# Patient Record
Sex: FEMALE | Race: OTHER | NOT HISPANIC OR LATINO | URBAN - METROPOLITAN AREA
[De-identification: names, ages, dates, MRNs, and addresses within clinical notes are randomized per-mention and may not be internally consistent; named-entity substitution may affect disease eponyms.]

---

## 2018-11-22 ENCOUNTER — EMERGENCY (EMERGENCY)
Facility: HOSPITAL | Age: 73
LOS: 1 days | Discharge: HOME | End: 2018-11-22
Attending: EMERGENCY MEDICINE | Admitting: EMERGENCY MEDICINE
Payer: MEDICARE

## 2018-11-22 VITALS
OXYGEN SATURATION: 100 % | TEMPERATURE: 98 F | DIASTOLIC BLOOD PRESSURE: 72 MMHG | RESPIRATION RATE: 17 BRPM | SYSTOLIC BLOOD PRESSURE: 182 MMHG | HEART RATE: 83 BPM

## 2018-11-22 VITALS
TEMPERATURE: 98 F | SYSTOLIC BLOOD PRESSURE: 194 MMHG | HEART RATE: 80 BPM | OXYGEN SATURATION: 100 % | DIASTOLIC BLOOD PRESSURE: 75 MMHG | RESPIRATION RATE: 18 BRPM

## 2018-11-22 LAB
ALBUMIN SERPL ELPH-MCNC: 4.2 G/DL — SIGNIFICANT CHANGE UP (ref 3.3–5)
ALP SERPL-CCNC: 65 U/L — SIGNIFICANT CHANGE UP (ref 40–120)
ALT FLD-CCNC: 20 U/L — SIGNIFICANT CHANGE UP (ref 4–33)
APPEARANCE UR: CLEAR — SIGNIFICANT CHANGE UP
AST SERPL-CCNC: 30 U/L — SIGNIFICANT CHANGE UP (ref 4–32)
BASE EXCESS BLDV CALC-SCNC: -2.6 MMOL/L — SIGNIFICANT CHANGE UP
BASOPHILS # BLD AUTO: 0.09 K/UL — SIGNIFICANT CHANGE UP (ref 0–0.2)
BASOPHILS NFR BLD AUTO: 0.6 % — SIGNIFICANT CHANGE UP (ref 0–2)
BILIRUB SERPL-MCNC: 0.3 MG/DL — SIGNIFICANT CHANGE UP (ref 0.2–1.2)
BILIRUB UR-MCNC: NEGATIVE — SIGNIFICANT CHANGE UP
BLOOD GAS VENOUS - CREATININE: 1.84 MG/DL — HIGH (ref 0.5–1.3)
BLOOD UR QL VISUAL: NEGATIVE — SIGNIFICANT CHANGE UP
BUN SERPL-MCNC: 35 MG/DL — HIGH (ref 7–23)
CALCIUM SERPL-MCNC: 10.1 MG/DL — SIGNIFICANT CHANGE UP (ref 8.4–10.5)
CHLORIDE BLDV-SCNC: 108 MMOL/L — SIGNIFICANT CHANGE UP (ref 96–108)
CHLORIDE SERPL-SCNC: 105 MMOL/L — SIGNIFICANT CHANGE UP (ref 98–107)
CO2 SERPL-SCNC: 20 MMOL/L — LOW (ref 22–31)
COLOR SPEC: SIGNIFICANT CHANGE UP
CREAT SERPL-MCNC: 1.82 MG/DL — HIGH (ref 0.5–1.3)
EOSINOPHIL # BLD AUTO: 0.29 K/UL — SIGNIFICANT CHANGE UP (ref 0–0.5)
EOSINOPHIL NFR BLD AUTO: 2 % — SIGNIFICANT CHANGE UP (ref 0–6)
GAS PNL BLDV: 141 MMOL/L — SIGNIFICANT CHANGE UP (ref 136–146)
GLUCOSE BLDV-MCNC: 219 — HIGH (ref 70–99)
GLUCOSE SERPL-MCNC: 209 MG/DL — HIGH (ref 70–99)
GLUCOSE UR-MCNC: NEGATIVE — SIGNIFICANT CHANGE UP
HCO3 BLDV-SCNC: 22 MMOL/L — SIGNIFICANT CHANGE UP (ref 20–27)
HCT VFR BLD CALC: 36.3 % — SIGNIFICANT CHANGE UP (ref 34.5–45)
HCT VFR BLDV CALC: 37.3 % — SIGNIFICANT CHANGE UP (ref 34.5–45)
HGB BLD-MCNC: 11.9 G/DL — SIGNIFICANT CHANGE UP (ref 11.5–15.5)
HGB BLDV-MCNC: 12.1 G/DL — SIGNIFICANT CHANGE UP (ref 11.5–15.5)
IMM GRANULOCYTES # BLD AUTO: 0.04 # — SIGNIFICANT CHANGE UP
IMM GRANULOCYTES NFR BLD AUTO: 0.3 % — SIGNIFICANT CHANGE UP (ref 0–1.5)
KETONES UR-MCNC: NEGATIVE — SIGNIFICANT CHANGE UP
LACTATE BLDV-MCNC: 1.5 MMOL/L — SIGNIFICANT CHANGE UP (ref 0.5–2)
LEUKOCYTE ESTERASE UR-ACNC: NEGATIVE — SIGNIFICANT CHANGE UP
LIDOCAIN IGE QN: 113.9 U/L — HIGH (ref 7–60)
LYMPHOCYTES # BLD AUTO: 17.7 % — SIGNIFICANT CHANGE UP (ref 13–44)
LYMPHOCYTES # BLD AUTO: 2.55 K/UL — SIGNIFICANT CHANGE UP (ref 1–3.3)
MCHC RBC-ENTMCNC: 26.9 PG — LOW (ref 27–34)
MCHC RBC-ENTMCNC: 32.8 % — SIGNIFICANT CHANGE UP (ref 32–36)
MCV RBC AUTO: 81.9 FL — SIGNIFICANT CHANGE UP (ref 80–100)
MONOCYTES # BLD AUTO: 0.84 K/UL — SIGNIFICANT CHANGE UP (ref 0–0.9)
MONOCYTES NFR BLD AUTO: 5.8 % — SIGNIFICANT CHANGE UP (ref 2–14)
NEUTROPHILS # BLD AUTO: 10.57 K/UL — HIGH (ref 1.8–7.4)
NEUTROPHILS NFR BLD AUTO: 73.6 % — SIGNIFICANT CHANGE UP (ref 43–77)
NITRITE UR-MCNC: NEGATIVE — SIGNIFICANT CHANGE UP
NRBC # FLD: 0 — SIGNIFICANT CHANGE UP
OB PNL STL: POSITIVE — SIGNIFICANT CHANGE UP
PCO2 BLDV: 43 MMHG — SIGNIFICANT CHANGE UP (ref 41–51)
PH BLDV: 7.34 PH — SIGNIFICANT CHANGE UP (ref 7.32–7.43)
PH UR: 6 — SIGNIFICANT CHANGE UP (ref 5–8)
PLATELET # BLD AUTO: 230 K/UL — SIGNIFICANT CHANGE UP (ref 150–400)
PMV BLD: 11.9 FL — SIGNIFICANT CHANGE UP (ref 7–13)
PO2 BLDV: 42 MMHG — HIGH (ref 35–40)
POTASSIUM BLDV-SCNC: 4.7 MMOL/L — HIGH (ref 3.4–4.5)
POTASSIUM SERPL-MCNC: 4.8 MMOL/L — SIGNIFICANT CHANGE UP (ref 3.5–5.3)
POTASSIUM SERPL-SCNC: 4.8 MMOL/L — SIGNIFICANT CHANGE UP (ref 3.5–5.3)
PROT SERPL-MCNC: 8.6 G/DL — HIGH (ref 6–8.3)
PROT UR-MCNC: NEGATIVE — SIGNIFICANT CHANGE UP
RBC # BLD: 4.43 M/UL — SIGNIFICANT CHANGE UP (ref 3.8–5.2)
RBC # FLD: 12.9 % — SIGNIFICANT CHANGE UP (ref 10.3–14.5)
SAO2 % BLDV: 73.3 % — SIGNIFICANT CHANGE UP (ref 60–85)
SODIUM SERPL-SCNC: 139 MMOL/L — SIGNIFICANT CHANGE UP (ref 135–145)
SP GR SPEC: 1.01 — SIGNIFICANT CHANGE UP (ref 1–1.04)
UROBILINOGEN FLD QL: NORMAL — SIGNIFICANT CHANGE UP
WBC # BLD: 14.38 K/UL — HIGH (ref 3.8–10.5)
WBC # FLD AUTO: 14.38 K/UL — HIGH (ref 3.8–10.5)

## 2018-11-22 PROCEDURE — 99284 EMERGENCY DEPT VISIT MOD MDM: CPT | Mod: GC

## 2018-11-22 PROCEDURE — 74176 CT ABD & PELVIS W/O CONTRAST: CPT | Mod: 26

## 2018-11-22 RX ORDER — CIPROFLOXACIN LACTATE 400MG/40ML
500 VIAL (ML) INTRAVENOUS ONCE
Qty: 0 | Refills: 0 | Status: DISCONTINUED | OUTPATIENT
Start: 2018-11-22 | End: 2018-11-26

## 2018-11-22 RX ORDER — METRONIDAZOLE 500 MG
1 TABLET ORAL
Qty: 21 | Refills: 0
Start: 2018-11-22 | End: 2018-11-28

## 2018-11-22 RX ORDER — SODIUM CHLORIDE 9 MG/ML
1000 INJECTION INTRAMUSCULAR; INTRAVENOUS; SUBCUTANEOUS ONCE
Qty: 0 | Refills: 0 | Status: COMPLETED | OUTPATIENT
Start: 2018-11-22 | End: 2018-11-22

## 2018-11-22 RX ORDER — CIPROFLOXACIN LACTATE 400MG/40ML
1 VIAL (ML) INTRAVENOUS
Qty: 14 | Refills: 0
Start: 2018-11-22 | End: 2018-11-28

## 2018-11-22 RX ORDER — METRONIDAZOLE 500 MG
500 TABLET ORAL ONCE
Qty: 0 | Refills: 0 | Status: DISCONTINUED | OUTPATIENT
Start: 2018-11-22 | End: 2018-11-26

## 2018-11-22 RX ADMIN — SODIUM CHLORIDE 1000 MILLILITER(S): 9 INJECTION INTRAMUSCULAR; INTRAVENOUS; SUBCUTANEOUS at 12:04

## 2018-11-22 NOTE — ED PROVIDER NOTE - MEDICAL DECISION MAKING DETAILS
Blaze: sent in for abdominal pain and GI bleeding. Will evaluate. as pt has some llq pain. pt may need CT.  will check labs for anemia.

## 2018-11-22 NOTE — ED PROVIDER NOTE - OBJECTIVE STATEMENT
73F h/o HTN, DM p/w 2 days of intermittent diffuse abdominal pain, no aggravating/alleviating factors. Last BM was 2 days ago, was red (had eaten preserved mangoes prior to BM). Saw PMD 2 days ago who advised pt to go to the ER. Denies fever, abdominal surgeries, nausea, vomiting, diarrhea, dysuria, hematuria.

## 2018-11-22 NOTE — ED ADULT NURSE NOTE - OBJECTIVE STATEMENT
73F h/o HTN, DM p/w 2 days of intermittent diffuse abdominal pain, no aggravating/alleviating factors. Last BM was 2 days ago, was red (had eaten preserved mangoes prior to BM). Saw PMD 2 days ago who advised pt to go to the ER. Denies fever, abdominal surgeries, nausea, vomiting, diarrhea, dysuria, hematuria.  Abdomen soft and non tender, denies pain at current. Resp even and unlabored. NAD.

## 2018-11-22 NOTE — ED PROVIDER NOTE - NSFOLLOWUPINSTRUCTIONS_ED_ALL_ED_FT
- Follow up with your primary care doctor in 1-2 days.    - Consider seeing a Gastroenterologist, see attached list.   - Bring results with you to the appointment.   - Take Ciprofloxacin 500mg twice a day and Flagyl 500mg three times a day for 7 days.   - Take tylenol 650mg every 6 hours for pain or fever.   - Return to the ED for new or worsening symptoms.

## 2018-11-22 NOTE — ED PROVIDER NOTE - ATTENDING CONTRIBUTION TO CARE
I performed a history and physical exam of the patient and discussed their management with the resident and /or advanced care provider. I reviewed the resident and /or ACP's note and agree with the documented findings and plan of care. My medical decision making and observations are found above.  Abd llq pain. lungs clear

## 2018-11-22 NOTE — ED ADULT NURSE NOTE - NSIMPLEMENTINTERV_GEN_ALL_ED
Implemented All Universal Safety Interventions:  Max to call system. Call bell, personal items and telephone within reach. Instruct patient to call for assistance. Room bathroom lighting operational. Non-slip footwear when patient is off stretcher. Physically safe environment: no spills, clutter or unnecessary equipment. Stretcher in lowest position, wheels locked, appropriate side rails in place.

## 2018-11-22 NOTE — ED ADULT TRIAGE NOTE - CHIEF COMPLAINT QUOTE
Pt states she ate a sam with red dye in it on Tuesday. Yesterday had a bowel movement and saw redness. Pt unsure if its blood or the dye. Co diffuse abdominal pain. On low dose aspirin. PMH: diabetes, htn.

## 2019-01-05 NOTE — ED ADULT NURSE NOTE - CHIEF COMPLAINT
Called by rn to eval right bloody nt. pt denies any tugging/ trauma. color light red. nt flushed easily with ns. keep tubes secure. flush every 8 carlo bloody. Replacement of SPT The patient is a 73y Female complaining of

## 2019-12-21 NOTE — ED ADULT NURSE NOTE - NS ED NOTE ABUSE RESPONSE YN
Please note patient is a poor historian    This is a Please note patient is a poor historian    This is a 63F with history of Schizophrenia, DM2, and COPD who presents to the hospital with complaints of 2 syncopal episodes today. Said that the first episode occurred while she was in her kitchen, fell onto the floor but denied any prodromal symptoms, no cardiac symptoms, and no pulmonary symptoms. A little while later she was outside smoking and then fell again, again denied any prodromal symptoms but did say that she was unable to get up after the second fall and therefore called EMS. Said that she had a similar fall about 2 months ago but otherwise denied any frequent falls. Has long standing dyskinesia 2/2 psychiatric medications but denied that impairing her ability to ambulate. Denied any infectious complaints. Said that she feels well currently.     On arrival to the ED, her vitals were T 97.7, P 114, /67, RR 16, O2 sat 91% RA -> 97% 4L. Her lab work was significant for leukocytosis (seems to have chronic low grade leukocytosis to 14k-15k based on prior labs) without neutrophilia. Her trop was 18 (patient without cardiac/pulmonary complaints. Her CTH and CXR were negative. Her RVP was negative. She was given ceftriaxone 1g IVPB, azithromycin 500mg IVPB, and NS 2L. She was admitted to medicine on telemetry for further evaluation. Please note patient is a poor historian    This is a 63F with history of Schizophrenia, DM2, and COPD who presents to the hospital with complaints of 2 syncopal episodes today. Said that the first episode occurred while she was in her kitchen, fell onto the floor but denied any prodromal symptoms, no cardiac symptoms, and no pulmonary symptoms. A little while later she was outside smoking and then fell again, again denied any prodromal symptoms but did say that she was unable to get up after the second fall and therefore called EMS. Said that she had a similar fall about 2 months ago but otherwise denied any frequent falls. Has long standing dyskinesia 2/2 psychiatric medications but denied that impairing her ability to ambulate. Denied any infectious complaints. Said that she feels well currently.     On arrival to the ED, her vitals were T 97.7, P 114, /67, RR 16, O2 sat 91% RA -> 97% 4L. Her lab work was significant for leukocytosis (seems to have chronic low grade leukocytosis to 14k-15k based on prior labs) without neutrophilia. Her trop was 18 (patient without cardiac/pulmonary complaints. Her CTH and CXR were negative. Her RVP was negative. She was given ceftriaxone 1g IVPB, azithromycin 500mg IVPB, and NS 2L. She was admitted to medicine on telemetry for further evaluation.     Of note, patient said that she gets her medications delivered from her pharmacy but has not received any deliveries in the past 2 weeks. As a result said that she has been without her medications for the past 2 weeks. Yes

## 2020-11-30 NOTE — ED PROVIDER NOTE - CROS ED GI ALL NEG
----- Message from Sherice Granados [facility] on [address] [place] [address] in [place] [phone]  Local or Mail Order:local   Ordering Provider: John  Would the patient rather a call back or a response via [facility]? Call back   Best Call Back Number:[phone]  Additional Information:         - - -

## 2022-04-26 PROBLEM — I10 ESSENTIAL (PRIMARY) HYPERTENSION: Chronic | Status: ACTIVE | Noted: 2018-11-22

## 2022-04-29 ENCOUNTER — APPOINTMENT (OUTPATIENT)
Dept: PAIN MANAGEMENT | Facility: CLINIC | Age: 77
End: 2022-04-29
Payer: OTHER MISCELLANEOUS

## 2022-04-29 VITALS — HEIGHT: 64 IN | WEIGHT: 190 LBS | BODY MASS INDEX: 32.44 KG/M2

## 2022-04-29 DIAGNOSIS — Z86.39 PERSONAL HISTORY OF OTHER ENDOCRINE, NUTRITIONAL AND METABOLIC DISEASE: ICD-10-CM

## 2022-04-29 DIAGNOSIS — Z86.79 PERSONAL HISTORY OF OTHER DISEASES OF THE CIRCULATORY SYSTEM: ICD-10-CM

## 2022-04-29 DIAGNOSIS — M54.50 LOW BACK PAIN, UNSPECIFIED: ICD-10-CM

## 2022-04-29 DIAGNOSIS — Z87.09 PERSONAL HISTORY OF OTHER DISEASES OF THE RESPIRATORY SYSTEM: ICD-10-CM

## 2022-04-29 PROBLEM — Z00.00 ENCOUNTER FOR PREVENTIVE HEALTH EXAMINATION: Status: ACTIVE | Noted: 2022-04-29

## 2022-04-29 PROCEDURE — 99214 OFFICE O/P EST MOD 30 MIN: CPT

## 2022-04-29 PROCEDURE — 99072 ADDL SUPL MATRL&STAF TM PHE: CPT

## 2022-04-29 RX ORDER — GLIPIZIDE 10 MG/1
10 TABLET, EXTENDED RELEASE ORAL
Refills: 0 | Status: ACTIVE | COMMUNITY

## 2022-04-29 RX ORDER — LISINOPRIL AND HYDROCHLOROTHIAZIDE TABLETS 20; 12.5 MG/1; MG/1
20-12.5 TABLET ORAL
Refills: 0 | Status: ACTIVE | COMMUNITY

## 2022-04-29 RX ORDER — INSULIN GLARGINE 100 [IU]/ML
INJECTION, SOLUTION SUBCUTANEOUS
Refills: 0 | Status: ACTIVE | COMMUNITY

## 2022-04-29 RX ORDER — NIFEDIPINE 20 MG/1
CAPSULE ORAL
Refills: 0 | Status: ACTIVE | COMMUNITY

## 2022-04-29 RX ORDER — IBUPROFEN 800 MG/1
TABLET, FILM COATED ORAL
Refills: 0 | Status: ACTIVE | COMMUNITY

## 2022-04-29 NOTE — HISTORY OF PRESENT ILLNESS
[Lower back] : lower back [10] : 10 [8] : 8 [Radiating] : radiating [Sharp] : sharp [Tingling] : tingling [] : yes [Constant] : constant [Household chores] : household chores [Leisure] : leisure [Work] : work [Sleep] : sleep [Sitting] : sitting [Standing] : standing [Walking] : walking [Lying in bed] : lying in bed [FreeTextEntry1] : center [FreeTextEntry6] : numbing  [FreeTextEntry7] : right hip leg to the foot

## 2022-04-29 NOTE — PHYSICAL EXAM
[de-identified] : Constitutional; Appears well, no apparent distress\par Ability to communicate: Normal \par Respiratory: non-labored breathing\par Skin: No rash noted\par Head: Normocephalic, atraumatic\par Neck: no visible thyroid enlargement\par Eyes: Extraocular movements intact\par Neurologic: Alert and oriented x3\par Psychiatric: normal mood, affect and behavior \par \par  [] : positive sitting straight leg raise

## 2022-04-29 NOTE — DISCUSSION/SUMMARY
[de-identified] : After discussing various treatment options with the patient including but not limited to oral medications, physical therapy, exercise modalities as well as interventional spinal injections, we have decided with the following plan:\par \par - Continue Home exercises, stretching, activity modification, physical therapy, and conservative care.\par - Recommend Right L4-5, L5-S1 Transforaminal Epidural Steroid Injection under fluoroscopic guidance with image.\par - The risks, benefits and alternatives of the proposed procedure were explained in detail with the patient. The risks outlined include but are not limited to infection, bleeding, post-dural puncture headache, nerve injury, a temporary increase in pain, failure to resolve symptoms, allergic reaction, symptom recurrence, and possible elevation of blood sugar in diabetics. All questions were answered to patient's apparent satisfaction and he/she verbalized an understanding.\par - Patient is presenting with acute/sub-acute radicular pain with impairment in ADLs and functionality.  The pain has not responded to conservative care including NSAID therapy and/or physical therapy.  There is no bleeding tendency, unstable medical condition, or systemic infection.\par - Follow up in 1-2 weeks post injection for re-evaluation.\par

## 2022-05-20 ENCOUNTER — APPOINTMENT (OUTPATIENT)
Dept: PAIN MANAGEMENT | Facility: CLINIC | Age: 77
End: 2022-05-20
Payer: OTHER MISCELLANEOUS

## 2022-05-20 VITALS — BODY MASS INDEX: 30.73 KG/M2 | WEIGHT: 180 LBS | HEIGHT: 64 IN

## 2022-05-20 DIAGNOSIS — M54.50 LOW BACK PAIN, UNSPECIFIED: ICD-10-CM

## 2022-05-20 PROCEDURE — 99072 ADDL SUPL MATRL&STAF TM PHE: CPT

## 2022-05-20 PROCEDURE — 99213 OFFICE O/P EST LOW 20 MIN: CPT

## 2022-05-20 RX ORDER — GABAPENTIN 300 MG/1
300 CAPSULE ORAL 3 TIMES DAILY
Qty: 90 | Refills: 1 | Status: ACTIVE | COMMUNITY
Start: 2022-05-20 | End: 1900-01-01

## 2022-05-20 NOTE — DISCUSSION/SUMMARY
[de-identified] : After discussing various treatment options with the patient including but not limited to oral medications, physical therapy, exercise modalities as well as interventional spinal injections, we have decided with the following plan:\par \par - Continue home exercises, stretching, activity modification, physical therapy, and conservative care.\par - Follow-up as needed.\par - Recommend Gabapentin 300mg TID. Recommend to titrate up gabapentin slowly - first taking one pill at night for 3 days, then increasing to twice a day for another 3 days, and then increasing to 3 times a day. Pt instructed not to drive or operate heavy machinery while on medications.\par \par

## 2022-05-20 NOTE — PHYSICAL EXAM
[de-identified] : Constitutional; Appears well, no apparent distress\par Ability to communicate: Normal \par Respiratory: non-labored breathing\par Skin: No rash noted\par Head: Normocephalic, atraumatic\par Neck: no visible thyroid enlargement\par Eyes: Extraocular movements intact\par Neurologic: Alert and oriented x3\par Psychiatric: normal mood, affect and behavior \par \par  [] : negative sitting straight leg raise

## 2022-05-20 NOTE — HISTORY OF PRESENT ILLNESS
[Lower back] : lower back [Radiating] : radiating [Sharp] : sharp [Tingling] : tingling [Constant] : constant [Household chores] : household chores [Leisure] : leisure [Work] : work [Sleep] : sleep [Sitting] : sitting [Standing] : standing [Walking] : walking [Lying in bed] : lying in bed [10] : 10 [Not working due to injury] : Work status: not working due to injury [] : yes [FreeTextEntry1] : center [FreeTextEntry6] : numbing  [FreeTextEntry7] : right hip leg to the foot  [FreeTextEntry9] : aleve

## 2022-06-13 ENCOUNTER — APPOINTMENT (OUTPATIENT)
Dept: ORTHOPEDIC SURGERY | Facility: CLINIC | Age: 77
End: 2022-06-13

## 2022-08-19 ENCOUNTER — APPOINTMENT (OUTPATIENT)
Dept: ORTHOPEDIC SURGERY | Facility: CLINIC | Age: 77
End: 2022-08-19

## 2022-08-19 VITALS — HEIGHT: 64 IN | BODY MASS INDEX: 30.73 KG/M2 | WEIGHT: 180 LBS

## 2022-08-19 PROCEDURE — 99214 OFFICE O/P EST MOD 30 MIN: CPT

## 2022-08-19 PROCEDURE — 99072 ADDL SUPL MATRL&STAF TM PHE: CPT

## 2022-08-19 RX ORDER — LIDOCAINE 5% 700 MG/1
5 PATCH TOPICAL
Qty: 1 | Refills: 1 | Status: ACTIVE | COMMUNITY
Start: 2022-08-19 | End: 1900-01-01

## 2022-08-19 NOTE — HISTORY OF PRESENT ILLNESS
[Lower back] : lower back [Work related] : work related [10] : 10 [5] : 5 [Constant] : constant [Household chores] : household chores [Sleep] : sleep [Nothing helps with pain getting better] : Nothing helps with pain getting better [Not working due to injury] : Work status: not working due to injury [de-identified] : Patient Complaint -  7/19/94\par 70 yo female with neck and low back pain treated by Dr. Bailey for years. She had a work injury in 1994. She has tried\par PT but states pain returns when she is finished. She takes advil/aleve occasionally. There is pain from her lower back\par down posterior and anterior thighs. She has difficulty walking long distances.\par 12/2/16:  Case DOI 07.19.94\par *MRI Lumbar Spine 11.03.16*\par 1. Compared to prior examination. there is progression of posterior disc herniation, moderate central stenosis and\par bilateral\par exiting nerve root impingement at L3-L4, moderate central stenosis with impingement upon both the exiting nerve roots\par at\par L4-L5 and similar appearing protrusions and disc bulging in the lower thoracic and upper lumbar spine.\par 2. Exaggerated lower lumbar lordosis and grade I anterolisthesis at L4-L5 without acute fracture.\par She will be consulting with pain management. \par She reports continuing lower back pain with radicular pain through lateral thigh > anterior thigh. \par 4/7/17:  Case DOI: 07.19.94\par Pt presents for f/u. She reports that last night she started to experience a great exacerbation of her lower back pain\par and right leg radicular symptoms. She reports that she also experienced muscle spasms. She describes the right\par paralumbar pain and radiating to the right buttock, right lateral thigh and she also experiences pain and paresthesia\par over the top of her right foot.\par She reports that her walking endurance has been limited and she feels she often has to stop to rest to relieve back pain\par and radiating pain over b/l legs.\par 5/1/17:  Case DOI 07.19.94. \par Pt presents for f/u. She reports that overall his pain is less intense. She reports that she was able to obtain some relief\par of her pain after the MDP prescribed at the previous visit. She reports that her pain is variable in intensity and she feels\par that it can be aggravated with changes in the weather. She reports right LE pain has almost resolved, but there are still\par residual left LE radicular symptoms radiating to the 5th toe.\par ____\par 8/30/18: WC Case DOI: 07.19.94\par Pt presents for back. She reports that she has been experiencing and exacerbation of lower back pain. She reports that\par she has been experiencing radiating pain and paresthesias over the right LE.\par 5/30/19: WC Case DOI: 07.19.94\par Pt presents for f/u back. She reports that she has continuing lower back pain which is variable in intensity. She reports\par that she has a new onset of numbness over the toes of both feet which is most present at night when laying down.\par 1/24/20: WC Case DOI: 07.19.94\par Pt presents for routine f/u back. She reports that her condition has remained essentially unchanged in regards to\par location and inciting factors of her pain.\par 10/30/20: WC Case DOI: 07.19.94\par She reports that recently she has been experiencing worsening back pain which can be intense at times. She reports\par that her standing endurance has been greatly limited which is interfering with her ADLs. She also reports an increase in\par radiating symptoms into the right LE. She also reports that when walking in a supermarket, she leans on a shopping cart\par which enables her to continue walking.\par 11/23/20: WC Case DOI: 07.19.94\par *MRI Lumbar Spine 11.06.20 [compared to 2016]*\par Significant interval change at the L2-L3 level where there is broad-based posterior disc herniation contributing to\par impingement upon the right L3, right L2, and left L2 nerve roots with moderate central stenosis and right greater than\par left foraminal narrowing. Otherwise, there is similar appearing multilevel degenerative disc disease with multilevel central\par stenosis and nerve root impingement without evidence of acute fracture. Specifically, there is moderate central stenosis\par and bilateral exiting nerve root impingement at L3-L4, moderate central stenosis with impingement upon the left L5\par nerve root with encroachment upon the right exiting L4 nerve root at L4-L5 and protrusions with disc bulging in lower\par thoracic spine.\par 4/13/21: WC Case DOI: 07.19.94\par Pt presents for f/up back. She reports continuing and worsening lower back pain with pain that intensifies with walking\par and pain that is aggravated with extension. The main complaint is low back pain with lesser pain radiating into both\par posterior thighs. The thigh pain follows a neuroclaudication pattern. The most intense pain is in the lower back and is\par relieved by bending forward, there is a positive grocery cart sign. She was unable to tolerate even a small amount of\par extension due to LBP.\par 11/23/2021: Case DOI: 07.19.94\par Pt presents for f/up back. She reports continuing and worsening lower back pain . Pt reports she did not have injection\par with DR. Choi.\par \par 08/19/22:  Case DOI: 07.19.94\par Pt presents for f/up back. She has followed with Dr. Choi and could not undergo TFESI due to high blood sugar. She reports that recently her lower back pain with radicular symptoms into b/l LEs have been worsening. She reports that her ADLs and walking distance has become more limited. She reports that her radicular symptoms are present while walking, continue to worsen until she is forced to stop to relieve the pain. She presents in the office in a wheelchair today.  She also reports n/t throughout b/l LE.  [] : no [FreeTextEntry1] : Back  [FreeTextEntry3] : 07/19/1994 [FreeTextEntry7] : downward to left hip and leg  [de-identified] : Movement  [de-identified] : 3 months ago  [de-identified] : Exercise at home

## 2022-08-19 NOTE — DISCUSSION/SUMMARY
[de-identified] : 77f with worsening radicular / stenosis and neuroclaudication symptoms. Last MRi was done in 2020, will eval with new MRI for HNP / stenosis\par Advised for pt to consult with spine specialist after MRI\par Rx for lidoderm patch today. \par

## 2022-08-21 ENCOUNTER — FORM ENCOUNTER (OUTPATIENT)
Age: 77
End: 2022-08-21

## 2022-08-22 ENCOUNTER — APPOINTMENT (OUTPATIENT)
Dept: MRI IMAGING | Facility: CLINIC | Age: 77
End: 2022-08-22

## 2022-08-22 PROCEDURE — 72148 MRI LUMBAR SPINE W/O DYE: CPT

## 2022-08-22 PROCEDURE — 99072 ADDL SUPL MATRL&STAF TM PHE: CPT

## 2022-09-06 ENCOUNTER — APPOINTMENT (OUTPATIENT)
Dept: ORTHOPEDIC SURGERY | Facility: CLINIC | Age: 77
End: 2022-09-06

## 2022-09-13 ENCOUNTER — APPOINTMENT (OUTPATIENT)
Dept: ORTHOPEDIC SURGERY | Facility: CLINIC | Age: 77
End: 2022-09-13

## 2023-03-01 ENCOUNTER — FORM ENCOUNTER (OUTPATIENT)
Age: 78
End: 2023-03-01

## 2023-04-10 ENCOUNTER — APPOINTMENT (OUTPATIENT)
Dept: ORTHOPEDIC SURGERY | Facility: CLINIC | Age: 78
End: 2023-04-10
Payer: OTHER MISCELLANEOUS

## 2023-04-10 PROCEDURE — 99214 OFFICE O/P EST MOD 30 MIN: CPT

## 2023-04-10 RX ORDER — IBUPROFEN 600 MG/1
600 TABLET, FILM COATED ORAL 3 TIMES DAILY
Qty: 90 | Refills: 1 | Status: ACTIVE | COMMUNITY
Start: 2023-04-10 | End: 1900-01-01

## 2023-04-10 NOTE — IMAGING
[de-identified] : Thoracic/Lumbar Spine exam: Inspection of the thoracic/lumbar spine is as follows: Able to heel stand. Ankles are zero\par and symmetrical / Knees 1+ symmetrical EHL strength is normal b/l. No 1st web sensory deficit. SLR negative b/l.\par Forward flexion is approximately 80 degrees and reproduces lower back pain and pain into the posterior right LE.

## 2023-04-10 NOTE — DISCUSSION/SUMMARY
[Medication Risks Reviewed] : Medication risks reviewed [de-identified] : 1) I reviewed and discussed Lumbar Spine MRI results with the patient.\par 2) I discussed the risks, benefits and alternatives of treatment options for the Lumbar spine, including activity modification, rest, home exercise, oral antiinflammatory medication, physical therapy, surgery. \par 3) I recommend that the patient consult with spine sub-specialist Dr. Richards regarding possible spinal decompression surgery. \par 4) **  Rx Ibuprofen 600 mg 1TID PRN MDD 3. The patient may combine each dose of this medication with 1 tablet of OTC Tylenol. \par \par \par The patient will continue with conservative treatment as described above, and will F/U after consultation with spine specialist. \par \par \par The patient was advised of the diagnosis.  The natural history of the pathology was explained in full to the patient in layman's terms, including but not limited to the risks, symptoms and available options for treatment.  We discussed the risks, benefits and alternatives of the treatment options and the advice I provided to the patient as listed above.  Pt was given the opportunity to ask questions, and all questions were answered.  The discussion was not limited to the above.\par \par Entered by Mihaela Zarco acting as scribe. \par \par  \par

## 2023-04-10 NOTE — HISTORY OF PRESENT ILLNESS
[Lower back] : lower back [Work related] : work related [10] : 10 [5] : 5 [Constant] : constant [Household chores] : household chores [Nothing helps with pain getting better] : Nothing helps with pain getting better [Sleep] : sleep [Not working due to injury] : Work status: not working due to injury [de-identified] : Patient Complaint -  7/19/94\par 70 yo female with neck and low back pain treated by Dr. Bailey for years. She had a work injury in 1994. She has tried\par PT but states pain returns when she is finished. She takes advil/aleve occasionally. There is pain from her lower back\par down posterior and anterior thighs. She has difficulty walking long distances.\par 12/2/16:  Case DOI 07.19.94\par *MRI Lumbar Spine 11.03.16*\par 1. Compared to prior examination. there is progression of posterior disc herniation, moderate central stenosis and\par bilateral\par exiting nerve root impingement at L3-L4, moderate central stenosis with impingement upon both the exiting nerve roots\par at\par L4-L5 and similar appearing protrusions and disc bulging in the lower thoracic and upper lumbar spine.\par 2. Exaggerated lower lumbar lordosis and grade I anterolisthesis at L4-L5 without acute fracture.\par She will be consulting with pain management. \par She reports continuing lower back pain with radicular pain through lateral thigh > anterior thigh. \par 4/7/17:  Case DOI: 07.19.94\par Pt presents for f/u. She reports that last night she started to experience a great exacerbation of her lower back pain\par and right leg radicular symptoms. She reports that she also experienced muscle spasms. She describes the right\par paralumbar pain and radiating to the right buttock, right lateral thigh and she also experiences pain and paresthesia\par over the top of her right foot.\par She reports that her walking endurance has been limited and she feels she often has to stop to rest to relieve back pain\par and radiating pain over b/l legs.\par 5/1/17:  Case DOI 07.19.94. \par Pt presents for f/u. She reports that overall his pain is less intense. She reports that she was able to obtain some relief\par of her pain after the MDP prescribed at the previous visit. She reports that her pain is variable in intensity and she feels\par that it can be aggravated with changes in the weather. She reports right LE pain has almost resolved, but there are still\par residual left LE radicular symptoms radiating to the 5th toe.\par ____\par 8/30/18: WC Case DOI: 07.19.94\par Pt presents for back. She reports that she has been experiencing and exacerbation of lower back pain. She reports that\par she has been experiencing radiating pain and paresthesias over the right LE.\par 5/30/19: WC Case DOI: 07.19.94\par Pt presents for f/u back. She reports that she has continuing lower back pain which is variable in intensity. She reports\par that she has a new onset of numbness over the toes of both feet which is most present at night when laying down.\par 1/24/20: WC Case DOI: 07.19.94\par Pt presents for routine f/u back. She reports that her condition has remained essentially unchanged in regards to\par location and inciting factors of her pain.\par 10/30/20: WC Case DOI: 07.19.94\par She reports that recently she has been experiencing worsening back pain which can be intense at times. She reports\par that her standing endurance has been greatly limited which is interfering with her ADLs. She also reports an increase in\par radiating symptoms into the right LE. She also reports that when walking in a supermarket, she leans on a shopping cart\par which enables her to continue walking.\par 11/23/20: WC Case DOI: 07.19.94\par *MRI Lumbar Spine 11.06.20 [compared to 2016]*\par Significant interval change at the L2-L3 level where there is broad-based posterior disc herniation contributing to\par impingement upon the right L3, right L2, and left L2 nerve roots with moderate central stenosis and right greater than\par left foraminal narrowing. Otherwise, there is similar appearing multilevel degenerative disc disease with multilevel central\par stenosis and nerve root impingement without evidence of acute fracture. Specifically, there is moderate central stenosis\par and bilateral exiting nerve root impingement at L3-L4, moderate central stenosis with impingement upon the left L5\par nerve root with encroachment upon the right exiting L4 nerve root at L4-L5 and protrusions with disc bulging in lower\par thoracic spine.\par 4/13/21: WC Case DOI: 07.19.94\par Pt presents for f/up back. She reports continuing and worsening lower back pain with pain that intensifies with walking\par and pain that is aggravated with extension. The main complaint is low back pain with lesser pain radiating into both\par posterior thighs. The thigh pain follows a neuroclaudication pattern. The most intense pain is in the lower back and is\par relieved by bending forward, there is a positive grocery cart sign. She was unable to tolerate even a small amount of\par extension due to LBP.\par 11/23/2021: Case DOI: 07.19.94\par Pt presents for f/up back. She reports continuing and worsening lower back pain . Pt reports she did not have injection\par with DR. Choi.\par \par 08/19/22:  Case DOI: 07.19.94\par Pt presents for f/up back. She has followed with Dr. Choi and could not undergo TFESI due to high blood sugar. She reports that recently her lower back pain with radicular symptoms into b/l LEs have been worsening. She reports that her ADLs and walking distance has become more limited. She reports that her radicular symptoms are present while walking, continue to worsen until she is forced to stop to relieve the pain. She presents in the office in a wheelchair today.  She also reports n/t throughout b/l LE. \par \par 04/10/2023:   Case DOI: 07.19.94\par Pt presents for a follow up for the back. She reports that she is doing worse since the last visit, and she is doing physical therapy at home. The pain follows a neural claudication pattern, and limits her standing time so that she cannot cook without sitting down. Her walking distance is limited to approximately 30 feet at which she has to pause to allow the pain in both legs to subside.\par \par **MRI of the Lumbar Spine taken on 08/22/2022 at [facility]**\par IMPRESSION:\par 1. Multilevel lumbar degenerative disc disease most pronounced at L2-3 as well as multilevel facet osteoarthrosis \par with a mild degenerative levoscoliosis and grade I degenerative spondylolisthesis at L4-5.\par 2. Disc herniation T11-12 without compression of the conus medullaris.\par 3. Disc herniation at L1-2 without stenosis or nerve root compression.\par 4. Significant central stenosis at L2-3 as well as compression of the right L2 nerve root in the neural foramen.\par 5. Significant central stenosis at L3-4 as well as compression of the left L4 nerve root in the lateral recess.\par 6. Grade I degenerative spondylolisthesis, left foraminal disc herniation and posterior element degenerative changes at L4-5 with moderate central stenosis as well as compression of the left L4 nerve root in the neural foramen.\par 7. Modic type II endplate changes adjacent to the L2-3 disc.\par 8. No significant interval change when comparing this study with the most recent MR examination dated November 6, 2020. [] : no [FreeTextEntry1] : Back  [FreeTextEntry3] : 07/19/1994 [FreeTextEntry5] : PT states she is doing worse since her last visit.  Pt states she does physical therapy at home.   [FreeTextEntry7] : downward to left hip and leg  [de-identified] : Movement  [de-identified] : 3 months ago  [de-identified] : Exercise at home

## 2023-05-01 ENCOUNTER — APPOINTMENT (OUTPATIENT)
Dept: ORTHOPEDIC SURGERY | Facility: CLINIC | Age: 78
End: 2023-05-01
Payer: OTHER MISCELLANEOUS

## 2023-05-01 VITALS — WEIGHT: 180 LBS | BODY MASS INDEX: 30.73 KG/M2 | HEIGHT: 64 IN

## 2023-05-01 DIAGNOSIS — Z78.9 OTHER SPECIFIED HEALTH STATUS: ICD-10-CM

## 2023-05-01 PROCEDURE — 99215 OFFICE O/P EST HI 40 MIN: CPT

## 2023-05-01 PROCEDURE — 72110 X-RAY EXAM L-2 SPINE 4/>VWS: CPT

## 2023-05-01 NOTE — REVIEW OF SYSTEMS
Patient is currently at [facility]. [Joint Pain] : joint pain [Joint Stiffness] : joint stiffness [Negative] : Heme/Lymph

## 2023-05-01 NOTE — PHYSICAL EXAM
[Flexion] : flexion [Extension] : extension [4___] : left plantor flexors 4[unfilled]/5 [5___] : right extensor hallicus longus 5[unfilled]/5 [] : no lumbar paraspinal tenderness [FreeTextEntry9] : bilateral radicular complaints are reproduced with extension [de-identified] : unable to heel walk bilaterally; able to toe walk [de-identified] : top of foot numbness with decreased sensation to touch

## 2023-05-01 NOTE — ASSESSMENT
[FreeTextEntry1] : mri reviewed, lumbar slip and stenosis with neurogenic claudication - has done almost 30 years of conservative mgmt including meds and PT; most recent PT has not been helpful in giving sustained relief; RONEY attempted last year but was aborted d/t blood pressure and dm concernes; if surgery were to be pursued it would require a 2-3 level laminectomy and fusion - Laminectomy from the bottom half of L2 through L5 with posterior instrumentation ; The L4-5 facet has subluxed and the laminectomy needed would require stabilization with instrumentation at that area; there is degen scoliosis and there needs to be a low threshold to instrument;  other option would be decompress where needed and stabilize the slipped , 45 level, could always stabilize the 234 levels with interbody support;  \par \par Progress note completed by Dara Romano PA-C under the supervision of Wellington Richards MD

## 2023-05-01 NOTE — HISTORY OF PRESENT ILLNESS
[Lower back] : lower back [Sudden] : sudden [10] : 10 [Shooting] : shooting [Constant] : constant [Sleep] : sleep [Nothing helps with pain getting better] : Nothing helps with pain getting better [Walking] : walking [de-identified] :  7/19/1994\par \par 79 yo F presenting with low back and rodo leg pain x years after lifting patient in the bathroom and felt instant back pain. pain radiates down both leg into the back of the legs with numbness in the top of the feet. Pain with prolonged walking. Using cane. Ok with sitting. Has doing extensive PT over the years that has only provided temporary relief. taking ibuprofen and Tylenol with temporary relief. no bb incontinence. Saw Dr. Choi early last year, was scheduled for RONEY but canceled d/t HTN and elevated blood sugar. no prior spine surgeries. \par \par DM, last A1C around 7. oow since injury [] : no [FreeTextEntry5] : MARYAM OCHOA is a 78 year old female presenting with low back pain that started years ago.  Shooting down b/l legs, Difficulty walking. Ambulates w/cane. Saw Baryr/ had MRI L spine completed 2022. Tried PT; did not help.  [FreeTextEntry7] : b/l legs [de-identified] : MRI L Spine 2022

## 2023-05-08 ENCOUNTER — APPOINTMENT (OUTPATIENT)
Dept: ORTHOPEDIC SURGERY | Facility: CLINIC | Age: 78
End: 2023-05-08
Payer: OTHER MISCELLANEOUS

## 2023-05-08 ENCOUNTER — NON-APPOINTMENT (OUTPATIENT)
Age: 78
End: 2023-05-08

## 2023-05-08 VITALS — WEIGHT: 180 LBS | HEIGHT: 64 IN | BODY MASS INDEX: 30.73 KG/M2

## 2023-05-08 PROCEDURE — 99214 OFFICE O/P EST MOD 30 MIN: CPT

## 2023-05-08 NOTE — DISCUSSION/SUMMARY
[Medication Risks Reviewed] : Medication risks reviewed [de-identified] : 1) I discussed the risks, benefits and alternatives of treatment options for the Lumbar spine, including activity modification, rest, home exercise, oral antiinflammatory medication, physical therapy, surgery. \par 2) ** Continue Rx Ibuprofen 600 mg 1TID PRN MDD 3. The patient may combine each dose of this medication with 1 tablet of OTC Tylenol. \par 3) Pt is describing symptom that are strongly consistent with neuro claudication from lumbar spinal stenosis. She saw Dr. Richards and it was his opinion he will benefit from decompression surgery. I recommend that the patient undergo lumbar decompression for neuro claudication, which greatly limits her standing and walking endurance, with Dr. Richards. \par 4) Ronle Maharaj has been under my care for several years for a disabling problem with a diagnosis of lumbar spinal stenosis. Her walking endurance is no greater than 100 feet. She has received from a handicapped parking permit and when I last examined her in the office on 04/10/23 she remained disabled from the same problem. The lapse in time between the expiration of the last permit and the application for the new one has nothing to do with her degree of disability. This has remained constant over time. \par \par \par The patient will continue with conservative treatment as described above, and will F/U in 2 months. \par \par \par The patient was advised of the diagnosis.  The natural history of the pathology was explained in full to the patient in layman's terms, including but not limited to the risks, symptoms and available options for treatment.  We discussed the risks, benefits and alternatives of the treatment options and the advice I provided to the patient as listed above.  Pt was given the opportunity to ask questions, and all questions were answered.  The discussion was not limited to the above.\par \par Entered by Mihaela Zarco acting as scribe. \par \par  \par

## 2023-05-08 NOTE — IMAGING
[de-identified] : Thoracic/Lumbar Spine exam: Inspection of the thoracic/lumbar spine is as follows: Able to heel stand. Ankles are zero\par and symmetrical / Knees 1+ symmetrical EHL strength is normal b/l. No 1st web sensory deficit. SLR negative b/l.\par Forward flexion is approximately 80 degrees and reproduces lower back pain and pain into the posterior right LE.

## 2023-05-08 NOTE — HISTORY OF PRESENT ILLNESS
[Lower back] : lower back [Work related] : work related [10] : 10 [5] : 5 [Constant] : constant [Household chores] : household chores [Sleep] : sleep [Nothing helps with pain getting better] : Nothing helps with pain getting better [Not working due to injury] : Work status: not working due to injury [de-identified] : Patient Complaint -  7/19/94\par 72 yo female with neck and low back pain treated by Dr. Bailey for years. She had a work injury in 1994. She has tried\par PT but states pain returns when she is finished. She takes advil/aleve occasionally. There is pain from her lower back\par down posterior and anterior thighs. She has difficulty walking long distances.\par 12/2/16:  Case DOI 07.19.94\par *MRI Lumbar Spine 11.03.16*\par 1. Compared to prior examination. there is progression of posterior disc herniation, moderate central stenosis and\par bilateral\par exiting nerve root impingement at L3-L4, moderate central stenosis with impingement upon both the exiting nerve roots\par at\par L4-L5 and similar appearing protrusions and disc bulging in the lower thoracic and upper lumbar spine.\par 2. Exaggerated lower lumbar lordosis and grade I anterolisthesis at L4-L5 without acute fracture.\par She will be consulting with pain management. \par She reports continuing lower back pain with radicular pain through lateral thigh > anterior thigh. \par 4/7/17:  Case DOI: 07.19.94\par Pt presents for f/u. She reports that last night she started to experience a great exacerbation of her lower back pain\par and right leg radicular symptoms. She reports that she also experienced muscle spasms. She describes the right\par paralumbar pain and radiating to the right buttock, right lateral thigh and she also experiences pain and paresthesia\par over the top of her right foot.\par She reports that her walking endurance has been limited and she feels she often has to stop to rest to relieve back pain\par and radiating pain over b/l legs.\par 5/1/17:  Case DOI 07.19.94. \par Pt presents for f/u. She reports that overall his pain is less intense. She reports that she was able to obtain some relief\par of her pain after the MDP prescribed at the previous visit. She reports that her pain is variable in intensity and she feels\par that it can be aggravated with changes in the weather. She reports right LE pain has almost resolved, but there are still\par residual left LE radicular symptoms radiating to the 5th toe.\par ____\par 8/30/18: WC Case DOI: 07.19.94\par Pt presents for back. She reports that she has been experiencing and exacerbation of lower back pain. She reports that\par she has been experiencing radiating pain and paresthesias over the right LE.\par 5/30/19: WC Case DOI: 07.19.94\par Pt presents for f/u back. She reports that she has continuing lower back pain which is variable in intensity. She reports\par that she has a new onset of numbness over the toes of both feet which is most present at night when laying down.\par 1/24/20: WC Case DOI: 07.19.94\par Pt presents for routine f/u back. She reports that her condition has remained essentially unchanged in regards to\par location and inciting factors of her pain.\par 10/30/20: WC Case DOI: 07.19.94\par She reports that recently she has been experiencing worsening back pain which can be intense at times. She reports\par that her standing endurance has been greatly limited which is interfering with her ADLs. She also reports an increase in\par radiating symptoms into the right LE. She also reports that when walking in a supermarket, she leans on a shopping cart\par which enables her to continue walking.\par 11/23/20: WC Case DOI: 07.19.94\par *MRI Lumbar Spine 11.06.20 [compared to 2016]*\par Significant interval change at the L2-L3 level where there is broad-based posterior disc herniation contributing to\par impingement upon the right L3, right L2, and left L2 nerve roots with moderate central stenosis and right greater than\par left foraminal narrowing. Otherwise, there is similar appearing multilevel degenerative disc disease with multilevel central\par stenosis and nerve root impingement without evidence of acute fracture. Specifically, there is moderate central stenosis\par and bilateral exiting nerve root impingement at L3-L4, moderate central stenosis with impingement upon the left L5\par nerve root with encroachment upon the right exiting L4 nerve root at L4-L5 and protrusions with disc bulging in lower\par thoracic spine.\par 4/13/21: WC Case DOI: 07.19.94\par Pt presents for f/up back. She reports continuing and worsening lower back pain with pain that intensifies with walking\par and pain that is aggravated with extension. The main complaint is low back pain with lesser pain radiating into both\par posterior thighs. The thigh pain follows a neuroclaudication pattern. The most intense pain is in the lower back and is\par relieved by bending forward, there is a positive grocery cart sign. She was unable to tolerate even a small amount of\par extension due to LBP.\par 11/23/2021: Case DOI: 07.19.94\par Pt presents for f/up back. She reports continuing and worsening lower back pain . Pt reports she did not have injection\par with DR. Choi.\par \par 08/19/22:  Case DOI: 07.19.94\par Pt presents for f/up back. She has followed with Dr. Choi and could not undergo TFESI due to high blood sugar. She reports that recently her lower back pain with radicular symptoms into b/l LEs have been worsening. She reports that her ADLs and walking distance has become more limited. She reports that her radicular symptoms are present while walking, continue to worsen until she is forced to stop to relieve the pain. She presents in the office in a wheelchair today.  She also reports n/t throughout b/l LE. \par \par 04/10/2023:   Case DOI: 07.19.94\par Pt presents for a follow up for the back. She reports that she is doing worse since the last visit, and she is doing physical therapy at home. The pain follows a neural claudication pattern, and limits her standing time so that she cannot cook without sitting down. Her walking distance is limited to approximately 30 feet at which she has to pause to allow the pain in both legs to subside.\par \par **MRI of the Lumbar Spine taken on 08/22/2022 at [facility]**\par IMPRESSION:\par 1. Multilevel lumbar degenerative disc disease most pronounced at L2-3 as well as multilevel facet osteoarthrosis \par with a mild degenerative levoscoliosis and grade I degenerative spondylolisthesis at L4-5.\par 2. Disc herniation T11-12 without compression of the conus medullaris.\par 3. Disc herniation at L1-2 without stenosis or nerve root compression.\par 4. Significant central stenosis at L2-3 as well as compression of the right L2 nerve root in the neural foramen.\par 5. Significant central stenosis at L3-4 as well as compression of the left L4 nerve root in the lateral recess.\par 6. Grade I degenerative spondylolisthesis, left foraminal disc herniation and posterior element degenerative changes at L4-5 with moderate central stenosis as well as compression of the left L4 nerve root in the neural foramen.\par 7. Modic type II endplate changes adjacent to the L2-3 disc.\par 8. No significant interval change when comparing this study with the most recent MR examination dated November 6, 2020.\par \par 05/08/2023: WC Case DOI: 07.19.94\par Pt reports that she is doing worse since the last visit. She states that she is no longer attending physical therapy and is instead doing home exercises. \par   [] : no [FreeTextEntry1] : Back  [FreeTextEntry3] : 07/19/1994 [FreeTextEntry5] : Pt states she is doing a little worse then the last office visit.  PT states she no longer attends physical therapy at a location.  Pt states she does physical therapy at home.  PT states she tries to do exercises every morning.   [FreeTextEntry7] : downward to left hip and leg  [de-identified] : Movement  [de-identified] : 3 months ago  [de-identified] : Exercise at home

## 2023-05-09 ENCOUNTER — FORM ENCOUNTER (OUTPATIENT)
Age: 78
End: 2023-05-09

## 2023-05-09 ENCOUNTER — APPOINTMENT (OUTPATIENT)
Dept: ORTHOPEDIC SURGERY | Facility: CLINIC | Age: 78
End: 2023-05-09
Payer: OTHER MISCELLANEOUS

## 2023-05-09 VITALS — HEIGHT: 64 IN | WEIGHT: 180 LBS | BODY MASS INDEX: 30.73 KG/M2

## 2023-05-09 PROCEDURE — 99213 OFFICE O/P EST LOW 20 MIN: CPT

## 2023-05-09 NOTE — ASSESSMENT
[FreeTextEntry1] : mri reviewed, lumbar slip and stenosis with neurogenic claudication - has done almost 30 years of conservative mgmt including meds and PT; most recent PT has not been helpful in giving sustained relief; RONEY attempted last year but was aborted d/t blood pressure and dm concernes; if surgery were to be pursued it would require a 2-3 level laminectomy and fusion - Laminectomy from the bottom half of L2 through L5 with posterior instrumentation ; The L4-5 facet has subluxed and the laminectomy needed would require stabilization with instrumentation at that area; there is degen scoliosis and there needs to be a low threshold to instrument;  other option would be decompress where needed and stabilize the slipped , 45 level, could always stabilize the 234 levels with interbody support;  we reviewed the r/b/e and patient opts to move forward with surgery\par \par Progress note completed by Dara Romano PA-C under the supervision of Wellington Richards MD

## 2023-05-09 NOTE — PHYSICAL EXAM
[Flexion] : flexion [Extension] : extension [4___] : left plantor flexors 4[unfilled]/5 [5___] : right extensor hallicus longus 5[unfilled]/5 [] : no lumbar paraspinal tenderness [FreeTextEntry9] : bilateral radicular complaints are reproduced with extension [de-identified] : unable to heel walk bilaterally; able to toe walk [de-identified] : top of foot numbness with decreased sensation to touch

## 2023-05-09 NOTE — HISTORY OF PRESENT ILLNESS
[Lower back] : lower back [Sudden] : sudden [10] : 10 [Shooting] : shooting [Constant] : constant [Sleep] : sleep [Nothing helps with pain getting better] : Nothing helps with pain getting better [Walking] : walking [de-identified] :  7/19/1994\par \par 77 yo F presenting with low back and rodo leg pain x years after lifting patient in the bathroom and felt instant back pain. pain radiates down both leg into the back of the legs with numbness in the top of the feet. Pain with prolonged walking. Using cane. Ok with sitting. Has doing extensive PT over the years that has only provided temporary relief. taking ibuprofen and Tylenol with temporary relief. no bb incontinence. Saw Dr. Choi early last year, was scheduled for RONEY but canceled d/t HTN and elevated blood sugar. no prior spine surgeries. \par \par DM, last A1C around 7. oow since injury [] : no [FreeTextEntry5] : MARYAM OCHOA is a 78 year old female presenting with low back pain that started years ago.  Shooting down b/l legs, Difficulty walking. Ambulates w/cane. Saw Barry/ had MRI L spine completed 2022. Tried PT; did not help.  [FreeTextEntry7] : b/l legs [de-identified] : MRI L Spine 2022

## 2023-06-06 ENCOUNTER — APPOINTMENT (OUTPATIENT)
Dept: ORTHOPEDIC SURGERY | Facility: CLINIC | Age: 78
End: 2023-06-06
Payer: OTHER MISCELLANEOUS

## 2023-06-06 VITALS — WEIGHT: 180 LBS | HEIGHT: 64 IN | BODY MASS INDEX: 30.73 KG/M2

## 2023-06-06 PROCEDURE — 99214 OFFICE O/P EST MOD 30 MIN: CPT

## 2023-06-06 NOTE — ASSESSMENT
[FreeTextEntry1] : mri reviewed, lumbar slip and stenosis with neurogenic claudication - has done almost 30 years of conservative mgmt including meds and PT; most recent PT has not been helpful in giving sustained relief; RONEY attempted last year but was aborted d/t blood pressure and dm concernes; if surgery were to be pursued it would require a 2-3 level laminectomy and fusion - Laminectomy from the bottom half of L2 through L5 with posterior instrumentation ; The L4-5 facet has subluxed and the laminectomy needed would require stabilization with instrumentation at that area; there is degen scoliosis and there needs to be a low threshold to instrument;  other option would be decompress where needed and stabilize the slipped , 45 level, could always stabilize the 234 levels with interbody support;  we reviewed the r/b/e and patient opts to move forward with surgery\par \par

## 2023-06-06 NOTE — HISTORY OF PRESENT ILLNESS
[Lower back] : lower back [Sudden] : sudden [10] : 10 [Shooting] : shooting [Constant] : constant [Sleep] : sleep [Nothing helps with pain getting better] : Nothing helps with pain getting better [Walking] : walking [de-identified] : 6/6/23: Symptoms unchanged since last visit. Reports surgery has been approved. Recently having numbness in LT calf and foot. 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 pain radiates down both leg into the back of the legs with numbness in the top of the feet. Pain with prolonged walking. Using cane. Ok with sitting. Has doing extensive PT over the years that has only provided temporary relief. taking ibuprofen and Tylenol with temporary relief. no bb incontinence. Saw Dr. Choi early last year, was scheduled for RONEY but canceled d/t HTN and elevated blood sugar. no prior spine surgeries. \par \par DM, last A1C around 7. oow since injury [] : no [FreeTextEntry5] : MARYAM OCHOA is a 78 year old female presenting with low back pain that started years ago.  Shooting down b/l legs, Difficulty walking. Ambulates w/cane. Saw Barry/ had MRI L spine completed 2022. Tried PT; did not help.  [FreeTextEntry7] : b/l legs [de-identified] : MRI L Spine 2022

## 2023-06-06 NOTE — PHYSICAL EXAM
[Flexion] : flexion [Extension] : extension [4___] : left plantor flexors 4[unfilled]/5 [5___] : right extensor hallicus longus 5[unfilled]/5 [] : no lumbar paraspinal tenderness [FreeTextEntry9] : bilateral radicular complaints are reproduced with extension [de-identified] : unable to heel walk bilaterally; able to toe walk [de-identified] : top of foot numbness with decreased sensation to touch

## 2023-06-15 ENCOUNTER — OUTPATIENT (OUTPATIENT)
Dept: OUTPATIENT SERVICES | Facility: HOSPITAL | Age: 78
LOS: 1 days | Discharge: HOME | End: 2023-06-15
Payer: OTHER MISCELLANEOUS

## 2023-06-15 VITALS
HEIGHT: 63 IN | TEMPERATURE: 98 F | HEART RATE: 70 BPM | OXYGEN SATURATION: 99 % | RESPIRATION RATE: 17 BRPM | DIASTOLIC BLOOD PRESSURE: 77 MMHG | WEIGHT: 172.18 LBS | SYSTOLIC BLOOD PRESSURE: 134 MMHG

## 2023-06-15 DIAGNOSIS — M41.80 OTHER FORMS OF SCOLIOSIS, SITE UNSPECIFIED: ICD-10-CM

## 2023-06-15 DIAGNOSIS — M48.062 SPINAL STENOSIS, LUMBAR REGION WITH NEUROGENIC CLAUDICATION: ICD-10-CM

## 2023-06-15 DIAGNOSIS — M54.16 RADICULOPATHY, LUMBAR REGION: ICD-10-CM

## 2023-06-15 DIAGNOSIS — Z01.818 ENCOUNTER FOR OTHER PREPROCEDURAL EXAMINATION: ICD-10-CM

## 2023-06-15 DIAGNOSIS — M43.17 SPONDYLOLISTHESIS, LUMBOSACRAL REGION: ICD-10-CM

## 2023-06-15 DIAGNOSIS — Z98.890 OTHER SPECIFIED POSTPROCEDURAL STATES: Chronic | ICD-10-CM

## 2023-06-15 LAB
A1C WITH ESTIMATED AVERAGE GLUCOSE RESULT: 8.2 % — HIGH (ref 4–5.6)
ALBUMIN SERPL ELPH-MCNC: 3.3 G/DL — SIGNIFICANT CHANGE UP (ref 3.3–5)
ALP SERPL-CCNC: 71 U/L — SIGNIFICANT CHANGE UP (ref 40–120)
ALT FLD-CCNC: 24 U/L — SIGNIFICANT CHANGE UP (ref 12–78)
ANION GAP SERPL CALC-SCNC: 8 MMOL/L — SIGNIFICANT CHANGE UP (ref 5–17)
APTT BLD: 28.8 SEC — SIGNIFICANT CHANGE UP (ref 27.5–35.5)
AST SERPL-CCNC: 16 U/L — SIGNIFICANT CHANGE UP (ref 15–37)
BASOPHILS # BLD AUTO: 0.08 K/UL — SIGNIFICANT CHANGE UP (ref 0–0.2)
BASOPHILS NFR BLD AUTO: 0.8 % — SIGNIFICANT CHANGE UP (ref 0–2)
BILIRUB SERPL-MCNC: 0.2 MG/DL — SIGNIFICANT CHANGE UP (ref 0.2–1.2)
BUN SERPL-MCNC: 51 MG/DL — HIGH (ref 7–23)
CALCIUM SERPL-MCNC: 9.9 MG/DL — SIGNIFICANT CHANGE UP (ref 8.5–10.1)
CHLORIDE SERPL-SCNC: 113 MMOL/L — HIGH (ref 96–108)
CO2 SERPL-SCNC: 20 MMOL/L — LOW (ref 22–31)
CREAT SERPL-MCNC: 2.76 MG/DL — HIGH (ref 0.5–1.3)
EGFR: 17 ML/MIN/1.73M2 — LOW
EOSINOPHIL # BLD AUTO: 0.51 K/UL — HIGH (ref 0–0.5)
EOSINOPHIL NFR BLD AUTO: 5.4 % — SIGNIFICANT CHANGE UP (ref 0–6)
ESTIMATED AVERAGE GLUCOSE: 189 MG/DL — HIGH (ref 68–114)
GLUCOSE SERPL-MCNC: 243 MG/DL — HIGH (ref 70–99)
HCT VFR BLD CALC: 34.8 % — SIGNIFICANT CHANGE UP (ref 34.5–45)
HGB BLD-MCNC: 11.3 G/DL — LOW (ref 11.5–15.5)
IMM GRANULOCYTES NFR BLD AUTO: 0.4 % — SIGNIFICANT CHANGE UP (ref 0–0.9)
INR BLD: 1 RATIO — SIGNIFICANT CHANGE UP (ref 0.88–1.16)
LYMPHOCYTES # BLD AUTO: 3.02 K/UL — SIGNIFICANT CHANGE UP (ref 1–3.3)
LYMPHOCYTES # BLD AUTO: 31.7 % — SIGNIFICANT CHANGE UP (ref 13–44)
MCHC RBC-ENTMCNC: 26.8 PG — LOW (ref 27–34)
MCHC RBC-ENTMCNC: 32.5 G/DL — SIGNIFICANT CHANGE UP (ref 32–36)
MCV RBC AUTO: 82.5 FL — SIGNIFICANT CHANGE UP (ref 80–100)
MONOCYTES # BLD AUTO: 0.55 K/UL — SIGNIFICANT CHANGE UP (ref 0–0.9)
MONOCYTES NFR BLD AUTO: 5.8 % — SIGNIFICANT CHANGE UP (ref 2–14)
MRSA PCR RESULT.: SIGNIFICANT CHANGE UP
NEUTROPHILS # BLD AUTO: 5.33 K/UL — SIGNIFICANT CHANGE UP (ref 1.8–7.4)
NEUTROPHILS NFR BLD AUTO: 55.9 % — SIGNIFICANT CHANGE UP (ref 43–77)
NRBC # BLD: 0 /100 WBCS — SIGNIFICANT CHANGE UP (ref 0–0)
PLATELET # BLD AUTO: 245 K/UL — SIGNIFICANT CHANGE UP (ref 150–400)
POTASSIUM SERPL-MCNC: 4.9 MMOL/L — SIGNIFICANT CHANGE UP (ref 3.5–5.3)
POTASSIUM SERPL-SCNC: 4.9 MMOL/L — SIGNIFICANT CHANGE UP (ref 3.5–5.3)
PROT SERPL-MCNC: 8.1 GM/DL — SIGNIFICANT CHANGE UP (ref 6–8.3)
PROTHROM AB SERPL-ACNC: 12 SEC — SIGNIFICANT CHANGE UP (ref 10.5–13.4)
RBC # BLD: 4.22 M/UL — SIGNIFICANT CHANGE UP (ref 3.8–5.2)
RBC # FLD: 13.8 % — SIGNIFICANT CHANGE UP (ref 10.3–14.5)
S AUREUS DNA NOSE QL NAA+PROBE: SIGNIFICANT CHANGE UP
SODIUM SERPL-SCNC: 141 MMOL/L — SIGNIFICANT CHANGE UP (ref 135–145)
VIT D25+D1,25 OH+D1,25 PNL SERPL-MCNC: 12.7 PG/ML — LOW (ref 19.9–79.3)
WBC # BLD: 9.53 K/UL — SIGNIFICANT CHANGE UP (ref 3.8–10.5)
WBC # FLD AUTO: 9.53 K/UL — SIGNIFICANT CHANGE UP (ref 3.8–10.5)

## 2023-06-15 PROCEDURE — 93010 ELECTROCARDIOGRAM REPORT: CPT

## 2023-06-15 NOTE — H&P PST ADULT - NSANTHOSAYNRD_GEN_A_CORE
No. ADITI screening performed.  STOP BANG Legend: 0-2 = LOW Risk; 3-4 = INTERMEDIATE Risk; 5-8 = HIGH Risk

## 2023-06-15 NOTE — H&P PST ADULT - PROBLEM SELECTOR PLAN 1
Laminectomy L2-4 and posterior spinal fusion L2-5 on 6/30/23 with Dr Richards.    Labs - CBC, BMP, PT/PTT, T&S, nose culture and EKG done   Medical eval advised  Preop 3 day antibacterial wash  instruction reviewed and given, MRSA and MSSA screening done today.  Avoid NSAID and OTC supplements for 7 days  Continue all prescribed meds as instructed  NPO after 11pm the day before surgery. Take medicines as advised the morning of surgery with sips of water.   Not Vaccinated for covid.   verbalized understanding of all instructions.

## 2023-06-15 NOTE — H&P PST ADULT - DIAGNOSTICS OTHER REVIEWED
Hospitalist Progress Note Power Rodas MD 
Please call  and page for questions. Call physician on-call through the  7pm-7am 
 
Daily Progress Note: 6/18/2019 Primary care Nancy Alfred MD 
 
Date of admission: 5/31/2019  7:03 PM 
 
Admission summery and hospital course: 
70-year-old man with past medical history significant for chronic systolic congestive heart failure, dyslipidemia, hypertension, atrial fibrillation status post cardioversion, was in his usual state of health until the day of presentation at the emergency room when the patient developed worsening of his shortness of breath.  The inpatient underwent cardioversion early this morning by his cardiologist. 
Everlina Backers started on 06/05. 
  
Subjective:  
 
Patient is seen and examined at bedside. Family present. He just underwent RHC and cardioversion. Tolerated well. No complaints. Assessment/Plan:  
Acute-on-chronic systolic congestive heart failure NYHA 2-3  
JACQUE on 05/31 with EF of 21%-25%.      
Entresto hold for low BP Continue coreg, CTA of the chest is negative for pulmonary embolism.  Appreciated Dr Nielson/Dr Olvera input. AHF team on board Patient off milrinone and bumex drip RHC done 6/18 - elevated pressures C/w bumex IV bid and po aldactone 
  
Atrial fibrillation, status post cardioversion 6/18   
Aflutter - reverted to afib - c/w amiodarone and Eliquis for anticoagulation.  
 
LBBB - need upgrade to BiV ICD/pacer perhaps later this week per cardiology 
  
Hypertension. stable . C/w coreg Acute kidney injury on CKD   
Creatinine worsening -  Most likely as a result of the diuretic therapy and or entresto.   
- Renal US on 06/05 was normal.  
- avoid nephrotoxins - pt cardiac cath today 
- will monitor Anemia-iron deficient - hb stable UTI:  
Afebrile. Completed Rocephin.  
Ucx c/w Enterobacter Cloacae Urinary retention- resolved, off neal Insomnia: melatonin. 
  
VTE prophylaxis: On Eliquis.  
Code status: Full Discussed plan of care with Patient/Family and Nurse.  
Pre-admission lived at home. Discharge planning: pending TBD Review of Systems:  
 
Review of Systems: 
Symptom  Y/N  Comments   Symptom  Y/N  Comments Fever/Chills   n   Chest Pain  n   
Poor Appetite  n    Edema  n    
Cough  n   Abdominal Pain  n    
Sputum  n   Joint Pain SOB/DOUGLAS  y Improved   Pruritis/Rash Nausea/vomit  n   Tolerating PT/OT Diarrhea     Tolerating Diet  y Constipation     Other Could not obtain due to:    
 
 
Objective:  
Physical Exam:  
 
Visit Vitals /66 (BP 1 Location: Right arm, BP Patient Position: At rest) Pulse 75 Temp 98 °F (36.7 °C) Resp 25 Wt 96.4 kg (212 lb 8.4 oz) SpO2 90% BMI 27.29 kg/m² O2 Flow Rate (L/min): 2 l/min O2 Device: Room air Temp (24hrs), Av °F (36.7 °C), Min:97.8 °F (36.6 °C), Max:98.3 °F (36.8 °C) 
  701 - 1900 In: -  
Out: 150 [Urine:150]   1901 -  0700 In: 1060 [P.O.:1060] Out: 2450 [Urine:2450] Stable exam.2019 General:  Alert, cooperative, no distress, appears stated age. Lungs:     clear eldon bases Heart:  Regular rate and rhythm, S1, S2 normal, no murmur.   
Abdomen:   Soft, non-tender. Bowel sounds normal.   
Extremities: Extremities normal, atraumatic, no cyanosis. Edema at LEs. Skin: Skin color, texture, turgor normal. No rashes or lesions Neurologic: Grossly normal.   
  
Data Review:  
   
Recent Days: 
Recent Labs  
  19 
0212 19 
0345 19 
0507 WBC 5.2 5.8 5.7 HGB 10.9* 10.8* 11.6* HCT 33.8* 33.2* 35.6*  296 267 Recent Labs  
  19 
0211 19 
0345 19 
0510 * 136 137  
K 3.9 3.8 4.2 CL 97 97 98 CO2 31 33* 32  
GLU 98 95 92 BUN 34* 32* 31* CREA 2.21* 2.13* 1.93* CA 9.1 9.1 9.0 MG 1.0* 2.1 2.1 ALB 2.8* 2.7* 2.7* SGOT 16 14* 15 ALT 23 21 22 No results for input(s): PH, PCO2, PO2, HCO3, FIO2 in the last 72 hours. 24 Hour Results: 
Recent Results (from the past 24 hour(s)) NT-PRO BNP Collection Time: 06/18/19  2:11 AM  
Result Value Ref Range NT pro-BNP 4,009 (H) <125 PG/ML  
MAGNESIUM Collection Time: 06/18/19  2:11 AM  
Result Value Ref Range Magnesium 1.0 (L) 1.6 - 2.4 mg/dL METABOLIC PANEL, COMPREHENSIVE Collection Time: 06/18/19  2:11 AM  
Result Value Ref Range Sodium 135 (L) 136 - 145 mmol/L Potassium 3.9 3.5 - 5.1 mmol/L Chloride 97 97 - 108 mmol/L  
 CO2 31 21 - 32 mmol/L Anion gap 7 5 - 15 mmol/L Glucose 98 65 - 100 mg/dL BUN 34 (H) 6 - 20 MG/DL Creatinine 2.21 (H) 0.70 - 1.30 MG/DL  
 BUN/Creatinine ratio 15 12 - 20 GFR est AA 36 (L) >60 ml/min/1.73m2 GFR est non-AA 30 (L) >60 ml/min/1.73m2 Calcium 9.1 8.5 - 10.1 MG/DL Bilirubin, total 0.4 0.2 - 1.0 MG/DL  
 ALT (SGPT) 23 12 - 78 U/L  
 AST (SGOT) 16 15 - 37 U/L Alk. phosphatase 88 45 - 117 U/L Protein, total 6.7 6.4 - 8.2 g/dL Albumin 2.8 (L) 3.5 - 5.0 g/dL Globulin 3.9 2.0 - 4.0 g/dL A-G Ratio 0.7 (L) 1.1 - 2.2 SAMPLES BEING HELD Collection Time: 06/18/19  2:11 AM  
Result Value Ref Range SAMPLES BEING HELD 1PST COMMENT Add-on orders for these samples will be processed based on acceptable specimen integrity and analyte stability, which may vary by analyte. CBC W/O DIFF Collection Time: 06/18/19  2:12 AM  
Result Value Ref Range WBC 5.2 4.1 - 11.1 K/uL  
 RBC 3.63 (L) 4.10 - 5.70 M/uL  
 HGB 10.9 (L) 12.1 - 17.0 g/dL HCT 33.8 (L) 36.6 - 50.3 % MCV 93.1 80.0 - 99.0 FL  
 MCH 30.0 26.0 - 34.0 PG  
 MCHC 32.2 30.0 - 36.5 g/dL  
 RDW 13.6 11.5 - 14.5 % PLATELET 189 941 - 694 K/uL MPV 9.0 8.9 - 12.9 FL  
 NRBC 0.0 0  WBC ABSOLUTE NRBC 0.00 0.00 - 0.01 K/uL EKG, 12 LEAD, INITIAL Collection Time: 06/18/19  5:05 AM  
Result Value Ref Range  Ventricular Rate 71 BPM  
 Atrial Rate 78 BPM  
 QRS Duration 172 ms Q-T Interval 458 ms QTC Calculation (Bezet) 497 ms Calculated R Axis 24 degrees Calculated T Axis 136 degrees Diagnosis Atrial fibrillation Left bundle branch block When compared with ECG of 17-JUN-2019 05:07, No significant change was found Confirmed by Catalina Chapa M.D., Rosa Merida ([id number]) on 6/18/2019 1:11:18 PM 
  
EKG, 12 LEAD, INITIAL Collection Time: 06/18/19 11:07 AM  
Result Value Ref Range Ventricular Rate 69 BPM  
 Atrial Rate 69 BPM  
 P-R Interval 260 ms QRS Duration 164 ms Q-T Interval 506 ms QTC Calculation (Bezet) 542 ms Calculated P Axis 13 degrees Calculated R Axis 36 degrees Calculated T Axis 106 degrees Diagnosis Sinus rhythm with 1st degree AV block with premature atrial complexes Left bundle branch block When compared with ECG of 18-JUN-2019 05:05, Sinus rhythm has replaced Atrial fibrillation Confirmed by Catalina Chapa M.D., Rosa Merida ([id number]) on 6/18/2019 4:31:54 PM 
  
 
 
Problem List: 
Problem List as of 6/18/2019 Date Reviewed: 6/10/2019 Codes Class Noted - Resolved * (Principal) Acute on chronic systolic CHF (congestive heart failure) (HCC) ICD-10-CM: B97.02 ICD-9-CM: 428.23, 428.0  5/31/2019 - Present Paroxysmal atrial fibrillation (HCC) ICD-10-CM: I48.0 ICD-9-CM: 427.31  4/2/2019 - Present Medications reviewed Current Facility-Administered Medications Medication Dose Route Frequency  bumetanide (BUMEX) injection 2 mg  2 mg IntraVENous BID  
 evolocumab (REPATHA SURECLICK) pen injection 576 mg (Patient Supplied)  140 mg SubCUTAneous Q 14 DAYS  spironolactone (ALDACTONE) tablet 25 mg  25 mg Oral DAILY  melatonin tablet 3 mg  3 mg Oral QHS PRN  
 amiodarone (CORDARONE) tablet 200 mg  200 mg Oral BID  
 apixaban (ELIQUIS) tablet 5 mg  5 mg Oral Q12H  aspirin delayed-release tablet 81 mg  81 mg Oral DAILY  carvedilol (COREG) tablet 12.5 mg  12.5 mg Oral BID WITH MEALS  sodium chloride (NS) flush 5-40 mL  5-40 mL IntraVENous Q8H  
 sodium chloride (NS) flush 5-40 mL  5-40 mL IntraVENous PRN  
 ondansetron (ZOFRAN) injection 4 mg  4 mg IntraVENous Q4H PRN  
 bisacodyl (DULCOLAX) tablet 5 mg  5 mg Oral DAILY PRN  
 acetaminophen (TYLENOL) tablet 650 mg  650 mg Oral Q4H PRN  
 morphine injection 2 mg  2 mg IntraVENous Q4H PRN Care Plan discussed with: Patient/Family and Nurse Total time spent with patient: 30 minutes.  
 
Kristene Goodpasture, MD 
 No

## 2023-06-15 NOTE — H&P PST ADULT - HISTORY OF PRESENT ILLNESS
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 Had difficulty getting to the bathroom, use a cane to walk. Takes Tylenol as needed for pain Tried Motrin but did not help. Seen by Dr Richards about a week ago, MRI and xray was done , scheduled for lumbar surgery on 6/30/23. Pre op testing today.  Goal: to walk without pain.  Denies recent travel outside of the country, no covid exposure  Not vaccinated.

## 2023-06-15 NOTE — H&P PST ADULT - NSICDXFAMILYHX_GEN_ALL_CORE_FT
FAMILY HISTORY:  Mother  Still living? No  FH: HTN (hypertension), Age at diagnosis: Age Unknown    Aunt  Still living? No  FH: type 2 diabetes, Age at diagnosis: Age Unknown

## 2023-06-15 NOTE — H&P PST ADULT - ASSESSMENT
lumbar radiculopathy lumbar radiculopathy  CAPRINI SCORE [CLOT]    AGE RELATED RISK FACTORS                                                       MOBILITY RELATED FACTORS  [ ] Age 41-60 years                                            (1 Point)                  [ ] Bed rest                                                        (1 Point)  [ ] Age: 61-74 years                                           (2 Points)                 [ ] Plaster cast                                                   (2 Points)  [ x] Age= 75 years                                              (3 Points)                 [ ] Bed bound for more than 72 hours                 (2 Points)    DISEASE RELATED RISK FACTORS                                               GENDER SPECIFIC FACTORS  [ ] Edema in the lower extremities                       (1 Point)                  [ ] Pregnancy                                                     (1 Point)  [ ] Varicose veins                                               (1 Point)                  [ ] Post-partum < 6 weeks                                   (1 Point)             [x ] BMI > 25 Kg/m2                                            (1 Point)                  [ ] Hormonal therapy  or oral contraception          (1 Point)                 [ ] Sepsis (in the previous month)                        (1 Point)                  [ ] History of pregnancy complications                 (1 point)  [ ] Pneumonia or serious lung disease                                               [ ] Unexplained or recurrent                     (1 Point)           (in the previous month)                               (1 Point)  [ ] Abnormal pulmonary function test                     (1 Point)                 SURGERY RELATED RISK FACTORS  [ ] Acute myocardial infarction                              (1 Point)                 [ ]  Section                                             (1 Point)  [ ] Congestive heart failure (in the previous month)  (1 Point)               [ ] Minor surgery                                                  (1 Point)   [ ] Inflammatory bowel disease                             (1 Point)                 [ ] Arthroscopic surgery                                        (2 Points)  [ ] Central venous access                                      (2 Points)                [ ] General surgery lasting more than 45 minutes   (2 Points)       [ ] Stroke (in the previous month)                          (5 Points)               [x ] Elective arthroplasty                                         (5 Points)                                                                                                                                               HEMATOLOGY RELATED FACTORS                                                 TRAUMA RELATED RISK FACTORS  [ ] Prior episodes of VTE                                     (3 Points)                [ ] Fracture of the hip, pelvis, or leg                       (5 Points)  [ ] Positive family history for VTE                         (3 Points)                 [ ] Acute spinal cord injury (in the previous month)  (5 Points)  [ ] Prothrombin 02758 A                                     (3 Points)                 [ ] Paralysis  (less than 1 month)                             (5 Points)  [ ] Factor V Leiden                                             (3 Points)                  [ ] Multiple Trauma within 1 month                        (5 Points)  [ ] Lupus anticoagulants                                     (3 Points)                                                           [ ] Anticardiolipin antibodies                               (3 Points)                                                       [ ] High homocysteine in the blood                      (3 Points)                                             [ ] Other congenital or acquired thrombophilia      (3 Points)                                                [ ] Heparin induced thrombocytopenia                  (3 Points)                                          Total Score [    9      ]    Caprini Score 0 - 2:  Low Risk, No VTE Prophylaxis required for most patients, encourage ambulation  Caprini Score 3 - 6:  At Risk, pharmacologic VTE prophylaxis is indicated for most patients (in the absence of a contraindication)  Caprini Score Greater than or = 7:  High Risk, pharmacologic VTE prophylaxis is indicated for most patients (in the absence of a contraindication)

## 2023-06-15 NOTE — H&P PST ADULT - NSICDXPASTMEDICALHX_GEN_ALL_CORE_FT
PAST MEDICAL HISTORY:  DM (diabetes mellitus)     HTN (hypertension)      PAST MEDICAL HISTORY:  HTN (hypertension)     T2DM (type 2 diabetes mellitus)

## 2023-07-05 ENCOUNTER — INPATIENT (INPATIENT)
Facility: HOSPITAL | Age: 78
LOS: 3 days | Discharge: HOME | End: 2023-07-09
Attending: INTERNAL MEDICINE | Admitting: INTERNAL MEDICINE
Payer: MEDICARE

## 2023-07-05 VITALS
RESPIRATION RATE: 19 BRPM | WEIGHT: 171.08 LBS | HEIGHT: 62 IN | TEMPERATURE: 98 F | SYSTOLIC BLOOD PRESSURE: 167 MMHG | OXYGEN SATURATION: 100 % | HEART RATE: 68 BPM | DIASTOLIC BLOOD PRESSURE: 72 MMHG

## 2023-07-05 DIAGNOSIS — Z29.9 ENCOUNTER FOR PROPHYLACTIC MEASURES, UNSPECIFIED: ICD-10-CM

## 2023-07-05 DIAGNOSIS — E11.9 TYPE 2 DIABETES MELLITUS WITHOUT COMPLICATIONS: ICD-10-CM

## 2023-07-05 DIAGNOSIS — N17.9 ACUTE KIDNEY FAILURE, UNSPECIFIED: ICD-10-CM

## 2023-07-05 DIAGNOSIS — R42 DIZZINESS AND GIDDINESS: ICD-10-CM

## 2023-07-05 DIAGNOSIS — I10 ESSENTIAL (PRIMARY) HYPERTENSION: ICD-10-CM

## 2023-07-05 DIAGNOSIS — Z98.890 OTHER SPECIFIED POSTPROCEDURAL STATES: Chronic | ICD-10-CM

## 2023-07-05 LAB
ALBUMIN SERPL ELPH-MCNC: 3.6 G/DL — SIGNIFICANT CHANGE UP (ref 3.3–5)
ALP SERPL-CCNC: 60 U/L — SIGNIFICANT CHANGE UP (ref 40–120)
ALT FLD-CCNC: 21 U/L — SIGNIFICANT CHANGE UP (ref 12–78)
ANION GAP SERPL CALC-SCNC: 5 MMOL/L — SIGNIFICANT CHANGE UP (ref 5–17)
APTT BLD: 27.8 SEC — SIGNIFICANT CHANGE UP (ref 27.5–35.5)
AST SERPL-CCNC: 20 U/L — SIGNIFICANT CHANGE UP (ref 15–37)
BASOPHILS # BLD AUTO: 0.09 K/UL — SIGNIFICANT CHANGE UP (ref 0–0.2)
BASOPHILS NFR BLD AUTO: 0.8 % — SIGNIFICANT CHANGE UP (ref 0–2)
BILIRUB SERPL-MCNC: 0.3 MG/DL — SIGNIFICANT CHANGE UP (ref 0.2–1.2)
BUN SERPL-MCNC: 53 MG/DL — HIGH (ref 7–23)
CALCIUM SERPL-MCNC: 10.3 MG/DL — HIGH (ref 8.5–10.1)
CHLORIDE SERPL-SCNC: 114 MMOL/L — HIGH (ref 96–108)
CO2 SERPL-SCNC: 20 MMOL/L — LOW (ref 22–31)
CREAT SERPL-MCNC: 2.82 MG/DL — HIGH (ref 0.5–1.3)
EGFR: 17 ML/MIN/1.73M2 — LOW
EOSINOPHIL # BLD AUTO: 0.04 K/UL — SIGNIFICANT CHANGE UP (ref 0–0.5)
EOSINOPHIL NFR BLD AUTO: 0.3 % — SIGNIFICANT CHANGE UP (ref 0–6)
GLUCOSE BLDC GLUCOMTR-MCNC: 174 MG/DL — HIGH (ref 70–99)
GLUCOSE SERPL-MCNC: 239 MG/DL — HIGH (ref 70–99)
HCT VFR BLD CALC: 34.1 % — LOW (ref 34.5–45)
HGB BLD-MCNC: 11.2 G/DL — LOW (ref 11.5–15.5)
IMM GRANULOCYTES NFR BLD AUTO: 0.3 % — SIGNIFICANT CHANGE UP (ref 0–0.9)
INR BLD: 1 RATIO — SIGNIFICANT CHANGE UP (ref 0.88–1.16)
LYMPHOCYTES # BLD AUTO: 1.56 K/UL — SIGNIFICANT CHANGE UP (ref 1–3.3)
LYMPHOCYTES # BLD AUTO: 13 % — SIGNIFICANT CHANGE UP (ref 13–44)
MCHC RBC-ENTMCNC: 27.3 PG — SIGNIFICANT CHANGE UP (ref 27–34)
MCHC RBC-ENTMCNC: 32.8 G/DL — SIGNIFICANT CHANGE UP (ref 32–36)
MCV RBC AUTO: 83 FL — SIGNIFICANT CHANGE UP (ref 80–100)
MONOCYTES # BLD AUTO: 0.34 K/UL — SIGNIFICANT CHANGE UP (ref 0–0.9)
MONOCYTES NFR BLD AUTO: 2.8 % — SIGNIFICANT CHANGE UP (ref 2–14)
NEUTROPHILS # BLD AUTO: 9.91 K/UL — HIGH (ref 1.8–7.4)
NEUTROPHILS NFR BLD AUTO: 82.8 % — HIGH (ref 43–77)
NRBC # BLD: 0 /100 WBCS — SIGNIFICANT CHANGE UP (ref 0–0)
PLATELET # BLD AUTO: 280 K/UL — SIGNIFICANT CHANGE UP (ref 150–400)
POTASSIUM SERPL-MCNC: 4.2 MMOL/L — SIGNIFICANT CHANGE UP (ref 3.5–5.3)
POTASSIUM SERPL-SCNC: 4.2 MMOL/L — SIGNIFICANT CHANGE UP (ref 3.5–5.3)
PROT SERPL-MCNC: 8.2 GM/DL — SIGNIFICANT CHANGE UP (ref 6–8.3)
PROTHROM AB SERPL-ACNC: 12 SEC — SIGNIFICANT CHANGE UP (ref 10.5–13.4)
RBC # BLD: 4.11 M/UL — SIGNIFICANT CHANGE UP (ref 3.8–5.2)
RBC # FLD: 14 % — SIGNIFICANT CHANGE UP (ref 10.3–14.5)
SODIUM SERPL-SCNC: 139 MMOL/L — SIGNIFICANT CHANGE UP (ref 135–145)
TROPONIN I, HIGH SENSITIVITY RESULT: 17.2 NG/L — SIGNIFICANT CHANGE UP
WBC # BLD: 11.97 K/UL — HIGH (ref 3.8–10.5)
WBC # FLD AUTO: 11.97 K/UL — HIGH (ref 3.8–10.5)

## 2023-07-05 PROCEDURE — 71045 X-RAY EXAM CHEST 1 VIEW: CPT | Mod: 26

## 2023-07-05 PROCEDURE — 93010 ELECTROCARDIOGRAM REPORT: CPT

## 2023-07-05 PROCEDURE — 99285 EMERGENCY DEPT VISIT HI MDM: CPT

## 2023-07-05 PROCEDURE — 70450 CT HEAD/BRAIN W/O DYE: CPT | Mod: 26,MA

## 2023-07-05 PROCEDURE — 70551 MRI BRAIN STEM W/O DYE: CPT | Mod: 26

## 2023-07-05 PROCEDURE — 99222 1ST HOSP IP/OBS MODERATE 55: CPT

## 2023-07-05 RX ORDER — HEPARIN SODIUM 5000 [USP'U]/ML
5000 INJECTION INTRAVENOUS; SUBCUTANEOUS EVERY 12 HOURS
Refills: 0 | Status: DISCONTINUED | OUTPATIENT
Start: 2023-07-05 | End: 2023-07-09

## 2023-07-05 RX ORDER — DEXTROSE 50 % IN WATER 50 %
25 SYRINGE (ML) INTRAVENOUS ONCE
Refills: 0 | Status: DISCONTINUED | OUTPATIENT
Start: 2023-07-05 | End: 2023-07-09

## 2023-07-05 RX ORDER — INSULIN LISPRO 100/ML
VIAL (ML) SUBCUTANEOUS
Refills: 0 | Status: DISCONTINUED | OUTPATIENT
Start: 2023-07-05 | End: 2023-07-09

## 2023-07-05 RX ORDER — SODIUM CHLORIDE 9 MG/ML
1000 INJECTION, SOLUTION INTRAVENOUS
Refills: 0 | Status: COMPLETED | OUTPATIENT
Start: 2023-07-05 | End: 2023-07-05

## 2023-07-05 RX ORDER — DEXTROSE 50 % IN WATER 50 %
12.5 SYRINGE (ML) INTRAVENOUS ONCE
Refills: 0 | Status: DISCONTINUED | OUTPATIENT
Start: 2023-07-05 | End: 2023-07-09

## 2023-07-05 RX ORDER — LISINOPRIL 2.5 MG/1
40 TABLET ORAL DAILY
Refills: 0 | Status: DISCONTINUED | OUTPATIENT
Start: 2023-07-05 | End: 2023-07-09

## 2023-07-05 RX ORDER — GLUCAGON INJECTION, SOLUTION 0.5 MG/.1ML
1 INJECTION, SOLUTION SUBCUTANEOUS ONCE
Refills: 0 | Status: DISCONTINUED | OUTPATIENT
Start: 2023-07-05 | End: 2023-07-09

## 2023-07-05 RX ORDER — NIFEDIPINE 30 MG
60 TABLET, EXTENDED RELEASE 24 HR ORAL DAILY
Refills: 0 | Status: DISCONTINUED | OUTPATIENT
Start: 2023-07-05 | End: 2023-07-09

## 2023-07-05 RX ORDER — SODIUM CHLORIDE 9 MG/ML
1000 INJECTION, SOLUTION INTRAVENOUS
Refills: 0 | Status: DISCONTINUED | OUTPATIENT
Start: 2023-07-05 | End: 2023-07-09

## 2023-07-05 RX ORDER — MECLIZINE HCL 12.5 MG
25 TABLET ORAL THREE TIMES A DAY
Refills: 0 | Status: DISCONTINUED | OUTPATIENT
Start: 2023-07-05 | End: 2023-07-06

## 2023-07-05 RX ORDER — LISINOPRIL 2.5 MG/1
20 TABLET ORAL DAILY
Refills: 0 | Status: DISCONTINUED | OUTPATIENT
Start: 2023-07-05 | End: 2023-07-05

## 2023-07-05 RX ORDER — DEXTROSE 50 % IN WATER 50 %
15 SYRINGE (ML) INTRAVENOUS ONCE
Refills: 0 | Status: DISCONTINUED | OUTPATIENT
Start: 2023-07-05 | End: 2023-07-09

## 2023-07-05 RX ADMIN — Medication 60 MILLIGRAM(S): at 19:21

## 2023-07-05 RX ADMIN — LISINOPRIL 40 MILLIGRAM(S): 2.5 TABLET ORAL at 21:18

## 2023-07-05 RX ADMIN — SODIUM CHLORIDE 100 MILLILITER(S): 9 INJECTION, SOLUTION INTRAVENOUS at 20:28

## 2023-07-05 RX ADMIN — Medication 25 MILLIGRAM(S): at 20:36

## 2023-07-05 NOTE — ED PROVIDER NOTE - OBJECTIVE STATEMENT
78 F pmh HTN, DM presenting to the ED w/ complaints of dizziness. Patient complaining of light-headedness sensation this morning. She states that her sugar level was low and she started sweating. Her symptoms began to alleviate after taking in crackers and coffee. She did not take any of her DM medications today. She has no complaints of room-spinning. occasional tinnitus (hx of polyp removal in R ear)

## 2023-07-05 NOTE — ED ADULT NURSE NOTE - NSFALLRISKINTERV_ED_ALL_ED

## 2023-07-05 NOTE — H&P ADULT - NSHPLABSRESULTS_GEN_ALL_CORE
Recent Vitals  T(C): 36.7 (07-05-23 @ 16:37), Max: 36.8 (07-05-23 @ 10:46)  HR: 71 (07-05-23 @ 16:37) (59 - 71)  BP: 190/74 (07-05-23 @ 16:37) (164/53 - 190/74)  RR: 20 (07-05-23 @ 16:37) (19 - 20)  SpO2: 99% (07-05-23 @ 16:37) (99% - 100%)                        11.2   11.97 )-----------( 280      ( 05 Jul 2023 11:58 )             34.1     07-05    139  |  114<H>  |  53<H>  ----------------------------<  239<H>  4.2   |  20<L>  |  2.82<H>    Ca    10.3<H>      05 Jul 2023 11:58    TPro  8.2  /  Alb  3.6  /  TBili  0.3  /  DBili  x   /  AST  20  /  ALT  21  /  AlkPhos  60  07-05    PT/INR - ( 05 Jul 2023 11:58 )   PT: 12.0 sec;   INR: 1.00 ratio         PTT - ( 05 Jul 2023 11:58 )  PTT:27.8 sec  LIVER FUNCTIONS - ( 05 Jul 2023 11:58 )  Alb: 3.6 g/dL / Pro: 8.2 gm/dL / ALK PHOS: 60 U/L / ALT: 21 U/L / AST: 20 U/L / GGT: x           Urinalysis Basic - ( 05 Jul 2023 11:58 )    Color: x / Appearance: x / SG: x / pH: x  Gluc: 239 mg/dL / Ketone: x  / Bili: x / Urobili: x   Blood: x / Protein: x / Nitrite: x   Leuk Esterase: x / RBC: x / WBC x   Sq Epi: x / Non Sq Epi: x / Bacteria: x        Home Medications:

## 2023-07-05 NOTE — ED PROVIDER NOTE - NEUROLOGICAL, MLM
Alert and oriented, no focal deficits, no motor or sensory deficits. ambulatory w/ assistance (pt uses cane at baseline)

## 2023-07-05 NOTE — ED PROVIDER NOTE - EKG #1 DATE/TIME
05-Jul-2023 10:59
Is This A New Presentation, Or A Follow-Up?: Skin Lesion
What Type Of Note Output Would You Prefer (Optional)?: Standard Output
Has Your Skin Lesion Been Treated?: not been treated

## 2023-07-05 NOTE — ED ADULT NURSE NOTE - NS ED NURSE DISCH DISPOSITION
Strep Throat, Group A Streptococcus  This is a test to determine if a sore throat (pharyngitis) or tonsil infection (tonsillitis) is caused by a Group A streptococcal bacteria (strep throat).   The test identifies Streptococcus pyogenes, known as Group A streptococcus, which are bacteria (a type of germ) that infect the back of the throat and cause the common infection called strep throat.  PREPARATION FOR TEST  A swab is brushed against your throat and tonsils. The swab is tested in your doctor's office or may be sent to a laboratory.  NORMAL FINDINGS  Normal values are negative for strep.  Ranges for normal findings may vary among different laboratories and hospitals. You should always check with your doctor after having lab work or other tests done to discuss the meaning of your test results and whether your values are considered within normal limits.  MEANING OF TEST   A positive rapid test indicates the presence of group A streptococci, the bacteria that cause strep throat. A negative rapid test indicates that you probably do not have strep throat. If negative, your caregiver may have the sample tested by doing a second test called a culture (a test that grows bacteria taking from the throat). This second test is done to be sure that there is no group A strep in your throat. Culture results may take one or two days. Recent antibiotic therapy or gargling with some mouthwashes before the rapid test may make the test wrong.  Your caregiver will go over the test results with you and discuss the importance and meaning of your results, as well as treatment options and the need for additional tests if necessary.  OBTAINING THE TEST RESULTS  It is your responsibility to obtain your test results. Ask the lab or department performing the test when and how you will get your results.  Document Released: 01/20/2006 Document Revised: 03/11/2013 Document Reviewed: 10/13/2009  [employer]® Patient Information ©2013 [facility]  [facility].   Admitted

## 2023-07-05 NOTE — ED ADULT NURSE NOTE - OBJECTIVE STATEMENT
pt stated she has been dizzy x2 day, with back surgery scheduled on june 13th. pt stated the dizziness increased and come to the ED to be evaluated. pt has a PMH of diabetes and HBP, compliant with medication. pt denies any international travel or falls ANNITA.

## 2023-07-05 NOTE — ED ADULT TRIAGE NOTE - CHIEF COMPLAINT QUOTE
patient c/o of dizziness, mild numbness and mild facial droop  started 2 weeks ago  denied  headache denied blurred vision ,

## 2023-07-05 NOTE — H&P ADULT - NSHPREVIEWOFSYSTEMS_GEN_ALL_CORE
Constitutional: no fever, chills, night sweats  Ears: no hearing changes or ear pain,   Nose: no nasal congestion, sinus pain, or rhinorrhea  Cardio: no chest pain, orthopnea, edema, or palpitations  Resp: no dyspnea, cough, wheezing  GI: no nausea, vomiting, diarrhea, constipation, hematochezia, or melena  : no dysuria, urinary frequency, hematuria  MSK: no back pain, neck pain  Skin: no rash, pruritis   Neuro: dizziness positive.  No lightheadedness, syncope   Heme/Lymph: no bruising or bleeding

## 2023-07-05 NOTE — PATIENT PROFILE ADULT - FALL HARM RISK - RISK INTERVENTIONS
Assistance OOB with selected safe patient handling equipment/Assistance with ambulation/Communicate Fall Risk and Risk Factors to all staff, patient, and family/Discuss with provider need for PT consult/Monitor gait and stability/Provide patient with walking aids - walker, cane, crutches/Reinforce activity limits and safety measures with patient and family/Sit up slowly, dangle for a short time, stand at bedside before walking/Visual Cue: Yellow wristband/Bed in lowest position, wheels locked, appropriate side rails in place/Call bell, personal items and telephone in reach/Instruct patient to call for assistance before getting out of bed or chair/Non-slip footwear when patient is out of bed/[place] to call system/Physically safe environment - no spills, clutter or unnecessary equipment/Purposeful Proactive Rounding/Room/bathroom lighting operational, light cord in reach

## 2023-07-05 NOTE — ED PROVIDER NOTE - NSFOLLOWUPINSTRUCTIONS_ED_ALL_ED_FT
Return to the ED for new or worsening symptoms such as dizziness, loss of consciousness, severe headache

## 2023-07-05 NOTE — ED PROVIDER NOTE - CLINICAL SUMMARY MEDICAL DECISION MAKING FREE TEXT BOX
78 F pmh HTN, DM presenting to the ED for dizziness  physical exam unremarkable    concern for hypoglycemia, vertigo, r/o ICH  labs consistent w/ prior values  CT head w/o acute pathology  patient ambulatory w/ steady gait    will discharge home w/ primary doctor follow-up 78 F pmh HTN, DM presenting to the ED for dizziness  physical exam unremarkable    concern for hypoglycemia, vertigo, r/o ICH  labs consistent w/ prior values  CT head w/o acute pathology  patient ambulatory w/ steady gait    Patient with persistent dizziness despite food  meclizine  will admit for MRI and r/o posterior CVA

## 2023-07-05 NOTE — ED PROVIDER NOTE - PATIENT PORTAL LINK FT
You can access the FollowMyHealth Patient Portal offered by [facility] by registering at the following website: [email or web address]. By joining [facility]’s FollowMyHealth portal, you will also be able to view your health information using other applications (apps) compatible with our system.

## 2023-07-05 NOTE — H&P ADULT - HISTORY OF PRESENT ILLNESS
Patient is a 78F with a PMH of HTN, DM, spinal stenosis who presents to the ED for dizziness.  Patient states that over the last 2-3 weeks she has developed dizziness that persists with change of position.  Earlier today, patient had hypoglycemia and her dizziness worsened.  Symptoms reportedly improved after eating.  Presented to the ED for evaluation and had continued dizziness.  Patient admits that she has had multiple near falls at home and that she lives alone.  Concerned for safety.  Ambulates with a cane at baseline.  Scheduled to have spinal surgery with Dr Richards in a few weeks.  No other complaints.  Hypertensive to 190/74 in ED, labs show mild leukocytosis and elevated creatinine.  Will admit to tele

## 2023-07-05 NOTE — H&P ADULT - ASSESSMENT
Patient is a 78F with a PMH of HTN, DM, spinal stenosis who presents to the ED for dizziness.  Patient states that over the last 2-3 weeks she has developed dizziness that persists with change of position.  Earlier today, patient had hypoglycemia and her dizziness worsened.  Symptoms reportedly improved after eating.  Presented to the ED for evaluation and had continued dizziness.  Patient admits that she has had multiple near falls at home and that she lives alone.  Concerned for safety.  Ambulates with a cane at baseline.  Scheduled to have spinal surgery with Dr Richards in a few weeks.  No other complaints.  Hypertensive to 190/74 in ED, labs show mild leukocytosis and elevated creatinine.  Will admit to tele    IMPROVE VTE Individual Risk Assessment          RISK                                                          Points  [  ] Previous VTE                                                3  [  ] Thrombophilia                                             2  [  ] Lower limb paralysis                                    2        (unable to hold up >15 seconds)    [  ] Current Cancer                                             2         (within 6 months)  [  ] Immobilization > 24 hrs                              1  [  ] ICU/CCU stay > 24 hours                            1  [  ] Age > 60                                                    1    IMPROVE VTE Score - 1

## 2023-07-05 NOTE — PATIENT PROFILE ADULT - DO YOU FEEL THREATENED BY OTHERS?
Patient Seen in: [address]    History   Patient presents with:  Ear Problem Pain (neurosensory)    Stated Complaint: ear pain in both ears    HPI  9year-old male is brought to IC by his mom over concern for ear pain f membrane is injected and erythematous. Tympanic membrane is not scarred, not perforated, not retracted and not bulging. A middle ear effusion is present. No hemotympanum. Nose: No rhinorrhea or congestion.    Mouth/Throat: Mucous membranes are moist. Orop 10 days. , Normal, Disp-10 mL, R-0    azithromycin 200 MG/5ML Oral Recon Susp  Take 6 mL (240 mg total) by mouth daily for 5 days.  Take 12ml on the first day, then 6ml on days 2-5., Normal, Disp-36 mL, R-0 no

## 2023-07-05 NOTE — H&P ADULT - PROBLEM SELECTOR PLAN 1
Will start on meclizine stat.  CVA unlikely has patient has had symptoms for 2-3 wks and CT negative.    MR brain in am  Consider neuro eval in am   PT Evaluation.

## 2023-07-06 LAB
A1C WITH ESTIMATED AVERAGE GLUCOSE RESULT: 7.3 % — HIGH (ref 4–5.6)
ANION GAP SERPL CALC-SCNC: 2 MMOL/L — LOW (ref 5–17)
BUN SERPL-MCNC: 53 MG/DL — HIGH (ref 7–23)
CALCIUM SERPL-MCNC: 9.7 MG/DL — SIGNIFICANT CHANGE UP (ref 8.5–10.1)
CHLORIDE SERPL-SCNC: 116 MMOL/L — HIGH (ref 96–108)
CO2 SERPL-SCNC: 24 MMOL/L — SIGNIFICANT CHANGE UP (ref 22–31)
CREAT SERPL-MCNC: 2.59 MG/DL — HIGH (ref 0.5–1.3)
EGFR: 19 ML/MIN/1.73M2 — LOW
ESTIMATED AVERAGE GLUCOSE: 163 MG/DL — HIGH (ref 68–114)
GLUCOSE BLDC GLUCOMTR-MCNC: 101 MG/DL — HIGH (ref 70–99)
GLUCOSE BLDC GLUCOMTR-MCNC: 173 MG/DL — HIGH (ref 70–99)
GLUCOSE BLDC GLUCOMTR-MCNC: 201 MG/DL — HIGH (ref 70–99)
GLUCOSE BLDC GLUCOMTR-MCNC: 210 MG/DL — HIGH (ref 70–99)
GLUCOSE SERPL-MCNC: 96 MG/DL — SIGNIFICANT CHANGE UP (ref 70–99)
HCT VFR BLD CALC: 31.2 % — LOW (ref 34.5–45)
HCV AB S/CO SERPL IA: 0.15 S/CO — SIGNIFICANT CHANGE UP (ref 0–0.99)
HCV AB SERPL-IMP: SIGNIFICANT CHANGE UP
HGB BLD-MCNC: 10 G/DL — LOW (ref 11.5–15.5)
MCHC RBC-ENTMCNC: 27 PG — SIGNIFICANT CHANGE UP (ref 27–34)
MCHC RBC-ENTMCNC: 32.1 G/DL — SIGNIFICANT CHANGE UP (ref 32–36)
MCV RBC AUTO: 84.1 FL — SIGNIFICANT CHANGE UP (ref 80–100)
NRBC # BLD: 0 /100 WBCS — SIGNIFICANT CHANGE UP (ref 0–0)
PLATELET # BLD AUTO: 252 K/UL — SIGNIFICANT CHANGE UP (ref 150–400)
POTASSIUM SERPL-MCNC: 4.7 MMOL/L — SIGNIFICANT CHANGE UP (ref 3.5–5.3)
POTASSIUM SERPL-SCNC: 4.7 MMOL/L — SIGNIFICANT CHANGE UP (ref 3.5–5.3)
RBC # BLD: 3.71 M/UL — LOW (ref 3.8–5.2)
RBC # FLD: 13.8 % — SIGNIFICANT CHANGE UP (ref 10.3–14.5)
SODIUM SERPL-SCNC: 142 MMOL/L — SIGNIFICANT CHANGE UP (ref 135–145)
WBC # BLD: 8.84 K/UL — SIGNIFICANT CHANGE UP (ref 3.8–10.5)
WBC # FLD AUTO: 8.84 K/UL — SIGNIFICANT CHANGE UP (ref 3.8–10.5)

## 2023-07-06 PROCEDURE — 99232 SBSQ HOSP IP/OBS MODERATE 35: CPT

## 2023-07-06 RX ORDER — MECLIZINE HCL 12.5 MG
25 TABLET ORAL THREE TIMES A DAY
Refills: 0 | Status: COMPLETED | OUTPATIENT
Start: 2023-07-06 | End: 2023-07-08

## 2023-07-06 RX ADMIN — Medication 25 MILLIGRAM(S): at 21:07

## 2023-07-06 RX ADMIN — Medication 60 MILLIGRAM(S): at 18:02

## 2023-07-06 RX ADMIN — Medication 1: at 17:45

## 2023-07-06 RX ADMIN — HEPARIN SODIUM 5000 UNIT(S): 5000 INJECTION INTRAVENOUS; SUBCUTANEOUS at 05:36

## 2023-07-06 RX ADMIN — LISINOPRIL 40 MILLIGRAM(S): 2.5 TABLET ORAL at 15:22

## 2023-07-06 RX ADMIN — Medication 2: at 12:34

## 2023-07-06 RX ADMIN — HEPARIN SODIUM 5000 UNIT(S): 5000 INJECTION INTRAVENOUS; SUBCUTANEOUS at 18:02

## 2023-07-06 NOTE — PHYSICAL THERAPY INITIAL EVALUATION ADULT - NSPTDISCHREC_GEN_A_CORE
(* pending improvement in doing ADLs safely  particularly ambulation, transfers, stair climbing/Sub-acute Rehab

## 2023-07-06 NOTE — PHYSICAL THERAPY INITIAL EVALUATION ADULT - PERTINENT HX OF CURRENT PROBLEM, REHAB EVAL
Pt states she is admitted with c/o dizziness and vertigo like symptoms that she almost fell a few times at home. states that her baseline is she lives alone in t house, independent in ADLS, drives car and uses a cane intermittently.

## 2023-07-06 NOTE — PHYSICAL THERAPY INITIAL EVALUATION ADULT - DIAGNOSIS, PT EVAL
Pt presented with ability to perform bed mobility , transfers and ambulation with use of rolling walker but balance is unsteady due to c/o weakness in the lower extremities, lightheaded, dizziness.

## 2023-07-06 NOTE — PHYSICAL THERAPY INITIAL EVALUATION ADULT - ADDITIONAL COMMENTS
Pt states her baseline at home- she is independent in ADLs at home, lives alone,ambulates with a cane, drives car.

## 2023-07-06 NOTE — PHYSICAL THERAPY INITIAL EVALUATION ADULT - GENERAL OBSERVATIONS, REHAB EVAL
Pt is alert,oriented x 4, follow instructions, on room air, c/o dizziness and vertigo like symptoms when standing and ambulating, and lumbar spine pain, states she supposed to have back surgery with Dr. Lawrence

## 2023-07-06 NOTE — PHYSICAL THERAPY INITIAL EVALUATION ADULT - STRENGTHENING, PT EVAL
Improve strength in the  UE and LE to 5/5, improve endurance to good and be able to perform functional tasks-bed mobility, sitting, standing, transfers and ambulate in a safe manner with or without  assistive device and prevent falls.

## 2023-07-06 NOTE — PROGRESS NOTE ADULT - ASSESSMENT
Patient is a 78F with a PMH of HTN, DM, spinal stenosis who presents to the ED for dizziness.  Patient states that over the last 2-3 weeks she has developed dizziness that persists with change of position.  Earlier today, patient had hypoglycemia and her dizziness worsened.  Symptoms reportedly improved after eating.  Presented to the ED for evaluation and had continued dizziness.  Patient admits that she has had multiple near falls at home and that she lives alone.  Concerned for safety.  Ambulates with a cane at baseline.  Scheduled to have spinal surgery with Dr Richards in a few weeks.  No other complaints.  Hypertensive to 190/74 in ED, labs show mild leukocytosis and elevated creatinine.  Will admit to tele.    # Dizziness/lightheadedness   CVA unlikely has patient has had symptoms for 2-3 wks and CT negative  MR brain without acute pathologies   Will trial meclizine ATC to see if symptoms improved  PT Evaluation  TTE    #ANASTACIO vs. CKD  Cr 2.8, unknown baseline, down to 2.59  Will hydrate and trend  check UA, urine cr and na    #Essential hypertension   BP elevated in ED  Nifedipine started  Continue lisinopril.    #Diabetes mellitus  insulin corrective scale      VTE prop: heparin sc q12 hrs  Dispo: pending TTE and improvement in dizziness    PT eval pending

## 2023-07-06 NOTE — PHYSICAL THERAPY INITIAL EVALUATION ADULT - GAIT TRAINING, PT EVAL
Pt will be able to ambulate using assistive device up to 200 ft or more and eventually be a safe community ambulator,  be able to negotiate steps safely observing proper gait, posture and prevent falls.

## 2023-07-07 LAB
ANION GAP SERPL CALC-SCNC: 3 MMOL/L — LOW (ref 5–17)
APPEARANCE UR: CLEAR — SIGNIFICANT CHANGE UP
BACTERIA # UR AUTO: ABNORMAL
BILIRUB UR-MCNC: NEGATIVE — SIGNIFICANT CHANGE UP
BUN SERPL-MCNC: 62 MG/DL — HIGH (ref 7–23)
CALCIUM SERPL-MCNC: 9.5 MG/DL — SIGNIFICANT CHANGE UP (ref 8.5–10.1)
CHLORIDE SERPL-SCNC: 111 MMOL/L — HIGH (ref 96–108)
CO2 SERPL-SCNC: 24 MMOL/L — SIGNIFICANT CHANGE UP (ref 22–31)
COLOR SPEC: YELLOW — SIGNIFICANT CHANGE UP
CREAT ?TM UR-MCNC: 71 MG/DL — SIGNIFICANT CHANGE UP
CREAT SERPL-MCNC: 2.65 MG/DL — HIGH (ref 0.5–1.3)
DIFF PNL FLD: NEGATIVE — SIGNIFICANT CHANGE UP
EGFR: 18 ML/MIN/1.73M2 — LOW
EPI CELLS # UR: SIGNIFICANT CHANGE UP
GLUCOSE BLDC GLUCOMTR-MCNC: 129 MG/DL — HIGH (ref 70–99)
GLUCOSE BLDC GLUCOMTR-MCNC: 146 MG/DL — HIGH (ref 70–99)
GLUCOSE BLDC GLUCOMTR-MCNC: 197 MG/DL — HIGH (ref 70–99)
GLUCOSE BLDC GLUCOMTR-MCNC: 222 MG/DL — HIGH (ref 70–99)
GLUCOSE BLDC GLUCOMTR-MCNC: 252 MG/DL — HIGH (ref 70–99)
GLUCOSE SERPL-MCNC: 144 MG/DL — HIGH (ref 70–99)
GLUCOSE UR QL: NEGATIVE MG/DL — SIGNIFICANT CHANGE UP
HCT VFR BLD CALC: 31.7 % — LOW (ref 34.5–45)
HGB BLD-MCNC: 10.1 G/DL — LOW (ref 11.5–15.5)
KETONES UR-MCNC: NEGATIVE — SIGNIFICANT CHANGE UP
LEUKOCYTE ESTERASE UR-ACNC: NEGATIVE — SIGNIFICANT CHANGE UP
MCHC RBC-ENTMCNC: 26.9 PG — LOW (ref 27–34)
MCHC RBC-ENTMCNC: 31.9 G/DL — LOW (ref 32–36)
MCV RBC AUTO: 84.5 FL — SIGNIFICANT CHANGE UP (ref 80–100)
NITRITE UR-MCNC: NEGATIVE — SIGNIFICANT CHANGE UP
NRBC # BLD: 0 /100 WBCS — SIGNIFICANT CHANGE UP (ref 0–0)
PH UR: 6 — SIGNIFICANT CHANGE UP (ref 5–8)
PLATELET # BLD AUTO: 239 K/UL — SIGNIFICANT CHANGE UP (ref 150–400)
POTASSIUM SERPL-MCNC: 4.7 MMOL/L — SIGNIFICANT CHANGE UP (ref 3.5–5.3)
POTASSIUM SERPL-SCNC: 4.7 MMOL/L — SIGNIFICANT CHANGE UP (ref 3.5–5.3)
PROT UR-MCNC: 100 MG/DL
RBC # BLD: 3.75 M/UL — LOW (ref 3.8–5.2)
RBC # FLD: 13.6 % — SIGNIFICANT CHANGE UP (ref 10.3–14.5)
RBC CASTS # UR COMP ASSIST: NEGATIVE /HPF — SIGNIFICANT CHANGE UP (ref 0–4)
SODIUM SERPL-SCNC: 138 MMOL/L — SIGNIFICANT CHANGE UP (ref 135–145)
SODIUM UR-SCNC: 76 MMOL/L — SIGNIFICANT CHANGE UP
SP GR SPEC: 1.01 — SIGNIFICANT CHANGE UP (ref 1.01–1.02)
TSH SERPL-MCNC: 5.11 UIU/ML — HIGH (ref 0.36–3.74)
UROBILINOGEN FLD QL: NEGATIVE MG/DL — SIGNIFICANT CHANGE UP
UUN UR-MCNC: 553 MG/DL — SIGNIFICANT CHANGE UP
WBC # BLD: 9.21 K/UL — SIGNIFICANT CHANGE UP (ref 3.8–10.5)
WBC # FLD AUTO: 9.21 K/UL — SIGNIFICANT CHANGE UP (ref 3.8–10.5)
WBC UR QL: SIGNIFICANT CHANGE UP

## 2023-07-07 PROCEDURE — 99232 SBSQ HOSP IP/OBS MODERATE 35: CPT

## 2023-07-07 RX ORDER — SODIUM CHLORIDE 0.65 %
1 AEROSOL, SPRAY (ML) NASAL
Refills: 0 | Status: DISCONTINUED | OUTPATIENT
Start: 2023-07-07 | End: 2023-07-09

## 2023-07-07 RX ORDER — FLUTICASONE PROPIONATE 50 MCG
1 SPRAY, SUSPENSION NASAL
Refills: 0 | Status: DISCONTINUED | OUTPATIENT
Start: 2023-07-07 | End: 2023-07-09

## 2023-07-07 RX ADMIN — Medication 3: at 14:16

## 2023-07-07 RX ADMIN — Medication 1 SPRAY(S): at 22:16

## 2023-07-07 RX ADMIN — Medication 1 SPRAY(S): at 22:18

## 2023-07-07 RX ADMIN — Medication 25 MILLIGRAM(S): at 22:16

## 2023-07-07 RX ADMIN — HEPARIN SODIUM 5000 UNIT(S): 5000 INJECTION INTRAVENOUS; SUBCUTANEOUS at 18:26

## 2023-07-07 RX ADMIN — LISINOPRIL 40 MILLIGRAM(S): 2.5 TABLET ORAL at 05:17

## 2023-07-07 RX ADMIN — Medication 25 MILLIGRAM(S): at 05:17

## 2023-07-07 RX ADMIN — HEPARIN SODIUM 5000 UNIT(S): 5000 INJECTION INTRAVENOUS; SUBCUTANEOUS at 05:17

## 2023-07-07 RX ADMIN — Medication 60 MILLIGRAM(S): at 05:17

## 2023-07-07 RX ADMIN — Medication 25 MILLIGRAM(S): at 14:16

## 2023-07-07 NOTE — PROGRESS NOTE ADULT - ASSESSMENT
Patient is a 78F with a PMH of HTN, DM, spinal stenosis who presents to the ED for dizziness.  Patient states that over the last 2-3 weeks she has developed dizziness that persists with change of position.  Earlier today, patient had hypoglycemia and her dizziness worsened.  Symptoms reportedly improved after eating.  Presented to the ED for evaluation and had continued dizziness.  Patient admits that she has had multiple near falls at home and that she lives alone.  Concerned for safety.  Ambulates with a cane at baseline.  Scheduled to have spinal surgery with Dr Richards in a few weeks.  No other complaints.  Hypertensive to 190/74 in ED, labs show mild leukocytosis and elevated creatinine.  Will admit to tele.    # Dizziness/lightheadedness   CVA unlikely has patient has had symptoms for 2-3 wks and CT negative  MR brain without acute pathologies   Will trial meclizine ATC to see if symptoms improved  PT Evaluation--> reccs for STr will d/w SW to d/w patient  TTE--> pending    #ANASTACIO on CKD vs oprogression   in 2018 had CKD stage 3b via review of HIE now stage 4   this maybe just progression of CKD    #Essential hypertension   BP has been elevated will adjust meds accordingly .elevated in ED  Nifedipine started--> increase  to 90  Continue lisinopril.    #Diabetes mellitus  insulin corrective scale      VTE prop: heparin sc q12 hrs Patient is a 78F with a PMH of HTN, DM, spinal stenosis who presents to the ED for dizziness.  Patient states that over the last 2-3 weeks she has developed dizziness that persists with change of position.  Earlier today, patient had hypoglycemia and her dizziness worsened.  Symptoms reportedly improved after eating.  Presented to the ED for evaluation and had continued dizziness.  Patient admits that she has had multiple near falls at home and that she lives alone.  Concerned for safety.  Ambulates with a cane at baseline.  Scheduled to have spinal surgery with Dr Richards in a few weeks.  No other complaints.  Hypertensive to 190/74 in ED, labs show mild leukocytosis and elevated creatinine.  Will admit to tele.    # Dizziness/lightheadedness   CVA unlikely has patient has had symptoms for 2-3 wks and CT negative  MR brain without acute pathologies   Will trial meclizine ATC to see if symptoms improved  PT Evaluation--> reccs for STr will d/w SW to d/w patient  TTE--> pending    #ANASTACIO on CKD vs oprogression   in 2018 had CKD stage 3b via review of HIE now stage 4   this maybe just progression of CKD    #Essential hypertension   BP has been elevated will adjust meds accordingly .elevated in ED  Nifedipine started--> consider  increase  to 90  Continue lisinopril.    #Diabetes mellitus  insulin corrective scale      VTE prop: heparin sc q12 hrs

## 2023-07-08 LAB
GLUCOSE BLDC GLUCOMTR-MCNC: 146 MG/DL — HIGH (ref 70–99)
GLUCOSE BLDC GLUCOMTR-MCNC: 223 MG/DL — HIGH (ref 70–99)
GLUCOSE BLDC GLUCOMTR-MCNC: 256 MG/DL — HIGH (ref 70–99)
T4 FREE SERPL-MCNC: 1.1 NG/DL — SIGNIFICANT CHANGE UP (ref 0.9–1.8)

## 2023-07-08 PROCEDURE — 99232 SBSQ HOSP IP/OBS MODERATE 35: CPT

## 2023-07-08 RX ORDER — ACETAMINOPHEN 500 MG
650 TABLET ORAL EVERY 6 HOURS
Refills: 0 | Status: DISCONTINUED | OUTPATIENT
Start: 2023-07-08 | End: 2023-07-09

## 2023-07-08 RX ORDER — MECLIZINE HCL 12.5 MG
25 TABLET ORAL THREE TIMES A DAY
Refills: 0 | Status: DISCONTINUED | OUTPATIENT
Start: 2023-07-08 | End: 2023-07-09

## 2023-07-08 RX ADMIN — Medication 25 MILLIGRAM(S): at 21:36

## 2023-07-08 RX ADMIN — HEPARIN SODIUM 5000 UNIT(S): 5000 INJECTION INTRAVENOUS; SUBCUTANEOUS at 17:45

## 2023-07-08 RX ADMIN — LISINOPRIL 40 MILLIGRAM(S): 2.5 TABLET ORAL at 06:02

## 2023-07-08 RX ADMIN — Medication 1 SPRAY(S): at 17:09

## 2023-07-08 RX ADMIN — Medication 2: at 12:11

## 2023-07-08 RX ADMIN — HEPARIN SODIUM 5000 UNIT(S): 5000 INJECTION INTRAVENOUS; SUBCUTANEOUS at 06:01

## 2023-07-08 RX ADMIN — Medication 650 MILLIGRAM(S): at 08:51

## 2023-07-08 RX ADMIN — Medication 3: at 17:07

## 2023-07-08 RX ADMIN — Medication 25 MILLIGRAM(S): at 13:46

## 2023-07-08 RX ADMIN — Medication 1 SPRAY(S): at 06:01

## 2023-07-08 RX ADMIN — Medication 60 MILLIGRAM(S): at 06:02

## 2023-07-08 RX ADMIN — Medication 25 MILLIGRAM(S): at 06:02

## 2023-07-08 RX ADMIN — Medication 650 MILLIGRAM(S): at 11:54

## 2023-07-08 NOTE — PROGRESS NOTE ADULT - ASSESSMENT
Patient is a 78F with a PMH of HTN, DM, spinal stenosis who presents to the ED for dizziness.  Patient states that over the last 2-3 weeks she has developed dizziness that persists with change of position.  Earlier today, patient had hypoglycemia and her dizziness worsened.  Symptoms reportedly improved after eating.  Presented to the ED for evaluation and had continued dizziness.  Patient admits that she has had multiple near falls at home and that she lives alone.  Concerned for safety.  Ambulates with a cane at baseline.  Scheduled to have spinal surgery with Dr Richards in a few weeks.  No other complaints.  Hypertensive to 190/74 in ED, labs show mild leukocytosis and elevated creatinine.  Will admit to tele.    # Dizziness/lightheadedness   CVA unlikely has patient has had symptoms for 2-3 wks and CT negative  MR brain without acute pathologies   Will trial meclizine ATC to see if symptoms improved  PT Evaluation--> reccs for STr will d/w SW to d/w patient  TTE--> pending    7/8/2023 echo as above   c/w mild diastolic chf    #ANASTACIO on CKD vs progression   in 2018 had CKD stage 3b via review of HIE now stage 4   this maybe just progression of CKD    #Essential hypertension   BP has been elevated will adjust meds accordingly .elevated in ED  Nifedipine started--> consider  increase  to 90  Continue lisinopril.    #Diabetes mellitus  insulin corrective scale   a1c 7.3       VTE prop: heparin sc q12 hrs

## 2023-07-09 ENCOUNTER — TRANSCRIPTION ENCOUNTER (OUTPATIENT)
Age: 78
End: 2023-07-09

## 2023-07-09 VITALS
HEART RATE: 49 BPM | DIASTOLIC BLOOD PRESSURE: 58 MMHG | RESPIRATION RATE: 18 BRPM | SYSTOLIC BLOOD PRESSURE: 149 MMHG | OXYGEN SATURATION: 93 % | TEMPERATURE: 99 F

## 2023-07-09 LAB
GLUCOSE BLDC GLUCOMTR-MCNC: 159 MG/DL — HIGH (ref 70–99)
GLUCOSE BLDC GLUCOMTR-MCNC: 163 MG/DL — HIGH (ref 70–99)
GLUCOSE BLDC GLUCOMTR-MCNC: 203 MG/DL — HIGH (ref 70–99)

## 2023-07-09 PROCEDURE — 99239 HOSP IP/OBS DSCHRG MGMT >30: CPT

## 2023-07-09 PROCEDURE — 99222 1ST HOSP IP/OBS MODERATE 55: CPT

## 2023-07-09 RX ORDER — NIFEDIPINE 30 MG
90 TABLET, EXTENDED RELEASE 24 HR ORAL DAILY
Refills: 0 | Status: DISCONTINUED | OUTPATIENT
Start: 2023-07-09 | End: 2023-07-09

## 2023-07-09 RX ORDER — FLUTICASONE PROPIONATE 50 MCG
1 SPRAY, SUSPENSION NASAL
Qty: 1 | Refills: 0
Start: 2023-07-09

## 2023-07-09 RX ADMIN — Medication 650 MILLIGRAM(S): at 08:27

## 2023-07-09 RX ADMIN — Medication 90 MILLIGRAM(S): at 06:36

## 2023-07-09 RX ADMIN — HEPARIN SODIUM 5000 UNIT(S): 5000 INJECTION INTRAVENOUS; SUBCUTANEOUS at 06:36

## 2023-07-09 RX ADMIN — Medication 1: at 08:28

## 2023-07-09 RX ADMIN — Medication 25 MILLIGRAM(S): at 06:36

## 2023-07-09 RX ADMIN — LISINOPRIL 40 MILLIGRAM(S): 2.5 TABLET ORAL at 06:36

## 2023-07-09 RX ADMIN — Medication 1 SPRAY(S): at 06:37

## 2023-07-09 RX ADMIN — Medication 2: at 11:49

## 2023-07-09 RX ADMIN — Medication 650 MILLIGRAM(S): at 09:30

## 2023-07-09 NOTE — DISCHARGE NOTE PROVIDER - NSDCQMERRANDS_GEN_ALL_CORE
I called the pharmacy and they are going to fax over the PA. I will call the pt once we receive them.
No

## 2023-07-09 NOTE — DISCHARGE NOTE PROVIDER - NSDCCPCAREPLAN_GEN_ALL_CORE_FT
PRINCIPAL DISCHARGE DIAGNOSIS  Diagnosis: Episode of dizziness  Assessment and Plan of Treatment:

## 2023-07-09 NOTE — PROGRESS NOTE ADULT - SUBJECTIVE AND OBJECTIVE BOX
Patient is a 78y old  Female who presents with a chief complaint of dizziness r/o stroke (05 Jul 2023 18:48)      SUBJECTIVE / OVERNIGHT EVENTS:     CONSTITUTIONAL: NAD, well-groomed  ENMT: Moist oral mucosa, no pharyngeal injection or exudates; normal dentition  RESPIRATORY: Normal respiratory effort; lungs are clear to auscultation bilaterally  CARDIOVASCULAR: Regular rate and rhythm; No lower extremity edema  ABDOMEN: Nontender to palpation, normoactive bowel sounds  PSYCH: A+O x3; affect appropriate  NEUROLOGY: CN 2-12 are intact and symmetric; no gross sensory deficits;   SKIN: No rashes; no palpable lesions    LABS:                               A1C with Estimated Average Glucose (07.06.23 @ 06:15)    A1C with Estimated Average Glucose Result: 7.3: Method: Immunoassay       Reference Range                4.0-5.6%       High risk (prediabetic)        5.7-6.4%       Diabetic, diagnostic             >=6.5%       ADA diabetic treatment goal       <7.0%  The Hemoglobin A1c testing is NGSP-certified.Reference ranges are based  upon the 2010 recommendations of  the American Diabetes Association.  Interpretation may vary for children  and adolescents. %   Estimated Average Glucose: 163: The Estimated Average Glucose (eAG) or Mean Plasma Glucose (MPG) value is  calculated from the hemoglobin A1c value and covers the same time period.   The American Diabetes Association (ADA) and other professional  organizations recommend reporting the eAG with the HgbA1c. mg/dL           CAPILLARY BLOOD GLUCOSE      POCT Blood Glucose.: 203 mg/dL (09 Jul 2023 11:21)  POCT Blood Glucose.: 159 mg/dL (09 Jul 2023 07:50)  POCT Blood Glucose.: 163 mg/dL (09 Jul 2023 00:18)  POCT Blood Glucose.: 256 mg/dL (08 Jul 2023 17:02)    RADIOLOGY & ADDITIONAL TESTS:  < from: TTE Echo Complete w/o Contrast w/ Doppler (07.07.23 @ 10:48) >  Summary:   1. Left ventricular ejection fraction, by visual estimation, is 60 to   65%.   2. Technically difficult study.   3. Normal global left ventricular systolic function.   4. Mildly increased LV wall thickness.   5. Normal left ventricular internal cavity size.   6. Spectral Doppler shows impaired relaxation pattern of left   ventricular myocardial filling (Grade I diastolic dysfunction).   7. Normal right ventricular size and function.   8. Normal left atrial size.   9. Normal right atrial size.  10. Thereis no evidence of pericardial effusion.  11. Mild mitral annular calcification.  12. Mild mitral valve regurgitation.  13. Mild thickening and calcification of the anterior and posterior   mitral valve leaflets.  14. Sclerotic aortic valve with normal opening.    < end of copied text >      Results Reviewed:   Imaging Personally Reviewed:  Electrocardiogram Personally Reviewed:
Shiloh Worthy M.D.    Patient is a 78y old  Female who presents with a chief complaint of dizziness r/o stroke (05 Jul 2023 18:48)      SUBJECTIVE / OVERNIGHT EVENTS: Continues to have lightheadedness with movement.     MEDICATIONS  (STANDING):  dextrose 5%. 1000 milliLiter(s) (50 mL/Hr) IV Continuous <Continuous>  dextrose 5%. 1000 milliLiter(s) (100 mL/Hr) IV Continuous <Continuous>  dextrose 50% Injectable 25 Gram(s) IV Push once  dextrose 50% Injectable 12.5 Gram(s) IV Push once  dextrose 50% Injectable 25 Gram(s) IV Push once  glucagon  Injectable 1 milliGRAM(s) IntraMuscular once  heparin   Injectable 5000 Unit(s) SubCutaneous every 12 hours  insulin lispro (ADMELOG) corrective regimen sliding scale   SubCutaneous three times a day before meals  lisinopril 40 milliGRAM(s) Oral daily  meclizine 25 milliGRAM(s) Oral three times a day  NIFEdipine XL 60 milliGRAM(s) Oral daily    MEDICATIONS  (PRN):  dextrose Oral Gel 15 Gram(s) Oral once PRN Blood Glucose LESS THAN 70 milliGRAM(s)/deciliter    Vital Signs Last 24 Hrs  T(C): 37.1 (07 Jul 2023 10:25), Max: 37.1 (07 Jul 2023 04:52)  T(F): 98.8 (07 Jul 2023 10:25), Max: 98.8 (07 Jul 2023 04:52)  HR: 58 (07 Jul 2023 10:25) (46 - 75)  BP: 135/62 (07 Jul 2023 10:25) (135/62 - 199/93)  BP(mean): --  RR: 19 (07 Jul 2023 10:25) (18 - 19)  SpO2: 99% (07 Jul 2023 10:25) (95% - 99%)    Parameters below as of 07 Jul 2023 10:25  Patient On (Oxygen Delivery Method): room air    CONSTITUTIONAL: NAD, well-groomed  ENMT: Moist oral mucosa, no pharyngeal injection or exudates; normal dentition  RESPIRATORY: Normal respiratory effort; lungs are clear to auscultation bilaterally  CARDIOVASCULAR: Regular rate and rhythm; No lower extremity edema  ABDOMEN: Nontender to palpation, normoactive bowel sounds  PSYCH: A+O x3; affect appropriate  NEUROLOGY: CN 2-12 are intact and symmetric; no gross sensory deficits;   SKIN: No rashes; no palpable lesions    LABS:                                   10.1   9.21  )-----------( 239      ( 07 Jul 2023 06:30 )             31.7   07-07    138  |  111<H>  |  62<H>  ----------------------------<  144<H>  4.7   |  24  |  2.65<H>    Ca    9.5      07 Jul 2023 06:30             CAPILLARY BLOOD GLUCOSE  CAPILLARY BLOOD GLUCOSE      POCT Blood Glucose.: 222 mg/dL (07 Jul 2023 12:09)  POCT Blood Glucose.: 146 mg/dL (07 Jul 2023 07:32)  POCT Blood Glucose.: 201 mg/dL (06 Jul 2023 21:18)  POCT Blood Glucose.: 173 mg/dL (06 Jul 2023 17:16)  )      RADIOLOGY & ADDITIONAL TESTS:      Results Reviewed:   Imaging Personally Reviewed:  Electrocardiogram Personally Reviewed:
Shiloh Worthy M.D.    Patient is a 78y old  Female who presents with a chief complaint of dizziness r/o stroke (05 Jul 2023 18:48)      SUBJECTIVE / OVERNIGHT EVENTS: Continues to have lightheadedness with movement.    heart rate did increases to max 79 with ambulation    Vital Signs Last 24 Hrs  T(C): 36.9 (08 Jul 2023 11:25), Max: 37.2 (08 Jul 2023 05:11)  T(F): 98.5 (08 Jul 2023 11:25), Max: 99 (08 Jul 2023 05:11)  HR: 56 (08 Jul 2023 11:25) (55 - 83)  BP: 132/67 (08 Jul 2023 11:25) (132/67 - 155/80)  BP(mean): --  RR: 18 (08 Jul 2023 11:25) (18 - 18)  SpO2: 98% (08 Jul 2023 11:25) (98% - 100%)    Parameters below as of 08 Jul 2023 11:25  Patient On (Oxygen Delivery Method): room air      CONSTITUTIONAL: NAD, well-groomed  ENMT: Moist oral mucosa, no pharyngeal injection or exudates; normal dentition  RESPIRATORY: Normal respiratory effort; lungs are clear to auscultation bilaterally  CARDIOVASCULAR: Regular rate and rhythm; No lower extremity edema  ABDOMEN: Nontender to palpation, normoactive bowel sounds  PSYCH: A+O x3; affect appropriate  NEUROLOGY: CN 2-12 are intact and symmetric; no gross sensory deficits;   SKIN: No rashes; no palpable lesions    LABS:                                            10.1   9.21  )-----------( 239      ( 07 Jul 2023 06:30 )             31.7   07-07    138  |  111<H>  |  62<H>  ----------------------------<  144<H>  4.7   |  24  |  2.65<H>    Ca    9.5      07 Jul 2023 06:30    A1C with Estimated Average Glucose (07.06.23 @ 06:15)    A1C with Estimated Average Glucose Result: 7.3: Method: Immunoassay       Reference Range                4.0-5.6%       High risk (prediabetic)        5.7-6.4%       Diabetic, diagnostic             >=6.5%       ADA diabetic treatment goal       <7.0%  The Hemoglobin A1c testing is NGSP-certified.Reference ranges are based  upon the 2010 recommendations of  the American Diabetes Association.  Interpretation may vary for children  and adolescents. %   Estimated Average Glucose: 163: The Estimated Average Glucose (eAG) or Mean Plasma Glucose (MPG) value is  calculated from the hemoglobin A1c value and covers the same time period.   The American Diabetes Association (ADA) and other professional  organizations recommend reporting the eAG with the HgbA1c. mg/dL           CAPILLARY BLOOD GLUCOSE      POCT Blood Glucose.: 223 mg/dL (08 Jul 2023 11:37)  POCT Blood Glucose.: 146 mg/dL (08 Jul 2023 07:43)  POCT Blood Glucose.: 197 mg/dL (07 Jul 2023 21:11)      RADIOLOGY & ADDITIONAL TESTS:  < from: TTE Echo Complete w/o Contrast w/ Doppler (07.07.23 @ 10:48) >  Summary:   1. Left ventricular ejection fraction, by visual estimation, is 60 to   65%.   2. Technically difficult study.   3. Normal global left ventricular systolic function.   4. Mildly increased LV wall thickness.   5. Normal left ventricular internal cavity size.   6. Spectral Doppler shows impaired relaxation pattern of left   ventricular myocardial filling (Grade I diastolic dysfunction).   7. Normal right ventricular size and function.   8. Normal left atrial size.   9. Normal right atrial size.  10. Thereis no evidence of pericardial effusion.  11. Mild mitral annular calcification.  12. Mild mitral valve regurgitation.  13. Mild thickening and calcification of the anterior and posterior   mitral valve leaflets.  14. Sclerotic aortic valve with normal opening.    < end of copied text >      Results Reviewed:   Imaging Personally Reviewed:  Electrocardiogram Personally Reviewed:
Shiloh Worthy M.D.    Patient is a 78y old  Female who presents with a chief complaint of dizziness r/o stroke (05 Jul 2023 18:48)      SUBJECTIVE / OVERNIGHT EVENTS: Continues to have lightheadedness with movement.     Patient denies chest pain, SOB, abd pain, N/V, fever, chills, dysuria or any other complaints. All remainder ROS negative.     MEDICATIONS  (STANDING):  dextrose 5%. 1000 milliLiter(s) (50 mL/Hr) IV Continuous <Continuous>  dextrose 5%. 1000 milliLiter(s) (100 mL/Hr) IV Continuous <Continuous>  dextrose 50% Injectable 25 Gram(s) IV Push once  dextrose 50% Injectable 12.5 Gram(s) IV Push once  dextrose 50% Injectable 25 Gram(s) IV Push once  glucagon  Injectable 1 milliGRAM(s) IntraMuscular once  heparin   Injectable 5000 Unit(s) SubCutaneous every 12 hours  insulin lispro (ADMELOG) corrective regimen sliding scale   SubCutaneous three times a day before meals  lisinopril 40 milliGRAM(s) Oral daily  NIFEdipine XL 60 milliGRAM(s) Oral daily    MEDICATIONS  (PRN):  dextrose Oral Gel 15 Gram(s) Oral once PRN Blood Glucose LESS THAN 70 milliGRAM(s)/deciliter  meclizine 25 milliGRAM(s) Oral three times a day PRN Dizziness      I&O's Summary      PHYSICAL EXAM:  Vital Signs Last 24 Hrs  T(C): 36.7 (06 Jul 2023 15:14), Max: 36.9 (05 Jul 2023 23:57)  T(F): 98.1 (06 Jul 2023 15:14), Max: 98.5 (05 Jul 2023 23:57)  HR: 67 (06 Jul 2023 15:14) (51 - 71)  BP: 199/93 (06 Jul 2023 15:14) (113/60 - 199/93)  BP(mean): --  RR: 18 (06 Jul 2023 15:14) (9 - 20)  SpO2: 98% (06 Jul 2023 15:14) (98% - 100%)    Parameters below as of 06 Jul 2023 15:14  Patient On (Oxygen Delivery Method): room air      CONSTITUTIONAL: NAD, well-groomed  ENMT: Moist oral mucosa, no pharyngeal injection or exudates; normal dentition  RESPIRATORY: Normal respiratory effort; lungs are clear to auscultation bilaterally  CARDIOVASCULAR: Regular rate and rhythm; No lower extremity edema  ABDOMEN: Nontender to palpation, normoactive bowel sounds  PSYCH: A+O x3; affect appropriate  NEUROLOGY: CN 2-12 are intact and symmetric; no gross sensory deficits;   SKIN: No rashes; no palpable lesions    LABS:                        10.0   8.84  )-----------( 252      ( 06 Jul 2023 06:15 )             31.2     07-06    142  |  116<H>  |  53<H>  ----------------------------<  96  4.7   |  24  |  2.59<H>    Ca    9.7      06 Jul 2023 06:15    TPro  8.2  /  Alb  3.6  /  TBili  0.3  /  DBili  x   /  AST  20  /  ALT  21  /  AlkPhos  60  07-05    PT/INR - ( 05 Jul 2023 11:58 )   PT: 12.0 sec;   INR: 1.00 ratio         PTT - ( 05 Jul 2023 11:58 )  PTT:27.8 sec      Urinalysis Basic - ( 06 Jul 2023 06:15 )    Color: x / Appearance: x / SG: x / pH: x  Gluc: 96 mg/dL / Ketone: x  / Bili: x / Urobili: x   Blood: x / Protein: x / Nitrite: x   Leuk Esterase: x / RBC: x / WBC x   Sq Epi: x / Non Sq Epi: x / Bacteria: x        CAPILLARY BLOOD GLUCOSE      POCT Blood Glucose.: 210 mg/dL (06 Jul 2023 12:32)  POCT Blood Glucose.: 101 mg/dL (06 Jul 2023 08:02)  POCT Blood Glucose.: 174 mg/dL (05 Jul 2023 20:32)      RADIOLOGY & ADDITIONAL TESTS:  Results Reviewed:   Imaging Personally Reviewed:  Electrocardiogram Personally Reviewed:

## 2023-07-09 NOTE — CONSULT NOTE ADULT - ASSESSMENT
78F HTN, DM, spinal stenosis who presented with dizziness and found to be bradycardic.    No evidence of high degree block.  There is evidence of increase in HR with light exertion.  ET > 4 METS    No evidence of active cardiac ischemia, uncontrolled arrhythmia, or decompensated heart failure. No cardiac contraindication to discharge or her planned spinal surgery.    Should she experience any further sx can be evaluated for chronotropic competence as outpt.

## 2023-07-09 NOTE — DISCHARGE NOTE PROVIDER - CARE PROVIDER_API CALL
Bret Bean  Internal Medicine  [id number] [place] [address]  Phone: [phone]  Fax: [phone]  Follow Up Time:

## 2023-07-09 NOTE — DISCHARGE NOTE PROVIDER - NSDCHHCONTACT_GEN_ALL_CORE_FT
62 y/o F PMH CAD, HLD, osteopenia, parathyroid tumor (s/p parathyroidectomy), pituitary tumor (s/p tx 30 years ago), episode of atrial fibrillation (resolved with anticoagulant, not on AC now), osteopenia presents with high sodium (154) seen on blood work done at PCP office. Pt endorses feeling weak for the past few months but has otherwise felt fine. Pt says she always drinks plenty of water at home. Not taking diuretics. Also reports that she lost 15 pounds in the past 2 months. Denies fevers/chills, HA, CP, SOB, nausea, vomiting, abdominal pain, dysuria, diarrhea, constipation, recent illness. On exam pt AOx3, CN II-XII intact, cardiac normal s1/s2 no M/R/G. Ordered basic labs, vbg, serum osm, phos. 62 y/o F PMH CAD, HLD, osteopenia, parathyroid tumor (s/p parathyroidectomy), pituitary tumor (s/p tx 30 years ago), episode of atrial fibrillation (resolved with anticoagulant, not on AC now), osteopenia presents with high sodium (154) seen on blood work done at PCP office. Pt endorses feeling weak for the past few months but has otherwise felt fine. Pt says she always drinks plenty of water at home. Not taking diuretics. Also reports that she lost 15 pounds in the past 2 months. Denies fevers/chills, HA, CP, SOB, nausea, vomiting, abdominal pain, dysuria, diarrhea, constipation, recent illness. On exam pt AOx3, CN II-XII intact, cardiac normal s1/s2 no M/R/G. Ordered basic labs, vbg, serum osm, mag, phos. As certified below, I, or a nurse practitioner or physician assistant working with me, had a face-to-face encounter that meets the physician face-to-face encounter requirements.

## 2023-07-09 NOTE — PROGRESS NOTE ADULT - ASSESSMENT
Patient is a 78F with a PMH of HTN, DM, spinal stenosis who presents to the ED for dizziness.  Patient states that over the last 2-3 weeks she has developed dizziness that persists with change of position.  Earlier today, patient had hypoglycemia and her dizziness worsened.  Symptoms reportedly improved after eating.  Presented to the ED for evaluation and had continued dizziness.  Patient admits that she has had multiple near falls at home and that she lives alone.  Concerned for safety.  Ambulates with a cane at baseline.  Scheduled to have spinal surgery with Dr Richards in a few weeks.  No other complaints.  Hypertensive to 190/74 in ED, labs show mild leukocytosis and elevated creatinine.  Will admit to tele.    # Dizziness/lightheadedness   CVA unlikely has patient has had symptoms for 2-3 wks and CT negative  MR brain without acute pathologies   Will trial meclizine ATC to see if symptoms improved  PT Evaluation--> reccs for STr will d/w SW to d/w patient  TTE--> pending    7/8/2023 echo as above   c/w mild diastolic chf    #ANASTACIO on CKD vs progression   in 2018 had CKD stage 3b via review of HIE now stage 4   this maybe just progression of CKD    #Essential hypertension   BP has been elevated will adjust meds accordingly .elevated in ED  Nifedipine started--> consider  increase  to 90  Continue lisinopril.  7/9/2023 --> bp better with the increase    #Diabetes mellitus  insulin corrective scale   a1c 7.3       VTE prop: heparin sc q12 hrs

## 2023-07-09 NOTE — DISCHARGE NOTE NURSING/CASE MANAGEMENT/SOCIAL WORK - NSDCPEFALRISK_GEN_ALL_CORE
For information on Fall & Injury Prevention, visit: [email or web address] OR  [email or web address] OR  https://www.cdc.gov/steadi/patient.html

## 2023-07-09 NOTE — DISCHARGE NOTE PROVIDER - NSDCMRMEDTOKEN_GEN_ALL_CORE_FT
glipiZIDE 5 mg oral tablet: 1 tab(s) orally 2 times a day  hydrALAZINE 50 mg oral tablet: 1 tab(s) orally 2 times a day  lisinopril-hydrochlorothiazide 20 mg-12.5 mg oral tablet: 1 orally  lisinopril-hydrochlorothiazide 20 mg-25 mg oral tablet: 1 tab(s) orally once a day  NIFEdipine 60 mg oral tablet, extended release: 1 tab(s) orally once a day   fluticasone 50 mcg/inh nasal spray: 1 puff(s) nasal 2 times a day  glipiZIDE 5 mg oral tablet: 1 tab(s) orally 2 times a day  hydrALAZINE 50 mg oral tablet: 1 tab(s) orally 2 times a day  lisinopril-hydrochlorothiazide 20 mg-12.5 mg oral tablet: 1 orally  NIFEdipine 60 mg oral tablet, extended release: 1 tab(s) orally once a day

## 2023-07-09 NOTE — DISCHARGE NOTE NURSING/CASE MANAGEMENT/SOCIAL WORK - PATIENT PORTAL LINK FT
You can access the FollowMyHealth Patient Portal offered by [facility] by registering at the following website: [email or web address]. By joining [facility]’s FollowMyHealth portal, you will also be able to view your health information using other applications (apps) compatible with our system.

## 2023-07-09 NOTE — DISCHARGE NOTE PROVIDER - HOSPITAL COURSE
Patient is a 78F with a PMH of HTN, DM, spinal stenosis who presents to the ED for dizziness.  Patient states that over the last 2-3 weeks she has developed dizziness that persists with change of position.  Earlier today, patient had hypoglycemia and her dizziness worsened.  Symptoms reportedly improved after eating.  Presented to the ED for evaluation and had continued dizziness.  Patient admits that she has had multiple near falls at home and that she lives alone.  Concerned for safety.  Ambulates with a cane at baseline.  Scheduled to have spinal surgery with Dr Richards in a few weeks.  No other complaints.  Hypertensive to 190/74 in ED, labs show mild leukocytosis and elevated creatinine.  Will admit to tele   (05 Jul 2023 18:48)    Subjective/Observations: Pt. seen and examined and evaluated. Pt. resting comfortably in bed in NAD, with no respiratory distress, no chest pain, dyspnea, palpitations, PND, or orthopnea.    REVIEW OF SYSTEMS: All other review of systems is negative unless indicated above    PAST MEDICAL & SURGICAL HISTORY:  Diabetes mellitus          MEDICATIONS  (STANDING):  dextrose 5%. 1000 milliLiter(s) (50 mL/Hr) IV Continuous <Continuous>  dextrose 5%. 1000 milliLiter(s) (100 mL/Hr) IV Continuous <Continuous>  dextrose 50% Injectable 25 Gram(s) IV Push once  dextrose 50% Injectable 12.5 Gram(s) IV Push once  dextrose 50% Injectable 25 Gram(s) IV Push once  fluticasone propionate 50 MICROgram(s)/spray Nasal Spray 1 Spray(s) Both Nostrils two times a day  glucagon  Injectable 1 milliGRAM(s) IntraMuscular once  heparin   Injectable 5000 Unit(s) SubCutaneous every 12 hours  insulin lispro (ADMELOG) corrective regimen sliding scale   SubCutaneous three times a day before meals  lisinopril 40 milliGRAM(s) Oral daily  meclizine 25 milliGRAM(s) Oral three times a day  NIFEdipine XL 90 milliGRAM(s) Oral daily    MEDICATIONS  (PRN):  acetaminophen     Tablet .. 650 milliGRAM(s) Oral every 6 hours PRN Temp greater or equal to 38C (100.4F), Mild Pain (1 - 3)  dextrose Oral Gel 15 Gram(s) Oral once PRN Blood Glucose LESS THAN 70 milliGRAM(s)/deciliter  sodium chloride 0.65% Nasal 1 Spray(s) Both Nostrils two times a day PRN Nasal Congestion      Allergies: No Known Allergies    Vital Signs Last 24 Hrs  T(C): 37 (09 Jul 2023 11:46), Max: 37.2 (09 Jul 2023 05:12)  T(F): 98.6 (09 Jul 2023 11:46), Max: 98.9 (09 Jul 2023 05:12)  HR: 49 (09 Jul 2023 11:46) (49 - 63)  BP: 149/58 (09 Jul 2023 11:46) (111/67 - 185/70)  BP(mean): --  RR: 18 (09 Jul 2023 11:46) (18 - 18)  SpO2: 93% (09 Jul 2023 11:46) (93% - 100%)    Parameters below as of 08 Jul 2023 16:23  Patient On (Oxygen Delivery Method): room air      LABS: All Labs Reviewed:             Echo:  < from: TTE Echo Complete w/o Contrast w/ Doppler (07.07.23 @ 10:48) >    Summary:   1. Left ventricular ejection fraction, by visual estimation, is 60 to   65%.   2. Technically difficult study.   3. Normal global left ventricular systolic function.   4. Mildly increased LV wall thickness.   5. Normal left ventricular internal cavity size.   6. Spectral Doppler shows impaired relaxation pattern of left   ventricular myocardial filling (Grade I diastolic dysfunction).   7. Normal right ventricular size and function.   8. Normal left atrial size.   9. Normal right atrial size.  10. Thereis no evidence of pericardial effusion.  11. Mild mitral annular calcification.  12. Mild mitral valve regurgitation.  13. Mild thickening and calcification of the anterior and posterior   mitral valve leaflets.          Physical Exam:  Appearance: [ ] Normal  [ ] abnormal [X ] NAD   Eyes: [ ] PERRL [ ] EOMI  HEENT: [ ] Normal [ ] Abnormal oral mucosa [ ]NC/AT  Cardiovascular: [X ] S1 [X ] S2 [ ] RRR [ ] m/r/g [ ]edema [ ] JVP  Procedural Access Site: [ ]  hematoma [ ] tender to palpation [ ] 2+ pulse [ ] bruit [ ] Ecchymosis  Respiratory: [ X] Clear to auscultation bilaterally  Gastrointestinal: [ ] Soft [ ] tenderness[ ] distension [ ] BS  Musculoskeletal: [ ] clubbing [ ] joint deformity   Neurologic: [ ] Non-focal  Psychiatry: [X] AAOx3  [ ] confused [ ] disoriented [ ] Mood & affect appropriate  Skin: [ ]  rashes [ ] ecchymoses [ ] cyanosis

## 2023-07-09 NOTE — CONSULT NOTE ADULT - SUBJECTIVE AND OBJECTIVE BOX
Cardiology Initial Consult    [facility] - Cardiology at [place]  [address] [place] [address], [place] [address]  Office: [phone]  Fax: [phone]    CHIEF COMPLAINT:  dizziness    HISTORY OF PRESENT ILLNESS:  78F HTN, DM, spinal stenosis who presented with dizziness.    The dizziness is usually positional, worse with getting up. Better since admitted    Noted to have baseline bradycardia without much variation throughout the day.    Ambulated and HR went up. Pt unable to ambulate further 2/2 her lumbago.  Sinusitis noted on NCHCT; dizziness also improved with nasal spray.    No hx of syncope.      Allergies  No Known Allergies    Intolerances      MEDICATIONS:  heparin   Injectable 5000 Unit(s) SubCutaneous every 12 hours  lisinopril 40 milliGRAM(s) Oral daily  NIFEdipine XL 90 milliGRAM(s) Oral daily  acetaminophen     Tablet .. 650 milliGRAM(s) Oral every 6 hours PRN  meclizine 25 milliGRAM(s) Oral three times a day  dextrose 50% Injectable 25 Gram(s) IV Push once  dextrose 50% Injectable 12.5 Gram(s) IV Push once  dextrose 50% Injectable 25 Gram(s) IV Push once  dextrose Oral Gel 15 Gram(s) Oral once PRN  glucagon  Injectable 1 milliGRAM(s) IntraMuscular once  insulin lispro (ADMELOG) corrective regimen sliding scale   SubCutaneous three times a day before meals  dextrose 5%. 1000 milliLiter(s) IV Continuous <Continuous>  dextrose 5%. 1000 milliLiter(s) IV Continuous <Continuous>  fluticasone propionate 50 MICROgram(s)/spray Nasal Spray 1 Spray(s) Both Nostrils two times a day  sodium chloride 0.65% Nasal 1 Spray(s) Both Nostrils two times a day PRN    PAST MEDICAL & SURGICAL HISTORY:  Diabetes mellitus      FAMILY HISTORY:  FH: HTN (hypertension)    SOCIAL HISTORY:    [x] Non-smoker  [ ] Smoker  [ ] Alcohol    Review of Systems:  Constitutional: [ -] Fever [- ] Chills [ ] Fatigue [ ] Weight change   HEENT: [ ] Blurred vision [ ] Eye pain [- ] Headache [ ] Runny nose [ ] Sore throat   Respiratory: [ ] Cough [ ] Wheezing [- ] Shortness of breath  Cardiovascular: [- ] Chest Pain [- ] Palpitations [- ] MARION [ -] PND [ -] Orthopnea  Gastrointestinal: [- ] Abdominal Pain [ ] Diarrhea [ ] Constipation [ ] Hemorrhoids [ ] Nausea [ ] Vomiting  Genitourinary: [ ] Nocturia [ -] Dysuria [ ] Incontinence  Extremities: [ -] Swelling [x ] Joint Pain  Neurologic: [ ] Focal deficit [x ] Paresthesias [ -] Syncope  Skin: [ -] Rash [ ] Ecchymoses [ ] Wounds [ ] Lesions  Psychiatry: [- ] Depression [ ] Suicidal/Homicidal ideation [ ] Anxiety [ ] Sleep disturbances  [x ] 10 point review of systems is otherwise negative except as mentioned above            [ ]Unable to obtain    PHYSICAL EXAM:  T(C): 37 (07-09-23 @ 11:46), Max: 37.2 (07-09-23 @ 05:12)  HR: 49 (07-09-23 @ 11:46) (49 - 63)  BP: 149/58 (07-09-23 @ 11:46) (111/67 - 185/70)  RR: 18 (07-09-23 @ 11:46) (18 - 18)  SpO2: 93% (07-09-23 @ 11:46) (93% - 100%)  Wt(kg): --  I&O's Summary      Appearance: No acute distress  HEENT:   Normal oral mucosa, PERRL  Cardiovascular: Normal S1 S2, no elevated JVP, 2/6 systolic murmur RUSB, LLSB, no edema  Respiratory: Lungs clear to auscultation, good air movement  Psychiatry: A & O x 3, Mood & affect appropriate  Gastrointestinal:  soft nt  Skin: No rashes, no ecchymoses, no cyanosis	  Neurologic: grossly non-focal  Extremities: Normal range of motion, no clubbing, cyanosis or edema  Vascular: Peripheral pulses palpable bilaterally    LABS:	 	    proBNP:   Lipid Profile:   HgA1c:   TSH:     CARDIAC MARKERS:    TELEMETRY: 	 SB   ECG:  	SR 73, LVH  RADIOLOGY: < from: CT Head No Cont (07.05.23 @ 13:10) >  IMPRESSION:   No acute abnormality. Chronic LEFT sphenoid sinusitis with   inspissated secretions    < end of copied text >    OTHER: 	    PREVIOUS DIAGNOSTIC TESTING:    [x] Echocardiogram: < from: TTE Echo Complete w/o Contrast w/ Doppler (07.07.23 @ 10:48) >  Summary:   1. Left ventricular ejection fraction, by visual estimation, is 60 to   65%.   2. Technically difficult study.   3. Normal global left ventricular systolic function.   4. Mildly increased LV wall thickness.   5. Normal left ventricular internal cavity size.   6. Spectral Doppler shows impaired relaxation pattern of left   ventricular myocardial filling (Grade I diastolic dysfunction).   7. Normal right ventricular size and function.   8. Normal left atrial size.   9. Normal right atrial size.  10. Thereis no evidence of pericardial effusion.  11. Mild mitral annular calcification.  12. Mild mitral valve regurgitation.  13. Mild thickening and calcification of the anterior and posterior   mitral valve leaflets.  14. Sclerotic aortic valve with normal opening.  15. Increased relative wall thickness with normal mass index consistent   with left ventricular concentric remodeling.  16. Intact intra-atrial septum without shunt.    < end of copied text >    [ ] Catheterization:  [ ] Stress Test:

## 2023-07-10 ENCOUNTER — APPOINTMENT (OUTPATIENT)
Dept: ORTHOPEDIC SURGERY | Facility: CLINIC | Age: 78
End: 2023-07-10
Payer: OTHER MISCELLANEOUS

## 2023-07-10 PROCEDURE — 99214 OFFICE O/P EST MOD 30 MIN: CPT

## 2023-07-10 RX ORDER — NIFEDIPINE 30 MG
1 TABLET, EXTENDED RELEASE 24 HR ORAL
Refills: 0 | DISCHARGE

## 2023-07-10 RX ORDER — LISINOPRIL/HYDROCHLOROTHIAZIDE 10-12.5 MG
1 TABLET ORAL
Refills: 0 | DISCHARGE

## 2023-07-10 NOTE — PHYSICAL EXAM
[Flexion] : flexion [Extension] : extension [4___] : left plantor flexors 4[unfilled]/5 [5___] : right extensor hallicus longus 5[unfilled]/5 [] : no lumbar paraspinal tenderness [FreeTextEntry9] : bilateral radicular complaints are reproduced with extension [de-identified] : unable to heel walk bilaterally; able to toe walk [de-identified] : top of foot numbness with decreased sensation to touch

## 2023-07-10 NOTE — IMAGING
[de-identified] : Thoracic/Lumbar Spine exam: Inspection of the thoracic/lumbar spine is as follows: Able to heel stand. Ankles are zero and symmetrical / Knees 1+ symmetrical EHL strength is normal b/l. No 1st web sensory deficit. SLR negative b/l. Forward flexion is approximately 80 degrees and reproduces lower back pain and pain into the posterior right LE. \par

## 2023-07-10 NOTE — DISCUSSION/SUMMARY
[de-identified] : 1) I discussed the risks, benefits and alternatives of treatment options for the Lumbar spine, including activity modification, rest, home exercise, oral antiinflammatory medication, physical therapy, surgery. \par 2) ** Continue Rx Ibuprofen 600 mg 1TID PRN MDD 3. The patient may combine each dose of this medication with 1 tablet of OTC Tylenol. \par 3) Pt is describing symptom that are strongly consistent with neuro claudication from lumbar spinal stenosis. She saw Dr. Richards and it was his opinion he will benefit from decompression surgery. I recommend that the patient undergo lumbar decompression for neuro claudication, which greatly limits her standing and walking endurance, with Dr. Richards. \par \par \par The patient will continue with conservative treatment as described above, and will F/U in 2 months. \par \par \par The patient was advised of the diagnosis. The natural history of the pathology was explained in full to the patient in layman's terms, including but not limited to the risks, symptoms and available options for treatment. We discussed the risks, benefits and alternatives of the treatment options and the advice I provided to the patient as listed above. Pt was given the opportunity to ask questions, and all questions were answered. The discussion was not limited to the above.\par \par Entered by Mihaela Zarco acting as scribe. \par

## 2023-07-10 NOTE — HISTORY OF PRESENT ILLNESS
[Lower back] : lower back [Sudden] : sudden [10] : 10 [Shooting] : shooting [Constant] : constant [Sleep] : sleep [Nothing helps with pain getting better] : Nothing helps with pain getting better [Walking] : walking [de-identified] : Patient Complaint -  7/19/94\par 72 yo female with neck and low back pain treated by Dr. Bailey for years. She had a work injury in 1994. She has tried\par PT but states pain returns when she is finished. She takes advil/aleve occasionally. There is pain from her lower back\par down posterior and anterior thighs. She has difficulty walking long distances.\par 12/2/16:  Case DOI 07.19.94\par *MRI Lumbar Spine 11.03.16*\par 1. Compared to prior examination. there is progression of posterior disc herniation, moderate central stenosis and\par bilateral\par exiting nerve root impingement at L3-L4, moderate central stenosis with impingement upon both the exiting nerve roots\par at\par L4-L5 and similar appearing protrusions and disc bulging in the lower thoracic and upper lumbar spine.\par 2. Exaggerated lower lumbar lordosis and grade I anterolisthesis at L4-L5 without acute fracture.\par She will be consulting with pain management. \par She reports continuing lower back pain with radicular pain through lateral thigh > anterior thigh. \par 4/7/17:  Case DOI: 07.19.94\par Pt presents for f/u. She reports that last night she started to experience a great exacerbation of her lower back pain\par and right leg radicular symptoms. She reports that she also experienced muscle spasms. She describes the right\par paralumbar pain and radiating to the right buttock, right lateral thigh and she also experiences pain and paresthesia\par over the top of her right foot.\par She reports that her walking endurance has been limited and she feels she often has to stop to rest to relieve back pain\par and radiating pain over b/l legs.\par 5/1/17:  Case DOI 07.19.94. \par Pt presents for f/u. She reports that overall his pain is less intense. She reports that she was able to obtain some relief\par of her pain after the MDP prescribed at the previous visit. She reports that her pain is variable in intensity and she feels\par that it can be aggravated with changes in the weather. She reports right LE pain has almost resolved, but there are still\par residual left LE radicular symptoms radiating to the 5th toe.\par ____\par 8/30/18: WC Case DOI: 07.19.94\par Pt presents for back. She reports that she has been experiencing and exacerbation of lower back pain. She reports that\par she has been experiencing radiating pain and paresthesias over the right LE.\par 5/30/19: WC Case DOI: 07.19.94\par Pt presents for f/u back. She reports that she has continuing lower back pain which is variable in intensity. She reports\par that she has a new onset of numbness over the toes of both feet which is most present at night when laying down.\par 1/24/20: WC Case DOI: 07.19.94\par Pt presents for routine f/u back. She reports that her condition has remained essentially unchanged in regards to\par location and inciting factors of her pain.\par 10/30/20: WC Case DOI: 07.19.94\par She reports that recently she has been experiencing worsening back pain which can be intense at times. She reports\par that her standing endurance has been greatly limited which is interfering with her ADLs. She also reports an increase in\par radiating symptoms into the right LE. She also reports that when walking in a supermarket, she leans on a shopping cart\par which enables her to continue walking.\par 11/23/20: WC Case DOI: 07.19.94\par *MRI Lumbar Spine 11.06.20 [compared to 2016]*\par Significant interval change at the L2-L3 level where there is broad-based posterior disc herniation contributing to\par impingement upon the right L3, right L2, and left L2 nerve roots with moderate central stenosis and right greater than\par left foraminal narrowing. Otherwise, there is similar appearing multilevel degenerative disc disease with multilevel central\par stenosis and nerve root impingement without evidence of acute fracture. Specifically, there is moderate central stenosis\par and bilateral exiting nerve root impingement at L3-L4, moderate central stenosis with impingement upon the left L5\par nerve root with encroachment upon the right exiting L4 nerve root at L4-L5 and protrusions with disc bulging in lower\par thoracic spine.\par 4/13/21: WC Case DOI: 07.19.94\par Pt presents for f/up back. She reports continuing and worsening lower back pain with pain that intensifies with walking\par and pain that is aggravated with extension. The main complaint is low back pain with lesser pain radiating into both\par posterior thighs. The thigh pain follows a neuroclaudication pattern. The most intense pain is in the lower back and is\par relieved by bending forward, there is a positive grocery cart sign. She was unable to tolerate even a small amount of\par extension due to LBP.\par 11/23/2021: Case DOI: 07.19.94\par Pt presents for f/up back. She reports continuing and worsening lower back pain. Pt reports she did not have injection\par with DR. Choi.\par \par 08/19/22:  Case DOI: 07.19.94\par Pt presents for f/up back. She has followed with Dr. Choi and could not undergo TFESI due to high blood sugar. She reports that recently her lower back pain with radicular symptoms into b/l LEs have been worsening. She reports that her ADLs and walking distance has become more limited. She reports that her radicular symptoms are present while walking, continue to worsen until she is forced to stop to relieve the pain. She presents in the office in a wheelchair today. She also reports n/t throughout b/l LE. \par \par 04/10/2023:  Case DOI: 07.19.94\par Pt presents for a follow up for the back. She reports that she is doing worse since the last visit, and she is doing physical therapy at home. The pain follows a neural claudication pattern, and limits her standing time so that she cannot cook without sitting down. Her walking distance is limited to approximately 30 feet at which she has to pause to allow the pain in both legs to subside.\par \par **MRI of the Lumbar Spine taken on 08/22/2022 at [facility]**\par IMPRESSION:\par 1. Multilevel lumbar degenerative disc disease most pronounced at L2-3 as well as multilevel facet osteoarthrosis \par with a mild degenerative levoscoliosis and grade I degenerative spondylolisthesis at L4-5.\par 2. Disc herniation T11-12 without compression of the conus medullaris.\par 3. Disc herniation at L1-2 without stenosis or nerve root compression.\par 4. Significant central stenosis at L2-3 as well as compression of the right L2 nerve root in the neural foramen.\par 5. Significant central stenosis at L3-4 as well as compression of the left L4 nerve root in the lateral recess.\par 6. Grade I degenerative spondylolisthesis, left foraminal disc herniation and posterior element degenerative changes at L4-5 with moderate central stenosis as well as compression of the left L4 nerve root in the neural foramen.\par 7. Modic type II endplate changes adjacent to the L2-3 disc.\par 8. No significant interval change when comparing this study with the most recent MR examination dated November 6, 2020.\par \par 05/08/2023:  Case DOI: 07.19.94\par Pt reports that she is doing worse since the last visit. She states that she is no longer attending physical therapy and is instead doing home exercises. \par \par 07/10/2023:  Case DOI: 07.19.94\par Pt had a recent complaint of vertigo and underwent workup including imaging. The workup was negative. She is scheduled to undergo lumbar laminectomy and fusion by Dr. Richards in 4 days. \par  [] : no [FreeTextEntry5] : MARYAM OCHOA is a 78 year old female presenting with low back pain that started years ago.  Shooting down b/l legs, Difficulty walking. Ambulates w/cane. Saw Barry/ had MRI L spine completed 2022. Tried PT; did not help. \par 7.10.2023 Patient states she was in the hospital from 7.5 to 7.9. Patient had x-rays and a CT scan completed from [facility]. [FreeTextEntry7] : b/l legs [de-identified] : MRI L Spine 2022

## 2023-07-12 DIAGNOSIS — R42 DIZZINESS AND GIDDINESS: ICD-10-CM

## 2023-07-12 DIAGNOSIS — R00.1 BRADYCARDIA, UNSPECIFIED: ICD-10-CM

## 2023-07-12 DIAGNOSIS — I11.0 HYPERTENSIVE HEART DISEASE WITH HEART FAILURE: ICD-10-CM

## 2023-07-12 DIAGNOSIS — J32.9 CHRONIC SINUSITIS, UNSPECIFIED: ICD-10-CM

## 2023-07-12 DIAGNOSIS — I50.32 CHRONIC DIASTOLIC (CONGESTIVE) HEART FAILURE: ICD-10-CM

## 2023-07-12 DIAGNOSIS — N18.4 CHRONIC KIDNEY DISEASE, STAGE 4 (SEVERE): ICD-10-CM

## 2023-07-12 DIAGNOSIS — N17.9 ACUTE KIDNEY FAILURE, UNSPECIFIED: ICD-10-CM

## 2023-07-12 DIAGNOSIS — E11.22 TYPE 2 DIABETES MELLITUS WITH DIABETIC CHRONIC KIDNEY DISEASE: ICD-10-CM

## 2023-07-12 DIAGNOSIS — E11.649 TYPE 2 DIABETES MELLITUS WITH HYPOGLYCEMIA WITHOUT COMA: ICD-10-CM

## 2023-07-12 DIAGNOSIS — M48.00 SPINAL STENOSIS, SITE UNSPECIFIED: ICD-10-CM

## 2023-07-13 ENCOUNTER — TRANSCRIPTION ENCOUNTER (OUTPATIENT)
Age: 78
End: 2023-07-13

## 2023-07-14 ENCOUNTER — INPATIENT (INPATIENT)
Facility: HOSPITAL | Age: 78
LOS: 3 days | Discharge: NURSING, REHAB OR LONG-TERM CARE | End: 2023-07-18
Attending: ORTHOPAEDIC SURGERY | Admitting: ORTHOPAEDIC SURGERY
Payer: MEDICARE

## 2023-07-14 ENCOUNTER — APPOINTMENT (OUTPATIENT)
Dept: ORTHOPEDIC SURGERY | Facility: HOSPITAL | Age: 78
End: 2023-07-14
Payer: OTHER MISCELLANEOUS

## 2023-07-14 ENCOUNTER — TRANSCRIPTION ENCOUNTER (OUTPATIENT)
Age: 78
End: 2023-07-14

## 2023-07-14 VITALS
TEMPERATURE: 98 F | OXYGEN SATURATION: 97 % | DIASTOLIC BLOOD PRESSURE: 72 MMHG | HEIGHT: 63 IN | HEART RATE: 80 BPM | WEIGHT: 169.98 LBS | RESPIRATION RATE: 17 BRPM | SYSTOLIC BLOOD PRESSURE: 181 MMHG

## 2023-07-14 DIAGNOSIS — M48.061 SPINAL STENOSIS, LUMBAR REGION WITHOUT NEUROGENIC CLAUDICATION: ICD-10-CM

## 2023-07-14 DIAGNOSIS — Z98.890 OTHER SPECIFIED POSTPROCEDURAL STATES: Chronic | ICD-10-CM

## 2023-07-14 LAB
ANION GAP SERPL CALC-SCNC: 8 MMOL/L — SIGNIFICANT CHANGE UP (ref 5–17)
BASOPHILS # BLD AUTO: 0.04 K/UL — SIGNIFICANT CHANGE UP (ref 0–0.2)
BASOPHILS NFR BLD AUTO: 0.3 % — SIGNIFICANT CHANGE UP (ref 0–2)
BUN SERPL-MCNC: 57 MG/DL — HIGH (ref 7–23)
CALCIUM SERPL-MCNC: 9.2 MG/DL — SIGNIFICANT CHANGE UP (ref 8.5–10.1)
CHLORIDE SERPL-SCNC: 114 MMOL/L — HIGH (ref 96–108)
CO2 SERPL-SCNC: 16 MMOL/L — LOW (ref 22–31)
CREAT SERPL-MCNC: 3.14 MG/DL — HIGH (ref 0.5–1.3)
EGFR: 15 ML/MIN/1.73M2 — LOW
EOSINOPHIL # BLD AUTO: 0.06 K/UL — SIGNIFICANT CHANGE UP (ref 0–0.5)
EOSINOPHIL NFR BLD AUTO: 0.5 % — SIGNIFICANT CHANGE UP (ref 0–6)
GLUCOSE SERPL-MCNC: 214 MG/DL — HIGH (ref 70–99)
HCT VFR BLD CALC: 27.6 % — LOW (ref 34.5–45)
HGB BLD-MCNC: 8.7 G/DL — LOW (ref 11.5–15.5)
IMM GRANULOCYTES NFR BLD AUTO: 0.8 % — SIGNIFICANT CHANGE UP (ref 0–0.9)
LYMPHOCYTES # BLD AUTO: 1.05 K/UL — SIGNIFICANT CHANGE UP (ref 1–3.3)
LYMPHOCYTES # BLD AUTO: 9.1 % — LOW (ref 13–44)
MCHC RBC-ENTMCNC: 27.2 PG — SIGNIFICANT CHANGE UP (ref 27–34)
MCHC RBC-ENTMCNC: 31.5 G/DL — LOW (ref 32–36)
MCV RBC AUTO: 86.3 FL — SIGNIFICANT CHANGE UP (ref 80–100)
MONOCYTES # BLD AUTO: 0.13 K/UL — SIGNIFICANT CHANGE UP (ref 0–0.9)
MONOCYTES NFR BLD AUTO: 1.1 % — LOW (ref 2–14)
NEUTROPHILS # BLD AUTO: 10.23 K/UL — HIGH (ref 1.8–7.4)
NEUTROPHILS NFR BLD AUTO: 88.2 % — HIGH (ref 43–77)
NRBC # BLD: 0 /100 WBCS — SIGNIFICANT CHANGE UP (ref 0–0)
PLATELET # BLD AUTO: 229 K/UL — SIGNIFICANT CHANGE UP (ref 150–400)
POTASSIUM SERPL-MCNC: 5.4 MMOL/L — HIGH (ref 3.5–5.3)
POTASSIUM SERPL-SCNC: 5.4 MMOL/L — HIGH (ref 3.5–5.3)
RBC # BLD: 3.2 M/UL — LOW (ref 3.8–5.2)
RBC # FLD: 13.9 % — SIGNIFICANT CHANGE UP (ref 10.3–14.5)
SODIUM SERPL-SCNC: 138 MMOL/L — SIGNIFICANT CHANGE UP (ref 135–145)
WBC # BLD: 11.6 K/UL — HIGH (ref 3.8–10.5)
WBC # FLD AUTO: 11.6 K/UL — HIGH (ref 3.8–10.5)

## 2023-07-14 PROCEDURE — 20936 SP BONE AGRFT LOCAL ADD-ON: CPT | Mod: AS

## 2023-07-14 PROCEDURE — 22842 INSERT SPINE FIXATION DEVICE: CPT

## 2023-07-14 PROCEDURE — 22612 ARTHRD PST TQ 1NTRSPC LUMBAR: CPT | Mod: AS

## 2023-07-14 PROCEDURE — 63048 LAM FACETEC &FORAMOT EA ADDL: CPT | Mod: AS

## 2023-07-14 PROCEDURE — 20936 SP BONE AGRFT LOCAL ADD-ON: CPT

## 2023-07-14 PROCEDURE — 22614 ARTHRD PST TQ 1NTRSPC EA ADD: CPT | Mod: AS

## 2023-07-14 PROCEDURE — 22614 ARTHRD PST TQ 1NTRSPC EA ADD: CPT

## 2023-07-14 PROCEDURE — 22612 ARTHRD PST TQ 1NTRSPC LUMBAR: CPT

## 2023-07-14 PROCEDURE — 22842 INSERT SPINE FIXATION DEVICE: CPT | Mod: AS

## 2023-07-14 PROCEDURE — 63047 LAM FACETEC & FORAMOT LUMBAR: CPT

## 2023-07-14 PROCEDURE — 63047 LAM FACETEC & FORAMOT LUMBAR: CPT | Mod: AS

## 2023-07-14 PROCEDURE — 61783 SCAN PROC SPINAL: CPT

## 2023-07-14 PROCEDURE — 63048 LAM FACETEC &FORAMOT EA ADDL: CPT

## 2023-07-14 DEVICE — BONE WAX 2.5GM: Type: IMPLANTABLE DEVICE | Status: FUNCTIONAL

## 2023-07-14 DEVICE — BONE WAX 2.5G NON ABSORBABLE: Type: IMPLANTABLE DEVICE | Status: FUNCTIONAL

## 2023-07-14 DEVICE — TULIP RELINE MOD: Type: IMPLANTABLE DEVICE | Status: FUNCTIONAL

## 2023-07-14 DEVICE — IMPLANTABLE DEVICE: Type: IMPLANTABLE DEVICE | Status: FUNCTIONAL

## 2023-07-14 DEVICE — SCREW LOCKG OPEN TULIP RELINE 5.5MM: Type: IMPLANTABLE DEVICE | Status: FUNCTIONAL

## 2023-07-14 DEVICE — SURGIFLO MATRIX WITH THROMBIN KIT: Type: IMPLANTABLE DEVICE | Status: FUNCTIONAL

## 2023-07-14 DEVICE — SCREW SHANK RELINE MAS MOD 2C 6.5X45MM: Type: IMPLANTABLE DEVICE | Status: FUNCTIONAL

## 2023-07-14 RX ORDER — LISINOPRIL 2.5 MG/1
20 TABLET ORAL DAILY
Refills: 0 | Status: DISCONTINUED | OUTPATIENT
Start: 2023-07-14 | End: 2023-07-15

## 2023-07-14 RX ORDER — DEXTROSE 50 % IN WATER 50 %
15 SYRINGE (ML) INTRAVENOUS ONCE
Refills: 0 | Status: DISCONTINUED | OUTPATIENT
Start: 2023-07-14 | End: 2023-07-18

## 2023-07-14 RX ORDER — DEXTROSE 50 % IN WATER 50 %
25 SYRINGE (ML) INTRAVENOUS ONCE
Refills: 0 | Status: DISCONTINUED | OUTPATIENT
Start: 2023-07-14 | End: 2023-07-18

## 2023-07-14 RX ORDER — OXYCODONE HYDROCHLORIDE 5 MG/1
15 TABLET ORAL EVERY 4 HOURS
Refills: 0 | Status: DISCONTINUED | OUTPATIENT
Start: 2023-07-14 | End: 2023-07-18

## 2023-07-14 RX ORDER — SODIUM CHLORIDE 9 MG/ML
1000 INJECTION, SOLUTION INTRAVENOUS
Refills: 0 | Status: DISCONTINUED | OUTPATIENT
Start: 2023-07-14 | End: 2023-07-14

## 2023-07-14 RX ORDER — NIFEDIPINE 30 MG
60 TABLET, EXTENDED RELEASE 24 HR ORAL
Refills: 0 | Status: DISCONTINUED | OUTPATIENT
Start: 2023-07-14 | End: 2023-07-14

## 2023-07-14 RX ORDER — SENNA PLUS 8.6 MG/1
2 TABLET ORAL AT BEDTIME
Refills: 0 | Status: DISCONTINUED | OUTPATIENT
Start: 2023-07-14 | End: 2023-07-18

## 2023-07-14 RX ORDER — LISINOPRIL 2.5 MG/1
20 TABLET ORAL DAILY
Refills: 0 | Status: DISCONTINUED | OUTPATIENT
Start: 2023-07-14 | End: 2023-07-14

## 2023-07-14 RX ORDER — FLUTICASONE PROPIONATE 50 MCG
1 SPRAY, SUSPENSION NASAL
Refills: 0 | Status: DISCONTINUED | OUTPATIENT
Start: 2023-07-14 | End: 2023-07-14

## 2023-07-14 RX ORDER — HYDRALAZINE HCL 50 MG
50 TABLET ORAL
Refills: 0 | Status: DISCONTINUED | OUTPATIENT
Start: 2023-07-14 | End: 2023-07-14

## 2023-07-14 RX ORDER — CEFAZOLIN SODIUM 1 G
2000 VIAL (EA) INJECTION EVERY 8 HOURS
Refills: 0 | Status: COMPLETED | OUTPATIENT
Start: 2023-07-14 | End: 2023-07-15

## 2023-07-14 RX ORDER — INSULIN LISPRO 100/ML
1 VIAL (ML) SUBCUTANEOUS ONCE
Refills: 0 | Status: DISCONTINUED | OUTPATIENT
Start: 2023-07-14 | End: 2023-07-14

## 2023-07-14 RX ORDER — ONDANSETRON 8 MG/1
4 TABLET, FILM COATED ORAL
Refills: 0 | Status: DISCONTINUED | OUTPATIENT
Start: 2023-07-14 | End: 2023-07-18

## 2023-07-14 RX ORDER — ONDANSETRON 8 MG/1
4 TABLET, FILM COATED ORAL EVERY 6 HOURS
Refills: 0 | Status: DISCONTINUED | OUTPATIENT
Start: 2023-07-14 | End: 2023-07-18

## 2023-07-14 RX ORDER — SODIUM CHLORIDE 9 MG/ML
1000 INJECTION, SOLUTION INTRAVENOUS
Refills: 0 | Status: DISCONTINUED | OUTPATIENT
Start: 2023-07-14 | End: 2023-07-15

## 2023-07-14 RX ORDER — INSULIN LISPRO 100/ML
1 VIAL (ML) SUBCUTANEOUS ONCE
Refills: 0 | Status: COMPLETED | OUTPATIENT
Start: 2023-07-14 | End: 2023-07-14

## 2023-07-14 RX ORDER — HYDROMORPHONE HYDROCHLORIDE 2 MG/ML
0.5 INJECTION INTRAMUSCULAR; INTRAVENOUS; SUBCUTANEOUS
Refills: 0 | Status: DISCONTINUED | OUTPATIENT
Start: 2023-07-14 | End: 2023-07-14

## 2023-07-14 RX ORDER — HYDRALAZINE HCL 50 MG
1 TABLET ORAL
Refills: 0 | DISCHARGE

## 2023-07-14 RX ORDER — ACETAMINOPHEN 500 MG
1000 TABLET ORAL ONCE
Refills: 0 | Status: DISCONTINUED | OUTPATIENT
Start: 2023-07-14 | End: 2023-07-18

## 2023-07-14 RX ORDER — SODIUM CHLORIDE 9 MG/ML
3 INJECTION INTRAMUSCULAR; INTRAVENOUS; SUBCUTANEOUS EVERY 8 HOURS
Refills: 0 | Status: DISCONTINUED | OUTPATIENT
Start: 2023-07-14 | End: 2023-07-14

## 2023-07-14 RX ORDER — BENZOCAINE AND MENTHOL 5; 1 G/100ML; G/100ML
1 LIQUID ORAL THREE TIMES A DAY
Refills: 0 | Status: DISCONTINUED | OUTPATIENT
Start: 2023-07-14 | End: 2023-07-18

## 2023-07-14 RX ORDER — INSULIN LISPRO 100/ML
VIAL (ML) SUBCUTANEOUS
Refills: 0 | Status: DISCONTINUED | OUTPATIENT
Start: 2023-07-14 | End: 2023-07-18

## 2023-07-14 RX ORDER — CYCLOBENZAPRINE HYDROCHLORIDE 10 MG/1
10 TABLET, FILM COATED ORAL EVERY 8 HOURS
Refills: 0 | Status: DISCONTINUED | OUTPATIENT
Start: 2023-07-14 | End: 2023-07-18

## 2023-07-14 RX ORDER — GLUCAGON INJECTION, SOLUTION 0.5 MG/.1ML
1 INJECTION, SOLUTION SUBCUTANEOUS ONCE
Refills: 0 | Status: DISCONTINUED | OUTPATIENT
Start: 2023-07-14 | End: 2023-07-18

## 2023-07-14 RX ORDER — DEXTROSE 50 % IN WATER 50 %
12.5 SYRINGE (ML) INTRAVENOUS ONCE
Refills: 0 | Status: DISCONTINUED | OUTPATIENT
Start: 2023-07-14 | End: 2023-07-18

## 2023-07-14 RX ORDER — FLUTICASONE PROPIONATE 50 MCG
1 SPRAY, SUSPENSION NASAL
Refills: 0 | Status: DISCONTINUED | OUTPATIENT
Start: 2023-07-14 | End: 2023-07-18

## 2023-07-14 RX ORDER — NIFEDIPINE 30 MG
60 TABLET, EXTENDED RELEASE 24 HR ORAL
Refills: 0 | Status: DISCONTINUED | OUTPATIENT
Start: 2023-07-14 | End: 2023-07-18

## 2023-07-14 RX ORDER — LANOLIN ALCOHOL/MO/W.PET/CERES
3 CREAM (GRAM) TOPICAL AT BEDTIME
Refills: 0 | Status: DISCONTINUED | OUTPATIENT
Start: 2023-07-14 | End: 2023-07-18

## 2023-07-14 RX ORDER — POLYETHYLENE GLYCOL 3350 17 G/17G
17 POWDER, FOR SOLUTION ORAL
Refills: 0 | Status: DISCONTINUED | OUTPATIENT
Start: 2023-07-14 | End: 2023-07-18

## 2023-07-14 RX ORDER — LISINOPRIL/HYDROCHLOROTHIAZIDE 10-12.5 MG
1 TABLET ORAL
Refills: 0 | DISCHARGE

## 2023-07-14 RX ORDER — OXYCODONE HYDROCHLORIDE 5 MG/1
10 TABLET ORAL EVERY 4 HOURS
Refills: 0 | Status: DISCONTINUED | OUTPATIENT
Start: 2023-07-14 | End: 2023-07-18

## 2023-07-14 RX ORDER — HYDRALAZINE HCL 50 MG
50 TABLET ORAL
Refills: 0 | Status: DISCONTINUED | OUTPATIENT
Start: 2023-07-14 | End: 2023-07-18

## 2023-07-14 RX ORDER — SODIUM CHLORIDE 9 MG/ML
1000 INJECTION, SOLUTION INTRAVENOUS
Refills: 0 | Status: DISCONTINUED | OUTPATIENT
Start: 2023-07-14 | End: 2023-07-18

## 2023-07-14 RX ORDER — ACETAMINOPHEN 500 MG
975 TABLET ORAL EVERY 8 HOURS
Refills: 0 | Status: DISCONTINUED | OUTPATIENT
Start: 2023-07-14 | End: 2023-07-18

## 2023-07-14 RX ORDER — HYDROMORPHONE HYDROCHLORIDE 2 MG/ML
0.5 INJECTION INTRAMUSCULAR; INTRAVENOUS; SUBCUTANEOUS EVERY 4 HOURS
Refills: 0 | Status: DISCONTINUED | OUTPATIENT
Start: 2023-07-14 | End: 2023-07-15

## 2023-07-14 RX ADMIN — HYDROMORPHONE HYDROCHLORIDE 0.5 MILLIGRAM(S): 2 INJECTION INTRAMUSCULAR; INTRAVENOUS; SUBCUTANEOUS at 18:31

## 2023-07-14 RX ADMIN — OXYCODONE HYDROCHLORIDE 10 MILLIGRAM(S): 5 TABLET ORAL at 22:30

## 2023-07-14 RX ADMIN — SODIUM CHLORIDE 75 MILLILITER(S): 9 INJECTION, SOLUTION INTRAVENOUS at 20:34

## 2023-07-14 RX ADMIN — HYDROMORPHONE HYDROCHLORIDE 0.5 MILLIGRAM(S): 2 INJECTION INTRAMUSCULAR; INTRAVENOUS; SUBCUTANEOUS at 18:18

## 2023-07-14 RX ADMIN — SENNA PLUS 2 TABLET(S): 8.6 TABLET ORAL at 21:29

## 2023-07-14 RX ADMIN — Medication 1 SPRAY(S): at 21:29

## 2023-07-14 RX ADMIN — SODIUM CHLORIDE 75 MILLILITER(S): 9 INJECTION, SOLUTION INTRAVENOUS at 18:05

## 2023-07-14 RX ADMIN — Medication 50 MILLIGRAM(S): at 21:30

## 2023-07-14 RX ADMIN — Medication 975 MILLIGRAM(S): at 21:29

## 2023-07-14 RX ADMIN — HYDROMORPHONE HYDROCHLORIDE 0.5 MILLIGRAM(S): 2 INJECTION INTRAMUSCULAR; INTRAVENOUS; SUBCUTANEOUS at 19:05

## 2023-07-14 RX ADMIN — OXYCODONE HYDROCHLORIDE 10 MILLIGRAM(S): 5 TABLET ORAL at 21:34

## 2023-07-14 RX ADMIN — HYDROMORPHONE HYDROCHLORIDE 0.5 MILLIGRAM(S): 2 INJECTION INTRAMUSCULAR; INTRAVENOUS; SUBCUTANEOUS at 18:50

## 2023-07-14 RX ADMIN — Medication 3 MILLIGRAM(S): at 21:29

## 2023-07-14 RX ADMIN — Medication 60 MILLIGRAM(S): at 21:30

## 2023-07-14 RX ADMIN — HYDROMORPHONE HYDROCHLORIDE 0.5 MILLIGRAM(S): 2 INJECTION INTRAMUSCULAR; INTRAVENOUS; SUBCUTANEOUS at 19:26

## 2023-07-14 RX ADMIN — Medication 975 MILLIGRAM(S): at 22:25

## 2023-07-14 NOTE — DISCHARGE NOTE PROVIDER - CARE PROVIDER_API CALL
Wellington Richards  Orthopaedic Surgery  [address] [facility], Floor 2  [place] [id number]  Phone: [phone]  Fax: [phone]  Follow Up Time:

## 2023-07-14 NOTE — DISCHARGE NOTE PROVIDER - HOSPITAL COURSE
78yFemale with history of lumbar radiculopathy presenting for Lumbar laminectomy and fusion by Dr. Richards on 7/14/2023. Risk and benefits of surgery were explained to the patient. The patient understood and agreed to proceed with surgery. Patient underwent the procedure with no intraoperative complications. Pt was brought in stable condition to the PACU. Once stable in PACU, pt was brought to the floor. During hospital stay pt was followed by Medicine, physical therapy, Home Care during this admission. Pt had an uneventful hospital course. Pt is stable for discharge to home 78yFemale with history of lumbar radiculopathy presenting for Lumbar laminectomy and fusion by Dr. Richards on 7/14/2023. Risk and benefits of surgery were explained to the patient. The patient understood and agreed to proceed with surgery. Patient underwent the procedure with no intraoperative complications. Pt was brought in stable condition to the PACU. Once stable in PACU, pt was brought to the floor. During hospital stay pt was followed by Medicine, physical therapy, Home Care during this admission. Pt had hyperkalemia which was treated with lokelma. Pt had elevated creatinine from baseline which improved during the hospital course. Pt is stable for discharge to rehab on POD#4 after drains were removed and electrolytes improved.

## 2023-07-14 NOTE — DISCHARGE NOTE PROVIDER - NSDCCPCAREPLAN_GEN_ALL_CORE_FT
PRINCIPAL DISCHARGE DIAGNOSIS  Diagnosis: Spinal stenosis of lumbar region with radiculopathy  Assessment and Plan of Treatment:      PRINCIPAL DISCHARGE DIAGNOSIS  Diagnosis: Spinal stenosis of lumbar region with radiculopathy  Assessment and Plan of Treatment:       SECONDARY DISCHARGE DIAGNOSES  Diagnosis: Hyperkalemia  Assessment and Plan of Treatment:     Diagnosis: Acute kidney injury superimposed on chronic kidney disease  Assessment and Plan of Treatment:

## 2023-07-14 NOTE — PROGRESS NOTE ADULT - SUBJECTIVE AND OBJECTIVE BOX
Patient seen and examined at bedside. Pain is controlled. Pt feeling well. No nausea or vomiting.    Vital Signs Last 24 Hrs  T(C): 36.7 (07-15-23 @ 05:30), Max: 36.9 (07-14-23 @ 12:16)  T(F): 98 (07-15-23 @ 05:30), Max: 98.5 (07-14-23 @ 12:16)  HR: 61 (07-15-23 @ 05:30) (57 - 80)  BP: 176/64 (07-15-23 @ 05:30) (141/44 - 181/72)  BP(mean): 114 (07-14-23 @ 19:20) (114 - 114)  RR: 18 (07-15-23 @ 05:30) (13 - 20)  SpO2: 98% (07-15-23 @ 05:30) (96% - 100%)      Exam:  Gen: NAD, resting comfortably  Spine  Dressing c/d/i  JPs with SS output    Motor:                   C5                C6              C7               C8           T1   R            5/5                5/5            5/5             5/5          5/5  L             5/5               5/5             5/5             5/5          5/5                L2             L3             L4               L5            S1  R         5/5           5/5          5/5             5/5           5/5  L          5/5          5/5           4/5             5/5           5/5    Sensory:            C5         C6         C7      C8       T1        (0=absent, 1=impaired, 2=normal, NT=not testable)  R         2            1          1        1        2  L          2            2           2        2         2               L2          L3         L4      L5       S1         (0=absent, 1=impaired, 2=normal, NT=not testable)  R         2            2           1       1       1  L          2            2          1      1         1  Neg Do  Neg Babinski  Neg clonus     A/P:  78yFemale Stable POD 0  s/p L2-4 Lami, L2-5 PSF    -please record drain output   -Bruce in place will plan to discontinue today   -FU labs  -WBAT  -Pain control  -PT/OT  -Ppx ABX x2 dose  -DVT PE ppx- hold in setting of spine surgery and risk of epidural hematoma, SCDs BL  -Incentive spirometry  -Dispo: Pending PT

## 2023-07-14 NOTE — DISCHARGE NOTE PROVIDER - NSDCMRMEDTOKEN_GEN_ALL_CORE_FT
fluticasone 50 mcg/inh nasal spray: 1 puff(s) nasal 2 times a day  glipiZIDE 10 mg oral tablet: 1 orally 2 times a day  glipiZIDE 5 mg oral tablet: 1 tab(s) orally 2 times a day  hydrALAZINE 50 mg oral tablet: 1 tab(s) orally 2 times a day  lisinopril-hydrochlorothiazide 20 mg-12.5 mg oral tablet: 1 orally once a day  Nifediac CC 60 mg oral tablet, extended release: 1 orally 2 times a day  NIFEdipine 60 mg oral tablet, extended release: 1 tab(s) orally once a day   acetaminophen 325 mg oral tablet: 3 tab(s) orally every 8 hours  aluminum hydroxide-magnesium hydroxide 200 mg-200 mg/5 mL oral suspension: 30 milliliter(s) orally every 4 hours As needed Dyspepsia  cyclobenzaprine 10 mg oral tablet: 1 tab(s) orally every 8 hours As needed Muscle Spasm  fluticasone 50 mcg/inh nasal spray: 1 puff(s) nasal 2 times a day  glipiZIDE 5 mg oral tablet: 1 tab(s) orally 2 times a day  hydrALAZINE 50 mg oral tablet: 1 tab(s) orally 2 times a day  lisinopril-hydrochlorothiazide 20 mg-12.5 mg oral tablet: 1 orally once a day  NIFEdipine 60 mg oral tablet, extended release: 1 tab(s) orally 2 times a day  oxyCODONE 10 mg oral tablet: 1 tab(s) orally every 4 hours As needed Moderate Pain (4 - 6)  oxyCODONE 15 mg oral tablet: 1 tab(s) orally every 4 hours As needed Severe Pain (7 - 10)  polyethylene glycol 3350 oral powder for reconstitution: 17 gram(s) orally 2 times a day  senna leaf extract oral tablet: 2 tab(s) orally once a day (at bedtime)

## 2023-07-14 NOTE — H&P ADULT - NSHPPHYSICALEXAM_GEN_ALL_CORE
· Constitutional: well-groomed; no distress  · Eyes: PERRL; EOMI; conjunctiva clear - no redness  · ENMT: no gross abnormalities  · Respiratory: clear to auscultation bilaterally; no wheezes; no rales; no rhonchi  · Cardiovascular: regular rate and rhythm; S1 S2 present; no gallops; no rub; no murmur  · Gastrointestinal: soft; nontender; nondistended; normal active bowel sounds  · Genitourinary Comments: deferred  · Neurological: cranial nerves II-XII intact; sensation intact  · Skin: warm and dry; color normal; no rashes; no ulcers  · Lymphatic: No lymphadedenopathy  · Musculoskeletal back exam  · Back Exam ROM limited  · ROM Limitation and pain with flexion; extension; lateral bend L; lateral bend R; rotation L; rotation R; sensory and motor grossly intact; limited movement, Walks with cane short distance otherwise wheelchair  · Psychiatric- normal affect; alert and oriented x3; normal behavior

## 2023-07-14 NOTE — H&P ADULT - NSICDXPASTMEDICALHX_GEN_ALL_CORE_FT
PAST MEDICAL HISTORY:  Diabetes mellitus     HTN (hypertension)     T2DM (type 2 diabetes mellitus)

## 2023-07-14 NOTE — H&P ADULT - ASSESSMENT
79 yo F with hx of HTN, T2DM, vertigo, lumbar stenosis and radiculopathy presents today for lumbar laminectomy and fusion with Dr. Richards.

## 2023-07-14 NOTE — DISCHARGE NOTE PROVIDER - NSDCCPTREATMENT_GEN_ALL_CORE_FT
PRINCIPAL PROCEDURE  Procedure: Posterior fusion of lumbar spine with laminectomy  Findings and Treatment:

## 2023-07-14 NOTE — DISCHARGE NOTE PROVIDER - NSDCFUSCHEDAPPT_GEN_ALL_CORE_FT
Wellington Richards  [facility]  [address]  Scheduled Appointment: 07/17/2023    Wellington Richards  [facility]  [address] [place], [place] [address]diana Mullen  Scheduled Appointment: 07/31/2023    Russell Reddy  [facility]  [address]  Scheduled Appointment: 09/11/2023     Wellington Richards  [facility]  [address] [place], [place] [address]diana [place]ullen  Scheduled Appointment: 07/31/2023    Russell Reddy  [facility]  [address] [place], [place] [address]diana Mullen  Scheduled Appointment: 09/11/2023

## 2023-07-14 NOTE — DISCHARGE NOTE PROVIDER - NSDCFUADDINST_GEN_ALL_CORE_FT
No bending/lifting/twisting/pulling/pushing/carrying or driving. No blood thinners (not limited to but including) aspirin, motrin, alleve, naproxen, etc.    May shower post op.  No direct water pressure over incision.  Change dressing daily as needed with a dry clean bandage.

## 2023-07-14 NOTE — DISCHARGE NOTE PROVIDER - NSDCFUADDAPPT_GEN_ALL_CORE_FT
Follow up with your surgeon in two weeks. Call for appointment.    If you need more pain medications, call your surgeon's office. For medication refills or authorizations call [phone]    Make sure to have a bowel movement by 2 days after surgery. Take stool softners and laxatives as needed.    Call and schedule a follow up appointment with your primary care physician for repeat blood work (CBC and BMP) for post hospital discharge follow-up care.    Call your surgeon if you have increased redness/pain/drainage or fever. Return to ER for shortness of breath/calf tenderness. Follow up with your surgeon in two weeks. Call for appointment.    If you need more pain medications, call your surgeon's office. For medication refills or authorizations call [phone]    Make sure to have a bowel movement every 2 days.     Repeat labs this week on 7/20: BMP for potassium and creatinine.   Hydralazine was discontinued because of elevated creatinine from baseline.   Please have medical follow up this week to review labs and medications.     Call your surgeon if you have increased redness/pain/drainage or fever. Return to ER for shortness of breath/calf tenderness.

## 2023-07-14 NOTE — H&P ADULT - NSICDXFAMILYHX_GEN_ALL_CORE_FT
FAMILY HISTORY:  FH: HTN (hypertension)    Mother  Still living? No  FH: HTN (hypertension), Age at diagnosis: Age Unknown    Aunt  Still living? No  FH: type 2 diabetes, Age at diagnosis: Age Unknown

## 2023-07-14 NOTE — PATIENT PROFILE ADULT - FALL HARM RISK - UNIVERSAL INTERVENTIONS
Bed in lowest position, wheels locked, appropriate side rails in place/Call bell, personal items and telephone in reach/Instruct patient to call for assistance before getting out of bed or chair/Non-slip footwear when patient is out of bed/[place] to call system/Physically safe environment - no spills, clutter or unnecessary equipment/Purposeful Proactive Rounding/Room/bathroom lighting operational, light cord in reach

## 2023-07-14 NOTE — H&P ADULT - HISTORY OF PRESENT ILLNESS
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 Had difficulty getting to the bathroom, use a cane to walk. Takes Tylenol as needed for pain Tried Motrin but did not help. Continue with back in the back and legs khushi with prolonged walking. After failing extensive conservative management over the years had decided to pursue lumbar laminectomy and fusion with Dr Richards 7/14/23. Was hospitalized last week for vertigo, was discharged with meclizine and nasal spray.   Goal: to walk without pain.  Denies recent travel outside of the country, no covid exposure  Not vaccinated. 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 Had difficulty getting to the bathroom, use a cane to walk. Takes Tylenol as needed for pain Tried Motrin but did not help. Continue with back in the back and legs khushi with prolonged walking. After failing extensive conservative management over the years had decided to pursue lumbar laminectomy and fusion with Dr Richards 7/14/23. Was hospitalized last week for vertigo, was discharged with meclizine and nasal spray.   Goal: to walk without pain.  Denies recent travel outside of the country, no covid exposure  Not vaccinated. 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 Had difficulty getting to the bathroom, use a cane to walk. Takes Tylenol as needed for pain Tried Motrin but did not help. Continue with pain in the back and legs with walking. After failing extensive conservative management over the years had decided to pursue lumbar laminectomy and fusion with Dr Richards 7/14/23. Was hospitalized last week for vertigo, was discharged with meclizine and nasal spray.   Goal: to walk without pain.  Denies recent travel outside of the country, no covid exposure  Not vaccinated.

## 2023-07-14 NOTE — H&P ADULT - NSHPREVIEWOFSYSTEMS_GEN_ALL_CORE
Skin - negative  Ophthalmologic Comments - BILATERAL  VISION PROBLEMS "swelling in the eyes" , GETTING INJECTION IN THE EYES every 2 -3 months  ENMT- negative  Negative Respiratory and Thorax Symptoms- no wheezing; no dyspnea; no cough; no hemoptysis  Negative Cardiovascular Symptoms - no chest pain; no palpitations; no dyspnea on exertion; no orthopnea  Gastrointestinal Symptoms- constipation sometimes, not taking meds  Genitourinary- negative  Musculoskeletal- lumbar paraspinal muscle spasms and tenderness, diminished ROM in all planes, motor and sensory grossly intact  Neurological- negative  Psychiatric- negative  Hematology/Lymphatics-negative  Endocrine Comments-T2DM, was on insulin , stopped since Dec 2022  Allergic/Immunologic- negative

## 2023-07-15 LAB
ANION GAP SERPL CALC-SCNC: 5 MMOL/L — SIGNIFICANT CHANGE UP (ref 5–17)
ANION GAP SERPL CALC-SCNC: 7 MMOL/L — SIGNIFICANT CHANGE UP (ref 5–17)
BASOPHILS # BLD AUTO: 0.02 K/UL — SIGNIFICANT CHANGE UP (ref 0–0.2)
BASOPHILS NFR BLD AUTO: 0.1 % — SIGNIFICANT CHANGE UP (ref 0–2)
BUN SERPL-MCNC: 58 MG/DL — HIGH (ref 7–23)
BUN SERPL-MCNC: 62 MG/DL — HIGH (ref 7–23)
CALCIUM SERPL-MCNC: 9.4 MG/DL — SIGNIFICANT CHANGE UP (ref 8.5–10.1)
CALCIUM SERPL-MCNC: 9.5 MG/DL — SIGNIFICANT CHANGE UP (ref 8.5–10.1)
CHLORIDE SERPL-SCNC: 109 MMOL/L — HIGH (ref 96–108)
CHLORIDE SERPL-SCNC: 113 MMOL/L — HIGH (ref 96–108)
CO2 SERPL-SCNC: 22 MMOL/L — SIGNIFICANT CHANGE UP (ref 22–31)
CO2 SERPL-SCNC: 22 MMOL/L — SIGNIFICANT CHANGE UP (ref 22–31)
CREAT SERPL-MCNC: 3.2 MG/DL — HIGH (ref 0.5–1.3)
CREAT SERPL-MCNC: 3.36 MG/DL — HIGH (ref 0.5–1.3)
EGFR: 14 ML/MIN/1.73M2 — LOW
EGFR: 14 ML/MIN/1.73M2 — LOW
EOSINOPHIL # BLD AUTO: 0 K/UL — SIGNIFICANT CHANGE UP (ref 0–0.5)
EOSINOPHIL NFR BLD AUTO: 0 % — SIGNIFICANT CHANGE UP (ref 0–6)
GLUCOSE SERPL-MCNC: 234 MG/DL — HIGH (ref 70–99)
GLUCOSE SERPL-MCNC: 324 MG/DL — HIGH (ref 70–99)
HCT VFR BLD CALC: 29.5 % — LOW (ref 34.5–45)
HGB BLD-MCNC: 9.2 G/DL — LOW (ref 11.5–15.5)
IMM GRANULOCYTES NFR BLD AUTO: 0.5 % — SIGNIFICANT CHANGE UP (ref 0–0.9)
LYMPHOCYTES # BLD AUTO: 0.84 K/UL — LOW (ref 1–3.3)
LYMPHOCYTES # BLD AUTO: 5 % — LOW (ref 13–44)
MCHC RBC-ENTMCNC: 26.8 PG — LOW (ref 27–34)
MCHC RBC-ENTMCNC: 31.2 G/DL — LOW (ref 32–36)
MCV RBC AUTO: 86 FL — SIGNIFICANT CHANGE UP (ref 80–100)
MONOCYTES # BLD AUTO: 0.67 K/UL — SIGNIFICANT CHANGE UP (ref 0–0.9)
MONOCYTES NFR BLD AUTO: 4 % — SIGNIFICANT CHANGE UP (ref 2–14)
NEUTROPHILS # BLD AUTO: 15.28 K/UL — HIGH (ref 1.8–7.4)
NEUTROPHILS NFR BLD AUTO: 90.4 % — HIGH (ref 43–77)
NRBC # BLD: 0 /100 WBCS — SIGNIFICANT CHANGE UP (ref 0–0)
PLATELET # BLD AUTO: 248 K/UL — SIGNIFICANT CHANGE UP (ref 150–400)
POTASSIUM SERPL-MCNC: 5.8 MMOL/L — HIGH (ref 3.5–5.3)
POTASSIUM SERPL-MCNC: 6.4 MMOL/L — CRITICAL HIGH (ref 3.5–5.3)
POTASSIUM SERPL-SCNC: 5.8 MMOL/L — HIGH (ref 3.5–5.3)
POTASSIUM SERPL-SCNC: 6.4 MMOL/L — CRITICAL HIGH (ref 3.5–5.3)
RBC # BLD: 3.43 M/UL — LOW (ref 3.8–5.2)
RBC # FLD: 13.8 % — SIGNIFICANT CHANGE UP (ref 10.3–14.5)
SODIUM SERPL-SCNC: 138 MMOL/L — SIGNIFICANT CHANGE UP (ref 135–145)
SODIUM SERPL-SCNC: 140 MMOL/L — SIGNIFICANT CHANGE UP (ref 135–145)
WBC # BLD: 16.9 K/UL — HIGH (ref 3.8–10.5)
WBC # FLD AUTO: 16.9 K/UL — HIGH (ref 3.8–10.5)

## 2023-07-15 PROCEDURE — 93010 ELECTROCARDIOGRAM REPORT: CPT

## 2023-07-15 RX ORDER — INSULIN HUMAN 100 [IU]/ML
10 INJECTION, SOLUTION SUBCUTANEOUS ONCE
Refills: 0 | Status: COMPLETED | OUTPATIENT
Start: 2023-07-15 | End: 2023-07-15

## 2023-07-15 RX ORDER — SODIUM CHLORIDE 9 MG/ML
1000 INJECTION INTRAMUSCULAR; INTRAVENOUS; SUBCUTANEOUS
Refills: 0 | Status: DISCONTINUED | OUTPATIENT
Start: 2023-07-15 | End: 2023-07-18

## 2023-07-15 RX ORDER — SODIUM ZIRCONIUM CYCLOSILICATE 10 G/10G
10 POWDER, FOR SUSPENSION ORAL ONCE
Refills: 0 | Status: COMPLETED | OUTPATIENT
Start: 2023-07-15 | End: 2023-07-15

## 2023-07-15 RX ORDER — DEXTROSE 50 % IN WATER 50 %
50 SYRINGE (ML) INTRAVENOUS ONCE
Refills: 0 | Status: COMPLETED | OUTPATIENT
Start: 2023-07-15 | End: 2023-07-15

## 2023-07-15 RX ADMIN — Medication 50 MILLILITER(S): at 08:10

## 2023-07-15 RX ADMIN — OXYCODONE HYDROCHLORIDE 15 MILLIGRAM(S): 5 TABLET ORAL at 02:49

## 2023-07-15 RX ADMIN — Medication 3 MILLIGRAM(S): at 21:17

## 2023-07-15 RX ADMIN — Medication 50 MILLIGRAM(S): at 18:16

## 2023-07-15 RX ADMIN — POLYETHYLENE GLYCOL 3350 17 GRAM(S): 17 POWDER, FOR SOLUTION ORAL at 05:38

## 2023-07-15 RX ADMIN — Medication 1 SPRAY(S): at 18:16

## 2023-07-15 RX ADMIN — Medication 975 MILLIGRAM(S): at 15:32

## 2023-07-15 RX ADMIN — SODIUM CHLORIDE 100 MILLILITER(S): 9 INJECTION INTRAMUSCULAR; INTRAVENOUS; SUBCUTANEOUS at 21:17

## 2023-07-15 RX ADMIN — POLYETHYLENE GLYCOL 3350 17 GRAM(S): 17 POWDER, FOR SOLUTION ORAL at 18:16

## 2023-07-15 RX ADMIN — HYDROMORPHONE HYDROCHLORIDE 0.5 MILLIGRAM(S): 2 INJECTION INTRAMUSCULAR; INTRAVENOUS; SUBCUTANEOUS at 05:34

## 2023-07-15 RX ADMIN — SENNA PLUS 2 TABLET(S): 8.6 TABLET ORAL at 21:17

## 2023-07-15 RX ADMIN — Medication 4: at 08:11

## 2023-07-15 RX ADMIN — HYDROMORPHONE HYDROCHLORIDE 0.5 MILLIGRAM(S): 2 INJECTION INTRAMUSCULAR; INTRAVENOUS; SUBCUTANEOUS at 05:49

## 2023-07-15 RX ADMIN — CYCLOBENZAPRINE HYDROCHLORIDE 10 MILLIGRAM(S): 10 TABLET, FILM COATED ORAL at 00:41

## 2023-07-15 RX ADMIN — LISINOPRIL 20 MILLIGRAM(S): 2.5 TABLET ORAL at 05:38

## 2023-07-15 RX ADMIN — SODIUM CHLORIDE 100 MILLILITER(S): 9 INJECTION INTRAMUSCULAR; INTRAVENOUS; SUBCUTANEOUS at 06:59

## 2023-07-15 RX ADMIN — OXYCODONE HYDROCHLORIDE 15 MILLIGRAM(S): 5 TABLET ORAL at 03:25

## 2023-07-15 RX ADMIN — Medication 60 MILLIGRAM(S): at 18:49

## 2023-07-15 RX ADMIN — OXYCODONE HYDROCHLORIDE 10 MILLIGRAM(S): 5 TABLET ORAL at 22:58

## 2023-07-15 RX ADMIN — Medication 3: at 11:52

## 2023-07-15 RX ADMIN — Medication 975 MILLIGRAM(S): at 06:30

## 2023-07-15 RX ADMIN — Medication 975 MILLIGRAM(S): at 14:32

## 2023-07-15 RX ADMIN — SODIUM CHLORIDE 75 MILLILITER(S): 9 INJECTION, SOLUTION INTRAVENOUS at 05:41

## 2023-07-15 RX ADMIN — Medication 100 MILLIGRAM(S): at 00:41

## 2023-07-15 RX ADMIN — SODIUM ZIRCONIUM CYCLOSILICATE 10 GRAM(S): 10 POWDER, FOR SUSPENSION ORAL at 08:10

## 2023-07-15 RX ADMIN — OXYCODONE HYDROCHLORIDE 15 MILLIGRAM(S): 5 TABLET ORAL at 14:31

## 2023-07-15 RX ADMIN — BENZOCAINE AND MENTHOL 1 LOZENGE: 5; 1 LIQUID ORAL at 00:53

## 2023-07-15 RX ADMIN — OXYCODONE HYDROCHLORIDE 15 MILLIGRAM(S): 5 TABLET ORAL at 08:41

## 2023-07-15 RX ADMIN — INSULIN HUMAN 10 UNIT(S): 100 INJECTION, SOLUTION SUBCUTANEOUS at 08:09

## 2023-07-15 RX ADMIN — OXYCODONE HYDROCHLORIDE 10 MILLIGRAM(S): 5 TABLET ORAL at 23:55

## 2023-07-15 RX ADMIN — Medication 975 MILLIGRAM(S): at 05:34

## 2023-07-15 RX ADMIN — Medication 975 MILLIGRAM(S): at 22:15

## 2023-07-15 RX ADMIN — OXYCODONE HYDROCHLORIDE 15 MILLIGRAM(S): 5 TABLET ORAL at 09:40

## 2023-07-15 RX ADMIN — Medication 60 MILLIGRAM(S): at 05:38

## 2023-07-15 RX ADMIN — Medication 975 MILLIGRAM(S): at 21:16

## 2023-07-15 RX ADMIN — OXYCODONE HYDROCHLORIDE 15 MILLIGRAM(S): 5 TABLET ORAL at 15:31

## 2023-07-15 RX ADMIN — Medication 50 MILLIGRAM(S): at 05:38

## 2023-07-15 NOTE — PROGRESS NOTE ADULT - SUBJECTIVE AND OBJECTIVE BOX
Patient seen and examined at bedside. Pain is controlled. Pt feeling well. No nausea or vomiting.    Vital Signs Last 24 Hrs  T(C): 36.7 (07-15-23 @ 05:30), Max: 36.9 (07-14-23 @ 12:16)  T(F): 98 (07-15-23 @ 05:30), Max: 98.5 (07-14-23 @ 12:16)  HR: 61 (07-15-23 @ 05:30) (57 - 80)  BP: 176/64 (07-15-23 @ 05:30) (141/44 - 181/72)  BP(mean): 114 (07-14-23 @ 19:20) (114 - 114)  RR: 18 (07-15-23 @ 05:30) (13 - 20)  SpO2: 98% (07-15-23 @ 05:30) (96% - 100%)      Exam:  Gen: NAD, resting comfortably  Spine  Dressing c/d/i  JPs with SS output    Motor:                   C5                C6              C7               C8           T1   R            5/5                5/5            5/5             5/5          5/5  L             5/5               5/5             5/5             5/5          5/5                L2             L3             L4               L5            S1  R         5/5           5/5          5/5             5/5           5/5  L          5/5          5/5           4/5             5/5           5/5    Sensory:            C5         C6         C7      C8       T1        (0=absent, 1=impaired, 2=normal, NT=not testable)  R         2            1          1        1        2  L          2            2           2        2         2               L2          L3         L4      L5       S1         (0=absent, 1=impaired, 2=normal, NT=not testable)  R         2            2           1       1       1  L          2            2          1      1         1  Neg Do  Neg Babinski  Neg clonus     A/P:  78yFemale Stable POD1  s/p L2-4 Lami, L2-5 PSF    -please record drain output   -Bruce in place will plan to discontinue today   -FU labs  -WBAT  -Pain control  -PT/OT  -Ppx ABX x2 dose  -DVT PE ppx- hold in setting of spine surgery and risk of epidural hematoma, SCDs BL  -Incentive spirometry  -Dispo: Pending PT   Patient seen and examined at bedside. Pain is controlled. Pt feeling well. No nausea or vomiting.    Vital Signs Last 24 Hrs  T(C): 36.7 (07-15-23 @ 05:30), Max: 36.9 (07-14-23 @ 12:16)  T(F): 98 (07-15-23 @ 05:30), Max: 98.5 (07-14-23 @ 12:16)  HR: 61 (07-15-23 @ 05:30) (57 - 80)  BP: 176/64 (07-15-23 @ 05:30) (141/44 - 181/72)  BP(mean): 114 (07-14-23 @ 19:20) (114 - 114)  RR: 18 (07-15-23 @ 05:30) (13 - 20)  SpO2: 98% (07-15-23 @ 05:30) (96% - 100%)      Exam:  Gen: NAD, resting comfortably  Spine  Dressing c/d/i  JPs with SS output    Motor:                   C5                C6              C7               C8           T1   R            5/5                5/5            5/5             5/5          5/5  L             5/5               5/5             5/5             5/5          5/5                L2             L3             L4               L5            S1  R         5/5           5/5          5/5             5/5           5/5  L          5/5          5/5           4/5             5/5           5/5    Sensory:            C5         C6         C7      C8       T1        (0=absent, 1=impaired, 2=normal, NT=not testable)  R         2            1          1        1        2  L          2            2           2        2         2               L2          L3         L4      L5       S1         (0=absent, 1=impaired, 2=normal, NT=not testable)  R         2            2           1       1       1  L          2            2          1      1         1  Neg Do  Neg Babinski  Neg clonus     A/P:  78yFemale Stable POD1  s/p L2-4 Lami, L2-5 PSF      -CKD now with ANASTACIO BUN/cr 58/3.2 from baseline which appears to be around 50s/2.6. Will continue gentle hydration and monitor  -please record drain output   -Bruce in place will plan to discontinue today   -FU labs  -WBAT  -Pain control  -PT/OT  -Ppx ABX x2 dose  -DVT PE ppx- hold in setting of spine surgery and risk of epidural hematoma, SCDs BL  -Incentive spirometry  -Dispo: Pending PT

## 2023-07-15 NOTE — CHART NOTE - NSCHARTNOTEFT_GEN_A_CORE
Contacted for high potassium 6.4. Will give a dose 10 Lokelma and obtain EKG. Will have medical team/Dr. Thorpe evaluate today and provide further recs. Contacted for high potassium 6.4. Will give a dose 10 Lokelma and obtain EKG.   Will have medical team/Dr. Thorpe evaluate this AM and provide further recs.    652 Spoke with general hospitalist and will give insulin as well as Lokelma. Will discontinue LR as this has high potassium and will start NS. Discontinued home lisinopril as well. Called and spoke with nurse to give insulin/lokelma/and have EKG completed Contacted for high potassium 6.4. Will give a dose 10 Lokelma and obtain EKG.   Will have medical team/Dr. Thorpe evaluate this AM and provide further recs.    652 Spoke with general hospitalist and will give insulin as well as Lokelma. Will discontinue LR as this has high potassium and will start NS. Discontinued home lisinopril as well. Called and spoke with nurse to give insulin/lokelma/and have EKG completed    725 EKG completed and no acute changes. will repeat BMP at 1900 and again in the AM

## 2023-07-15 NOTE — PHYSICAL THERAPY INITIAL EVALUATION ADULT - STAIR PATTERN, REHAB EVAL
During stair negotiation pt devloped dizziness  limiting her further participation in PT session/step to step

## 2023-07-15 NOTE — PHYSICAL THERAPY INITIAL EVALUATION ADULT - TRANSFER SAFETY CONCERNS NOTED: SIT/STAND, REHAB EVAL
Patient has no clinical changes    And remains cleared for tomorrows surgery. Dr. Jose Angel Ware aware, on behalf of dr. Al Preston. decreased step length

## 2023-07-15 NOTE — PHYSICAL THERAPY INITIAL EVALUATION ADULT - PERTINENT HX OF CURRENT PROBLEM, REHAB EVAL
Admitted for elective back sx  Pt reported that until 3 years ago she used to have a regular gym routine but then slowly her back pain go worse and her activity level decreased. Lately pt mostly home bound, with ambulation limited to approx 50 ft . Pt developed radicular pain & paresthesiae  BLE and  Pt has RW and SAC at home

## 2023-07-16 LAB
ANION GAP SERPL CALC-SCNC: 3 MMOL/L — LOW (ref 5–17)
ANION GAP SERPL CALC-SCNC: 6 MMOL/L — SIGNIFICANT CHANGE UP (ref 5–17)
BASOPHILS # BLD AUTO: 0.05 K/UL — SIGNIFICANT CHANGE UP (ref 0–0.2)
BASOPHILS NFR BLD AUTO: 0.3 % — SIGNIFICANT CHANGE UP (ref 0–2)
BUN SERPL-MCNC: 60 MG/DL — HIGH (ref 7–23)
BUN SERPL-MCNC: 62 MG/DL — HIGH (ref 7–23)
CALCIUM SERPL-MCNC: 9.2 MG/DL — SIGNIFICANT CHANGE UP (ref 8.5–10.1)
CALCIUM SERPL-MCNC: 9.3 MG/DL — SIGNIFICANT CHANGE UP (ref 8.5–10.1)
CHLORIDE SERPL-SCNC: 111 MMOL/L — HIGH (ref 96–108)
CHLORIDE SERPL-SCNC: 111 MMOL/L — HIGH (ref 96–108)
CO2 SERPL-SCNC: 21 MMOL/L — LOW (ref 22–31)
CO2 SERPL-SCNC: 22 MMOL/L — SIGNIFICANT CHANGE UP (ref 22–31)
CREAT SERPL-MCNC: 3.37 MG/DL — HIGH (ref 0.5–1.3)
CREAT SERPL-MCNC: 3.42 MG/DL — HIGH (ref 0.5–1.3)
EGFR: 13 ML/MIN/1.73M2 — LOW
EGFR: 14 ML/MIN/1.73M2 — LOW
EOSINOPHIL # BLD AUTO: 0.4 K/UL — SIGNIFICANT CHANGE UP (ref 0–0.5)
EOSINOPHIL NFR BLD AUTO: 2.2 % — SIGNIFICANT CHANGE UP (ref 0–6)
GLUCOSE SERPL-MCNC: 141 MG/DL — HIGH (ref 70–99)
GLUCOSE SERPL-MCNC: 177 MG/DL — HIGH (ref 70–99)
HCT VFR BLD CALC: 25.8 % — LOW (ref 34.5–45)
HGB BLD-MCNC: 8.2 G/DL — LOW (ref 11.5–15.5)
IMM GRANULOCYTES NFR BLD AUTO: 0.5 % — SIGNIFICANT CHANGE UP (ref 0–0.9)
LYMPHOCYTES # BLD AUTO: 13.7 % — SIGNIFICANT CHANGE UP (ref 13–44)
LYMPHOCYTES # BLD AUTO: 2.49 K/UL — SIGNIFICANT CHANGE UP (ref 1–3.3)
MCHC RBC-ENTMCNC: 27.2 PG — SIGNIFICANT CHANGE UP (ref 27–34)
MCHC RBC-ENTMCNC: 31.8 G/DL — LOW (ref 32–36)
MCV RBC AUTO: 85.4 FL — SIGNIFICANT CHANGE UP (ref 80–100)
MONOCYTES # BLD AUTO: 1.29 K/UL — HIGH (ref 0–0.9)
MONOCYTES NFR BLD AUTO: 7.1 % — SIGNIFICANT CHANGE UP (ref 2–14)
NEUTROPHILS # BLD AUTO: 13.88 K/UL — HIGH (ref 1.8–7.4)
NEUTROPHILS NFR BLD AUTO: 76.2 % — SIGNIFICANT CHANGE UP (ref 43–77)
NRBC # BLD: 0 /100 WBCS — SIGNIFICANT CHANGE UP (ref 0–0)
PLATELET # BLD AUTO: 270 K/UL — SIGNIFICANT CHANGE UP (ref 150–400)
POTASSIUM SERPL-MCNC: 5.2 MMOL/L — SIGNIFICANT CHANGE UP (ref 3.5–5.3)
POTASSIUM SERPL-MCNC: 6.1 MMOL/L — HIGH (ref 3.5–5.3)
POTASSIUM SERPL-SCNC: 5.2 MMOL/L — SIGNIFICANT CHANGE UP (ref 3.5–5.3)
POTASSIUM SERPL-SCNC: 6.1 MMOL/L — HIGH (ref 3.5–5.3)
RBC # BLD: 3.02 M/UL — LOW (ref 3.8–5.2)
RBC # FLD: 13.8 % — SIGNIFICANT CHANGE UP (ref 10.3–14.5)
SODIUM SERPL-SCNC: 136 MMOL/L — SIGNIFICANT CHANGE UP (ref 135–145)
SODIUM SERPL-SCNC: 138 MMOL/L — SIGNIFICANT CHANGE UP (ref 135–145)
WBC # BLD: 18.21 K/UL — HIGH (ref 3.8–10.5)
WBC # FLD AUTO: 18.21 K/UL — HIGH (ref 3.8–10.5)

## 2023-07-16 RX ORDER — SODIUM ZIRCONIUM CYCLOSILICATE 10 G/10G
10 POWDER, FOR SUSPENSION ORAL ONCE
Refills: 0 | Status: COMPLETED | OUTPATIENT
Start: 2023-07-16 | End: 2023-07-16

## 2023-07-16 RX ORDER — SODIUM CHLORIDE 9 MG/ML
500 INJECTION INTRAMUSCULAR; INTRAVENOUS; SUBCUTANEOUS ONCE
Refills: 0 | Status: COMPLETED | OUTPATIENT
Start: 2023-07-16 | End: 2023-07-16

## 2023-07-16 RX ORDER — SODIUM ZIRCONIUM CYCLOSILICATE 10 G/10G
10 POWDER, FOR SUSPENSION ORAL THREE TIMES A DAY
Refills: 0 | Status: COMPLETED | OUTPATIENT
Start: 2023-07-17 | End: 2023-07-17

## 2023-07-16 RX ADMIN — Medication 50 MILLIGRAM(S): at 18:35

## 2023-07-16 RX ADMIN — OXYCODONE HYDROCHLORIDE 15 MILLIGRAM(S): 5 TABLET ORAL at 14:52

## 2023-07-16 RX ADMIN — Medication 975 MILLIGRAM(S): at 23:00

## 2023-07-16 RX ADMIN — Medication 1 SPRAY(S): at 06:15

## 2023-07-16 RX ADMIN — BENZOCAINE AND MENTHOL 1 LOZENGE: 5; 1 LIQUID ORAL at 18:38

## 2023-07-16 RX ADMIN — Medication 975 MILLIGRAM(S): at 14:25

## 2023-07-16 RX ADMIN — Medication 3 MILLIGRAM(S): at 22:07

## 2023-07-16 RX ADMIN — Medication 50 MILLIGRAM(S): at 06:14

## 2023-07-16 RX ADMIN — POLYETHYLENE GLYCOL 3350 17 GRAM(S): 17 POWDER, FOR SOLUTION ORAL at 06:14

## 2023-07-16 RX ADMIN — Medication 30 MILLILITER(S): at 22:06

## 2023-07-16 RX ADMIN — Medication 1: at 16:11

## 2023-07-16 RX ADMIN — Medication 1: at 08:12

## 2023-07-16 RX ADMIN — Medication 975 MILLIGRAM(S): at 22:07

## 2023-07-16 RX ADMIN — SODIUM CHLORIDE 100 MILLILITER(S): 9 INJECTION INTRAMUSCULAR; INTRAVENOUS; SUBCUTANEOUS at 22:06

## 2023-07-16 RX ADMIN — Medication 2: at 11:31

## 2023-07-16 RX ADMIN — SODIUM ZIRCONIUM CYCLOSILICATE 10 GRAM(S): 10 POWDER, FOR SUSPENSION ORAL at 23:37

## 2023-07-16 RX ADMIN — Medication 1 SPRAY(S): at 18:08

## 2023-07-16 RX ADMIN — POLYETHYLENE GLYCOL 3350 17 GRAM(S): 17 POWDER, FOR SOLUTION ORAL at 22:07

## 2023-07-16 RX ADMIN — OXYCODONE HYDROCHLORIDE 15 MILLIGRAM(S): 5 TABLET ORAL at 10:48

## 2023-07-16 RX ADMIN — ONDANSETRON 4 MILLIGRAM(S): 8 TABLET, FILM COATED ORAL at 19:03

## 2023-07-16 RX ADMIN — Medication 975 MILLIGRAM(S): at 06:15

## 2023-07-16 RX ADMIN — SODIUM CHLORIDE 100 MILLILITER(S): 9 INJECTION INTRAMUSCULAR; INTRAVENOUS; SUBCUTANEOUS at 09:15

## 2023-07-16 RX ADMIN — Medication 975 MILLIGRAM(S): at 07:15

## 2023-07-16 RX ADMIN — OXYCODONE HYDROCHLORIDE 15 MILLIGRAM(S): 5 TABLET ORAL at 13:52

## 2023-07-16 RX ADMIN — Medication 60 MILLIGRAM(S): at 06:14

## 2023-07-16 RX ADMIN — OXYCODONE HYDROCHLORIDE 15 MILLIGRAM(S): 5 TABLET ORAL at 09:48

## 2023-07-16 RX ADMIN — SODIUM CHLORIDE 1000 MILLILITER(S): 9 INJECTION INTRAMUSCULAR; INTRAVENOUS; SUBCUTANEOUS at 08:25

## 2023-07-16 RX ADMIN — Medication 60 MILLIGRAM(S): at 19:30

## 2023-07-16 RX ADMIN — Medication 975 MILLIGRAM(S): at 13:25

## 2023-07-16 RX ADMIN — SENNA PLUS 2 TABLET(S): 8.6 TABLET ORAL at 22:07

## 2023-07-16 NOTE — CHART NOTE - NSCHARTNOTEFT_GEN_A_CORE
Discussed worsening renal function with Dr. Thorpe this AM and recommendation was to give NS bolus and continue NS maintenance fluids with repeat BMP this evening. On repeat BMP, potassium came back elevated at 6.1. Discussed findings with overnight swing hospitalist who recommended one dose of lokelma 10mg, continuation of IVF, lokelma TID starting tomorrow for 3 doses, low potassium diet, and a medical eval in the AM.

## 2023-07-16 NOTE — PROGRESS NOTE ADULT - SUBJECTIVE AND OBJECTIVE BOX
Patient seen and examined at bedside. Pain is controlled. Pt feeling well. No nausea or vomiting.    LABS:                        8.2    18.21 )-----------( 270      ( 16 Jul 2023 05:30 )             25.8     07-16    138  |  111<H>  |  60<H>  ----------------------------<  141<H>  5.2   |  21<L>  |  3.37<H>    Ca    9.2      16 Jul 2023 05:30        Urinalysis Basic - ( 16 Jul 2023 05:30 )    Color: x / Appearance: x / SG: x / pH: x  Gluc: 141 mg/dL / Ketone: x  / Bili: x / Urobili: x   Blood: x / Protein: x / Nitrite: x   Leuk Esterase: x / RBC: x / WBC x   Sq Epi: x / Non Sq Epi: x / Bacteria: x        VITAL SIGNS:  T(C): 36.9 (07-16-23 @ 12:13), Max: 37.1 (07-16-23 @ 06:00)  HR: 73 (07-16-23 @ 12:13) (56 - 76)  BP: 153/67 (07-16-23 @ 12:13) (123/64 - 169/74)  RR: 17 (07-16-23 @ 12:13) (16 - 18)  SpO2: 95% (07-16-23 @ 12:13) (94% - 100%)      Exam:  Gen: NAD, resting comfortably  Spine  Dressing c/d/i  JPs with SS output    Motor:                   C5                C6              C7               C8           T1   R            5/5                5/5            5/5             5/5          5/5  L             5/5               5/5             5/5             5/5          5/5                L2             L3             L4               L5            S1  R         5/5           5/5          5/5             5/5           5/5  L          5/5          5/5           5/5             5/5           5/5    Sensory:            C5         C6         C7      C8       T1        (0=absent, 1=impaired, 2=normal, NT=not testable)  R         2            1          1        1        2  L          2            2           2        2         2               L2          L3         L4      L5       S1         (0=absent, 1=impaired, 2=normal, NT=not testable)  R         2            2           1       1       1  L          2            2          1      1         1  Neg Do  Neg Babinski  Neg clonus     A/P:  78yFemale Stable POD2  s/p L2-4 Lami, L2-5 PSF      -CKD now with ANASTACIO from baseline which appears to be around 50s/2.6. Will continue gentle hydration and monitor  -Patient with Hyperkalemia sp lokalma, insulin, NS, patient's potassium improved overnight and normalized this AM  -please record drain output   -Bruce in place will plan to discontinue today   -FU labs  -WBAT  -Pain control  -PT/OT  -DVT PE ppx- hold in setting of spine surgery and risk of epidural hematoma, SCDs BL  -Incentive spirometry  -Please provide optimization recommendations for discharge from medical standpoint  -Dispo: Home

## 2023-07-17 ENCOUNTER — APPOINTMENT (OUTPATIENT)
Dept: ORTHOPEDIC SURGERY | Facility: CLINIC | Age: 78
End: 2023-07-17

## 2023-07-17 LAB
ANION GAP SERPL CALC-SCNC: 5 MMOL/L — SIGNIFICANT CHANGE UP (ref 5–17)
BASOPHILS # BLD AUTO: 0.06 K/UL — SIGNIFICANT CHANGE UP (ref 0–0.2)
BASOPHILS NFR BLD AUTO: 0.4 % — SIGNIFICANT CHANGE UP (ref 0–2)
BUN SERPL-MCNC: 59 MG/DL — HIGH (ref 7–23)
CALCIUM SERPL-MCNC: 9.1 MG/DL — SIGNIFICANT CHANGE UP (ref 8.5–10.1)
CHLORIDE SERPL-SCNC: 113 MMOL/L — HIGH (ref 96–108)
CO2 SERPL-SCNC: 19 MMOL/L — LOW (ref 22–31)
CREAT SERPL-MCNC: 3.35 MG/DL — HIGH (ref 0.5–1.3)
EGFR: 14 ML/MIN/1.73M2 — LOW
EOSINOPHIL # BLD AUTO: 0.48 K/UL — SIGNIFICANT CHANGE UP (ref 0–0.5)
EOSINOPHIL NFR BLD AUTO: 3.2 % — SIGNIFICANT CHANGE UP (ref 0–6)
FLUAV AG NPH QL: SIGNIFICANT CHANGE UP
FLUBV AG NPH QL: SIGNIFICANT CHANGE UP
GLUCOSE SERPL-MCNC: 184 MG/DL — HIGH (ref 70–99)
HCT VFR BLD CALC: 25.5 % — LOW (ref 34.5–45)
HGB BLD-MCNC: 8 G/DL — LOW (ref 11.5–15.5)
IMM GRANULOCYTES NFR BLD AUTO: 0.4 % — SIGNIFICANT CHANGE UP (ref 0–0.9)
LYMPHOCYTES # BLD AUTO: 1.71 K/UL — SIGNIFICANT CHANGE UP (ref 1–3.3)
LYMPHOCYTES # BLD AUTO: 11.4 % — LOW (ref 13–44)
MCHC RBC-ENTMCNC: 26.9 PG — LOW (ref 27–34)
MCHC RBC-ENTMCNC: 31.4 G/DL — LOW (ref 32–36)
MCV RBC AUTO: 85.9 FL — SIGNIFICANT CHANGE UP (ref 80–100)
MONOCYTES # BLD AUTO: 1.19 K/UL — HIGH (ref 0–0.9)
MONOCYTES NFR BLD AUTO: 7.9 % — SIGNIFICANT CHANGE UP (ref 2–14)
NEUTROPHILS # BLD AUTO: 11.54 K/UL — HIGH (ref 1.8–7.4)
NEUTROPHILS NFR BLD AUTO: 76.7 % — SIGNIFICANT CHANGE UP (ref 43–77)
NRBC # BLD: 0 /100 WBCS — SIGNIFICANT CHANGE UP (ref 0–0)
PLATELET # BLD AUTO: 265 K/UL — SIGNIFICANT CHANGE UP (ref 150–400)
POTASSIUM SERPL-MCNC: 5.2 MMOL/L — SIGNIFICANT CHANGE UP (ref 3.5–5.3)
POTASSIUM SERPL-SCNC: 5.2 MMOL/L — SIGNIFICANT CHANGE UP (ref 3.5–5.3)
RBC # BLD: 2.97 M/UL — LOW (ref 3.8–5.2)
RBC # FLD: 13.7 % — SIGNIFICANT CHANGE UP (ref 10.3–14.5)
SARS-COV-2 RNA SPEC QL NAA+PROBE: SIGNIFICANT CHANGE UP
SODIUM SERPL-SCNC: 137 MMOL/L — SIGNIFICANT CHANGE UP (ref 135–145)
WBC # BLD: 15.04 K/UL — HIGH (ref 3.8–10.5)
WBC # FLD AUTO: 15.04 K/UL — HIGH (ref 3.8–10.5)

## 2023-07-17 PROCEDURE — 99231 SBSQ HOSP IP/OBS SF/LOW 25: CPT | Mod: 24

## 2023-07-17 RX ORDER — SIMETHICONE 80 MG/1
80 TABLET, CHEWABLE ORAL EVERY 8 HOURS
Refills: 0 | Status: DISCONTINUED | OUTPATIENT
Start: 2023-07-17 | End: 2023-07-18

## 2023-07-17 RX ADMIN — BENZOCAINE AND MENTHOL 1 LOZENGE: 5; 1 LIQUID ORAL at 15:37

## 2023-07-17 RX ADMIN — Medication 975 MILLIGRAM(S): at 21:16

## 2023-07-17 RX ADMIN — SENNA PLUS 2 TABLET(S): 8.6 TABLET ORAL at 21:16

## 2023-07-17 RX ADMIN — SIMETHICONE 80 MILLIGRAM(S): 80 TABLET, CHEWABLE ORAL at 11:18

## 2023-07-17 RX ADMIN — SODIUM ZIRCONIUM CYCLOSILICATE 10 GRAM(S): 10 POWDER, FOR SUSPENSION ORAL at 22:39

## 2023-07-17 RX ADMIN — Medication 1 SPRAY(S): at 05:44

## 2023-07-17 RX ADMIN — Medication 975 MILLIGRAM(S): at 15:33

## 2023-07-17 RX ADMIN — Medication 3 MILLIGRAM(S): at 22:40

## 2023-07-17 RX ADMIN — Medication 975 MILLIGRAM(S): at 22:16

## 2023-07-17 RX ADMIN — SIMETHICONE 80 MILLIGRAM(S): 80 TABLET, CHEWABLE ORAL at 21:16

## 2023-07-17 RX ADMIN — SODIUM ZIRCONIUM CYCLOSILICATE 10 GRAM(S): 10 POWDER, FOR SUSPENSION ORAL at 07:31

## 2023-07-17 RX ADMIN — Medication 1: at 16:18

## 2023-07-17 RX ADMIN — Medication 975 MILLIGRAM(S): at 06:40

## 2023-07-17 RX ADMIN — Medication 975 MILLIGRAM(S): at 05:39

## 2023-07-17 RX ADMIN — POLYETHYLENE GLYCOL 3350 17 GRAM(S): 17 POWDER, FOR SOLUTION ORAL at 09:40

## 2023-07-17 RX ADMIN — POLYETHYLENE GLYCOL 3350 17 GRAM(S): 17 POWDER, FOR SOLUTION ORAL at 17:41

## 2023-07-17 RX ADMIN — Medication 60 MILLIGRAM(S): at 05:39

## 2023-07-17 RX ADMIN — Medication 975 MILLIGRAM(S): at 16:33

## 2023-07-17 RX ADMIN — Medication 5 MILLIGRAM(S): at 11:24

## 2023-07-17 RX ADMIN — Medication 1: at 07:34

## 2023-07-17 RX ADMIN — SODIUM ZIRCONIUM CYCLOSILICATE 10 GRAM(S): 10 POWDER, FOR SUSPENSION ORAL at 13:28

## 2023-07-17 RX ADMIN — Medication 1 SPRAY(S): at 17:42

## 2023-07-17 RX ADMIN — Medication 50 MILLIGRAM(S): at 18:20

## 2023-07-17 RX ADMIN — SIMETHICONE 80 MILLIGRAM(S): 80 TABLET, CHEWABLE ORAL at 15:33

## 2023-07-17 RX ADMIN — Medication 50 MILLIGRAM(S): at 05:39

## 2023-07-17 RX ADMIN — Medication 60 MILLIGRAM(S): at 17:41

## 2023-07-17 RX ADMIN — Medication 1: at 11:19

## 2023-07-17 NOTE — PHYSICAL THERAPY INITIAL EVALUATION ADULT - ADDITIONAL COMMENTS
As per PT initial evaluation: Lives in a  with 3 steps to enter c B/l closeby handrails. Once inside no additional steps to do. Pt's niece from [place] is visiting & would stay with the patient until she is fully recovered. Pt's daughter lives in [place] with her family but cannot help as she has stroke for 4 years now.
Lives in a  with 3 steps to enter c B/l closeby handrails . Once inside no additional steps to do. Pt's niece from [place] is visiting & would stay with the patient until she is fully recovered. Pt's daughter lives in [place] with her family but cannot help as she has stroke for 4 years now .

## 2023-07-17 NOTE — CONSULT NOTE ADULT - SUBJECTIVE AND OBJECTIVE BOX
MARYAM OCHOA is a 78y Female s/p LAMINECTOMY L2-L4 POSTERIOR SPINAL FUSION L2-L5 WITH IMAGE      w/ h/o HTN (hypertension)    DM (diabetes mellitus)    T2DM (type 2 diabetes mellitus)    Diabetes mellitus      denies any chest pain shortness of breath palpitation dizziness lightheadedness nausea vomiting fever or chills  Spinal stenosis of lumbar region with radiculopathy      Status post D&C      FH: type 2 diabetes (Aunt)    FH: HTN (hypertension) (Mother)    FH: HTN (hypertension)      SH: doesnot smoke or drink at this time    No Known Allergies    acetaminophen     Tablet .. 975 milliGRAM(s) Oral every 8 hours  acetaminophen   IVPB .. 1000 milliGRAM(s) IV Intermittent once  aluminum hydroxide/magnesium hydroxide/simethicone Suspension 30 milliLiter(s) Oral every 6 hours PRN  aluminum hydroxide/magnesium hydroxide/simethicone Suspension 30 milliLiter(s) Oral every 4 hours PRN  benzocaine/menthol Lozenge 1 Lozenge Oral three times a day PRN  bisacodyl 5 milliGRAM(s) Oral every 12 hours PRN  cyclobenzaprine 10 milliGRAM(s) Oral every 8 hours PRN  dextrose 5%. 1000 milliLiter(s) IV Continuous <Continuous>  dextrose 5%. 1000 milliLiter(s) IV Continuous <Continuous>  dextrose 50% Injectable 12.5 Gram(s) IV Push once  dextrose 50% Injectable 25 Gram(s) IV Push once  dextrose 50% Injectable 25 Gram(s) IV Push once  dextrose Oral Gel 15 Gram(s) Oral once PRN  fluticasone propionate 50 MICROgram(s)/spray Nasal Spray 1 Spray(s) Both Nostrils two times a day  glucagon  Injectable 1 milliGRAM(s) IntraMuscular once  hydrALAZINE 50 milliGRAM(s) Oral two times a day  hydrochlorothiazide 12.5 milliGRAM(s) Oral daily  insulin lispro (ADMELOG) corrective regimen sliding scale   SubCutaneous three times a day before meals  melatonin 3 milliGRAM(s) Oral at bedtime  NIFEdipine XL 60 milliGRAM(s) Oral two times a day  ondansetron Injectable 4 milliGRAM(s) IV Push every 6 hours PRN  ondansetron Injectable 4 milliGRAM(s) IV Push two times a day PRN  oxyCODONE    IR 15 milliGRAM(s) Oral every 4 hours PRN  oxyCODONE    IR 10 milliGRAM(s) Oral every 4 hours PRN  polyethylene glycol 3350 17 Gram(s) Oral two times a day  senna 2 Tablet(s) Oral at bedtime  simethicone 80 milliGRAM(s) Chew every 8 hours  sodium chloride 0.9%. 1000 milliLiter(s) IV Continuous <Continuous>  sodium zirconium cyclosilicate 10 Gram(s) Oral three times a day    T(C): 37.1 (07-17-23 @ 19:30), Max: 37.2 (07-16-23 @ 22:00)  HR: 72 (07-17-23 @ 19:30) (72 - 81)  BP: 154/76 (07-17-23 @ 19:30) (132/69 - 161/64)  RR: 18 (07-17-23 @ 19:30) (17 - 19)  SpO2: 98% (07-17-23 @ 19:30) (94% - 98%)  HEENT unremarkable  neck no JVD or bruit  heart normal S1 S2 RRR no gallops or rubs  chest clear to auscultation  abd sof nontender non distended +bs  ext no calf tenderness    A/P   DVT PX  pain control  bowel regimen   wound care as per ortho  GI PX  antiemetics prn  incentive spirometer

## 2023-07-17 NOTE — PHYSICAL THERAPY INITIAL EVALUATION ADULT - BED MOBILITY TRAINING, PT EVAL
Independent in bed mobility- supine<>sit, rolling side<>side observing proper body mechanics, proper positioning, body alignment and precautions.
Patient will be independent in bed mobility in 1 week.

## 2023-07-17 NOTE — PHYSICAL THERAPY INITIAL EVALUATION ADULT - TRANSFER TRAINING, PT EVAL
Patient will be independent with sit to stand transfers in 2-3 weeks with rolling walker
Independent in  transfer ability bed to chair and vice versa using appropriate assistive device  and prevent falls.

## 2023-07-17 NOTE — PHYSICAL THERAPY INITIAL EVALUATION ADULT - GAIT DISTANCE, PT EVAL
25 feet
Ambulated 75ft c RW x 2 trials c chair follow.  . Needs sitt rest period of approx 3-4 min to cont further walking ./75 feet
Skin normal color for race, warm, dry and intact. No evidence of rash.

## 2023-07-17 NOTE — PHYSICAL THERAPY INITIAL EVALUATION ADULT - IMPAIRMENTS FOUND, PT EVAL
gait, locomotion, and balance
aerobic capacity/endurance/gait, locomotion, and balance/muscle strength

## 2023-07-17 NOTE — PHYSICAL THERAPY INITIAL EVALUATION ADULT - GENERAL OBSERVATIONS, REHAB EVAL
Pt received supine NAD +hemovac + primafit. Pt with no c/o pain in back, reported stiffness. Denies any pain in BLE but c/o numbness on soles of B feet and over B shins.  During stair training session pt developed lightheadedness & dizziness & was unable to ambulate further  & participate in car transfer training Reviewed back precautions c visual demo of activities to be avoided - bendning, lifting & twisting
Patient encountered supine in bed, A&O x 4.

## 2023-07-17 NOTE — PHYSICAL THERAPY INITIAL EVALUATION ADULT - GAIT TRAINING, PT EVAL
Patient will ambulate 500 feet with rolling walker independently for community ambulation in 2-3 weeks.
Pt will be able to ambulate using assistive device up to 200 ft or more, be able to negotiate 12 steps safely observing proper gait, posture and prevent falls.

## 2023-07-17 NOTE — PHYSICAL THERAPY INITIAL EVALUATION ADULT - GAIT DEVIATIONS NOTED, PT EVAL
decreased velocity of limb motion/decreased step length
decreased mariela/decreased step length/decreased stride length/decreased weight-shifting ability

## 2023-07-17 NOTE — PROGRESS NOTE ADULT - SUBJECTIVE AND OBJECTIVE BOX
78yFemale s/p Laminectomy L2-4/PSF L2-L5. Pt seen and examined in NAD. Pain controlled. Pt denies any new complaints.C/O persisting preop symptoms. Also c/o dizziness.     PE:   Neuro: AAOX3  Abd: Soft. NTTP.   Spine: Dressing c/d/i +HV with serosanguinous.   B/L UE: Skin intact. +ROM shoulder/elbow/wrist/fingers. +ok/thumbsup/fingercross signs.  strength: 5/5.  RP2+ NVI.   B/L LE: Skin intact. +ROM hip/knee/ankle/toes. Ankle Dorsi/plantarflexion: 5/5. Calf: soft, compressible and nontender. DP/PT 2+ NVI.                             8.0    15.04 )-----------( 265      ( 17 Jul 2023 07:23 )             25.5       07-17    137  |  113<H>  |  59<H>  ----------------------------<  184<H>  5.2   |  19<L>  |  3.35<H>    Ca    9.1      17 Jul 2023 07:23          A/P: 78yFemale s/p Laminectomy L2-4/PSF L2-L5 POD#3  F/U BM: Dulcolax BID added  Monitor HV output- to gravity/OFF suction for now per Dr Luong  Electrolyte abnormalities: Hyperkalemia s/p lokelma: Now normakalemic   Acute on chronic kidney ds - stable   Pain controlled  PT: WBAT - spinal precautions   DVT ppx: SCDs   Wound care, Isometric exercises, incentive spirometry   Discharge: planning for home with home care after HV out  All the above discussed and understood by pt   D/W Dr luong

## 2023-07-18 ENCOUNTER — TRANSCRIPTION ENCOUNTER (OUTPATIENT)
Age: 78
End: 2023-07-18

## 2023-07-18 VITALS
RESPIRATION RATE: 18 BRPM | TEMPERATURE: 99 F | DIASTOLIC BLOOD PRESSURE: 76 MMHG | HEART RATE: 81 BPM | SYSTOLIC BLOOD PRESSURE: 180 MMHG | OXYGEN SATURATION: 93 %

## 2023-07-18 LAB
ANION GAP SERPL CALC-SCNC: 6 MMOL/L — SIGNIFICANT CHANGE UP (ref 5–17)
BUN SERPL-MCNC: 56 MG/DL — HIGH (ref 7–23)
CALCIUM SERPL-MCNC: 9 MG/DL — SIGNIFICANT CHANGE UP (ref 8.5–10.1)
CHLORIDE SERPL-SCNC: 114 MMOL/L — HIGH (ref 96–108)
CO2 SERPL-SCNC: 19 MMOL/L — LOW (ref 22–31)
CREAT SERPL-MCNC: 3.1 MG/DL — HIGH (ref 0.5–1.3)
EGFR: 15 ML/MIN/1.73M2 — LOW
GLUCOSE SERPL-MCNC: 131 MG/DL — HIGH (ref 70–99)
HCT VFR BLD CALC: 25.6 % — LOW (ref 34.5–45)
HGB BLD-MCNC: 8 G/DL — LOW (ref 11.5–15.5)
MCHC RBC-ENTMCNC: 26.8 PG — LOW (ref 27–34)
MCHC RBC-ENTMCNC: 31.3 G/DL — LOW (ref 32–36)
MCV RBC AUTO: 85.9 FL — SIGNIFICANT CHANGE UP (ref 80–100)
NRBC # BLD: 0 /100 WBCS — SIGNIFICANT CHANGE UP (ref 0–0)
PLATELET # BLD AUTO: 270 K/UL — SIGNIFICANT CHANGE UP (ref 150–400)
POTASSIUM SERPL-MCNC: 4.4 MMOL/L — SIGNIFICANT CHANGE UP (ref 3.5–5.3)
POTASSIUM SERPL-SCNC: 4.4 MMOL/L — SIGNIFICANT CHANGE UP (ref 3.5–5.3)
RBC # BLD: 2.98 M/UL — LOW (ref 3.8–5.2)
RBC # FLD: 13.7 % — SIGNIFICANT CHANGE UP (ref 10.3–14.5)
SODIUM SERPL-SCNC: 139 MMOL/L — SIGNIFICANT CHANGE UP (ref 135–145)
WBC # BLD: 13.83 K/UL — HIGH (ref 3.8–10.5)
WBC # FLD AUTO: 13.83 K/UL — HIGH (ref 3.8–10.5)

## 2023-07-18 PROCEDURE — 99231 SBSQ HOSP IP/OBS SF/LOW 25: CPT | Mod: 1L,24

## 2023-07-18 RX ORDER — CYCLOBENZAPRINE HYDROCHLORIDE 10 MG/1
1 TABLET, FILM COATED ORAL
Qty: 0 | Refills: 0 | DISCHARGE
Start: 2023-07-18

## 2023-07-18 RX ORDER — POLYETHYLENE GLYCOL 3350 17 G/17G
17 POWDER, FOR SOLUTION ORAL
Qty: 0 | Refills: 0 | DISCHARGE
Start: 2023-07-18

## 2023-07-18 RX ORDER — NIFEDIPINE 30 MG
1 TABLET, EXTENDED RELEASE 24 HR ORAL
Refills: 0 | DISCHARGE

## 2023-07-18 RX ORDER — MINERAL OIL
133 OIL (ML) MISCELLANEOUS ONCE
Refills: 0 | Status: COMPLETED | OUTPATIENT
Start: 2023-07-18 | End: 2023-07-18

## 2023-07-18 RX ORDER — OXYCODONE HYDROCHLORIDE 5 MG/1
1 TABLET ORAL
Qty: 0 | Refills: 0 | DISCHARGE
Start: 2023-07-18

## 2023-07-18 RX ORDER — ACETAMINOPHEN 500 MG
3 TABLET ORAL
Qty: 0 | Refills: 0 | DISCHARGE
Start: 2023-07-18

## 2023-07-18 RX ORDER — NIFEDIPINE 30 MG
1 TABLET, EXTENDED RELEASE 24 HR ORAL
Qty: 0 | Refills: 0 | DISCHARGE
Start: 2023-07-18

## 2023-07-18 RX ORDER — SENNA PLUS 8.6 MG/1
2 TABLET ORAL
Qty: 0 | Refills: 0 | DISCHARGE
Start: 2023-07-18

## 2023-07-18 RX ADMIN — Medication 1 ENEMA: at 11:45

## 2023-07-18 RX ADMIN — Medication 2: at 12:04

## 2023-07-18 RX ADMIN — Medication 975 MILLIGRAM(S): at 14:08

## 2023-07-18 RX ADMIN — Medication 1 SPRAY(S): at 05:53

## 2023-07-18 RX ADMIN — SIMETHICONE 80 MILLIGRAM(S): 80 TABLET, CHEWABLE ORAL at 14:09

## 2023-07-18 RX ADMIN — SIMETHICONE 80 MILLIGRAM(S): 80 TABLET, CHEWABLE ORAL at 05:47

## 2023-07-18 RX ADMIN — Medication 50 MILLIGRAM(S): at 05:47

## 2023-07-18 RX ADMIN — Medication 133 MILLILITER(S): at 14:03

## 2023-07-18 RX ADMIN — Medication 10 MILLIGRAM(S): at 09:47

## 2023-07-18 RX ADMIN — POLYETHYLENE GLYCOL 3350 17 GRAM(S): 17 POWDER, FOR SOLUTION ORAL at 05:53

## 2023-07-18 RX ADMIN — Medication 975 MILLIGRAM(S): at 07:31

## 2023-07-18 RX ADMIN — Medication 975 MILLIGRAM(S): at 05:46

## 2023-07-18 RX ADMIN — Medication 60 MILLIGRAM(S): at 05:47

## 2023-07-18 RX ADMIN — Medication 975 MILLIGRAM(S): at 15:00

## 2023-07-18 NOTE — PROGRESS NOTE ADULT - SUBJECTIVE AND OBJECTIVE BOX
MARYAM OCHOA is a 78y Female s/p LAMINECTOMY L2-L4 POSTERIOR SPINAL FUSION L2-L5 WITH IMAGE        denies any chest pain shortness of breath palpitation dizziness lightheadedness nausea vomiting fever or chills    T(C): 37 (07-18-23 @ 05:30), Max: 37.1 (07-17-23 @ 19:30)  HR: 73 (07-18-23 @ 10:50) (63 - 81)  BP: 155/59 (07-18-23 @ 10:50) (145/73 - 161/64)  RR: 17 (07-18-23 @ 10:50) (17 - 18)  SpO2: 98% (07-18-23 @ 10:50) (94% - 98%)  no jvd/bruit  s1 s2 rrr  cta  s/nt/nd  no calf tend                        8.0    13.83 )-----------( 270      ( 18 Jul 2023 06:18 )             25.6   07-18    139  |  114<H>  |  56<H>  ----------------------------<  131<H>  4.4   |  19<L>  |  3.10<H>    Ca    9.0      18 Jul 2023 06:18        cont dvt px  pain control  bowel regimen  antiemetics  incentive spirometer

## 2023-07-18 NOTE — PROGRESS NOTE ADULT - SUBJECTIVE AND OBJECTIVE BOX
78yFemale s/p  lamiL2-4/PSF L2-5 POD#4. Pt seen and examined in NAD. Pain controlled. Pt denies any new complaints. Pt denies CP/SOB/N/V/D/numbness/tingling/bowel or bladder dysfunction.   HV: 90/230    PE:   Neuro: AAOX3  Spine: Dressing c/d/i Incision: prineo dressing C/D/I.    B/L UE: Skin intact. +ROM shoulder/elbow/wrist/fingers. +ok/thumbsup/fingercross signs.  strength: 5/5.  RP2+ NVI.   B/L LE: Skin intact. +ROM hip/knee/ankle/toes. Ankle Dorsi/plantarflexion: 5/5. Calf: soft, compressible and nontender. DP/PT 2+ NVI.                             8.0    13.83 )-----------( 270      ( 18 Jul 2023 06:18 )             25.6       07-18    139  |  114<H>  |  56<H>  ----------------------------<  131<H>  4.4   |  19<L>  |  3.10<H>    Ca    9.0      18 Jul 2023 06:18          A/P: 78yFemale s/p  lamiL2-4/PSF L2-5 POD#4.  HV DC'd tip intact. Dressing changed    Pain controlled  F/U BM today   PT: WBAT - spinal precautions   DVT ppx: SCDs   Acute on chronic kidney injury: Creatinine trending downwared  Hyperkalemia: resolved   Wound care, Isometric exercises, incentive spirometry   Medical consult appreciated -pt medically stable for dc to rehab today should have follow up labs later this week and medical follow up this week as well  Discharge: planning for rehab pending BM and auth.   All the above discussed and understood by pt   D/W DR Richards

## 2023-07-18 NOTE — CHART NOTE - NSCHARTNOTEFT_GEN_A_CORE
Per PT recommendations discharge planning changed to subacute rehab.  Pt pending drain removal today - DC to rehab today after drain removal and BM.

## 2023-07-18 NOTE — DISCHARGE NOTE NURSING/CASE MANAGEMENT/SOCIAL WORK - NSDCFUADDAPPT_GEN_ALL_CORE_FT
Follow up with your surgeon in two weeks. Call for appointment.    If you need more pain medications, call your surgeon's office. For medication refills or authorizations call [phone]    Make sure to have a bowel movement every 2 days.     Repeat labs this week on 7/20: BMP for potassium and creatinine.   Hydralazine was discontinued because of elevated creatinine from baseline.   Please have medical follow up this week to review labs and medications.     Call your surgeon if you have increased redness/pain/drainage or fever. Return to ER for shortness of breath/calf tenderness.

## 2023-07-18 NOTE — DISCHARGE NOTE NURSING/CASE MANAGEMENT/SOCIAL WORK - PATIENT PORTAL LINK FT
You can access the FollowMyHealth Patient Portal offered by [facility] by registering at the following website: [email or web address]. By joining [employer]’s FollowMyHealth portal, you will also be able to view your health information using other applications (apps) compatible with our system.

## 2023-07-18 NOTE — PROGRESS NOTE ADULT - REASON FOR ADMISSION
lumbar laminectomy and fusion

## 2023-07-21 ENCOUNTER — OUTPATIENT (OUTPATIENT)
Dept: OUTPATIENT SERVICES | Facility: HOSPITAL | Age: 78
LOS: 1 days | Discharge: HOME | End: 2023-07-21
Payer: OTHER MISCELLANEOUS

## 2023-07-21 DIAGNOSIS — M54.16 RADICULOPATHY, LUMBAR REGION: ICD-10-CM

## 2023-07-21 DIAGNOSIS — M41.56 OTHER SECONDARY SCOLIOSIS, LUMBAR REGION: ICD-10-CM

## 2023-07-21 DIAGNOSIS — M43.16 SPONDYLOLISTHESIS, LUMBAR REGION: ICD-10-CM

## 2023-07-21 DIAGNOSIS — K59.00 CONSTIPATION, UNSPECIFIED: ICD-10-CM

## 2023-07-21 DIAGNOSIS — M48.061 SPINAL STENOSIS, LUMBAR REGION WITHOUT NEUROGENIC CLAUDICATION: ICD-10-CM

## 2023-07-21 DIAGNOSIS — Z98.890 OTHER SPECIFIED POSTPROCEDURAL STATES: Chronic | ICD-10-CM

## 2023-07-21 DIAGNOSIS — E87.5 HYPERKALEMIA: ICD-10-CM

## 2023-07-21 DIAGNOSIS — R91.8 OTHER NONSPECIFIC ABNORMAL FINDING OF LUNG FIELD: ICD-10-CM

## 2023-07-21 DIAGNOSIS — E11.22 TYPE 2 DIABETES MELLITUS WITH DIABETIC CHRONIC KIDNEY DISEASE: ICD-10-CM

## 2023-07-21 DIAGNOSIS — N18.9 CHRONIC KIDNEY DISEASE, UNSPECIFIED: ICD-10-CM

## 2023-07-21 DIAGNOSIS — N17.9 ACUTE KIDNEY FAILURE, UNSPECIFIED: ICD-10-CM

## 2023-07-21 DIAGNOSIS — I12.9 HYPERTENSIVE CHRONIC KIDNEY DISEASE WITH STAGE 1 THROUGH STAGE 4 CHRONIC KIDNEY DISEASE, OR UNSPECIFIED CHRONIC KIDNEY DISEASE: ICD-10-CM

## 2023-07-21 PROBLEM — E11.9 TYPE 2 DIABETES MELLITUS WITHOUT COMPLICATIONS: Chronic | Status: ACTIVE | Noted: 2023-06-15

## 2023-07-21 PROBLEM — E11.9 TYPE 2 DIABETES MELLITUS WITHOUT COMPLICATIONS: Chronic | Status: ACTIVE | Noted: 2023-07-05

## 2023-07-21 PROCEDURE — 71045 X-RAY EXAM CHEST 1 VIEW: CPT | Mod: 26

## 2023-07-25 ENCOUNTER — OUTPATIENT (OUTPATIENT)
Dept: OUTPATIENT SERVICES | Facility: HOSPITAL | Age: 78
LOS: 1 days | Discharge: HOME | End: 2023-07-25
Payer: OTHER MISCELLANEOUS

## 2023-07-25 DIAGNOSIS — Z98.890 OTHER SPECIFIED POSTPROCEDURAL STATES: Chronic | ICD-10-CM

## 2023-07-25 DIAGNOSIS — I82.401 ACUTE EMBOLISM AND THROMBOSIS OF UNSPECIFIED DEEP VEINS OF RIGHT LOWER EXTREMITY: ICD-10-CM

## 2023-07-25 PROCEDURE — 93971 EXTREMITY STUDY: CPT | Mod: 26,RT

## 2023-07-27 ENCOUNTER — OUTPATIENT (OUTPATIENT)
Dept: OUTPATIENT SERVICES | Facility: HOSPITAL | Age: 78
LOS: 1 days | Discharge: HOME | End: 2023-07-27
Payer: OTHER MISCELLANEOUS

## 2023-07-27 DIAGNOSIS — Z98.890 OTHER SPECIFIED POSTPROCEDURAL STATES: Chronic | ICD-10-CM

## 2023-07-27 DIAGNOSIS — R91.8 OTHER NONSPECIFIC ABNORMAL FINDING OF LUNG FIELD: ICD-10-CM

## 2023-07-27 PROCEDURE — 71045 X-RAY EXAM CHEST 1 VIEW: CPT | Mod: 26

## 2023-07-28 ENCOUNTER — INPATIENT (INPATIENT)
Facility: HOSPITAL | Age: 78
LOS: 13 days | Discharge: NURSING, REHAB OR LONG-TERM CARE | End: 2023-08-11
Attending: STUDENT IN AN ORGANIZED HEALTH CARE EDUCATION/TRAINING PROGRAM | Admitting: STUDENT IN AN ORGANIZED HEALTH CARE EDUCATION/TRAINING PROGRAM
Payer: MEDICARE

## 2023-07-28 VITALS
RESPIRATION RATE: 18 BRPM | OXYGEN SATURATION: 99 % | WEIGHT: 186.29 LBS | TEMPERATURE: 98 F | HEART RATE: 97 BPM | SYSTOLIC BLOOD PRESSURE: 151 MMHG | DIASTOLIC BLOOD PRESSURE: 71 MMHG | HEIGHT: 63 IN

## 2023-07-28 DIAGNOSIS — D62 ACUTE POSTHEMORRHAGIC ANEMIA: ICD-10-CM

## 2023-07-28 DIAGNOSIS — E11.40 TYPE 2 DIABETES MELLITUS WITH DIABETIC NEUROPATHY, UNSPECIFIED: ICD-10-CM

## 2023-07-28 DIAGNOSIS — Z98.1 ARTHRODESIS STATUS: ICD-10-CM

## 2023-07-28 DIAGNOSIS — G57.93 UNSPECIFIED MONONEUROPATHY OF BILATERAL LOWER LIMBS: ICD-10-CM

## 2023-07-28 DIAGNOSIS — I12.9 HYPERTENSIVE CHRONIC KIDNEY DISEASE WITH STAGE 1 THROUGH STAGE 4 CHRONIC KIDNEY DISEASE, OR UNSPECIFIED CHRONIC KIDNEY DISEASE: ICD-10-CM

## 2023-07-28 DIAGNOSIS — B95.7 OTHER STAPHYLOCOCCUS AS THE CAUSE OF DISEASES CLASSIFIED ELSEWHERE: ICD-10-CM

## 2023-07-28 DIAGNOSIS — E11.649 TYPE 2 DIABETES MELLITUS WITH HYPOGLYCEMIA WITHOUT COMA: ICD-10-CM

## 2023-07-28 DIAGNOSIS — N31.2 FLACCID NEUROPATHIC BLADDER, NOT ELSEWHERE CLASSIFIED: ICD-10-CM

## 2023-07-28 DIAGNOSIS — N39.0 URINARY TRACT INFECTION, SITE NOT SPECIFIED: ICD-10-CM

## 2023-07-28 DIAGNOSIS — Z20.822 CONTACT WITH AND (SUSPECTED) EXPOSURE TO COVID-19: ICD-10-CM

## 2023-07-28 DIAGNOSIS — T37.0X5A ADVERSE EFFECT OF SULFONAMIDES, INITIAL ENCOUNTER: ICD-10-CM

## 2023-07-28 DIAGNOSIS — N17.0 ACUTE KIDNEY FAILURE WITH TUBULAR NECROSIS: ICD-10-CM

## 2023-07-28 DIAGNOSIS — Z79.85 LONG-TERM (CURRENT) USE OF INJECTABLE NON-INSULIN ANTIDIABETIC DRUGS: ICD-10-CM

## 2023-07-28 DIAGNOSIS — N18.4 CHRONIC KIDNEY DISEASE, STAGE 4 (SEVERE): ICD-10-CM

## 2023-07-28 DIAGNOSIS — A41.50 GRAM-NEGATIVE SEPSIS, UNSPECIFIED: ICD-10-CM

## 2023-07-28 DIAGNOSIS — Z79.899 OTHER LONG TERM (CURRENT) DRUG THERAPY: ICD-10-CM

## 2023-07-28 DIAGNOSIS — R31.0 GROSS HEMATURIA: ICD-10-CM

## 2023-07-28 DIAGNOSIS — E11.22 TYPE 2 DIABETES MELLITUS WITH DIABETIC CHRONIC KIDNEY DISEASE: ICD-10-CM
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
## 2023-07-28 DIAGNOSIS — R33.9 RETENTION OF URINE, UNSPECIFIED: ICD-10-CM
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
## 2023-07-28 DIAGNOSIS — Z98.890 OTHER SPECIFIED POSTPROCEDURAL STATES: Chronic | ICD-10-CM

## 2023-07-28 DIAGNOSIS — K64.9 UNSPECIFIED HEMORRHOIDS: ICD-10-CM

## 2023-07-28 PROCEDURE — 99053 MED SERV 10PM-8AM 24 HR FAC: CPT

## 2023-07-28 PROCEDURE — 99291 CRITICAL CARE FIRST HOUR: CPT

## 2023-07-28 PROCEDURE — 93010 ELECTROCARDIOGRAM REPORT: CPT

## 2023-07-28 NOTE — ED PROVIDER NOTE - OBJECTIVE STATEMENT
78 year old female with PMH of DM II on Glipizide 5mg otherwise noted to have hypoglycemia and AMS noted with FS 30 noted on FS in ED - will give dextrose 50mL immediately. After which pt was able to participate with History and physical. Pt otherwise had recently undergone Laminectomy with fusion L2-4 on 7/14 as per pt and has been having dysuria symptoms since that surgery. Noted some chills otherwise no definite fever otherwise and states back has been feeling well - some lower abd discomfort noted as well.

## 2023-07-28 NOTE — ED ADULT TRIAGE NOTE - CHIEF COMPLAINT QUOTE
Pt from [facility], per [facility] RN, pt was unresponsive and low blood sugar in 30s.  I amp of glucagon given pta.  repeat FS in triage 41, 1amp of D50 given with improvement.  Pt in [facility] for recent laminectomy.  hx of DM, HTN Pt from [facility], per Devorah RN, pt was unresponsive and low blood sugar in 30s.  I amp of glucagon given pta, via 22g IV in left forearm.  repeat FS in triage 41, 1amp of D50 given with improvement.  Pt in [facility] for recent laminectomy.  hx of DM, HTN

## 2023-07-28 NOTE — ED PROVIDER NOTE - CRITICAL CARE ATTENDING CONTRIBUTION TO CARE
pt with hypoglycemic episode with AMS - seen immediately and given dextrose and workup ensued for hypoglycemic episode

## 2023-07-28 NOTE — ED PROVIDER NOTE - CPE EDP RESP NORM
normal... Afx Histology Text: Mild to severe solar elastosis is noted.  Aggregates of dermal tumor cells  with moderate to severe pleomorphism with spindle, epithelioid, or multinucleated forms and atypical mitotic figures are seen.

## 2023-07-29 DIAGNOSIS — N17.9 ACUTE KIDNEY FAILURE, UNSPECIFIED: ICD-10-CM

## 2023-07-29 DIAGNOSIS — N39.0 URINARY TRACT INFECTION, SITE NOT SPECIFIED: ICD-10-CM

## 2023-07-29 DIAGNOSIS — E11.9 TYPE 2 DIABETES MELLITUS WITHOUT COMPLICATIONS: ICD-10-CM

## 2023-07-29 DIAGNOSIS — I10 ESSENTIAL (PRIMARY) HYPERTENSION: ICD-10-CM

## 2023-07-29 DIAGNOSIS — E16.2 HYPOGLYCEMIA, UNSPECIFIED: ICD-10-CM

## 2023-07-29 LAB
ALBUMIN SERPL ELPH-MCNC: 2 G/DL — LOW (ref 3.3–5)
ALP SERPL-CCNC: 105 U/L — SIGNIFICANT CHANGE UP (ref 40–120)
ALT FLD-CCNC: 25 U/L — SIGNIFICANT CHANGE UP (ref 12–78)
ANION GAP SERPL CALC-SCNC: 12 MMOL/L — SIGNIFICANT CHANGE UP (ref 5–17)
ANION GAP SERPL CALC-SCNC: 9 MMOL/L — SIGNIFICANT CHANGE UP (ref 5–17)
APPEARANCE UR: ABNORMAL
APTT BLD: 31.1 SEC — SIGNIFICANT CHANGE UP (ref 24.5–35.6)
AST SERPL-CCNC: 50 U/L — HIGH (ref 15–37)
BACTERIA # UR AUTO: ABNORMAL
BASOPHILS # BLD AUTO: 0.07 K/UL — SIGNIFICANT CHANGE UP (ref 0–0.2)
BASOPHILS NFR BLD AUTO: 0.2 % — SIGNIFICANT CHANGE UP (ref 0–2)
BILIRUB SERPL-MCNC: 0.3 MG/DL — SIGNIFICANT CHANGE UP (ref 0.2–1.2)
BILIRUB UR-MCNC: NEGATIVE — SIGNIFICANT CHANGE UP
BUN SERPL-MCNC: 67 MG/DL — HIGH (ref 7–23)
BUN SERPL-MCNC: 68 MG/DL — HIGH (ref 7–23)
CALCIUM SERPL-MCNC: 8.6 MG/DL — SIGNIFICANT CHANGE UP (ref 8.5–10.1)
CALCIUM SERPL-MCNC: 9.3 MG/DL — SIGNIFICANT CHANGE UP (ref 8.5–10.1)
CHLORIDE SERPL-SCNC: 108 MMOL/L — SIGNIFICANT CHANGE UP (ref 96–108)
CHLORIDE SERPL-SCNC: 109 MMOL/L — HIGH (ref 96–108)
CO2 SERPL-SCNC: 18 MMOL/L — LOW (ref 22–31)
CO2 SERPL-SCNC: 19 MMOL/L — LOW (ref 22–31)
COLOR SPEC: YELLOW — SIGNIFICANT CHANGE UP
CREAT SERPL-MCNC: 4.9 MG/DL — HIGH (ref 0.5–1.3)
CREAT SERPL-MCNC: 4.9 MG/DL — HIGH (ref 0.5–1.3)
DIFF PNL FLD: ABNORMAL
EGFR: 9 ML/MIN/1.73M2 — LOW
EGFR: 9 ML/MIN/1.73M2 — LOW
EOSINOPHIL # BLD AUTO: 0.03 K/UL — SIGNIFICANT CHANGE UP (ref 0–0.5)
EOSINOPHIL NFR BLD AUTO: 0.1 % — SIGNIFICANT CHANGE UP (ref 0–6)
EPI CELLS # UR: ABNORMAL
FLUAV AG NPH QL: SIGNIFICANT CHANGE UP
FLUBV AG NPH QL: SIGNIFICANT CHANGE UP
GLUCOSE BLDC GLUCOMTR-MCNC: 106 MG/DL — HIGH (ref 70–99)
GLUCOSE BLDC GLUCOMTR-MCNC: 118 MG/DL — HIGH (ref 70–99)
GLUCOSE BLDC GLUCOMTR-MCNC: 154 MG/DL — HIGH (ref 70–99)
GLUCOSE BLDC GLUCOMTR-MCNC: 58 MG/DL — LOW (ref 70–99)
GLUCOSE BLDC GLUCOMTR-MCNC: 61 MG/DL — LOW (ref 70–99)
GLUCOSE BLDC GLUCOMTR-MCNC: 85 MG/DL — SIGNIFICANT CHANGE UP (ref 70–99)
GLUCOSE BLDC GLUCOMTR-MCNC: 97 MG/DL — SIGNIFICANT CHANGE UP (ref 70–99)
GLUCOSE SERPL-MCNC: 171 MG/DL — HIGH (ref 70–99)
GLUCOSE SERPL-MCNC: 95 MG/DL — SIGNIFICANT CHANGE UP (ref 70–99)
GLUCOSE UR QL: NEGATIVE MG/DL — SIGNIFICANT CHANGE UP
HCT VFR BLD CALC: 23.3 % — LOW (ref 34.5–45)
HCT VFR BLD CALC: 24.5 % — LOW (ref 34.5–45)
HGB BLD-MCNC: 7.5 G/DL — LOW (ref 11.5–15.5)
HGB BLD-MCNC: 8.1 G/DL — LOW (ref 11.5–15.5)
IMM GRANULOCYTES NFR BLD AUTO: 0.8 % — SIGNIFICANT CHANGE UP (ref 0–0.9)
INR BLD: 1.1 RATIO — SIGNIFICANT CHANGE UP (ref 0.85–1.18)
INSULIN SERPL-MCNC: 41.5 UU/ML — HIGH (ref 2.6–24.9)
KETONES UR-MCNC: NEGATIVE — SIGNIFICANT CHANGE UP
LACTATE SERPL-SCNC: 2 MMOL/L — SIGNIFICANT CHANGE UP (ref 0.7–2)
LEUKOCYTE ESTERASE UR-ACNC: ABNORMAL
LYMPHOCYTES # BLD AUTO: 0.68 K/UL — LOW (ref 1–3.3)
LYMPHOCYTES # BLD AUTO: 2.4 % — LOW (ref 13–44)
MCHC RBC-ENTMCNC: 26.8 PG — LOW (ref 27–34)
MCHC RBC-ENTMCNC: 27.3 PG — SIGNIFICANT CHANGE UP (ref 27–34)
MCHC RBC-ENTMCNC: 32.2 G/DL — SIGNIFICANT CHANGE UP (ref 32–36)
MCHC RBC-ENTMCNC: 33.1 G/DL — SIGNIFICANT CHANGE UP (ref 32–36)
MCV RBC AUTO: 82.5 FL — SIGNIFICANT CHANGE UP (ref 80–100)
MCV RBC AUTO: 83.2 FL — SIGNIFICANT CHANGE UP (ref 80–100)
MONOCYTES # BLD AUTO: 1.35 K/UL — HIGH (ref 0–0.9)
MONOCYTES NFR BLD AUTO: 4.7 % — SIGNIFICANT CHANGE UP (ref 2–14)
NEUTROPHILS # BLD AUTO: 26.44 K/UL — HIGH (ref 1.8–7.4)
NEUTROPHILS NFR BLD AUTO: 91.8 % — HIGH (ref 43–77)
NITRITE UR-MCNC: NEGATIVE — SIGNIFICANT CHANGE UP
NRBC # BLD: 0 /100 WBCS — SIGNIFICANT CHANGE UP (ref 0–0)
NRBC # BLD: 0 /100 WBCS — SIGNIFICANT CHANGE UP (ref 0–0)
NT-PROBNP SERPL-SCNC: 1806 PG/ML — HIGH (ref 0–450)
PH UR: 8 — SIGNIFICANT CHANGE UP (ref 5–8)
PLATELET # BLD AUTO: 541 K/UL — HIGH (ref 150–400)
PLATELET # BLD AUTO: 572 K/UL — HIGH (ref 150–400)
POTASSIUM SERPL-MCNC: 4.6 MMOL/L — SIGNIFICANT CHANGE UP (ref 3.5–5.3)
POTASSIUM SERPL-MCNC: 5.2 MMOL/L — SIGNIFICANT CHANGE UP (ref 3.5–5.3)
POTASSIUM SERPL-SCNC: 4.6 MMOL/L — SIGNIFICANT CHANGE UP (ref 3.5–5.3)
POTASSIUM SERPL-SCNC: 5.2 MMOL/L — SIGNIFICANT CHANGE UP (ref 3.5–5.3)
PROT SERPL-MCNC: 7.4 GM/DL — SIGNIFICANT CHANGE UP (ref 6–8.3)
PROT UR-MCNC: 500 MG/DL
PROTHROM AB SERPL-ACNC: 13.1 SEC — HIGH (ref 9.5–13)
RBC # BLD: 2.8 M/UL — LOW (ref 3.8–5.2)
RBC # BLD: 2.97 M/UL — LOW (ref 3.8–5.2)
RBC # FLD: 13.2 % — SIGNIFICANT CHANGE UP (ref 10.3–14.5)
RBC # FLD: 13.2 % — SIGNIFICANT CHANGE UP (ref 10.3–14.5)
RBC CASTS # UR COMP ASSIST: SIGNIFICANT CHANGE UP /HPF (ref 0–4)
SARS-COV-2 RNA SPEC QL NAA+PROBE: SIGNIFICANT CHANGE UP
SODIUM SERPL-SCNC: 136 MMOL/L — SIGNIFICANT CHANGE UP (ref 135–145)
SODIUM SERPL-SCNC: 139 MMOL/L — SIGNIFICANT CHANGE UP (ref 135–145)
SP GR SPEC: 1.01 — SIGNIFICANT CHANGE UP (ref 1.01–1.02)
TRI-PHOS CRY UR QL COMP ASSIST: ABNORMAL
TROPONIN I, HIGH SENSITIVITY RESULT: 31 NG/L — SIGNIFICANT CHANGE UP
TSH SERPL-MCNC: 2.38 UU/ML — SIGNIFICANT CHANGE UP (ref 0.36–3.74)
UROBILINOGEN FLD QL: NEGATIVE MG/DL — SIGNIFICANT CHANGE UP
WBC # BLD: 28.8 K/UL — HIGH (ref 3.8–10.5)
WBC # BLD: 29.2 K/UL — HIGH (ref 3.8–10.5)
WBC # FLD AUTO: 28.8 K/UL — HIGH (ref 3.8–10.5)
WBC # FLD AUTO: 29.2 K/UL — HIGH (ref 3.8–10.5)
WBC UR QL: ABNORMAL

## 2023-07-29 PROCEDURE — 93970 EXTREMITY STUDY: CPT | Mod: 26

## 2023-07-29 PROCEDURE — 74176 CT ABD & PELVIS W/O CONTRAST: CPT | Mod: 26,MA

## 2023-07-29 PROCEDURE — 99223 1ST HOSP IP/OBS HIGH 75: CPT

## 2023-07-29 PROCEDURE — 76770 US EXAM ABDO BACK WALL COMP: CPT | Mod: 26

## 2023-07-29 PROCEDURE — 71045 X-RAY EXAM CHEST 1 VIEW: CPT | Mod: 26

## 2023-07-29 PROCEDURE — 71250 CT THORAX DX C-: CPT | Mod: 26

## 2023-07-29 PROCEDURE — 72131 CT LUMBAR SPINE W/O DYE: CPT | Mod: 26,MA

## 2023-07-29 RX ORDER — CEFTRIAXONE 500 MG/1
1000 INJECTION, POWDER, FOR SOLUTION INTRAMUSCULAR; INTRAVENOUS ONCE
Refills: 0 | Status: COMPLETED | OUTPATIENT
Start: 2023-07-29 | End: 2023-07-29

## 2023-07-29 RX ORDER — DEXTROSE 50 % IN WATER 50 %
50 SYRINGE (ML) INTRAVENOUS ONCE
Refills: 0 | Status: COMPLETED | OUTPATIENT
Start: 2023-07-29 | End: 2023-07-29

## 2023-07-29 RX ORDER — SODIUM CHLORIDE 9 MG/ML
1000 INJECTION INTRAMUSCULAR; INTRAVENOUS; SUBCUTANEOUS
Refills: 0 | Status: DISCONTINUED | OUTPATIENT
Start: 2023-07-29 | End: 2023-07-29

## 2023-07-29 RX ORDER — INSULIN LISPRO 100/ML
VIAL (ML) SUBCUTANEOUS
Refills: 0 | Status: DISCONTINUED | OUTPATIENT
Start: 2023-07-29 | End: 2023-07-29

## 2023-07-29 RX ORDER — SODIUM CHLORIDE 9 MG/ML
1000 INJECTION INTRAMUSCULAR; INTRAVENOUS; SUBCUTANEOUS ONCE
Refills: 0 | Status: COMPLETED | OUTPATIENT
Start: 2023-07-29 | End: 2023-07-29

## 2023-07-29 RX ORDER — SODIUM CHLORIDE 9 MG/ML
1000 INJECTION, SOLUTION INTRAVENOUS
Refills: 0 | Status: DISCONTINUED | OUTPATIENT
Start: 2023-07-29 | End: 2023-07-30

## 2023-07-29 RX ORDER — LANOLIN ALCOHOL/MO/W.PET/CERES
3 CREAM (GRAM) TOPICAL AT BEDTIME
Refills: 0 | Status: DISCONTINUED | OUTPATIENT
Start: 2023-07-29 | End: 2023-08-11

## 2023-07-29 RX ORDER — ACETAMINOPHEN 500 MG
650 TABLET ORAL EVERY 6 HOURS
Refills: 0 | Status: DISCONTINUED | OUTPATIENT
Start: 2023-07-29 | End: 2023-08-11

## 2023-07-29 RX ORDER — DEXTROSE 50 % IN WATER 50 %
25 SYRINGE (ML) INTRAVENOUS ONCE
Refills: 0 | Status: DISCONTINUED | OUTPATIENT
Start: 2023-07-29 | End: 2023-08-11

## 2023-07-29 RX ORDER — POLYETHYLENE GLYCOL 3350 17 G/17G
17 POWDER, FOR SOLUTION ORAL ONCE
Refills: 0 | Status: COMPLETED | OUTPATIENT
Start: 2023-07-29 | End: 2023-07-29

## 2023-07-29 RX ORDER — PIPERACILLIN AND TAZOBACTAM 4; .5 G/20ML; G/20ML
3.38 INJECTION, POWDER, LYOPHILIZED, FOR SOLUTION INTRAVENOUS ONCE
Refills: 0 | Status: COMPLETED | OUTPATIENT
Start: 2023-07-29 | End: 2023-07-29

## 2023-07-29 RX ORDER — GLUCAGON INJECTION, SOLUTION 0.5 MG/.1ML
1 INJECTION, SOLUTION SUBCUTANEOUS ONCE
Refills: 0 | Status: DISCONTINUED | OUTPATIENT
Start: 2023-07-29 | End: 2023-08-11

## 2023-07-29 RX ORDER — NIFEDIPINE 30 MG
60 TABLET, EXTENDED RELEASE 24 HR ORAL
Refills: 0 | Status: DISCONTINUED | OUTPATIENT
Start: 2023-07-29 | End: 2023-08-11

## 2023-07-29 RX ORDER — DEXTROSE 50 % IN WATER 50 %
15 SYRINGE (ML) INTRAVENOUS ONCE
Refills: 0 | Status: DISCONTINUED | OUTPATIENT
Start: 2023-07-29 | End: 2023-08-11

## 2023-07-29 RX ORDER — HYDRALAZINE HCL 50 MG
50 TABLET ORAL
Refills: 0 | Status: DISCONTINUED | OUTPATIENT
Start: 2023-07-29 | End: 2023-07-30

## 2023-07-29 RX ORDER — SENNA PLUS 8.6 MG/1
2 TABLET ORAL AT BEDTIME
Refills: 0 | Status: DISCONTINUED | OUTPATIENT
Start: 2023-07-29 | End: 2023-08-11

## 2023-07-29 RX ORDER — VANCOMYCIN HCL 1 G
1000 VIAL (EA) INTRAVENOUS ONCE
Refills: 0 | Status: COMPLETED | OUTPATIENT
Start: 2023-07-29 | End: 2023-07-29

## 2023-07-29 RX ORDER — SODIUM CHLORIDE 9 MG/ML
1000 INJECTION, SOLUTION INTRAVENOUS
Refills: 0 | Status: DISCONTINUED | OUTPATIENT
Start: 2023-07-29 | End: 2023-08-11

## 2023-07-29 RX ORDER — PIPERACILLIN AND TAZOBACTAM 4; .5 G/20ML; G/20ML
3.38 INJECTION, POWDER, LYOPHILIZED, FOR SOLUTION INTRAVENOUS EVERY 12 HOURS
Refills: 0 | Status: COMPLETED | OUTPATIENT
Start: 2023-07-29 | End: 2023-08-07

## 2023-07-29 RX ORDER — ACETAMINOPHEN 500 MG
650 TABLET ORAL EVERY 6 HOURS
Refills: 0 | Status: DISCONTINUED | OUTPATIENT
Start: 2023-07-29 | End: 2023-07-29

## 2023-07-29 RX ADMIN — SODIUM CHLORIDE 90 MILLILITER(S): 9 INJECTION INTRAMUSCULAR; INTRAVENOUS; SUBCUTANEOUS at 05:44

## 2023-07-29 RX ADMIN — Medication 10 MILLIGRAM(S): at 22:22

## 2023-07-29 RX ADMIN — SODIUM CHLORIDE 100 MILLILITER(S): 9 INJECTION, SOLUTION INTRAVENOUS at 12:08

## 2023-07-29 RX ADMIN — Medication 60 MILLIGRAM(S): at 06:15

## 2023-07-29 RX ADMIN — PIPERACILLIN AND TAZOBACTAM 200 GRAM(S): 4; .5 INJECTION, POWDER, LYOPHILIZED, FOR SOLUTION INTRAVENOUS at 02:49

## 2023-07-29 RX ADMIN — SODIUM CHLORIDE 1000 MILLILITER(S): 9 INJECTION INTRAMUSCULAR; INTRAVENOUS; SUBCUTANEOUS at 00:44

## 2023-07-29 RX ADMIN — Medication 50 MILLILITER(S): at 00:00

## 2023-07-29 RX ADMIN — Medication 50 MILLIGRAM(S): at 06:15

## 2023-07-29 RX ADMIN — Medication 250 MILLIGRAM(S): at 03:35

## 2023-07-29 RX ADMIN — Medication 650 MILLIGRAM(S): at 23:03

## 2023-07-29 RX ADMIN — SENNA PLUS 2 TABLET(S): 8.6 TABLET ORAL at 22:04

## 2023-07-29 RX ADMIN — Medication 50 MILLIGRAM(S): at 19:13

## 2023-07-29 RX ADMIN — Medication 650 MILLIGRAM(S): at 15:00

## 2023-07-29 RX ADMIN — PIPERACILLIN AND TAZOBACTAM 25 GRAM(S): 4; .5 INJECTION, POWDER, LYOPHILIZED, FOR SOLUTION INTRAVENOUS at 19:13

## 2023-07-29 RX ADMIN — CEFTRIAXONE 100 MILLIGRAM(S): 500 INJECTION, POWDER, FOR SOLUTION INTRAMUSCULAR; INTRAVENOUS at 05:01

## 2023-07-29 RX ADMIN — POLYETHYLENE GLYCOL 3350 17 GRAM(S): 17 POWDER, FOR SOLUTION ORAL at 12:08

## 2023-07-29 RX ADMIN — Medication 650 MILLIGRAM(S): at 14:00

## 2023-07-29 RX ADMIN — Medication 60 MILLIGRAM(S): at 22:04

## 2023-07-29 RX ADMIN — Medication 650 MILLIGRAM(S): at 22:03

## 2023-07-29 NOTE — H&P ADULT - HISTORY OF PRESENT ILLNESS
78 year old female with PMH of HTN, DM present to the ED for hypoglycemic episode. Patient is from [facility], she was found unresponsive with a glucose level of 30, she was given 1 amp of glucogan, upon ED arrival FS was 41 with AMS, received Dextrose 50ml.. On 7/14 patient had Laminectomy with fusion L2-4, endorses she has been having dysuria since she arrived at [facility] associated with suprapubic pain.  Labs  remarkable for leukocytosis (WBC 28.80) worsen kidney function with Cr 4.90 compared to 7/18 Cr 3.10. Upon evaluation patient was AAOx4, endorses dysuria with lower abd pain, denies fever, headache, dizziness, SOB, chest pain, palpitation or any other acute symptoms.

## 2023-07-29 NOTE — H&P ADULT - PROBLEM SELECTOR PLAN 1
Patient is currently on Glipizide 5mg   Patient  [facility]  In the ED FS 41 received Dextrose & returned to baseline   - Monitor Blood glucose   - FS Q2hrs

## 2023-07-29 NOTE — ED ADULT NURSE NOTE - CHIEF COMPLAINT QUOTE
Pt from [facility], per Devorah RN, pt was unresponsive and low blood sugar in 30s.  I amp of glucagon given pta, via 22g IV in left forearm.  repeat FS in triage 41, 1amp of D50 given with improvement.  Pt in [facility] for recent laminectomy.  hx of DM, HTN

## 2023-07-29 NOTE — ED ADULT NURSE NOTE - NS ED NURSE LEVEL OF CONSCIOUSNESS SPEECH
Subjective:       Patient ID: Marvin Ray is a 59 y.o. male.    Chief Complaint: Pre-op Exam      Past Medical History   Diagnosis Date    Arthritis     Cataract     Diabetes mellitus     Diabetic retinopathy     Glaucoma     Hyperlipemia     Hypertension      Past Surgical History   Procedure Laterality Date    Tonsillectomy      Toe amputation      Cyst removal      Right foot surgery  10/2014    Cataract extraction w/  intraocular lens implant Left 2012     WITH BAERVEDT//DONE AT [facility]    Baerveldt glaucoma implant Left 2012     WITH CATARACT EXTRACTION//DONE AT [facility]    Ahmed glaucoma implant Left 2011     DONE AT [facility]     Family History   Problem Relation Age of Onset    Diabetes Mother     Heart disease Brother     No Known Problems Father     No Known Problems Sister     No Known Problems Maternal Aunt     No Known Problems Maternal Uncle     No Known Problems Paternal Aunt     No Known Problems Paternal Uncle     No Known Problems Maternal Grandmother     No Known Problems Maternal Grandfather     No Known Problems Paternal Grandmother     No Known Problems Paternal Grandfather     Anemia Neg Hx     Arrhythmia Neg Hx     Asthma Neg Hx     Clotting disorder Neg Hx     Fainting Neg Hx     Heart attack Neg Hx     Heart failure Neg Hx     Hyperlipidemia Neg Hx     Hypertension Neg Hx     Stroke Neg Hx     Atrial Septal Defect Neg Hx     Amblyopia Neg Hx     Blindness Neg Hx     Glaucoma Neg Hx     Macular degeneration Neg Hx     Retinal detachment Neg Hx     Strabismus Neg Hx      Social History     Social History    Marital status: Legally      Spouse name: N/A    Number of children: N/A    Years of education: 14     Social History Main Topics    Smoking status: Former Smoker     Packs/day: 1.00     Years: 3.00     Quit date: 7/11/1984    Smokeless tobacco: Never Used    Alcohol use 0.6 oz/week     1 Cans of beer per week      Comment:  Occasional    Drug use: No    Sexual activity: Not Currently     Other Topics Concern    None     Social History Narrative       Current Outpatient Prescriptions   Medication Sig Dispense Refill    allopurinol (ZYLOPRIM) 100 MG tablet Take 2 tablets (200 mg total) by mouth once daily. 180 tablet 3    amlodipine (NORVASC) 10 MG tablet Take 0.5 tablets (5 mg total) by mouth once daily. 30 tablet 11    aspirin (ECOTRIN) 81 MG EC tablet Take 1 tablet (81 mg total) by mouth once daily.  0    benazepril (LOTENSIN) 40 MG tablet Take 1 tablet (40 mg total) by mouth 2 (two) times daily. 180 tablet 3    brimonidine 0.1% (ALPHAGAN P) 0.1 % Drop Place 1 drop into both eyes 3 (three) times daily. 15 mL 11    carvedilol (COREG) 25 MG tablet Take 1 tablet (25 mg total) by mouth 2 (two) times daily. 60 tablet 1    chlorthalidone (HYGROTEN) 25 MG Tab Take 1 tablet (25 mg total) by mouth once daily. 30 tablet 11    co-enzyme Q-10 50 mg capsule Take 2 capsules (100 mg total) by mouth once daily. 30 capsule 11    dorzolamide-timolol 2-0.5% (COSOPT) 22.3-6.8 mg/mL ophthalmic solution Place 1 drop into both eyes 3 (three) times daily. 10 mL 11    fenofibrate micronized (LOFIBRA) 67 MG capsule Take 1 capsule (67 mg total) by mouth before breakfast. 90 capsule 3    fish oil-omega-3 fatty acids 300-1,000 mg capsule Take 2 capsules (2 g total) by mouth 2 (two) times daily. 120 capsule 5    insulin aspart (NOVOLOG) 100 unit/mL InPn pen Inject 20 Units into the skin 3 (three) times daily with meals. 2 Box 11    latanoprost 0.005 % ophthalmic solution Place 1 drop into the left eye every evening. 2.5 mL 11    metformin (GLUCOPHAGE) 1000 MG tablet Take 1 tablet (1,000 mg total) by mouth 2 (two) times daily with meals. 180 tablet 3    MULTIVIT,THER IRON,CA,FA & MIN (MULTIVITAMIN) Tab Take 1 tablet by mouth once daily.      nitroGLYCERIN (NITROSTAT) 0.4 MG SL tablet Place 1 tablet (0.4 mg total) under the tongue every 5 (five)  "minutes as needed for Chest pain (If CP persists go to ER. If taking NTG notify MD.). 20 tablet 1    pantoprazole (PROTONIX) 20 MG tablet Take 1 tablet (20 mg total) by mouth once daily. 30 tablet 11    pen needle, diabetic 31 gauge x 1/4" Ndle Use as directed 100 each 11    rosuvastatin (CRESTOR) 20 MG tablet Take 1 tablet (20 mg total) by mouth once daily. 90 tablet 3    spironolactone (ALDACTONE) 25 MG tablet Take 1 tablet (25 mg total) by mouth once daily. 30 tablet 11    insulin glargine (LANTUS SOLOSTAR) 100 unit/mL (3 mL) InPn pen Inject 80 Units into the skin every evening. (Patient taking differently: Inject 40 Units into the skin 2 (two) times daily. ) 3 Box 11     No current facility-administered medications for this visit.      Review of patient's allergies indicates:   Allergen Reactions    Statins-hmg-coa reductase inhibitors Other (See Comments)     Pain, Lipitor and Pravastatin    Penicillins Rash       Review of Systems   Constitutional: Negative.    HENT: Negative.    Eyes: Positive for visual disturbance.   Respiratory: Negative.    Cardiovascular: Negative.    Neurological: Negative.    Psychiatric/Behavioral: Negative.        Objective:      There were no vitals filed for this visit.  Physical Exam   Constitutional: He is oriented to person, place, and time. He appears well-developed and well-nourished.   HENT:   Head: Normocephalic and atraumatic.   Eyes:   See eye exam    Cardiovascular: Normal rate.    Pulmonary/Chest: Effort normal.   Neurological: He is alert and oriented to person, place, and time.   Skin: Skin is warm and dry.   Psychiatric: He has a normal mood and affect.            Assessment:       1. Neovascular glaucoma of left eye, severe stage    2. Primary open angle glaucoma of right eye, moderate stage    3. Epiretinal membrane, both eyes    4. Nuclear sclerotic cataract of right eye    5. Proliferative diabetic retinopathy of both eyes with macular edema associated with " type 2 diabetes mellitus    6. Pseudophakia, left eye        Plan:       Cyclo G6 - CB destruction      Speaking Coherently

## 2023-07-29 NOTE — H&P ADULT - PROBLEM SELECTOR PLAN 2
Week+ history of dysuria associated with suprapubic pain   Leukocytosis (WBC 28.80), afebrile   Received Vancomycin  in the ED   - Continue wit broad coverage Abx   - F/U Urine Cx to narrow Tx   - F/U KUB- US

## 2023-07-29 NOTE — H&P ADULT - NSHPPHYSICALEXAM_GEN_ALL_CORE
· CONSTITUTIONAL: Well appearing, AAOx4. No acute distress   · ENMT: Airway patent, Nasal mucosa clear. Mouth with normal mucosa.   · EYES: Clear bilaterally, pupils equal, round and reactive to light.  · CARDIAC: Normal rate, regular rhythm.  S1, S2.  No murmurs, rubs or gallops.  · RESPIRATORY: Breath sounds clear and equal bilaterally.  · GASTROINTESTINAL: +BS, soft, mild tenderness at the suprapubic area R>L, no rebound, no guarding.  · MUSCULOSKELETAL: Spine appears normal, range of motion is not limited, no muscle or joint tenderness  · SKIN: Skin normal color for race, warm, dry and intact.. Mild irritation at the abdominal fold

## 2023-07-29 NOTE — H&P ADULT - NSHPLABSRESULTS_GEN_ALL_CORE
8.1    28.80 )-----------( 541      ( 2023 00:15 )             24.5         136  |  108  |  67<H>  ----------------------------<  171<H>  5.2   |  19<L>  |  4.90<H>    Ca    9.3      2023 00:15    TPro  7.4  /  Alb  2.0<L>  /  TBili  0.3  /  DBili  x   /  AST  50<H>  /  ALT  25  /  AlkPhos  105  -29    PT/INR - ( 2023 00:15 )   PT: 13.1 sec;   INR: 1.10 ratio         PTT - ( 2023 00:15 )  PTT:31.1 sec  Urinalysis Basic - ( 2023 01:53 )    Color: Yellow / Appearance: very cloudy / S.010 / pH: x  Gluc: x / Ketone: Negative  / Bili: Negative / Urobili: Negative mg/dL   Blood: x / Protein: 500 mg/dL / Nitrite: Negative   Leuk Esterase: Moderate / RBC: 0-2 /HPF / WBC 6-10   Sq Epi: x / Non Sq Epi: x / Bacteria: Many

## 2023-07-29 NOTE — PATIENT PROFILE ADULT - FUNCTIONAL ASSESSMENT - BASIC MOBILITY 3.
History of Present Illness





- General


Chief Complaint: Pain


Stated Complaint: KNEES PAIN


Time Seen by Provider: 05/07/18 16:04


History Source: Patient





- History of Present Illness


Initial Comments: 





05/07/18 18:33


55-year-old female being treated by orthopedic surgery for bilateral knee 

osteoarthritis. She had a cortisone shot in her right knee about 3 months ago. 

The shot eventually wore off she now has bilateral knee pain. There are no 

associated symptoms. No fevers chills or night sweats nausea vomiting or 

headache.





Past History





- Past Medical History


Allergies/Adverse Reactions: 


 Allergies











Allergy/AdvReac Type Severity Reaction Status Date / Time


 


amoxicillin [Amoxicillin] Allergy   Verified 05/07/18 16:04


 


amoxicillin trihydrate Allergy   Verified 05/07/18 16:04





[From Omeclamox-Gus]     


 


clarithromycin Allergy   Verified 05/07/18 16:04





[From Omeclamox-Gus]     


 


codeine [Codeine] Allergy  Vomiting Verified 05/07/18 16:04


 


omeprazole Allergy   Verified 05/07/18 16:04





[From Omeclamox-Gus]     


 


omeclamox-gus Allergy  sob, Uncoded 05/07/18 16:04





   nasuea,Vomiting  











Home Medications: 


Ambulatory Orders





Aspirin [ASA -] 81 mg PO DAILY 03/17/13 


Losartan Potassium 25 mg PO DAILY 03/17/13 


Nifedipine [Nifedipine ER] 90 mg PO DAILY 12/03/16 








Cardiac Disorders: Yes (palpitations)


COPD: No


HTN: Yes





- Suicide/Smoking/Psychosocial Hx


Smoking Status: No


Smoking History: Never smoked


Number of Cigarettes Smoked Daily: 0


Hx Alcohol Use: Yes (SOCIAL)


Drug/Substance Use Hx: No


Substance Use Type: None


Hx Substance Use Treatment: No





**Review of Systems





- Review of Systems


Is the patient limited English proficient: No


Constitutional: Yes: See HPI


Musculoskeletal: Yes: Joint Pain, Joint Swelling, Joint Stiffness


Integumentary: Yes: Rash


Neurological: Yes: Unsteady Gait





*Physical Exam





- Vital Signs


 Last Vital Signs











Temp Pulse Resp BP Pulse Ox


 


 98.1 F   95 H  15   128/71   100 


 


 05/07/18 16:05  05/07/18 16:05  05/07/18 16:05  05/07/18 16:05  05/07/18 16:05














- Physical Exam


Comments: 





05/07/18 18:36


Bilateral knee exam skin color and temperature are normal. There is no palpable 

intra-articular effusion. Range of motion 0-95 with crepitation and stiffness. 

No indication of instability. There is patellofemoral crepitation. Bilateral 

thighs and calves are soft and nontender. She has nonpainful hip range of 

motion and negative straight leg raise test bilaterally





ED Treatment Course





- RADIOLOGY


Radiology Studies Ordered: 














 Category Date Time Status


 


 KNEE 3 POS-LEFT [RAD] Stat Radiology  05/07/18 17:46 Taken


 


 KNEE 3 POS-RIGHT [RAD] Stat Radiology  05/07/18 17:46 Taken














*DC/Admit/Observation/Transfer


Diagnosis at time of Disposition: 


 Arthritis of knee, degenerative








- Discharge Dispostion


Disposition: HOME


Condition at time of disposition: Stable


Admit: No





- Referrals


Referrals: 


Lida Juárez MD [Primary Care Provider] - 


Kostas Kolb MD [Staff Physician] - 





- Patient Instructions


Additional Instructions: 


May ice knee for 20 minutes 2-3 times a day. Follow-up with orthopedic surgery. 

Return to the ER if symptoms worsen or go on resolved.





- Post Discharge Activity 4 = No assist / stand by assistance

## 2023-07-29 NOTE — H&P ADULT - PROBLEM SELECTOR PLAN 3
Worsen ANASTACIO most likely 2/2 prolonged untreated UTI   Current Cr: 4.90, 8/18 Cr 3.10  - IVF   - Monitor kidney function   - Avoid Nephrotoxic drugs

## 2023-07-29 NOTE — H&P ADULT - ASSESSMENT
78 year old female with PMH of HTN, DM present to the ED for hypoglycemic episode. Patient is from [facility], she was found unresponsive with a glucose level of 30, she was given 1 amp of glucogan, upon ED arrival FS was 41 with AMS, received Dextrose 50ml.. On 7/14 patient had Laminectomy with fusion L2-4, endorses she has been having dysuria since she arrived at [facility] associated with suprapubic pain.  Labs  remarkable for leukocytosis (WBC 28.80) worsen kidney function with Cr 4.90 compared to 7/18 Cr 3.10. Afebrile. Upon evaluation patient was AAOx4, endorses dysuria with lower abd pain, denies fever, headache, dizziness, SOB, chest pain, palpitation or any other acute symptoms.    Patient is being admitted for UTI to be treated with antibiotics.

## 2023-07-29 NOTE — PHYSICAL THERAPY INITIAL EVALUATION ADULT - ADDITIONAL COMMENTS
Lives in a  with 3 steps to enter c B/l closeby handrails . Once inside no additional steps to do. Pt's niece from [place] is visiting & would stay with the patient until she is fully recovered. Pt's daughter lives in [place] with her family but cannot help as she has stroke for 4 years now .

## 2023-07-29 NOTE — ED ADULT NURSE NOTE - OBJECTIVE STATEMENT
pt [facility] pt found to be unresponsive and hypoglycemic given glucagon in [facility] and d50 in triage pt responded to treatment A & O x 3. pt doesn't recall prior event to becoming unresponsive reports only having cranberry juice tody has no appetite. bruising noted to yesi PANDYAi strips down spin intact pt s/p laminectomy no drainage noted to incision site. pt from [facility] pt found to be unresponsive and hypoglycemic given glucagon in [facility] and d50 in triage pt responded to treatment A & O x 3. pt doesn't recall prior event to becoming unresponsive reports only having cranberry juice tody has no appetite. bruising noted to YA steri strips down spin intact pt s/p laminectomy no drainage noted to incision site. 22G noted to L [facility] and hypoglycemic given glucagon in [facility] and d50 in triage pt responded to treatment A & O x 3. pt doesn't recall prior event to becoming unresponsive reports only having cranberry juice tody has no appetite. bruising noted to sanjana PANDYA strips down spin intact pt s/p laminectomy no drainage noted to incision site. In addition pt reports dysuria and urinary urgency since procedure was performed. [facility]

## 2023-07-29 NOTE — CONSULT NOTE ADULT - SUBJECTIVE AND OBJECTIVE BOX
Patient chart reviewed, full consult to follow.     Thank you for the courtesy of this consultation. [facility] NEPHROLOGY SERVICES, [facility]  NEPHROLOGY AND HYPERTENSION  [address]  [address]  [place] [address]  [phone]    MD MAT TUBBS MD YELENA ROSENBERG, MD BINNY KOSHY, MD CHRISTOPHER CAPUTO, MD EDWARD BOVER, MD      Information from chart:  "Patient is a 78y old  Female who presents with a chief complaint of Hypoglycemia & UTI (29 Jul 2023 09:49)    HPI:  78 year old female with PMH of HTN, DM present to the ED for hypoglycemic episode. Patient is from [facility], she was found unresponsive with a glucose level of 30, she was given 1 amp of glucogan, upon ED arrival FS was 41 with AMS, received Dextrose 50ml.. On 7/14 patient had Laminectomy with fusion L2-4, endorses she has been having dysuria since she arrived at [facility] associated with suprapubic pain.  Labs  remarkable for leukocytosis (WBC 28.80) worsen kidney function with Cr 4.90 compared to 7/18 Cr 3.10. Upon evaluation patient was AAOx4, endorses dysuria with lower abd pain, denies fever, headache, dizziness, SOB, chest pain, palpitation or any other acute symptoms.          CT noted bladder distention and perinephric standing      (29 Jul 2023 05:17)   "    PAST MEDICAL & SURGICAL HISTORY:  HTN (hypertension)      T2DM (type 2 diabetes mellitus)      Diabetes mellitus      Status post D&C        FAMILY HISTORY:  FH: type 2 diabetes (Aunt)    FH: HTN (hypertension) (Mother)    FH: HTN (hypertension)      Allergies    No Known Allergies    Intolerances      Home Medications:  acetaminophen 325 mg oral tablet: 3 tab(s) orally every 8 hours (18 Jul 2023 11:01)  aluminum hydroxide-magnesium hydroxide 200 mg-200 mg/5 mL oral suspension: 30 milliliter(s) orally every 4 hours As needed Dyspepsia (18 Jul 2023 11:01)  cyclobenzaprine 10 mg oral tablet: 1 tab(s) orally every 8 hours As needed Muscle Spasm (18 Jul 2023 11:01)  glipiZIDE 5 mg oral tablet: 1 tab(s) orally 2 times a day (14 Jul 2023 12:28)  hydrALAZINE 50 mg oral tablet: 1 tab(s) orally 2 times a day (14 Jul 2023 12:28)  lisinopril-hydrochlorothiazide 20 mg-12.5 mg oral tablet: 1 orally once a day (14 Jul 2023 12:28)  NIFEdipine 60 mg oral tablet, extended release: 1 tab(s) orally 2 times a day (18 Jul 2023 11:01)  oxyCODONE 10 mg oral tablet: 1 tab(s) orally every 4 hours As needed Moderate Pain (4 - 6) (18 Jul 2023 11:01)  oxyCODONE 15 mg oral tablet: 1 tab(s) orally every 4 hours As needed Severe Pain (7 - 10) (18 Jul 2023 11:01)  polyethylene glycol 3350 oral powder for reconstitution: 17 gram(s) orally 2 times a day (18 Jul 2023 11:01)  senna leaf extract oral tablet: 2 tab(s) orally once a day (at bedtime) (18 Jul 2023 11:01)    MEDICATIONS  (STANDING):  bisacodyl Suppository 10 milliGRAM(s) Rectal once  dextrose 5% + lactated ringers. 1000 milliLiter(s) (100 mL/Hr) IV Continuous <Continuous>  dextrose 5%. 1000 milliLiter(s) (50 mL/Hr) IV Continuous <Continuous>  dextrose 50% Injectable 25 Gram(s) IV Push once  glucagon  Injectable 1 milliGRAM(s) IntraMuscular once  hydrALAZINE 50 milliGRAM(s) Oral two times a day  NIFEdipine XL 60 milliGRAM(s) Oral two times a day  piperacillin/tazobactam IVPB.. 3.375 Gram(s) IV Intermittent every 12 hours  senna 2 Tablet(s) Oral at bedtime    MEDICATIONS  (PRN):  acetaminophen     Tablet .. 650 milliGRAM(s) Oral every 6 hours PRN Temp greater or equal to 38C (100.4F), Mild Pain (1 - 3)  dextrose Oral Gel 15 Gram(s) Oral once PRN Blood Glucose LESS THAN 70 milliGRAM(s)/deciliter  melatonin 3 milliGRAM(s) Oral at bedtime PRN Insomnia    Vital Signs Last 24 Hrs  T(C): 37.4 (29 Jul 2023 17:22), Max: 37.4 (29 Jul 2023 17:22)  T(F): 99.4 (29 Jul 2023 17:22), Max: 99.4 (29 Jul 2023 17:22)  HR: 73 (29 Jul 2023 17:22) (59 - 97)  BP: 127/65 (29 Jul 2023 17:22) (127/65 - 156/68)  BP(mean): --  RR: 18 (29 Jul 2023 17:22) (12 - 23)  SpO2: 97% (29 Jul 2023 17:22) (96% - 99%)    Parameters below as of 29 Jul 2023 10:31  Patient On (Oxygen Delivery Method): room air        Daily Height in cm: 160.02 (28 Jul 2023 23:45)    Daily     CAPILLARY BLOOD GLUCOSE      POCT Blood Glucose.: 154 mg/dL (29 Jul 2023 16:46)  POCT Blood Glucose.: 118 mg/dL (29 Jul 2023 13:28)  POCT Blood Glucose.: 58 mg/dL (29 Jul 2023 11:39)  POCT Blood Glucose.: 61 mg/dL (29 Jul 2023 11:38)  POCT Blood Glucose.: 106 mg/dL (29 Jul 2023 06:32)  POCT Blood Glucose.: 97 mg/dL (29 Jul 2023 05:25)  POCT Blood Glucose.: 190 mg/dL (29 Jul 2023 00:04)  POCT Blood Glucose.: 41 mg/dL (28 Jul 2023 23:45)  POCT Blood Glucose.: 40 mg/dL (28 Jul 2023 23:44)    PHYSICAL EXAM:      T(C): 37.4 (07-29-23 @ 17:22), Max: 37.4 (07-29-23 @ 17:22)  HR: 73 (07-29-23 @ 17:22) (59 - 97)  BP: 127/65 (07-29-23 @ 17:22) (127/65 - 156/68)  RR: 18 (07-29-23 @ 17:22) (12 - 23)  SpO2: 97% (07-29-23 @ 17:22) (96% - 99%)  Wt(kg): --  Lungs clear  Heart S1S2  Abd soft NT ND  Extremities:   tr edema              07-29    139  |  109<H>  |  68<H>  ----------------------------<  95  4.6   |  18<L>  |  4.90<H>    Ca    8.6      29 Jul 2023 13:07    TPro  7.4  /  Alb  2.0<L>  /  TBili  0.3  /  DBili  x   /  AST  50<H>  /  ALT  25  /  AlkPhos  105  07-29                          7.5    29.20 )-----------( 572      ( 29 Jul 2023 13:07 )             23.3     Creatinine Trend: 4.90<--, 4.90<--, 3.10<--, 3.35<--, 3.42<--, 3.37<--  Urinalysis Basic - ( 29 Jul 2023 13:07 )    Color: x / Appearance: x / SG: x / pH: x  Gluc: 95 mg/dL / Ketone: x  / Bili: x / Urobili: x   Blood: x / Protein: x / Nitrite: x   Leuk Esterase: x / RBC: x / WBC x   Sq Epi: x / Non Sq Epi: x / Bacteria: x            Assessment   ANASTACIO CKD 4; pre / post renal factors  UTI risk for infectious AIN     Plan  Bladder scan assess PVF;   Empiric IVF and abx   Hold ACE; HCTZ   Will follow     Adin Jack MD

## 2023-07-29 NOTE — ED ADULT NURSE NOTE - ED STAT RN HANDOFF DETAILS
handoff given to RN mechelle PORTILLO pt general condition remains stable endorsed for continued care

## 2023-07-29 NOTE — PHYSICAL THERAPY INITIAL EVALUATION ADULT - CRITERIA FOR SKILLED THERAPEUTIC INTERVENTIONS
subacute rehab/impairments found/functional limitations in following categories/risk reduction/prevention/rehab potential/therapy frequency/predicted duration of therapy intervention/anticipated equipment needs at discharge/anticipated discharge recommendation

## 2023-07-29 NOTE — PHYSICAL THERAPY INITIAL EVALUATION ADULT - PERTINENT HX OF CURRENT PROBLEM, REHAB EVAL
This is the hx of MEDHAT a 77 y/o female patient who was admitted to [facility] due to complications of Hypoglycemia, Leukocytosis affecting medical condition and with subsequent affection on functional mobility. X-ray chest 7/29/23: (-) PE, X-ray chest 7/27/23: (-) Active disease.

## 2023-07-29 NOTE — PHYSICAL THERAPY INITIAL EVALUATION ADULT - DID THE PATIENT HAVE SURGERY?
This is the hx of MEDHAT a 79 y/o female patient who was admitted to [facility] due to complications of Hypoglycemia, Leukocytosis affecting medical condition and with subsequent affection on functional mobility./n/a

## 2023-07-29 NOTE — ED ADULT NURSE NOTE - ASSOCIATED SYMPTOMS
pt [facility] pt found to be unresponsive and hypoglycemic given glucagon in [facility] and d50 in triage pt responded to treatment A & O x 3. pt doesn't recall prior event to becoming unresponsive reports only having cranberry juice tody has no appetite.ruising noted to sanjana PANDYA strips down spin intact pt s/p laminectomy no drainage noted to incision site. [facility] and hypoglycemic given glucagon in [facility] and d50 in triage pt responded to treatment A & O x 3. pt doesn't recall prior event to becoming unresponsive reports only having cranberry juice tody has no appetite. bruising noted to sanjana PANDYA strips down spin intact pt s/p laminectomy no drainage noted to incision site. In addition pt reports dysuria and urinary urgency since procedure was performed. 22G [facility]

## 2023-07-30 LAB
-  STAPHYLOCOCCUS EPIDERMIDIS, METHICILLIN RESISTANT: SIGNIFICANT CHANGE UP
A1C WITH ESTIMATED AVERAGE GLUCOSE RESULT: 7.1 % — HIGH (ref 4–5.6)
ALBUMIN SERPL ELPH-MCNC: 1.7 G/DL — LOW (ref 3.3–5)
ALP SERPL-CCNC: 102 U/L — SIGNIFICANT CHANGE UP (ref 40–120)
ALT FLD-CCNC: 27 U/L — SIGNIFICANT CHANGE UP (ref 12–78)
ANION GAP SERPL CALC-SCNC: 9 MMOL/L — SIGNIFICANT CHANGE UP (ref 5–17)
APPEARANCE UR: ABNORMAL
AST SERPL-CCNC: 40 U/L — HIGH (ref 15–37)
BACTERIA # UR AUTO: ABNORMAL
BASOPHILS # BLD AUTO: 0.08 K/UL — SIGNIFICANT CHANGE UP (ref 0–0.2)
BASOPHILS NFR BLD AUTO: 0.4 % — SIGNIFICANT CHANGE UP (ref 0–2)
BILIRUB SERPL-MCNC: 0.4 MG/DL — SIGNIFICANT CHANGE UP (ref 0.2–1.2)
BILIRUB UR-MCNC: NEGATIVE — SIGNIFICANT CHANGE UP
BLD GP AB SCN SERPL QL: SIGNIFICANT CHANGE UP
BUN SERPL-MCNC: 66 MG/DL — HIGH (ref 7–23)
C PEPTIDE SERPL-MCNC: 14.1 NG/ML — HIGH (ref 1.1–4.4)
CALCIUM SERPL-MCNC: 8.2 MG/DL — LOW (ref 8.5–10.1)
CHLORIDE SERPL-SCNC: 108 MMOL/L — SIGNIFICANT CHANGE UP (ref 96–108)
CHOLEST SERPL-MCNC: 115 MG/DL — SIGNIFICANT CHANGE UP
CO2 SERPL-SCNC: 19 MMOL/L — LOW (ref 22–31)
COLOR SPEC: ABNORMAL
CREAT SERPL-MCNC: 4.73 MG/DL — HIGH (ref 0.5–1.3)
CRP SERPL-MCNC: 290 MG/L — HIGH
CULTURE RESULTS: SIGNIFICANT CHANGE UP
DIFF PNL FLD: ABNORMAL
EGFR: 9 ML/MIN/1.73M2 — LOW
EOSINOPHIL # BLD AUTO: 0.45 K/UL — SIGNIFICANT CHANGE UP (ref 0–0.5)
EOSINOPHIL NFR BLD AUTO: 2 % — SIGNIFICANT CHANGE UP (ref 0–6)
EPI CELLS # UR: SIGNIFICANT CHANGE UP
ERYTHROCYTE [SEDIMENTATION RATE] IN BLOOD: >140 MM/HR — HIGH (ref 0–20)
ESTIMATED AVERAGE GLUCOSE: 157 MG/DL — HIGH (ref 68–114)
GLUCOSE BLDC GLUCOMTR-MCNC: 105 MG/DL — HIGH (ref 70–99)
GLUCOSE BLDC GLUCOMTR-MCNC: 116 MG/DL — HIGH (ref 70–99)
GLUCOSE BLDC GLUCOMTR-MCNC: 154 MG/DL — HIGH (ref 70–99)
GLUCOSE BLDC GLUCOMTR-MCNC: 230 MG/DL — HIGH (ref 70–99)
GLUCOSE BLDC GLUCOMTR-MCNC: 264 MG/DL — HIGH (ref 70–99)
GLUCOSE BLDC GLUCOMTR-MCNC: 359 MG/DL — HIGH (ref 70–99)
GLUCOSE SERPL-MCNC: 212 MG/DL — HIGH (ref 70–99)
GLUCOSE UR QL: NEGATIVE MG/DL — SIGNIFICANT CHANGE UP
GRAM STN FLD: SIGNIFICANT CHANGE UP
GRAM STN FLD: SIGNIFICANT CHANGE UP
HCT VFR BLD CALC: 20.6 % — CRITICAL LOW (ref 34.5–45)
HCT VFR BLD CALC: 22.3 % — LOW (ref 34.5–45)
HDLC SERPL-MCNC: 39 MG/DL — LOW
HGB BLD-MCNC: 6.7 G/DL — CRITICAL LOW (ref 11.5–15.5)
HGB BLD-MCNC: 7.3 G/DL — LOW (ref 11.5–15.5)
IMM GRANULOCYTES NFR BLD AUTO: 1.2 % — HIGH (ref 0–0.9)
KETONES UR-MCNC: ABNORMAL
LEUKOCYTE ESTERASE UR-ACNC: ABNORMAL
LIPID PNL WITH DIRECT LDL SERPL: 55 MG/DL — SIGNIFICANT CHANGE UP
LYMPHOCYTES # BLD AUTO: 1.29 K/UL — SIGNIFICANT CHANGE UP (ref 1–3.3)
LYMPHOCYTES # BLD AUTO: 5.7 % — LOW (ref 13–44)
MAGNESIUM SERPL-MCNC: 2.4 MG/DL — SIGNIFICANT CHANGE UP (ref 1.6–2.6)
MCHC RBC-ENTMCNC: 26.9 PG — LOW (ref 27–34)
MCHC RBC-ENTMCNC: 27.1 PG — SIGNIFICANT CHANGE UP (ref 27–34)
MCHC RBC-ENTMCNC: 32.5 G/DL — SIGNIFICANT CHANGE UP (ref 32–36)
MCHC RBC-ENTMCNC: 32.7 G/DL — SIGNIFICANT CHANGE UP (ref 32–36)
MCV RBC AUTO: 82.7 FL — SIGNIFICANT CHANGE UP (ref 80–100)
MCV RBC AUTO: 82.9 FL — SIGNIFICANT CHANGE UP (ref 80–100)
METHOD TYPE: SIGNIFICANT CHANGE UP
MONOCYTES # BLD AUTO: 1.64 K/UL — HIGH (ref 0–0.9)
MONOCYTES NFR BLD AUTO: 7.2 % — SIGNIFICANT CHANGE UP (ref 2–14)
NEUTROPHILS # BLD AUTO: 18.94 K/UL — HIGH (ref 1.8–7.4)
NEUTROPHILS NFR BLD AUTO: 83.5 % — HIGH (ref 43–77)
NITRITE UR-MCNC: POSITIVE
NON HDL CHOLESTEROL: 76 MG/DL — SIGNIFICANT CHANGE UP
NRBC # BLD: 0 /100 WBCS — SIGNIFICANT CHANGE UP (ref 0–0)
NRBC # BLD: 0 /100 WBCS — SIGNIFICANT CHANGE UP (ref 0–0)
ORGANISM # SPEC MICROSCOPIC CNT: SIGNIFICANT CHANGE UP
ORGANISM # SPEC MICROSCOPIC CNT: SIGNIFICANT CHANGE UP
PH UR: 8 — SIGNIFICANT CHANGE UP (ref 5–8)
PHOSPHATE SERPL-MCNC: 3.9 MG/DL — SIGNIFICANT CHANGE UP (ref 2.5–4.5)
PLATELET # BLD AUTO: 504 K/UL — HIGH (ref 150–400)
PLATELET # BLD AUTO: 572 K/UL — HIGH (ref 150–400)
POTASSIUM SERPL-MCNC: 4.8 MMOL/L — SIGNIFICANT CHANGE UP (ref 3.5–5.3)
POTASSIUM SERPL-SCNC: 4.8 MMOL/L — SIGNIFICANT CHANGE UP (ref 3.5–5.3)
PROT SERPL-MCNC: 6.4 GM/DL — SIGNIFICANT CHANGE UP (ref 6–8.3)
PROT UR-MCNC: 500 MG/DL
RBC # BLD: 2.49 M/UL — LOW (ref 3.8–5.2)
RBC # BLD: 2.69 M/UL — LOW (ref 3.8–5.2)
RBC # FLD: 13.2 % — SIGNIFICANT CHANGE UP (ref 10.3–14.5)
RBC # FLD: 13.2 % — SIGNIFICANT CHANGE UP (ref 10.3–14.5)
RBC CASTS # UR COMP ASSIST: >50 /HPF (ref 0–4)
SODIUM SERPL-SCNC: 136 MMOL/L — SIGNIFICANT CHANGE UP (ref 135–145)
SP GR SPEC: 1.01 — SIGNIFICANT CHANGE UP (ref 1.01–1.02)
SPECIMEN SOURCE: SIGNIFICANT CHANGE UP
SPECIMEN SOURCE: SIGNIFICANT CHANGE UP
TRIGL SERPL-MCNC: 117 MG/DL — SIGNIFICANT CHANGE UP
UROBILINOGEN FLD QL: NEGATIVE MG/DL — SIGNIFICANT CHANGE UP
VANCOMYCIN FLD-MCNC: 9.5 UG/ML — SIGNIFICANT CHANGE UP
WBC # BLD: 22.68 K/UL — HIGH (ref 3.8–10.5)
WBC # BLD: 24.4 K/UL — HIGH (ref 3.8–10.5)
WBC # FLD AUTO: 22.68 K/UL — HIGH (ref 3.8–10.5)
WBC # FLD AUTO: 24.4 K/UL — HIGH (ref 3.8–10.5)
WBC UR QL: >50

## 2023-07-30 PROCEDURE — 99233 SBSQ HOSP IP/OBS HIGH 50: CPT

## 2023-07-30 PROCEDURE — 72100 X-RAY EXAM L-S SPINE 2/3 VWS: CPT | Mod: 26

## 2023-07-30 PROCEDURE — 93925 LOWER EXTREMITY STUDY: CPT | Mod: 26

## 2023-07-30 RX ORDER — OXYCODONE HYDROCHLORIDE 5 MG/1
5 TABLET ORAL ONCE
Refills: 0 | Status: DISCONTINUED | OUTPATIENT
Start: 2023-07-30 | End: 2023-07-30

## 2023-07-30 RX ORDER — PHENYLEPHRINE-SHARK LIVER OIL-MINERAL OIL-PETROLATUM RECTAL OINTMENT
1 OINTMENT (GRAM) RECTAL EVERY 6 HOURS
Refills: 0 | Status: DISCONTINUED | OUTPATIENT
Start: 2023-07-30 | End: 2023-08-11

## 2023-07-30 RX ORDER — IRON SUCROSE 20 MG/ML
200 INJECTION, SOLUTION INTRAVENOUS ONCE
Refills: 0 | Status: COMPLETED | OUTPATIENT
Start: 2023-07-30 | End: 2023-07-30

## 2023-07-30 RX ORDER — SUCRALFATE 1 G
1 TABLET ORAL EVERY 6 HOURS
Refills: 0 | Status: DISCONTINUED | OUTPATIENT
Start: 2023-07-30 | End: 2023-08-11

## 2023-07-30 RX ORDER — PANTOPRAZOLE SODIUM 20 MG/1
40 TABLET, DELAYED RELEASE ORAL
Refills: 0 | Status: DISCONTINUED | OUTPATIENT
Start: 2023-07-30 | End: 2023-08-11

## 2023-07-30 RX ORDER — HYDRALAZINE HCL 50 MG
75 TABLET ORAL EVERY 8 HOURS
Refills: 0 | Status: DISCONTINUED | OUTPATIENT
Start: 2023-07-30 | End: 2023-08-03

## 2023-07-30 RX ORDER — FOLIC ACID 0.8 MG
1 TABLET ORAL DAILY
Refills: 0 | Status: DISCONTINUED | OUTPATIENT
Start: 2023-07-30 | End: 2023-08-11

## 2023-07-30 RX ORDER — PREGABALIN 225 MG/1
1000 CAPSULE ORAL DAILY
Refills: 0 | Status: DISCONTINUED | OUTPATIENT
Start: 2023-07-30 | End: 2023-08-11

## 2023-07-30 RX ORDER — SODIUM CHLORIDE 9 MG/ML
1000 INJECTION, SOLUTION INTRAVENOUS
Refills: 0 | Status: DISCONTINUED | OUTPATIENT
Start: 2023-07-30 | End: 2023-07-31

## 2023-07-30 RX ORDER — INSULIN LISPRO 100/ML
VIAL (ML) SUBCUTANEOUS
Refills: 0 | Status: DISCONTINUED | OUTPATIENT
Start: 2023-07-30 | End: 2023-07-31

## 2023-07-30 RX ORDER — PHENYLEPHRINE-SHARK LIVER OIL-MINERAL OIL-PETROLATUM RECTAL OINTMENT
1 OINTMENT (GRAM) RECTAL DAILY
Refills: 0 | Status: DISCONTINUED | OUTPATIENT
Start: 2023-07-30 | End: 2023-07-30

## 2023-07-30 RX ORDER — GABAPENTIN 400 MG/1
100 CAPSULE ORAL
Refills: 0 | Status: DISCONTINUED | OUTPATIENT
Start: 2023-07-30 | End: 2023-08-11

## 2023-07-30 RX ADMIN — Medication 650 MILLIGRAM(S): at 06:10

## 2023-07-30 RX ADMIN — Medication 60 MILLIGRAM(S): at 05:10

## 2023-07-30 RX ADMIN — OXYCODONE HYDROCHLORIDE 5 MILLIGRAM(S): 5 TABLET ORAL at 09:18

## 2023-07-30 RX ADMIN — PANTOPRAZOLE SODIUM 40 MILLIGRAM(S): 20 TABLET, DELAYED RELEASE ORAL at 10:00

## 2023-07-30 RX ADMIN — PREGABALIN 1000 MICROGRAM(S): 225 CAPSULE ORAL at 18:35

## 2023-07-30 RX ADMIN — Medication 1 MILLIGRAM(S): at 18:53

## 2023-07-30 RX ADMIN — SODIUM CHLORIDE 75 MILLILITER(S): 9 INJECTION, SOLUTION INTRAVENOUS at 21:38

## 2023-07-30 RX ADMIN — Medication 1 GRAM(S): at 23:15

## 2023-07-30 RX ADMIN — Medication 75 MILLIGRAM(S): at 21:30

## 2023-07-30 RX ADMIN — PANTOPRAZOLE SODIUM 40 MILLIGRAM(S): 20 TABLET, DELAYED RELEASE ORAL at 18:47

## 2023-07-30 RX ADMIN — PIPERACILLIN AND TAZOBACTAM 25 GRAM(S): 4; .5 INJECTION, POWDER, LYOPHILIZED, FOR SOLUTION INTRAVENOUS at 18:33

## 2023-07-30 RX ADMIN — Medication 50 MILLIGRAM(S): at 05:10

## 2023-07-30 RX ADMIN — PHENYLEPHRINE-SHARK LIVER OIL-MINERAL OIL-PETROLATUM RECTAL OINTMENT 1 APPLICATION(S): at 13:04

## 2023-07-30 RX ADMIN — Medication 50 MILLIGRAM(S): at 18:51

## 2023-07-30 RX ADMIN — PHENYLEPHRINE-SHARK LIVER OIL-MINERAL OIL-PETROLATUM RECTAL OINTMENT 1 APPLICATION(S): at 18:08

## 2023-07-30 RX ADMIN — SODIUM CHLORIDE 100 MILLILITER(S): 9 INJECTION, SOLUTION INTRAVENOUS at 05:10

## 2023-07-30 RX ADMIN — IRON SUCROSE 200 MILLIGRAM(S): 20 INJECTION, SOLUTION INTRAVENOUS at 19:08

## 2023-07-30 RX ADMIN — Medication 5: at 21:30

## 2023-07-30 RX ADMIN — Medication 650 MILLIGRAM(S): at 05:10

## 2023-07-30 RX ADMIN — GABAPENTIN 100 MILLIGRAM(S): 400 CAPSULE ORAL at 18:50

## 2023-07-30 RX ADMIN — Medication 10 MILLIGRAM(S): at 10:22

## 2023-07-30 RX ADMIN — SENNA PLUS 2 TABLET(S): 8.6 TABLET ORAL at 21:31

## 2023-07-30 RX ADMIN — PHENYLEPHRINE-SHARK LIVER OIL-MINERAL OIL-PETROLATUM RECTAL OINTMENT 1 APPLICATION(S): at 23:15

## 2023-07-30 RX ADMIN — Medication 1 GRAM(S): at 18:51

## 2023-07-30 RX ADMIN — PIPERACILLIN AND TAZOBACTAM 25 GRAM(S): 4; .5 INJECTION, POWDER, LYOPHILIZED, FOR SOLUTION INTRAVENOUS at 05:10

## 2023-07-30 RX ADMIN — Medication 60 MILLIGRAM(S): at 18:51

## 2023-07-30 NOTE — PROGRESS NOTE ADULT - ASSESSMENT
78 year old female with PMH of HTN, DM present to the ED for hypoglycemic episode. Patient is from [facility], she was found unresponsive with a glucose level of 30, she was given 1 amp of glucogan, upon ED arrival FS was 41 with AMS, received Dextrose 50ml.. On 7/14 patient had Laminectomy with fusion L2-4, endorses she has been having dysuria since she arrived at [facility] associated with suprapubic pain.  Labs  remarkable for leukocytosis (WBC 28.80) worsen kidney function with Cr 4.90 compared to 7/18 Cr 3.10. Afebrile. Upon evaluation patient was AAOx4, endorses dysuria with lower abd pain, denies fever, headache, dizziness, SOB, chest pain, palpitation or any other acute symptoms.    Patient is being admitted for UTI to be treated with antibiotics.     Hypoglycemia likely due to Sulfourea.   Patient is currently on Glipizide 5mg   Patient  was found unresponsive with FS 30 at [facility]  In the ED FS 41 received Dextrose & returned to baseline   s/p dextrose iV  hypogylcemia workup sent  sugars better  plan:  stop dextroseinfusion  monitor    UTI (urinary tract infection) with evidence of urinary retention.   one  Week+ history of dysuria associated with suprapubic pain   Leukocytosis (WBC 28.80),  s/p Vancomycin  urine culture pending : bloodculture negative  plan;  Need bladder scan to determine if negrete needed  c/w IVF  c/w zosyn  Bowel regime ;    Acute blood loss anemia ddx include lower GI bleed ?red stool in [facility] vs Fe defiency   hb today 6.7  plan;  transfuse one unit now    Lower extremity radiculopathy with hx of L4 lami  patient reports leg pain and numbness of both legs up tothe knee  no MRI   plan:  ortho / neurotonin and lidocaine patch  PT consult to return back to [facility]  Acute kidney injury superimposed on CKD due to UTI and urinary retention.   other risk factors include worsening anemia  Worsen ANASTACIO most likely 2/2 prolonged untreated UTI   Current Cr: 4.90, 8/18 Cr 3.10 --> now 4.7  plan;  IVF   Monitor kidney function   Avoid Nephrotoxic drugs.    Hypertension.   ·  Plan: Continue Home meds   - Nifedipine  - Hydralazine   - Monitor.    Diabetes mellitus.   hold sliding scale for now

## 2023-07-30 NOTE — PROGRESS NOTE ADULT - SUBJECTIVE AND OBJECTIVE BOX
[facility] NEPHROLOGY SERVICES, [facility]  NEPHROLOGY AND HYPERTENSION  [address]  [address]  [place] [address]  [phone]    MD MAT TUBBS MD YELENA ROSENBERG, MD BINNY KOSHY, MD CHRISTOPHER CAPUTO, MD EDWARD BOVER, MD          Patient events noted    Bilateral LE pains     MEDICATIONS  (STANDING):  cyanocobalamin 1000 MICROGram(s) Oral daily  dextrose 5%. 1000 milliLiter(s) (50 mL/Hr) IV Continuous <Continuous>  dextrose 50% Injectable 25 Gram(s) IV Push once  folic acid 1 milliGRAM(s) Oral daily  gabapentin 100 milliGRAM(s) Oral two times a day  glucagon  Injectable 1 milliGRAM(s) IntraMuscular once  hemorrhoidal Ointment 1 Application(s) Rectal every 6 hours  hydrALAZINE 50 milliGRAM(s) Oral two times a day  insulin lispro (ADMELOG) corrective regimen sliding scale   SubCutaneous three times a day before meals  iron sucrose Injectable 200 milliGRAM(s) IV Push once  lactated ringers. 1000 milliLiter(s) (75 mL/Hr) IV Continuous <Continuous>  NIFEdipine XL 60 milliGRAM(s) Oral two times a day  pantoprazole  Injectable 40 milliGRAM(s) IV Push two times a day  piperacillin/tazobactam IVPB.. 3.375 Gram(s) IV Intermittent every 12 hours  senna 2 Tablet(s) Oral at bedtime  sucralfate 1 Gram(s) Oral every 6 hours    MEDICATIONS  (PRN):  acetaminophen     Tablet .. 650 milliGRAM(s) Oral every 6 hours PRN Temp greater or equal to 38C (100.4F), Mild Pain (1 - 3)  dextrose Oral Gel 15 Gram(s) Oral once PRN Blood Glucose LESS THAN 70 milliGRAM(s)/deciliter  melatonin 3 milliGRAM(s) Oral at bedtime PRN Insomnia      07-29-23 @ 07:01  -  07-30-23 @ 07:00  --------------------------------------------------------  IN: 0 mL / OUT: 1100 mL / NET: -1100 mL    07-30-23 @ 07:01  -  07-30-23 @ 19:53  --------------------------------------------------------  IN: 240 mL / OUT: 0 mL / NET: 240 mL      PHYSICAL EXAM:      T(C): 37.2 (07-30-23 @ 17:27), Max: 37.9 (07-30-23 @ 05:34)  HR: 77 (07-30-23 @ 17:27) (69 - 77)  BP: 163/66 (07-30-23 @ 17:27) (147/70 - 168/67)  RR: 18 (07-30-23 @ 17:27) (17 - 18)  SpO2: 95% (07-30-23 @ 17:27) (95% - 97%)  Wt(kg): --  Lungs clear  Heart S1S2  Abd soft NT ND  Extremities:   tr edema; warm tender to touch                                    7.3    24.40 )-----------( 572      ( 30 Jul 2023 10:45 )             22.3     07-30    136  |  108  |  66<H>  ----------------------------<  212<H>  4.8   |  19<L>  |  4.73<H>    Ca    8.2<L>      30 Jul 2023 07:20  Phos  3.9     07-30  Mg     2.4     07-30    TPro  6.4  /  Alb  1.7<L>  /  TBili  0.4  /  DBili  x   /  AST  40<H>  /  ALT  27  /  AlkPhos  102  07-30      LIVER FUNCTIONS - ( 30 Jul 2023 07:20 )  Alb: 1.7 g/dL / Pro: 6.4 gm/dL / ALK PHOS: 102 U/L / ALT: 27 U/L / AST: 40 U/L / GGT: x           Creatinine Trend: 4.73<--, 4.90<--, 4.90<--, 3.10<--, 3.35<--, 3.42<--            Assessment   ANASTACIO CKD 4; pre / post renal factors  UTI risk for infectious AIN     Plan  Serial bladder scan assess PVR  Empiric IVF and abx   Hold ACE; HCTZ   Will follow   Adin Jack MD

## 2023-07-30 NOTE — PROGRESS NOTE ADULT - SUBJECTIVE AND OBJECTIVE BOX
Patient seen and examined      Updates 7/30  1. patient noted red stool at [facility] . no BM thus far in house. reports abdominal discomfort and bilateral leg pain when examined  2.   3. HB 6.7  4. Cr somewhat better although plateued  5. patient given miralax and dulcolax yesterday.   6. ordered for one unit of prbc  7. on IVF  8. ortho and renal consulted  9. lower extremity dopplers negative for dvt  10. on empiric abx .cultures are in process. blood is + contaminent      Review of Systems:  General:denies fever chills, headache, weakness  HEENT: denies blurry vision,diffculty swallowing, difficulty hearing, tinnitus  Cardiovascular: denies chest pain  ,palpitations  Pulmonary:denies shortness of breath, cough, wheezing, hemoptysis  Gastrointestinal: denies abdominal pain, constipation, diarrhea,nausea , vomiting, hematochezia  : denies hematuria, dysuria, or incontinence  Neurological: denies weakness, numbness , tingling, dizziness, tremors  MSK: denies muscle pain, difficulty ambulating, swelling, back pain  skin: denies skin rash, itching, burning, or  skin lesions  Psychiatrical: denies mood disturbances, anxierty, feeling depressed, depression , or difficulty sleeping    Objective:  Vitals  T(C): 37.9 (07-30-23 @ 05:34), Max: 37.9 (07-30-23 @ 05:34)  HR: 76 (07-30-23 @ 05:34) (70 - 76)  BP: 154/65 (07-30-23 @ 05:34) (127/65 - 154/65)  RR: 18 (07-30-23 @ 05:34) (18 - 18)  SpO2: 95% (07-30-23 @ 05:34) (95% - 97%)    Physical Exam:  General: comfortable, no acute distress  HEENT: Atraumatic, no LAD, trachea midline, PERRLA  Cardiovascular: normal s1s2, no murmurs, gallops or fricition rubs  Pulmonary: clear to ausculation Bilaterally, no wheezing , rhonchi  Gastrointestinal: soft non tender non distended, no masses felt, no organomegally  Muscloskeletal: no lower extremity edema, intact bilateral lower extremity pulses  Neurological: CN II-12 intact. No focal weakness  Psychiatrical: normal mood, cooperative  SKIN: no rash, lesions or ulcers    Labs:                          6.7    22.68 )-----------( 504      ( 30 Jul 2023 07:20 )             20.6     07-30    136  |  108  |  66<H>  ----------------------------<  212<H>  4.8   |  19<L>  |  4.73<H>    Ca    8.2<L>      30 Jul 2023 07:20  Phos  3.9     07-30  Mg     2.4     07-30    TPro  6.4  /  Alb  1.7<L>  /  TBili  0.4  /  DBili  x   /  AST  40<H>  /  ALT  27  /  AlkPhos  102  07-30    LIVER FUNCTIONS - ( 30 Jul 2023 07:20 )  Alb: 1.7 g/dL / Pro: 6.4 gm/dL / ALK PHOS: 102 U/L / ALT: 27 U/L / AST: 40 U/L / GGT: x           PT/INR - ( 29 Jul 2023 00:15 )   PT: 13.1 sec;   INR: 1.10 ratio         PTT - ( 29 Jul 2023 00:15 )  PTT:31.1 sec      Active Medications  MEDICATIONS  (STANDING):  bisacodyl 10 milliGRAM(s) Oral once  dextrose 5%. 1000 milliLiter(s) (50 mL/Hr) IV Continuous <Continuous>  dextrose 50% Injectable 25 Gram(s) IV Push once  gabapentin 100 milliGRAM(s) Oral two times a day  glucagon  Injectable 1 milliGRAM(s) IntraMuscular once  hemorrhoidal Ointment 1 Application(s) Rectal every 6 hours  hydrALAZINE 50 milliGRAM(s) Oral two times a day  NIFEdipine XL 60 milliGRAM(s) Oral two times a day  pantoprazole  Injectable 40 milliGRAM(s) IV Push two times a day  piperacillin/tazobactam IVPB.. 3.375 Gram(s) IV Intermittent every 12 hours  senna 2 Tablet(s) Oral at bedtime  sucralfate 1 Gram(s) Oral every 6 hours    MEDICATIONS  (PRN):  acetaminophen     Tablet .. 650 milliGRAM(s) Oral every 6 hours PRN Temp greater or equal to 38C (100.4F), Mild Pain (1 - 3)  dextrose Oral Gel 15 Gram(s) Oral once PRN Blood Glucose LESS THAN 70 milliGRAM(s)/deciliter  melatonin 3 milliGRAM(s) Oral at bedtime PRN Insomnia

## 2023-07-30 NOTE — CONSULT NOTE ADULT - SUBJECTIVE AND OBJECTIVE BOX
Patient is a 78y Female who is complaining of worsening bilateral LE radicular pain s/p L2-5 laminectomy and fusion on 7/14/23 with Dr. Richards. Patient states that she initially felt better after surgery but her radicular pain returned when she was at rehab. She states prior to surgery she had several years of similar pain. She has been able to ambulate with a walker. Pt denies any trauma or falls. Denies bowel/bladder incontinence. Denies fevers/chills. No other complaints at this time. Pt was readmitted for ANASTACIO on CKD and hypoglycemia.     HEALTH ISSUES - PROBLEM Dx:  Hypoglycemia    UTI (urinary tract infection)    Hypertension    Diabetes mellitus    Acute kidney injury superimposed on CKD            MEDICATIONS  (STANDING):  dextrose 5% + lactated ringers. 1000 milliLiter(s) IV Continuous <Continuous>  dextrose 5%. 1000 milliLiter(s) IV Continuous <Continuous>  dextrose 50% Injectable 25 Gram(s) IV Push once  glucagon  Injectable 1 milliGRAM(s) IntraMuscular once  hydrALAZINE 50 milliGRAM(s) Oral two times a day  NIFEdipine XL 60 milliGRAM(s) Oral two times a day  piperacillin/tazobactam IVPB.. 3.375 Gram(s) IV Intermittent every 12 hours  senna 2 Tablet(s) Oral at bedtime      Allergies    No Known Allergies    Intolerances        PAST MEDICAL & SURGICAL HISTORY:  HTN (hypertension)    DM (diabetes mellitus)    T2DM (type 2 diabetes mellitus)    Diabetes mellitus    Status post D&C                              7.5    29.20 )-----------( 572      ( 29 Jul 2023 13:07 )             23.3       29 Jul 2023 13:07    139    |  109    |  68     ----------------------------<  95     4.6     |  18     |  4.90     Ca    8.6        29 Jul 2023 13:07    TPro  7.4    /  Alb  2.0    /  TBili  0.3    /  DBili  x      /  AST  50     /  ALT  25     /  AlkPhos  105    29 Jul 2023 00:15      PT/INR - ( 29 Jul 2023 00:15 )   PT: 13.1 sec;   INR: 1.10 ratio         PTT - ( 29 Jul 2023 00:15 )  PTT:31.1 sec    Urinalysis Basic - ( 29 Jul 2023 13:07 )    Color: x / Appearance: x / SG: x / pH: x  Gluc: 95 mg/dL / Ketone: x  / Bili: x / Urobili: x   Blood: x / Protein: x / Nitrite: x   Leuk Esterase: x / RBC: x / WBC x   Sq Epi: x / Non Sq Epi: x / Bacteria: x        Vital Signs Last 24 Hrs  T(C): 37.8 (07-29-23 @ 23:36), Max: 37.8 (07-29-23 @ 23:36)  T(F): 100.1 (07-29-23 @ 23:36), Max: 100.1 (07-29-23 @ 23:36)  HR: 70 (07-29-23 @ 23:36) (59 - 73)  BP: 147/70 (07-29-23 @ 23:36) (127/65 - 156/68)  BP(mean): --  RR: 18 (07-29-23 @ 23:36) (12 - 23)  SpO2: 96% (07-29-23 @ 23:36) (96% - 99%)    Physical Exam:  Gen: NAD  Spine:  incision intact, no surrounding erythema or expressible drainage  No gross deformity  No midline TTP C/T/L/S spine  No bony step offs  No paraspinal muscle ttp/hypertonicity   Negative Straight leg raise  Negative clonus  Negative babinski  Negative barlow      Motor:                   C5                C6              C7               C8           T1   R            5/5                5/5            5/5             5/5          5/5  L             5/5               5/5             5/5             5/5          5/5                L2             L3             L4               L5            S1  R         5/5           5/5          5/5             4/5           4/5  L          4/5          4/5           4/5             4/5           4/5    *weakness likely 2/2 pain    Sensory:            C5         C6         C7      C8       T1        (0=absent, 1=impaired, 2=normal, NT=not testable)  R         2            2           2        2         2  L          2            2           2        2         2               L2          L3         L4      L5       S1         (0=absent, 1=impaired, 2=normal, NT=not testable)  R         2            1            1        1        1  L          2            1           1        1         1    *sensory exam similar to postop exam    Imaging:   CT Lsp: normal postop changes, hardware intact    A/P: 78y Female with BLLE radicular pain s/p L2-5 lami/fus on 7/14/23    pts exam is mostly consistent with postop exam except with new bilateral foot weakness  Pain control prn  PT/OT  WBAT with assistive devices as needed  FU XR Lsp  DVT ppx per primary team  will discuss with Dr. Richards and advise with changes to plan   Patient is a 78y Female who is complaining of worsening bilateral LE radicular pain s/p L2-5 laminectomy and fusion on 7/14/23 with Dr. Richards. Patient states that she initially felt better after surgery but her radicular pain returned when she was at rehab. She states prior to surgery she had several years of similar pain. She has been able to ambulate with a walker. Pt denies any trauma or falls. Denies bowel/bladder incontinence. Denies fevers/chills. No other complaints at this time. Pt was readmitted for ANASTACIO on CKD and hypoglycemia.     HEALTH ISSUES - PROBLEM Dx:  Hypoglycemia    UTI (urinary tract infection)    Hypertension    Diabetes mellitus    Acute kidney injury superimposed on CKD            MEDICATIONS  (STANDING):  dextrose 5% + lactated ringers. 1000 milliLiter(s) IV Continuous <Continuous>  dextrose 5%. 1000 milliLiter(s) IV Continuous <Continuous>  dextrose 50% Injectable 25 Gram(s) IV Push once  glucagon  Injectable 1 milliGRAM(s) IntraMuscular once  hydrALAZINE 50 milliGRAM(s) Oral two times a day  NIFEdipine XL 60 milliGRAM(s) Oral two times a day  piperacillin/tazobactam IVPB.. 3.375 Gram(s) IV Intermittent every 12 hours  senna 2 Tablet(s) Oral at bedtime      Allergies    No Known Allergies    Intolerances        PAST MEDICAL & SURGICAL HISTORY:  HTN (hypertension)    DM (diabetes mellitus)    T2DM (type 2 diabetes mellitus)    Diabetes mellitus    Status post D&C                              7.5    29.20 )-----------( 572      ( 29 Jul 2023 13:07 )             23.3       29 Jul 2023 13:07    139    |  109    |  68     ----------------------------<  95     4.6     |  18     |  4.90     Ca    8.6        29 Jul 2023 13:07    TPro  7.4    /  Alb  2.0    /  TBili  0.3    /  DBili  x      /  AST  50     /  ALT  25     /  AlkPhos  105    29 Jul 2023 00:15      PT/INR - ( 29 Jul 2023 00:15 )   PT: 13.1 sec;   INR: 1.10 ratio         PTT - ( 29 Jul 2023 00:15 )  PTT:31.1 sec    Urinalysis Basic - ( 29 Jul 2023 13:07 )    Color: x / Appearance: x / SG: x / pH: x  Gluc: 95 mg/dL / Ketone: x  / Bili: x / Urobili: x   Blood: x / Protein: x / Nitrite: x   Leuk Esterase: x / RBC: x / WBC x   Sq Epi: x / Non Sq Epi: x / Bacteria: x        Vital Signs Last 24 Hrs  T(C): 37.8 (07-29-23 @ 23:36), Max: 37.8 (07-29-23 @ 23:36)  T(F): 100.1 (07-29-23 @ 23:36), Max: 100.1 (07-29-23 @ 23:36)  HR: 70 (07-29-23 @ 23:36) (59 - 73)  BP: 147/70 (07-29-23 @ 23:36) (127/65 - 156/68)  BP(mean): --  RR: 18 (07-29-23 @ 23:36) (12 - 23)  SpO2: 96% (07-29-23 @ 23:36) (96% - 99%)    Physical Exam:  Gen: NAD  Spine:  incision intact, no surrounding erythema or expressible drainage  No gross deformity  No midline TTP C/T/L/S spine  No bony step offs  No paraspinal muscle ttp/hypertonicity   Negative Straight leg raise  Negative clonus  Negative babinski  Negative barlow      Motor:                   C5                C6              C7               C8           T1   R            5/5                5/5            5/5             5/5          5/5  L             5/5               5/5             5/5             5/5          5/5                L2             L3             L4               L5            S1  R         5/5           5/5          5/5             4/5           4/5  L          4/5          4/5           4/5             4/5           4/5    *weakness likely 2/2 pain    Sensory:            C5         C6         C7      C8       T1        (0=absent, 1=impaired, 2=normal, NT=not testable)  R         2            2           2        2         2  L          2            2           2        2         2               L2          L3         L4      L5       S1         (0=absent, 1=impaired, 2=normal, NT=not testable)  R         2            1            1        1        1  L          2            1           1        1         1    *sensory exam similar to postop exam    Imaging:   CT Lsp: normal postop changes, hardware intact    A/P: 78y Female with BLLE radicular pain s/p L2-5 lami/fus on 7/14/23    pts exam is mostly consistent with postop exam except with new bilateral foot weakness  Pain control prn  gabapentin, flexeril  PT/OT  WBAT with assistive devices as needed  FU XR Lsp  DVT ppx per primary team  will discuss with Dr. Richards and advise with changes to plan

## 2023-07-31 ENCOUNTER — APPOINTMENT (OUTPATIENT)
Dept: ORTHOPEDIC SURGERY | Facility: CLINIC | Age: 78
End: 2023-07-31

## 2023-07-31 DIAGNOSIS — N39.0 URINARY TRACT INFECTION, SITE NOT SPECIFIED: ICD-10-CM

## 2023-07-31 DIAGNOSIS — R33.9 RETENTION OF URINE, UNSPECIFIED: ICD-10-CM

## 2023-07-31 LAB
ANION GAP SERPL CALC-SCNC: 10 MMOL/L — SIGNIFICANT CHANGE UP (ref 5–17)
BUN SERPL-MCNC: 72 MG/DL — HIGH (ref 7–23)
CALCIUM SERPL-MCNC: 8.3 MG/DL — LOW (ref 8.5–10.1)
CHLORIDE SERPL-SCNC: 107 MMOL/L — SIGNIFICANT CHANGE UP (ref 96–108)
CO2 SERPL-SCNC: 16 MMOL/L — LOW (ref 22–31)
CREAT SERPL-MCNC: 5.09 MG/DL — HIGH (ref 0.5–1.3)
CULTURE RESULTS: SIGNIFICANT CHANGE UP
EGFR: 8 ML/MIN/1.73M2 — LOW
GLUCOSE BLDC GLUCOMTR-MCNC: 275 MG/DL — HIGH (ref 70–99)
GLUCOSE BLDC GLUCOMTR-MCNC: 285 MG/DL — HIGH (ref 70–99)
GLUCOSE BLDC GLUCOMTR-MCNC: 302 MG/DL — HIGH (ref 70–99)
GLUCOSE BLDC GLUCOMTR-MCNC: 306 MG/DL — HIGH (ref 70–99)
GLUCOSE SERPL-MCNC: 235 MG/DL — HIGH (ref 70–99)
HCT VFR BLD CALC: 21 % — CRITICAL LOW (ref 34.5–45)
HCT VFR BLD CALC: 24.1 % — LOW (ref 34.5–45)
HGB BLD-MCNC: 6.8 G/DL — CRITICAL LOW (ref 11.5–15.5)
HGB BLD-MCNC: 7.9 G/DL — LOW (ref 11.5–15.5)
MCHC RBC-ENTMCNC: 27.3 PG — SIGNIFICANT CHANGE UP (ref 27–34)
MCHC RBC-ENTMCNC: 27.8 PG — SIGNIFICANT CHANGE UP (ref 27–34)
MCHC RBC-ENTMCNC: 32.4 G/DL — SIGNIFICANT CHANGE UP (ref 32–36)
MCHC RBC-ENTMCNC: 32.8 G/DL — SIGNIFICANT CHANGE UP (ref 32–36)
MCV RBC AUTO: 84.3 FL — SIGNIFICANT CHANGE UP (ref 80–100)
MCV RBC AUTO: 84.9 FL — SIGNIFICANT CHANGE UP (ref 80–100)
NRBC # BLD: 0 /100 WBCS — SIGNIFICANT CHANGE UP (ref 0–0)
NRBC # BLD: 0 /100 WBCS — SIGNIFICANT CHANGE UP (ref 0–0)
PLATELET # BLD AUTO: 481 K/UL — HIGH (ref 150–400)
PLATELET # BLD AUTO: 482 K/UL — HIGH (ref 150–400)
POTASSIUM SERPL-MCNC: 5.2 MMOL/L — SIGNIFICANT CHANGE UP (ref 3.5–5.3)
POTASSIUM SERPL-SCNC: 5.2 MMOL/L — SIGNIFICANT CHANGE UP (ref 3.5–5.3)
RBC # BLD: 2.49 M/UL — LOW (ref 3.8–5.2)
RBC # BLD: 2.84 M/UL — LOW (ref 3.8–5.2)
RBC # FLD: 13.3 % — SIGNIFICANT CHANGE UP (ref 10.3–14.5)
RBC # FLD: 13.5 % — SIGNIFICANT CHANGE UP (ref 10.3–14.5)
SODIUM SERPL-SCNC: 133 MMOL/L — LOW (ref 135–145)
SPECIMEN SOURCE: SIGNIFICANT CHANGE UP
WBC # BLD: 23.01 K/UL — HIGH (ref 3.8–10.5)
WBC # BLD: 24.55 K/UL — HIGH (ref 3.8–10.5)
WBC # FLD AUTO: 23.01 K/UL — HIGH (ref 3.8–10.5)
WBC # FLD AUTO: 24.55 K/UL — HIGH (ref 3.8–10.5)

## 2023-07-31 PROCEDURE — 99223 1ST HOSP IP/OBS HIGH 75: CPT

## 2023-07-31 PROCEDURE — 99233 SBSQ HOSP IP/OBS HIGH 50: CPT

## 2023-07-31 RX ORDER — INSULIN LISPRO 100/ML
VIAL (ML) SUBCUTANEOUS
Refills: 0 | Status: DISCONTINUED | OUTPATIENT
Start: 2023-07-31 | End: 2023-08-11

## 2023-07-31 RX ORDER — VANCOMYCIN HCL 1 G
1250 VIAL (EA) INTRAVENOUS ONCE
Refills: 0 | Status: COMPLETED | OUTPATIENT
Start: 2023-07-31 | End: 2023-07-31

## 2023-07-31 RX ORDER — SODIUM CHLORIDE 9 MG/ML
1000 INJECTION, SOLUTION INTRAVENOUS
Refills: 0 | Status: COMPLETED | OUTPATIENT
Start: 2023-07-31 | End: 2023-07-31

## 2023-07-31 RX ADMIN — PHENYLEPHRINE-SHARK LIVER OIL-MINERAL OIL-PETROLATUM RECTAL OINTMENT 1 APPLICATION(S): at 18:20

## 2023-07-31 RX ADMIN — Medication 60 MILLIGRAM(S): at 18:20

## 2023-07-31 RX ADMIN — GABAPENTIN 100 MILLIGRAM(S): 400 CAPSULE ORAL at 18:20

## 2023-07-31 RX ADMIN — Medication 650 MILLIGRAM(S): at 00:18

## 2023-07-31 RX ADMIN — Medication 3: at 07:59

## 2023-07-31 RX ADMIN — Medication 650 MILLIGRAM(S): at 19:10

## 2023-07-31 RX ADMIN — PANTOPRAZOLE SODIUM 40 MILLIGRAM(S): 20 TABLET, DELAYED RELEASE ORAL at 05:13

## 2023-07-31 RX ADMIN — PIPERACILLIN AND TAZOBACTAM 25 GRAM(S): 4; .5 INJECTION, POWDER, LYOPHILIZED, FOR SOLUTION INTRAVENOUS at 18:19

## 2023-07-31 RX ADMIN — PANTOPRAZOLE SODIUM 40 MILLIGRAM(S): 20 TABLET, DELAYED RELEASE ORAL at 18:19

## 2023-07-31 RX ADMIN — Medication 166.67 MILLIGRAM(S): at 14:31

## 2023-07-31 RX ADMIN — Medication 1 GRAM(S): at 18:20

## 2023-07-31 RX ADMIN — PHENYLEPHRINE-SHARK LIVER OIL-MINERAL OIL-PETROLATUM RECTAL OINTMENT 1 APPLICATION(S): at 05:21

## 2023-07-31 RX ADMIN — SODIUM CHLORIDE 100 MILLILITER(S): 9 INJECTION, SOLUTION INTRAVENOUS at 21:41

## 2023-07-31 RX ADMIN — Medication 3: at 12:34

## 2023-07-31 RX ADMIN — Medication 60 MILLIGRAM(S): at 05:13

## 2023-07-31 RX ADMIN — Medication 1 MILLIGRAM(S): at 12:45

## 2023-07-31 RX ADMIN — Medication 1 GRAM(S): at 05:13

## 2023-07-31 RX ADMIN — SODIUM CHLORIDE 100 MILLILITER(S): 9 INJECTION, SOLUTION INTRAVENOUS at 14:30

## 2023-07-31 RX ADMIN — PREGABALIN 1000 MICROGRAM(S): 225 CAPSULE ORAL at 12:32

## 2023-07-31 RX ADMIN — Medication 1 GRAM(S): at 12:35

## 2023-07-31 RX ADMIN — Medication 650 MILLIGRAM(S): at 02:00

## 2023-07-31 RX ADMIN — PIPERACILLIN AND TAZOBACTAM 25 GRAM(S): 4; .5 INJECTION, POWDER, LYOPHILIZED, FOR SOLUTION INTRAVENOUS at 05:13

## 2023-07-31 RX ADMIN — Medication 4: at 16:58

## 2023-07-31 RX ADMIN — Medication 4: at 22:36

## 2023-07-31 RX ADMIN — Medication 75 MILLIGRAM(S): at 21:43

## 2023-07-31 RX ADMIN — SENNA PLUS 2 TABLET(S): 8.6 TABLET ORAL at 21:44

## 2023-07-31 RX ADMIN — Medication 75 MILLIGRAM(S): at 05:14

## 2023-07-31 RX ADMIN — Medication 75 MILLIGRAM(S): at 14:33

## 2023-07-31 RX ADMIN — SODIUM CHLORIDE 75 MILLILITER(S): 9 INJECTION, SOLUTION INTRAVENOUS at 10:52

## 2023-07-31 RX ADMIN — GABAPENTIN 100 MILLIGRAM(S): 400 CAPSULE ORAL at 05:21

## 2023-07-31 NOTE — PROGRESS NOTE ADULT - SUBJECTIVE AND OBJECTIVE BOX
Patient seen and examined      Updates 7/30  1. patient noted red stool at [facility] . no BM thus far in house. reports abdominal discomfort and bilateral leg pain when examined  2.   3. HB 6.7  4. Cr somewhat better although plateued  5. patient given miralax and dulcolax yesterday.   6. ordered for one unit of prbc  7. on IVF  8. ortho and renal consulted  9. lower extremity dopplers negative for dvt  10. on empiric abx .cultures are in process. blood is + contaminent    7/31:  patient reports numbness  FEVER  negrete inserted  urine culture NOT back  cr uptrending now 5  patient reports she HAD a BM  on iv zosyn, given one time dose of vanc  agreeing to blood      Review of Systems:  General:denies fever chills, headache, weakness  HEENT: denies blurry vision,diffculty swallowing, difficulty hearing, tinnitus  Cardiovascular: denies chest pain  ,palpitations  Pulmonary:denies shortness of breath, cough, wheezing, hemoptysis  Gastrointestinal: denies abdominal pain, constipation, diarrhea,nausea , vomiting, hematochezia  : denies hematuria, dysuria, or incontinence  Neurological: denies weakness, numbness , tingling, dizziness, tremors  MSK: denies muscle pain, difficulty ambulating, swelling, back pain  skin: denies skin rash, itching, burning, or  skin lesions  Psychiatrical: denies mood disturbances, anxierty, feeling depressed, depression , or difficulty sleeping    Objective:  Vitals  T(C): 37.9 (07-30-23 @ 05:34), Max: 37.9 (07-30-23 @ 05:34)  HR: 76 (07-30-23 @ 05:34) (70 - 76)  BP: 154/65 (07-30-23 @ 05:34) (127/65 - 154/65)  RR: 18 (07-30-23 @ 05:34) (18 - 18)  SpO2: 95% (07-30-23 @ 05:34) (95% - 97%)    Physical Exam:  General: comfortable, no acute distress  HEENT: Atraumatic, no LAD, trachea midline, PERRLA  Cardiovascular: normal s1s2, no murmurs, gallops or fricition rubs  Pulmonary: clear to ausculation Bilaterally, no wheezing , rhonchi  Gastrointestinal: soft non tender non distended, no masses felt, no organomegally  Muscloskeletal: no lower extremity edema, intact bilateral lower extremity pulses  Neurological: CN II-12 intact. No focal weakness  Psychiatrical: normal mood, cooperative  SKIN: no rash, lesions or ulcers    Labs:                          6.7    22.68 )-----------( 504      ( 30 Jul 2023 07:20 )             20.6     07-30    136  |  108  |  66<H>  ----------------------------<  212<H>  4.8   |  19<L>  |  4.73<H>    Ca    8.2<L>      30 Jul 2023 07:20  Phos  3.9     07-30  Mg     2.4     07-30    TPro  6.4  /  Alb  1.7<L>  /  TBili  0.4  /  DBili  x   /  AST  40<H>  /  ALT  27  /  AlkPhos  102  07-30    LIVER FUNCTIONS - ( 30 Jul 2023 07:20 )  Alb: 1.7 g/dL / Pro: 6.4 gm/dL / ALK PHOS: 102 U/L / ALT: 27 U/L / AST: 40 U/L / GGT: x           PT/INR - ( 29 Jul 2023 00:15 )   PT: 13.1 sec;   INR: 1.10 ratio         PTT - ( 29 Jul 2023 00:15 )  PTT:31.1 sec      Active Medications  MEDICATIONS  (STANDING):  bisacodyl 10 milliGRAM(s) Oral once  dextrose 5%. 1000 milliLiter(s) (50 mL/Hr) IV Continuous <Continuous>  dextrose 50% Injectable 25 Gram(s) IV Push once  gabapentin 100 milliGRAM(s) Oral two times a day  glucagon  Injectable 1 milliGRAM(s) IntraMuscular once  hemorrhoidal Ointment 1 Application(s) Rectal every 6 hours  hydrALAZINE 50 milliGRAM(s) Oral two times a day  NIFEdipine XL 60 milliGRAM(s) Oral two times a day  pantoprazole  Injectable 40 milliGRAM(s) IV Push two times a day  piperacillin/tazobactam IVPB.. 3.375 Gram(s) IV Intermittent every 12 hours  senna 2 Tablet(s) Oral at bedtime  sucralfate 1 Gram(s) Oral every 6 hours    MEDICATIONS  (PRN):  acetaminophen     Tablet .. 650 milliGRAM(s) Oral every 6 hours PRN Temp greater or equal to 38C (100.4F), Mild Pain (1 - 3)  dextrose Oral Gel 15 Gram(s) Oral once PRN Blood Glucose LESS THAN 70 milliGRAM(s)/deciliter  melatonin 3 milliGRAM(s) Oral at bedtime PRN Insomnia

## 2023-07-31 NOTE — CONSULT NOTE ADULT - PROBLEM SELECTOR RECOMMENDATION 9
- Upsize negrete to 20 fr to accommodate for hematura/clots- irrigate BID or more frequently as needed  - Monitor Cr

## 2023-07-31 NOTE — PROGRESS NOTE ADULT - ASSESSMENT
78 year old female with PMH of HTN, DM present to the ED for hypoglycemic episode. Patient is from [facility], she was found unresponsive with a glucose level of 30, she was given 1 amp of glucogan, upon ED arrival FS was 41 with AMS, received Dextrose 50ml.. On 7/14 patient had Laminectomy with fusion L2-4, endorses she has been having dysuria since she arrived at [facility] associated with suprapubic pain.  Labs  remarkable for leukocytosis (WBC 28.80) worsen kidney function with Cr 4.90 compared to 7/18 Cr 3.10. Afebrile. Upon evaluation patient was AAOx4, endorses dysuria with lower abd pain, denies fever, headache, dizziness, SOB, chest pain, palpitation or any other acute symptoms.    Patient is being admitted for UTI to be treated with antibiotics.     Hypoglycemia likely due to Sulfourea.   Patient is currently on Glipizide 5mg   Patient  was found unresponsive with FS 30 at [facility]  In the ED FS 41 received Dextrose & returned to baseline   s/p dextrose iV  hypogylcemia workup sent  sugars better  plan:  back on coverage    UTI (urinary tract infection) with evidence of urinary retention.   one  Week+ history of dysuria associated with suprapubic pain   Leukocytosis (WBC 28.80),  s/p Vancomycin  urine culture pending : blood culture negative  urine culture pending  US renal bladder scan with retention  negrete inserted  plan;  ID consulted  Urology consulted  c/w zosyn  Bowel regime ;    Acute blood loss anemia ddx include lower GI bleed ?red stool in [facility] vs [facility]   hb today 6.7  plan;  transfuse one unit now    Lower extremity radiculopathy with hx of L4 lami  patient reports leg pain and numbness of both legs up tothe knee  no MRI   plan:  ortho / neurotonin and lidocaine patch  PT consult to return back to [facility]    Acute kidney injury superimposed on CKD due to UTI and urinary retention.   other risk factors include worsening anemia  Worsen ANASTACIO most likely 2/2 prolonged untreated UTI   Current Cr: 4.90, 8/18 Cr 3.10 --> now > 5  plan;  IVF   Monitor kidney function   Avoid Nephrotoxic drugs.    Hypertension.   ·  Plan: Continue Home meds   - Nifedipine  - Hydralazine   - Monitor.    Diabetes mellitus.   sliding scale

## 2023-07-31 NOTE — CONSULT NOTE ADULT - ASSESSMENT
78 year old female with PMH of HTN, DM present to the ED for hypoglycemic episode. Patient is from [facility], she was found unresponsive with a glucose level of 30, she was given 1 amp of glucogan, upon ED arrival FS was 41 with AMS, received Dextrose 50ml.. On 7/14 patient had Laminectomy with fusion L2-4, endorses she has been having dysuria since she arrived at [facility] associated with suprapubic pain.  Labs  remarkable for leukocytosis (WBC 28.80) worsen kidney function with Cr 4.90 compared to 7/18 Cr 3.10. Upon evaluation patient was AAOx4, endorses dysuria with lower abd pain, denies fever, headache, dizziness, SOB, chest pain, palpitation or any other acute symptoms.      wbc remains high   creatinine is worsening and has significant hematuria with drop in H/H requiring blood transfusion   Blood cultures 1/4 bottles with MRSE  CT (I personally reviewed) with cystitis   surgical site does not appear to be culprit here but if blood cultures remains positiive will need to pursue   suspect this is a severe case of UTI with urinary retention     Plan:  continue renally dosed zosyn   follow all cultures  repeat blood cultures   monitor h/h and transfuse when less the 7  trend wbc toward normal   trend creatinine and IVF per renal  vancomycin given x1 dose and will check vancomycin level 23 hrs later     Discussed with Dr.Virmani Román Guan, DO  Infectious Disease Attending  Reachable via Microsoft Teams or ID office: [phone]  After 5pm/weekends please call [phone] for all inquiries and new consults     
78 year old female with PMH of HTN, DM, s/p L2-L5 laminectomy 7/14/23, presenting from [facility], admitted with hypoglycemia (resolved), urinary retention, hematuria, UTI.

## 2023-07-31 NOTE — PROGRESS NOTE ADULT - SUBJECTIVE AND OBJECTIVE BOX
[facility] NEPHROLOGY SERVICES, [facility]  NEPHROLOGY AND HYPERTENSION  [address]  [address]  [place] [address]  [phone]    MD MAT TUBBS MD YELENA ROSENBERG, MD BINNY KOSHY, MD CHRISTOPHER CAPUTO, MD EDWARD BOVER, MD          Patient events noted  Alert and awake;   Noted + BC    MEDICATIONS  (STANDING):  cyanocobalamin 1000 MICROGram(s) Oral daily  dextrose 5% 1000 milliLiter(s) (100 mL/Hr) IV Continuous <Continuous>  dextrose 5%. 1000 milliLiter(s) (50 mL/Hr) IV Continuous <Continuous>  dextrose 50% Injectable 25 Gram(s) IV Push once  folic acid 1 milliGRAM(s) Oral daily  gabapentin 100 milliGRAM(s) Oral two times a day  glucagon  Injectable 1 milliGRAM(s) IntraMuscular once  hemorrhoidal Ointment 1 Application(s) Rectal every 6 hours  hydrALAZINE 75 milliGRAM(s) Oral every 8 hours  insulin lispro (ADMELOG) corrective regimen sliding scale   SubCutaneous three times a day before meals  NIFEdipine XL 60 milliGRAM(s) Oral two times a day  pantoprazole  Injectable 40 milliGRAM(s) IV Push two times a day  piperacillin/tazobactam IVPB.. 3.375 Gram(s) IV Intermittent every 12 hours  senna 2 Tablet(s) Oral at bedtime  sucralfate 1 Gram(s) Oral every 6 hours  vancomycin  IVPB 1250 milliGRAM(s) IV Intermittent once    MEDICATIONS  (PRN):  acetaminophen     Tablet .. 650 milliGRAM(s) Oral every 6 hours PRN Temp greater or equal to 38C (100.4F), Mild Pain (1 - 3)  dextrose Oral Gel 15 Gram(s) Oral once PRN Blood Glucose LESS THAN 70 milliGRAM(s)/deciliter  melatonin 3 milliGRAM(s) Oral at bedtime PRN Insomnia      07-30-23 @ 07:01  -  07-31-23 @ 07:00  --------------------------------------------------------  IN: 240 mL / OUT: 800 mL / NET: -560 mL      PHYSICAL EXAM:      T(C): 37.4 (07-31-23 @ 11:18), Max: 38.2 (07-31-23 @ 02:18)  HR: 68 (07-31-23 @ 11:18) (68 - 77)  BP: 148/54 (07-31-23 @ 11:18) (137/68 - 163/66)  RR: 17 (07-31-23 @ 11:18) (17 - 18)  SpO2: 100% (07-31-23 @ 11:18) (94% - 100%)  Wt(kg): --  Lungs clear  Heart S1S2  Abd soft NT ND  Extremities:   1 edema                                    6.8    24.55 )-----------( 481      ( 31 Jul 2023 07:40 )             21.0     07-31    133<L>  |  107  |  72<H>  ----------------------------<  235<H>  5.2   |  16<L>  |  5.09<H>    Ca    8.3<L>      31 Jul 2023 05:47  Phos  3.9     07-30  Mg     2.4     07-30    TPro  6.4  /  Alb  1.7<L>  /  TBili  0.4  /  DBili  x   /  AST  40<H>  /  ALT  27  /  AlkPhos  102  07-30      LIVER FUNCTIONS - ( 30 Jul 2023 07:20 )  Alb: 1.7 g/dL / Pro: 6.4 gm/dL / ALK PHOS: 102 U/L / ALT: 27 U/L / AST: 40 U/L / GGT: x           Creatinine Trend: 5.09<--, 4.73<--, 4.90<--, 4.90<--, 3.10<--, 3.35<--    Trend Bun/Cr  07-31-23 @ 05:47  BUN/CR -  72<H> / 5.09<H>  07-30-23 @ 07:20  BUN/CR -  66<H> / 4.73<H>  07-29-23 @ 13:07  BUN/CR -  68<H> / 4.90<H>  07-29-23 @ 00:15  BUN/CR -  67<H> / 4.90<H>  07-18-23 @ 06:18  BUN/CR -  56<H> / 3.10<H>  07-17-23 @ 07:23  BUN/CR -  59<H> / 3.35<H>  07-16-23 @ 21:53  BUN/CR -  62<H> / 3.42<H>  07-16-23 @ 05:30  BUN/CR -  60<H> / 3.37<H>  07-15-23 @ 14:05  BUN/CR -  62<H> / 3.36<H>  07-15-23 @ 05:26  BUN/CR -  58<H> / 3.20<H>  07-14-23 @ 18:09  BUN/CR -  57<H> / 3.14<H>  07-07-23 @ 06:30  BUN/CR -  62<H> / 2.65<H>    Culture - Blood (07.29.23 @ 02:38)    -  Staphylococcus epidermidis, Methicillin resistant: Detec   Gram Stain:   Growth in aerobic bottle: Gram Positive Cocci in Clusters   Specimen Source: .Blood Blood   Organism: Blood Culture PCR   Culture Results:   Growth in aerobic bottle: Staphylococcus epidermidis  Coagulase Negative Staphylococci isolated from a single blood culture set  may represent contamination.        Assessment   ANASTACIO CKD 4; pyuria and hematuria; gram positive bacteremia   UTI risk for infectious AIN vs infectious GN   Recent Laminectomy 7/14/23    Plan    Empiric bicarbonate IVF  ID evaluation, dose Vanco 1250 grams  ASO titers, C3  Hold ACE; HCTZ   Will follow     Adin Jack MD

## 2023-07-31 NOTE — PROGRESS NOTE ADULT - SUBJECTIVE AND OBJECTIVE BOX
Patient seen and examined. Pain overall controlled. Denies cp sob n/v any other acute complaints.    Vital Signs Last 24 Hrs  T(C): 37.6 (31 Jul 2023 05:22), Max: 38.2 (31 Jul 2023 02:18)  T(F): 99.7 (31 Jul 2023 05:22), Max: 100.7 (31 Jul 2023 02:18)  HR: 68 (31 Jul 2023 05:22) (68 - 77)  BP: 147/67 (31 Jul 2023 05:22) (137/68 - 168/67)  BP(mean): --  RR: 17 (31 Jul 2023 05:22) (17 - 18)  SpO2: 96% (31 Jul 2023 05:22) (94% - 97%)    Parameters below as of 31 Jul 2023 05:22  Patient On (Oxygen Delivery Method): room air                          7.3    24.40 )-----------( 572      ( 30 Jul 2023 10:45 )             22.3       Physical Exam:  Gen: NAD  Spine:  incision intact, no surrounding erythema or expressible drainage  No midline TTP C/T/L/S spine; No paraspinal muscle ttp/hypertonicity; No bony step offs  Negative Straight leg raise bilaterally  Negative clonus, Negative babinski, Negative barlow    Motor:                   C5                C6              C7               C8           T1   R            5/5                5/5            5/5             5/5          5/5  L             5/5               5/5             5/5             5/5          5/5                L2             L3             L4               L5            S1  R         5/5           5/5          5/5             4/5           4/5  L          4/5          4/5           4/5             4/5           4/5    *weakness likely 2/2 pain    Sensory:            C5         C6         C7      C8       T1        (0=absent, 1=impaired, 2=normal, NT=not testable)  R         2            2           2        2         2  L          2            2           2        2         2               L2          L3         L4      L5       S1         (0=absent, 1=impaired, 2=normal, NT=not testable)  R         2            1            1        1        1  L          2            1           1        1         1    *sensory exam similar to documented postoperative exam      A/P: 78y Female with BLLE radicular pain s/p L2-5 lami/fus on 7/14/23    Medical comanagement appreciated  Pain control PRN  WBAT/PT/OT with assistive devices as needed  BCx: MRSE,   UA Positive, pending UCx results  Antibiotics per primary team: Zosyn  No plans for advanced imaging of lumbar spine at this time, will monitor for clinical improvement; If develops new onset neurologic deficits, will consider advanced imaging  DVT ppx per primary team  Will discuss with Dr. Richards and advise with changes to plan

## 2023-07-31 NOTE — CONSULT NOTE ADULT - SUBJECTIVE AND OBJECTIVE BOX
HPI:  78 year old female with PMH of HTN, DM, s/p L2-L5 laminectomy 7/14/23, presenting from [facility], admitted with hypoglycemia, as well as dysuria and suprapubic pain. Patient found to be retaining urine, negrete placed with removal of 1100cc urine. dysuria since she arrived at [facility] associated with suprapubic pain.    PAST MEDICAL & SURGICAL HISTORY:  HTN (hypertension)  T2DM (type 2 diabetes mellitus)  Diabetes mellitus  Status post D&C    REVIEW OF SYSTEMS:  CONSTITUTIONAL: No weakness, fevers or chills  EYES/ENT: No visual changes;  No vertigo or throat pain   NECK: No pain or stiffness  RESPIRATORY: No cough, wheezing, hemoptysis; No shortness of breath  CARDIOVASCULAR: No chest pain or palpitations  GASTROINTESTINAL: No abdominal or epigastric pain. No nausea, vomiting, or hematemesis; No diarrhea or constipation. No melena or hematochezia.  GENITOURINARY: No dysuria, frequency or hematuria  NEUROLOGICAL: No numbness or weakness  SKIN: No itching, burning, rashes, or lesions   All other review of systems is negative unless indicated above.    MEDICATIONS:  MEDICATIONS  (STANDING):  cyanocobalamin 1000 MICROGram(s) Oral daily  dextrose 5% 1000 milliLiter(s) (100 mL/Hr) IV Continuous <Continuous>  dextrose 5%. 1000 milliLiter(s) (50 mL/Hr) IV Continuous <Continuous>  dextrose 50% Injectable 25 Gram(s) IV Push once  folic acid 1 milliGRAM(s) Oral daily  gabapentin 100 milliGRAM(s) Oral two times a day  glucagon  Injectable 1 milliGRAM(s) IntraMuscular once  hemorrhoidal Ointment 1 Application(s) Rectal every 6 hours  hydrALAZINE 75 milliGRAM(s) Oral every 8 hours  insulin lispro (ADMELOG) corrective regimen sliding scale   SubCutaneous three times a day before meals  NIFEdipine XL 60 milliGRAM(s) Oral two times a day  pantoprazole  Injectable 40 milliGRAM(s) IV Push two times a day  piperacillin/tazobactam IVPB.. 3.375 Gram(s) IV Intermittent every 12 hours  senna 2 Tablet(s) Oral at bedtime  sucralfate 1 Gram(s) Oral every 6 hours    MEDICATIONS  (PRN):  acetaminophen     Tablet .. 650 milliGRAM(s) Oral every 6 hours PRN Temp greater or equal to 38C (100.4F), Mild Pain (1 - 3)  dextrose Oral Gel 15 Gram(s) Oral once PRN Blood Glucose LESS THAN 70 milliGRAM(s)/deciliter  melatonin 3 milliGRAM(s) Oral at bedtime PRN Insomnia    ALLERGIES:  No Known Allergies    SOCIAL HISTORY:  Nonsmoker.  No ETOH or illicit drug use.    FAMILY HISTORY:  FH: type 2 diabetes (Aunt)  FH: HTN (hypertension) (Mother)    VITAL SIGNS:  Vital Signs Last 24 Hrs  T(C): 37.4 (31 Jul 2023 11:18), Max: 38.2 (31 Jul 2023 02:18)  T(F): 99.3 (31 Jul 2023 11:18), Max: 100.7 (31 Jul 2023 02:18)  HR: 68 (31 Jul 2023 11:18) (68 - 77)  BP: 148/54 (31 Jul 2023 11:18) (137/68 - 163/66)  RR: 17 (31 Jul 2023 11:18) (17 - 18)  SpO2: 100% (31 Jul 2023 11:18) (94% - 100%)    PHYSICAL EXAM:  GENERAL:  Well-nourished, well-developed Female lying comfortably in bed in [facility].  HEENT:  NC/AT. Sclera white. Mucous membranes moist.  CARDIO:  Regular rate and rhythm.  No murmur, gallop or rub appreciated.  RESPIRATORY:  Nonlabored breathing, no accessory muscle use. Lungs clear to auscultation bilaterally.  No wheezing, rales or rhonchi appreciated.  ABDOMEN:  Soft, nondistended, nontender. BS appreciated on auscultation.  No rebound tenderness or guarding.  EXTREMITIES: No calf tenderness bilaterally.  SKIN:  No jaundice, pallor, or cyanosis  NEURO:  A&O x 3    LABS:                        6.8    24.55 )-----------( 481      ( 31 Jul 2023 07:40 )             21.0     07-31    133<L>  |  107  |  72<H>  ----------------------------<  235<H>  5.2   |  16<L>  |  5.09<H>    Ca    8.3<L>      31 Jul 2023 05:47  Phos  3.9     07-30  Mg     2.4     07-30    TPro  6.4  /  Alb  1.7<L>  /  TBili  0.4  /  DBili  x   /  AST  40<H>  /  ALT  27  /  AlkPhos  102  07-30    LIVER FUNCTIONS - ( 30 Jul 2023 07:20 )  Alb: 1.7 g/dL / Pro: 6.4 gm/dL / ALK PHOS: 102 U/L / ALT: 27 U/L / AST: 40 U/L / GGT: x           Culture - Blood (collected 29 Jul 2023 02:38)  Source: .Blood Blood  Gram Stain (30 Jul 2023 22:17):    Growth in aerobic bottle: Gram Positive Cocci in Clusters  Final Report (30 Jul 2023 22:17):    Growth in aerobic bottle: Staphylococcus epidermidis    Coagulase Negative Staphylococci isolated from a single blood culture set    may represent contamination.    Contact the Microbiology Department at [phone] if susceptibility    testing is clinically indicated.    Direct identification is available within approximately 3-5    hours either by Blood Panel Multiplexed PCR or Direct    MALDI-TOF. Details: [email or web address]  Organism: Blood Culture PCR (30 Jul 2023 22:17)  Organism: Blood Culture PCR (30 Jul 2023 22:17)    Culture - Blood (collected 29 Jul 2023 02:38)  Source: .Blood Blood  Preliminary Report (30 Jul 2023 12:01):    No growth at 24 hours    RADIOLOGY & ADDITIONAL STUDIES: HPI:  78 year old female with PMH of HTN, DM, s/p L2-L5 laminectomy 7/14/23, presenting from [facility], admitted with hypoglycemia, as well as dysuria and suprapubic pain. Patient found to be in urinary retention with positive UA, negrete placed with removal of 1100cc urine, now with 800cc output/24 hrs, hematuric with clots, anemic with H/H of 6.8/21. Also noted to have worsening Cr. Febrile overnight to 100.7F, on zosyn. At time of my interview, patient offers no complaints, denies abdominal or suprapubic pain, dizziness, lightheadedness, chest pain or shortness of breath.    PAST MEDICAL & SURGICAL HISTORY:  HTN (hypertension)  T2DM (type 2 diabetes mellitus)  Diabetes mellitus  Status post D&C    REVIEW OF SYSTEMS:  CONSTITUTIONAL: No weakness, fevers or chills  EYES/ENT: No visual changes;  No vertigo or throat pain   NECK: No pain or stiffness  RESPIRATORY: No cough, wheezing, hemoptysis; No shortness of breath  CARDIOVASCULAR: No chest pain or palpitations  GASTROINTESTINAL: No abdominal or epigastric pain. No nausea, vomiting, or hematemesis; No diarrhea or constipation. No melena or hematochezia.  GENITOURINARY: See HPI.  NEUROLOGICAL: No numbness or weakness  SKIN: No itching, burning, rashes, or lesions   All other review of systems is negative unless indicated above.    MEDICATIONS:  MEDICATIONS  (STANDING):  cyanocobalamin 1000 MICROGram(s) Oral daily  dextrose 5% 1000 milliLiter(s) (100 mL/Hr) IV Continuous <Continuous>  dextrose 5%. 1000 milliLiter(s) (50 mL/Hr) IV Continuous <Continuous>  dextrose 50% Injectable 25 Gram(s) IV Push once  folic acid 1 milliGRAM(s) Oral daily  gabapentin 100 milliGRAM(s) Oral two times a day  glucagon  Injectable 1 milliGRAM(s) IntraMuscular once  hemorrhoidal Ointment 1 Application(s) Rectal every 6 hours  hydrALAZINE 75 milliGRAM(s) Oral every 8 hours  insulin lispro (ADMELOG) corrective regimen sliding scale   SubCutaneous three times a day before meals  NIFEdipine XL 60 milliGRAM(s) Oral two times a day  pantoprazole  Injectable 40 milliGRAM(s) IV Push two times a day  piperacillin/tazobactam IVPB.. 3.375 Gram(s) IV Intermittent every 12 hours  senna 2 Tablet(s) Oral at bedtime  sucralfate 1 Gram(s) Oral every 6 hours    MEDICATIONS  (PRN):  acetaminophen     Tablet .. 650 milliGRAM(s) Oral every 6 hours PRN Temp greater or equal to 38C (100.4F), Mild Pain (1 - 3)  dextrose Oral Gel 15 Gram(s) Oral once PRN Blood Glucose LESS THAN 70 milliGRAM(s)/deciliter  melatonin 3 milliGRAM(s) Oral at bedtime PRN Insomnia    ALLERGIES:  No Known Allergies    SOCIAL HISTORY:  Nonsmoker.  No ETOH or illicit drug use.    FAMILY HISTORY:  FH: type 2 diabetes (Aunt)  FH: HTN (hypertension) (Mother)    VITAL SIGNS:  Vital Signs Last 24 Hrs  T(C): 37.4 (31 Jul 2023 11:18), Max: 38.2 (31 Jul 2023 02:18)  T(F): 99.3 (31 Jul 2023 11:18), Max: 100.7 (31 Jul 2023 02:18)  HR: 68 (31 Jul 2023 11:18) (68 - 77)  BP: 148/54 (31 Jul 2023 11:18) (137/68 - 163/66)  RR: 17 (31 Jul 2023 11:18) (17 - 18)  SpO2: 100% (31 Jul 2023 11:18) (94% - 100%)    PHYSICAL EXAM:  GENERAL:  Well-nourished, well-developed female lying comfortably in bed in [facility].  HEENT:  NC/AT. Sclera white. Mucous membranes moist.  CARDIO:  Regular rate and rhythm.   RESPIRATORY:  Nonlabored breathing, no accessory muscle use.   ABDOMEN:  Soft, nondistended, nontender. No suprapubic tenderness.  : Negrete in place with 150cc dark fruit punch colored urine with clots.  NEURO:  Alert; answers questions appropriately.    LABS:                        6.8    24.55 )-----------( 481      ( 31 Jul 2023 07:40 )             21.0     07-31    133<L>  |  107  |  72<H>  ----------------------------<  235<H>  5.2   |  16<L>  |  5.09<H>    Ca    8.3<L>      31 Jul 2023 05:47  Phos  3.9     07-30  Mg     2.4     07-30    TPro  6.4  /  Alb  1.7<L>  /  TBili  0.4  /  DBili  x   /  AST  40<H>  /  ALT  27  /  AlkPhos  102  07-30    LIVER FUNCTIONS - ( 30 Jul 2023 07:20 )  Alb: 1.7 g/dL / Pro: 6.4 gm/dL / ALK PHOS: 102 U/L / ALT: 27 U/L / AST: 40 U/L / GGT: x           Culture - Blood (collected 29 Jul 2023 02:38)  Source: .Blood Blood  Gram Stain (30 Jul 2023 22:17):    Growth in aerobic bottle: Gram Positive Cocci in Clusters  Final Report (30 Jul 2023 22:17):    Growth in aerobic bottle: Staphylococcus epidermidis    Coagulase Negative Staphylococci isolated from a single blood culture set    may represent contamination.    Contact the Microbiology Department at [phone] if susceptibility    testing is clinically indicated.    Direct identification is available within approximately 3-5    hours either by Blood Panel Multiplexed PCR or Direct    MALDI-TOF. Details: [email or web address]  Organism: Blood Culture PCR (30 Jul 2023 22:17)  Organism: Blood Culture PCR (30 Jul 2023 22:17)    Culture - Blood (collected 29 Jul 2023 02:38)  Source: .Blood Blood  Preliminary Report (30 Jul 2023 12:01):    No growth at 24 hours    RADIOLOGY & ADDITIONAL STUDIES:  < from: US Kidney and Bladder (07.29.23 @ 18:50) >  IMPRESSION:  Distended urinary bladder without hydronephrosis. Prevoid volume of 570   cc of urine. Patient was unable to void.      < from: CT Abdomen and Pelvis No Cont (07.29.23 @ 02:06) >  IMPRESSION:  Thick-walled urinary bladder suspicious for cystitis Correlate with   urinalysis.    Dilated urinary bladder which may be related to voluntary holding versus   urinary retention. Correlate clinically.    Symmetric bilateral perinephric stranding, nonspecific though may   indicate underlying nephritis or nephropathy.    Trace bilateral pleural effusions, trace ascites and diffuse soft tissue   edema.    Postlaminectomy changes with fusion hardware from L2 through L5.   Degenerative changes of the visualized spine. No acute osseous   abnormality of the lumbar spine. HPI:  78 year old female with PMH of HTN, DM, s/p L2-L5 laminectomy 7/14/23, presenting from [facility], admitted with hypoglycemia, as well as dysuria and suprapubic pain. Patient found to be in urinary retention with positive UA, negrete placed with removal of 1100cc urine, now with 800cc output/24 hrs, hematuric with clots, anemic with H/H of 6.8/21. Also noted to have worsening Cr. Febrile overnight to 100.7F, on zosyn/vanco. At time of my interview, patient offers no complaints, denies abdominal or suprapubic pain, dizziness, lightheadedness, chest pain or shortness of breath.    PAST MEDICAL & SURGICAL HISTORY:  HTN (hypertension)  T2DM (type 2 diabetes mellitus)  Diabetes mellitus  Status post D&C    REVIEW OF SYSTEMS:  CONSTITUTIONAL: No weakness, fevers or chills  EYES/ENT: No visual changes;  No vertigo or throat pain   NECK: No pain or stiffness  RESPIRATORY: No cough, wheezing, hemoptysis; No shortness of breath  CARDIOVASCULAR: No chest pain or palpitations  GASTROINTESTINAL: No abdominal or epigastric pain. No nausea, vomiting, or hematemesis; No diarrhea or constipation. No melena or hematochezia.  GENITOURINARY: See HPI.  NEUROLOGICAL: No numbness or weakness  SKIN: No itching, burning, rashes, or lesions   All other review of systems is negative unless indicated above.    MEDICATIONS:  MEDICATIONS  (STANDING):  cyanocobalamin 1000 MICROGram(s) Oral daily  dextrose 5% 1000 milliLiter(s) (100 mL/Hr) IV Continuous <Continuous>  dextrose 5%. 1000 milliLiter(s) (50 mL/Hr) IV Continuous <Continuous>  dextrose 50% Injectable 25 Gram(s) IV Push once  folic acid 1 milliGRAM(s) Oral daily  gabapentin 100 milliGRAM(s) Oral two times a day  glucagon  Injectable 1 milliGRAM(s) IntraMuscular once  hemorrhoidal Ointment 1 Application(s) Rectal every 6 hours  hydrALAZINE 75 milliGRAM(s) Oral every 8 hours  insulin lispro (ADMELOG) corrective regimen sliding scale   SubCutaneous three times a day before meals  NIFEdipine XL 60 milliGRAM(s) Oral two times a day  pantoprazole  Injectable 40 milliGRAM(s) IV Push two times a day  piperacillin/tazobactam IVPB.. 3.375 Gram(s) IV Intermittent every 12 hours  senna 2 Tablet(s) Oral at bedtime  sucralfate 1 Gram(s) Oral every 6 hours    MEDICATIONS  (PRN):  acetaminophen     Tablet .. 650 milliGRAM(s) Oral every 6 hours PRN Temp greater or equal to 38C (100.4F), Mild Pain (1 - 3)  dextrose Oral Gel 15 Gram(s) Oral once PRN Blood Glucose LESS THAN 70 milliGRAM(s)/deciliter  melatonin 3 milliGRAM(s) Oral at bedtime PRN Insomnia    ALLERGIES:  No Known Allergies    SOCIAL HISTORY:  Nonsmoker.  No ETOH or illicit drug use.    FAMILY HISTORY:  FH: type 2 diabetes (Aunt)  FH: HTN (hypertension) (Mother)    VITAL SIGNS:  Vital Signs Last 24 Hrs  T(C): 37.4 (31 Jul 2023 11:18), Max: 38.2 (31 Jul 2023 02:18)  T(F): 99.3 (31 Jul 2023 11:18), Max: 100.7 (31 Jul 2023 02:18)  HR: 68 (31 Jul 2023 11:18) (68 - 77)  BP: 148/54 (31 Jul 2023 11:18) (137/68 - 163/66)  RR: 17 (31 Jul 2023 11:18) (17 - 18)  SpO2: 100% (31 Jul 2023 11:18) (94% - 100%)    PHYSICAL EXAM:  GENERAL:  Well-nourished, well-developed female lying comfortably in bed in [facility].  HEENT:  NC/AT. Sclera white. Mucous membranes moist.  CARDIO:  Regular rate and rhythm.   RESPIRATORY:  Nonlabored breathing, no accessory muscle use.   ABDOMEN:  Soft, nondistended, nontender. No suprapubic tenderness.  : Negrete in place with 150cc dark fruit punch colored urine with clots.  NEURO:  Alert; answers questions appropriately.    LABS:                        6.8    24.55 )-----------( 481      ( 31 Jul 2023 07:40 )             21.0     07-31    133<L>  |  107  |  72<H>  ----------------------------<  235<H>  5.2   |  16<L>  |  5.09<H>    Ca    8.3<L>      31 Jul 2023 05:47  Phos  3.9     07-30  Mg     2.4     07-30    TPro  6.4  /  Alb  1.7<L>  /  TBili  0.4  /  DBili  x   /  AST  40<H>  /  ALT  27  /  AlkPhos  102  07-30    LIVER FUNCTIONS - ( 30 Jul 2023 07:20 )  Alb: 1.7 g/dL / Pro: 6.4 gm/dL / ALK PHOS: 102 U/L / ALT: 27 U/L / AST: 40 U/L / GGT: x           Culture - Blood (collected 29 Jul 2023 02:38)  Source: .Blood Blood  Gram Stain (30 Jul 2023 22:17):    Growth in aerobic bottle: Gram Positive Cocci in Clusters  Final Report (30 Jul 2023 22:17):    Growth in aerobic bottle: Staphylococcus epidermidis    Coagulase Negative Staphylococci isolated from a single blood culture set    may represent contamination.    Contact the Microbiology Department at [phone] if susceptibility    testing is clinically indicated.    Direct identification is available within approximately 3-5    hours either by Blood Panel Multiplexed PCR or Direct    MALDI-TOF. Details: [email or web address]  Organism: Blood Culture PCR (30 Jul 2023 22:17)  Organism: Blood Culture PCR (30 Jul 2023 22:17)    Culture - Blood (collected 29 Jul 2023 02:38)  Source: .Blood Blood  Preliminary Report (30 Jul 2023 12:01):    No growth at 24 hours    RADIOLOGY & ADDITIONAL STUDIES:  < from: US Kidney and Bladder (07.29.23 @ 18:50) >  IMPRESSION:  Distended urinary bladder without hydronephrosis. Prevoid volume of 570   cc of urine. Patient was unable to void.      < from: CT Abdomen and Pelvis No Cont (07.29.23 @ 02:06) >  IMPRESSION:  Thick-walled urinary bladder suspicious for cystitis Correlate with   urinalysis.    Dilated urinary bladder which may be related to voluntary holding versus   urinary retention. Correlate clinically.    Symmetric bilateral perinephric stranding, nonspecific though may   indicate underlying nephritis or nephropathy.    Trace bilateral pleural effusions, trace ascites and diffuse soft tissue   edema.    Postlaminectomy changes with fusion hardware from L2 through L5.   Degenerative changes of the visualized spine. No acute osseous   abnormality of the lumbar spine.

## 2023-07-31 NOTE — CONSULT NOTE ADULT - SUBJECTIVE AND OBJECTIVE BOX
[facility]  INFECTIOUS DISEASES   [address] [place] [address]  Tel: [phone]     Fax: [phone]  ======================================================  Desai MD Jonathan Garellek, DO Chyna Guzman, SHARA   ======================================================    MARYAM OCHOA  MRN-[id number]  Female  78y ([birth year])        Patient is a 78y old  Female who presents with a chief complaint of Hypoglycemia & UTI (31 Jul 2023 15:10)      HPI:  78 year old female with PMH of HTN, DM present to the ED for hypoglycemic episode. Patient is from [facility], she was found unresponsive with a glucose level of 30, she was given 1 amp of glucogan, upon ED arrival FS was 41 with AMS, received Dextrose 50ml.. On 7/14 patient had Laminectomy with fusion L2-4, endorses she has been having dysuria since she arrived at [facility] associated with suprapubic pain.  Labs  remarkable for leukocytosis (WBC 28.80) worsen kidney function with Cr 4.90 compared to 7/18 Cr 3.10. Upon evaluation patient was AAOx4, endorses dysuria with lower abd pain, denies fever, headache, dizziness, SOB, chest pain, palpitation or any other acute symptoms.           (29 Jul 2023 05:17)      ID consulted for workup and antibiotic management     PAST MEDICAL & SURGICAL HISTORY:  HTN (hypertension)      T2DM (type 2 diabetes mellitus)      Diabetes mellitus      Status post D&C          Allergies  No Known Allergies        ANTIMICROBIALS:  piperacillin/tazobactam IVPB.. 3.375 every 12 hours      MEDICATIONS  (STANDING):  cefTRIAXone   IVPB   100 mL/Hr IV Intermittent (07-29-23 @ 05:01)    piperacillin/tazobactam IVPB..   25 mL/Hr IV Intermittent (07-31-23 @ 18:19)   25 mL/Hr IV Intermittent (07-31-23 @ 05:13)   25 mL/Hr IV Intermittent (07-30-23 @ 18:33)   25 mL/Hr IV Intermittent (07-30-23 @ 05:10)   25 mL/Hr IV Intermittent (07-29-23 @ 19:13)    piperacillin/tazobactam IVPB...   200 mL/Hr IV Intermittent (07-29-23 @ 02:49)    vancomycin  IVPB   166.67 mL/Hr IV Intermittent (07-31-23 @ 14:31)    vancomycin  IVPB.   250 mL/Hr IV Intermittent (07-29-23 @ 03:35)        OTHER MEDS: MEDICATIONS  (STANDING):  acetaminophen     Tablet .. 650 every 6 hours PRN  dextrose 50% Injectable 25 once  dextrose Oral Gel 15 once PRN  gabapentin 100 two times a day  glucagon  Injectable 1 once  hydrALAZINE 75 every 8 hours  insulin lispro (ADMELOG) corrective regimen sliding scale  Before meals and at bedtime  melatonin 3 at bedtime PRN  NIFEdipine XL 60 two times a day  pantoprazole  Injectable 40 two times a day  senna 2 at bedtime  sucralfate 1 every 6 hours      SOCIAL HISTORY:     Smoking Cigarettes [ ]Active [ ] Former x]Denies   ETOH [x ]denies [ ]Former [ ]Current Use denies   Drug Use [ x]Never [ ] Former [ ] Active     FAMILY HISTORY:  FH: type 2 diabetes (Aunt)    FH: HTN (hypertension) (Mother)    FH: HTN (hypertension)        REVIEW OF SYSTEMS  [  ] ROS unobtainable because:    [X] All other systems negative except as noted below:	    Constitutional:  [ ] fever [ ] chills  [ ] weight loss  [X ] weakness  Skin:  [ ] rash [ ] phlebitis	  Eyes: [ ] icterus [ ] pain  [ ] discharge	  ENMT: [ ] sore throat  [ ] thrush [ ] ulcers [ ] exudates  Respiratory: [ ] dyspnea [ ] hemoptysis [ ] cough [ ] sputum	  Cardiovascular:  [ ] chest pain [ ] palpitations [ ] edema	  Gastrointestinal:  [ ] nausea [ ] vomiting [ ] diarrhea [ ] constipation [ ] pain	  Genitourinary:  [X ] dysuria [ ] frequency [X ] hematuria [ ] discharge [ ] flank pain  [ ] incontinence  Musculoskeletal:  [ ] myalgias [ ] arthralgias [ ] arthritis  [ ] back pain  Neurological:  [ ] headache [ ] seizures  [ ] confusion/altered mental status  Psychiatric:  [ ] anxiety [ ] depression	  Hematology/Lymphatics:  [ ] lymphadenopathy  Endocrine:  [ ] adrenal [ ] thyroid  Allergic/Immunologic:	 [ ] transplant [ ] seasonal    Vital Signs Last 24 Hrs  T(F): 99.3 (07-31-23 @ 20:00), Max: 100.7 (07-31-23 @ 02:18)    Vital Signs Last 24 Hrs  HR: 65 (07-31-23 @ 21:24) (65 - 72)  BP: 149/59 (07-31-23 @ 21:24) (126/61 - 151/74)  RR: 18 (07-31-23 @ 18:29)  SpO2: 96% (07-31-23 @ 18:29) (94% - 100%)  Wt(kg): --    PHYSICAL EXAM:  Constitutional: non-toxic, no distress  HEAD/EYES: anicteric, no conjunctival injection  ENT:  supple, no thrush  Cardiovascular:   normal S1, S2, no murmur, no edema  Respiratory:  clear BS bilaterally, no wheezes, no rales  GI:  soft, lower abodmin tenderness, normal bowel sounds  :  +negrete, no CVA tenderness, + suprapubic pain  Musculoskeletal:  no synovitis, normal ROM  Neurologic: awake and alert, normal strength, no focal findings  Skin:  no rash, no erythema, no phlebitis, midline lumbar surgical site is closed, clean, nontender and no evidence of infection on exam   Heme/Onc: no lymphadenopathy   Psychiatric:  awake, alert but lethargic           WBC Count: 23.01 K/uL (07-31 @ 20:05)  WBC Count: 24.55 K/uL (07-31 @ 07:40)  WBC Count: 24.40 K/uL (07-30 @ 10:45)  WBC Count: 22.68 K/uL (07-30 @ 07:20)  WBC Count: 29.20 K/uL (07-29 @ 13:07)  WBC Count: 28.80 K/uL (07-29 @ 00:15)      Auto Neutrophil %: 83.5 % *H* (07-30-23 @ 07:20)  Auto Neutrophil #: 18.94 K/uL *H* (07-30-23 @ 07:20)  Auto Neutrophil %: 91.8 % *H* (07-29-23 @ 00:15)  Auto Neutrophil #: 26.44 K/uL *H* (07-29-23 @ 00:15)                            7.9    23.01 )-----------( 482      ( 31 Jul 2023 20:05 )             24.1       07-31    133<L>  |  107  |  72<H>  ----------------------------<  235<H>  5.2   |  16<L>  |  5.09<H>    Ca    8.3<L>      31 Jul 2023 05:47  Phos  3.9     07-30  Mg     2.4     07-30    TPro  6.4  /  Alb  1.7<L>  /  TBili  0.4  /  DBili  x   /  AST  40<H>  /  ALT  27  /  AlkPhos  102  07-30      Creatinine Trend: 5.09<--, 4.73<--, 4.90<--, 4.90<--, 3.10<--, 3.35<--          MICROBIOLOGY:  Clean Catch Clean Catch (Midstream)  07-30-23   <10,000 CFU/mL Normal Urogenital Nola  --  --      .Blood Blood  07-29-23   No growth at 48 Hours  --  Blood Culture PCR    Culture - Blood (07.29.23 @ 02:38)    -  Staphylococcus epidermidis, Methicillin resistant: Detec   Gram Stain:   Growth in aerobic bottle: Gram Positive Cocci in Clusters   Specimen Source: .Blood Blood   Organism: Blood Culture PCR   Culture Results:   Growth in aerobic bottle: Staphylococcus epidermidis  Coagulase Negative Staphylococci isolated from a single blood culture set  may represent contamination.  Contact the Microbiology Department at [phone] if susceptibility  testing is clinically indicated.  Direct identification is available within approximately 3-5  hours either by Blood Panel Multiplexed PCR or Direct  MALDI-TOF. Details: [email or web address]   Organism Identification: Blood Culture PCR   Method Type: PCR          C-Reactive Protein, Serum: 290 (07-30)        SARS-CoV-2 Result: NotDetec (07-29-23 @ 00:47)        RADIOLOGY:  < from: CT Chest No Cont (07.29.23 @ 17:45) >  IMPRESSION:  No obvious infectious focus in the chest.    Anemia.    Small hiatal hernia and distal esophageal wall thickening as can be seen   with esophagitis and reflux disease.      < from: CT Abdomen and Pelvis No Cont (07.29.23 @ 02:06) >  IMPRESSION:  Thick-walled urinary bladder suspicious for cystitis Correlate with   urinalysis.    Dilated urinary bladder which may be related to voluntary holding versus   urinary retention. Correlate clinically.    Symmetric bilateral perinephric stranding, nonspecific though may   indicate underlying nephritis or nephropathy.    Trace bilateral pleural effusions, trace ascites and diffuse soft tissue   edema.    Postlaminectomy changes with fusion hardware from L2 through L5.   Degenerative changes of the visualized spine. No acute osseous   abnormality of the lumbar spine.      ACC: [id number] EXAM:  XR CHEST PORTABLE URGENT 1V   ORDERED BY: CORONA PAYTON     PROCEDURE DATE:  07/29/2023          INTERPRETATION:  TECHNIQUE: A single AP view of the chest was obtained.   Ordered time:   7/29/2023 3:47 AM    COMPARISON: 7/27/2023    CLINICAL INFORMATION: Admission    FINDINGS:    The heart is unchanged in size. Mitral annular calcification is again   identified.  The lungs are clear.  There are no pleural effusions.  There is no pneumothorax.    IMPRESSION:    No acute pulmonary disease      URIEL KUMARI MD; Attending Radiologist  This document has been electronically signed. Jul 29 2023  9:10AM      I have personally reviewed the above imaging

## 2023-07-31 NOTE — CHART NOTE - NSCHARTNOTEFT_GEN_A_CORE
House- Medicine NP:    Called by RN to obtain consent for blood transfusion.   Patient is a 78y old  Female who presents with a chief complaint of Hypoglycemia & UTI (31 Jul 2023 11:38)                          6.8    24.55 )-----------( 481      ( 31 Jul 2023 07:40 )             21.0     Discussed with patient and family { nieces} at the bed side regarding the need for blood transfusion. Risk and benefits discussed. Risks including fever, chills/rigors, high or low blood pressure, respiratory distress (wheezing/hypoxia), urticaria/rash/edema, nausea, pain, bleeding, darkened urine, lower back pain, severe allergic reaction and death was discussed. Patient requested the niece Emily to sign the consent after Verbalizes the understanding and consent obtained. Witnessed by staff.

## 2023-08-01 LAB
ANION GAP SERPL CALC-SCNC: 10 MMOL/L — SIGNIFICANT CHANGE UP (ref 5–17)
BUN SERPL-MCNC: 75 MG/DL — HIGH (ref 7–23)
CALCIUM SERPL-MCNC: 8.2 MG/DL — LOW (ref 8.5–10.1)
CHLORIDE SERPL-SCNC: 100 MMOL/L — SIGNIFICANT CHANGE UP (ref 96–108)
CO2 SERPL-SCNC: 19 MMOL/L — LOW (ref 22–31)
CREAT SERPL-MCNC: 5.38 MG/DL — HIGH (ref 0.5–1.3)
CULTURE RESULTS: SIGNIFICANT CHANGE UP
EGFR: 8 ML/MIN/1.73M2 — LOW
GLUCOSE BLDC GLUCOMTR-MCNC: 237 MG/DL — HIGH (ref 70–99)
GLUCOSE BLDC GLUCOMTR-MCNC: 244 MG/DL — HIGH (ref 70–99)
GLUCOSE BLDC GLUCOMTR-MCNC: 245 MG/DL — HIGH (ref 70–99)
GLUCOSE BLDC GLUCOMTR-MCNC: 271 MG/DL — HIGH (ref 70–99)
GLUCOSE SERPL-MCNC: 254 MG/DL — HIGH (ref 70–99)
HCT VFR BLD CALC: 24.9 % — LOW (ref 34.5–45)
HGB BLD-MCNC: 8.1 G/DL — LOW (ref 11.5–15.5)
MCHC RBC-ENTMCNC: 27.3 PG — SIGNIFICANT CHANGE UP (ref 27–34)
MCHC RBC-ENTMCNC: 32.5 G/DL — SIGNIFICANT CHANGE UP (ref 32–36)
MCV RBC AUTO: 83.8 FL — SIGNIFICANT CHANGE UP (ref 80–100)
NRBC # BLD: 0 /100 WBCS — SIGNIFICANT CHANGE UP (ref 0–0)
PLATELET # BLD AUTO: 506 K/UL — HIGH (ref 150–400)
POTASSIUM SERPL-MCNC: 4.7 MMOL/L — SIGNIFICANT CHANGE UP (ref 3.5–5.3)
POTASSIUM SERPL-SCNC: 4.7 MMOL/L — SIGNIFICANT CHANGE UP (ref 3.5–5.3)
RBC # BLD: 2.97 M/UL — LOW (ref 3.8–5.2)
RBC # FLD: 13.4 % — SIGNIFICANT CHANGE UP (ref 10.3–14.5)
SODIUM SERPL-SCNC: 129 MMOL/L — LOW (ref 135–145)
SPECIMEN SOURCE: SIGNIFICANT CHANGE UP
VANCOMYCIN TROUGH SERPL-MCNC: 17.6 UG/ML — SIGNIFICANT CHANGE UP (ref 10–20)
WBC # BLD: 21.05 K/UL — HIGH (ref 3.8–10.5)
WBC # FLD AUTO: 21.05 K/UL — HIGH (ref 3.8–10.5)

## 2023-08-01 PROCEDURE — 99232 SBSQ HOSP IP/OBS MODERATE 35: CPT

## 2023-08-01 PROCEDURE — 99233 SBSQ HOSP IP/OBS HIGH 50: CPT

## 2023-08-01 RX ORDER — HEPARIN SODIUM 5000 [USP'U]/ML
5000 INJECTION INTRAVENOUS; SUBCUTANEOUS EVERY 12 HOURS
Refills: 0 | Status: DISCONTINUED | OUTPATIENT
Start: 2023-08-01 | End: 2023-08-11

## 2023-08-01 RX ORDER — POLYETHYLENE GLYCOL 3350 17 G/17G
17 POWDER, FOR SOLUTION ORAL
Refills: 0 | Status: DISCONTINUED | OUTPATIENT
Start: 2023-08-01 | End: 2023-08-11

## 2023-08-01 RX ADMIN — GABAPENTIN 100 MILLIGRAM(S): 400 CAPSULE ORAL at 17:19

## 2023-08-01 RX ADMIN — Medication 1 MILLIGRAM(S): at 12:27

## 2023-08-01 RX ADMIN — Medication 1 GRAM(S): at 12:27

## 2023-08-01 RX ADMIN — Medication 3: at 08:28

## 2023-08-01 RX ADMIN — PHENYLEPHRINE-SHARK LIVER OIL-MINERAL OIL-PETROLATUM RECTAL OINTMENT 1 APPLICATION(S): at 12:31

## 2023-08-01 RX ADMIN — PHENYLEPHRINE-SHARK LIVER OIL-MINERAL OIL-PETROLATUM RECTAL OINTMENT 1 APPLICATION(S): at 05:18

## 2023-08-01 RX ADMIN — Medication 75 MILLIGRAM(S): at 05:19

## 2023-08-01 RX ADMIN — Medication 2: at 21:39

## 2023-08-01 RX ADMIN — Medication 60 MILLIGRAM(S): at 17:19

## 2023-08-01 RX ADMIN — Medication 2: at 12:27

## 2023-08-01 RX ADMIN — Medication 1 GRAM(S): at 05:18

## 2023-08-01 RX ADMIN — Medication 1 GRAM(S): at 00:36

## 2023-08-01 RX ADMIN — Medication 1 GRAM(S): at 17:19

## 2023-08-01 RX ADMIN — SENNA PLUS 2 TABLET(S): 8.6 TABLET ORAL at 21:38

## 2023-08-01 RX ADMIN — PANTOPRAZOLE SODIUM 40 MILLIGRAM(S): 20 TABLET, DELAYED RELEASE ORAL at 17:18

## 2023-08-01 RX ADMIN — PANTOPRAZOLE SODIUM 40 MILLIGRAM(S): 20 TABLET, DELAYED RELEASE ORAL at 05:19

## 2023-08-01 RX ADMIN — PHENYLEPHRINE-SHARK LIVER OIL-MINERAL OIL-PETROLATUM RECTAL OINTMENT 1 APPLICATION(S): at 00:36

## 2023-08-01 RX ADMIN — Medication 60 MILLIGRAM(S): at 05:21

## 2023-08-01 RX ADMIN — GABAPENTIN 100 MILLIGRAM(S): 400 CAPSULE ORAL at 05:22

## 2023-08-01 RX ADMIN — PIPERACILLIN AND TAZOBACTAM 25 GRAM(S): 4; .5 INJECTION, POWDER, LYOPHILIZED, FOR SOLUTION INTRAVENOUS at 17:17

## 2023-08-01 RX ADMIN — Medication 75 MILLIGRAM(S): at 14:43

## 2023-08-01 RX ADMIN — Medication 75 MILLIGRAM(S): at 21:39

## 2023-08-01 RX ADMIN — PIPERACILLIN AND TAZOBACTAM 25 GRAM(S): 4; .5 INJECTION, POWDER, LYOPHILIZED, FOR SOLUTION INTRAVENOUS at 05:20

## 2023-08-01 RX ADMIN — POLYETHYLENE GLYCOL 3350 17 GRAM(S): 17 POWDER, FOR SOLUTION ORAL at 14:42

## 2023-08-01 RX ADMIN — PHENYLEPHRINE-SHARK LIVER OIL-MINERAL OIL-PETROLATUM RECTAL OINTMENT 1 APPLICATION(S): at 17:20

## 2023-08-01 RX ADMIN — PREGABALIN 1000 MICROGRAM(S): 225 CAPSULE ORAL at 12:27

## 2023-08-01 RX ADMIN — POLYETHYLENE GLYCOL 3350 17 GRAM(S): 17 POWDER, FOR SOLUTION ORAL at 17:18

## 2023-08-01 RX ADMIN — Medication 2: at 17:17

## 2023-08-01 NOTE — PROGRESS NOTE ADULT - NS ATTEND AMEND GEN_ALL_CORE FT
Pt s/p laminectomy c/o neuropathy in feet  Was found to have AUR  has uti   Blood tinged urine in tubing, but has been clearing.  no CBI needed  22 Fr in    She is comfortable    Monitor urine color  ck cx

## 2023-08-01 NOTE — PROGRESS NOTE ADULT - ASSESSMENT
78 year old female with PMH of HTN, DM present to the ED for hypoglycemic episode. Patient is from [facility], she was found unresponsive with a glucose level of 30, she was given 1 amp of glucogan, upon ED arrival FS was 41 with AMS, received Dextrose 50ml.. On 7/14 patient had Laminectomy with fusion L2-4, endorses she has been having dysuria since she arrived at [facility] associated with suprapubic pain.  Labs  remarkable for leukocytosis (WBC 28.80) worsen kidney function with Cr 4.90 compared to 7/18 Cr 3.10. Afebrile. Upon evaluation patient was AAOx4, endorses dysuria with lower abd pain, denies fever, headache, dizziness, SOB, chest pain, palpitation or any other acute symptoms.    Patient is being admitted for UTI to be treated with antibiotics.     Hypoglycemia likely due to MOSS.   Patient is currently on Glipizide 5mg   Patient  was found unresponsive with FS 30 at [facility]  In the ED FS 41 received Dextrose & returned to baseline   s/p dextrose iV  hypogylcemia workup sent  sugars better  plan:  back on coverage    UTI (urinary tract infection) with evidence of urinary retention.   one  Week+ history of dysuria associated with suprapubic pain   Leukocytosis (WBC 28.80) now 21  s/p Vancomycin  UA is GROSSLY positive and culture is negative, blood culture negative  US renal bladder scan with retention  negrete inserted  plan;  ID consulted  Urology consulted  c/w zosyn / dc vanco  Bowel regime ;    Acute blood loss anemia ddx include lower GI bleed ?red stool in [facility] vs Fe defiency   hb today now 8 with prbc    Lower extremity radiculopathy with hx of L4 lami  patient reports leg pain and numbness of both legs up tothe knee  no MRI   plan:  ortho / neurotonin and lidocaine patch  PT consult to return back to [facility] : appreciated res    Acute kidney injury superimposed on CKD due to UTI and urinary retention. DUE TO ATN  other risk factors include worsening anemia  Worsen ANASTACIO most likely 2/2 prolonged untreated UTI   Current Cr: 4.90, 8/18 Cr 3.10 --> now > 5  plan;  IVF   Monitor kidney function   Avoid Nephrotoxic drugs.    Hypertension.   ·  Plan: Continue Home meds   - Nifedipine  - Hydralazine   - Monitor.    Diabetes mellitus.   sliding scale

## 2023-08-01 NOTE — PROGRESS NOTE ADULT - SUBJECTIVE AND OBJECTIVE BOX
[facility] NE[facility], [facility]  NEPHROLOGY AND HYPERTENSION  [address]  [address]  [place] [address]  [phone]    MD MAT TUBBS MD YELENA ROSENBERG, MD BINNY KOSHY, MD CHRISTOPHER CAPUTO, MD EDWARD BOVER, MD          Patient events noted    No distress  feels well     MEDICATIONS  (STANDING):  cyanocobalamin 1000 MICROGram(s) Oral daily  dextrose 5%. 1000 milliLiter(s) (50 mL/Hr) IV Continuous <Continuous>  dextrose 50% Injectable 25 Gram(s) IV Push once  folic acid 1 milliGRAM(s) Oral daily  gabapentin 100 milliGRAM(s) Oral two times a day  glucagon  Injectable 1 milliGRAM(s) IntraMuscular once  hemorrhoidal Ointment 1 Application(s) Rectal every 6 hours  heparin   Injectable 5000 Unit(s) SubCutaneous every 12 hours  hydrALAZINE 75 milliGRAM(s) Oral every 8 hours  insulin lispro (ADMELOG) corrective regimen sliding scale   SubCutaneous Before meals and at bedtime  NIFEdipine XL 60 milliGRAM(s) Oral two times a day  pantoprazole  Injectable 40 milliGRAM(s) IV Push two times a day  piperacillin/tazobactam IVPB.. 3.375 Gram(s) IV Intermittent every 12 hours  polyethylene glycol 3350 17 Gram(s) Oral two times a day  senna 2 Tablet(s) Oral at bedtime  sucralfate 1 Gram(s) Oral every 6 hours    MEDICATIONS  (PRN):  acetaminophen     Tablet .. 650 milliGRAM(s) Oral every 6 hours PRN Temp greater or equal to 38C (100.4F), Mild Pain (1 - 3)  dextrose Oral Gel 15 Gram(s) Oral once PRN Blood Glucose LESS THAN 70 milliGRAM(s)/deciliter  melatonin 3 milliGRAM(s) Oral at bedtime PRN Insomnia      07-31-23 @ 07:01  -  08-01-23 @ 07:00  --------------------------------------------------------  IN: 0 mL / OUT: 150 mL / NET: -150 mL    08-01-23 @ 07:01  -  08-01-23 @ 21:47  --------------------------------------------------------  IN: 0 mL / OUT: 100 mL / NET: -100 mL      PHYSICAL EXAM:      T(C): 37.3 (08-01-23 @ 18:28), Max: 37.3 (08-01-23 @ 18:28)  HR: 61 (08-01-23 @ 21:26) (61 - 77)  BP: 150/65 (08-01-23 @ 21:26) (134/77 - 160/72)  RR: 17 (08-01-23 @ 18:28) (17 - 18)  SpO2: 96% (08-01-23 @ 18:28) (96% - 97%)  Wt(kg): --  Lungs clear  Heart S1S2  Abd soft NT ND  Extremities:   1 edema                                    8.1    21.05 )-----------( 506      ( 01 Aug 2023 10:05 )             24.9     08-01    129<L>  |  100  |  75<H>  ----------------------------<  254<H>  4.7   |  19<L>  |  5.38<H>    Ca    8.2<L>      01 Aug 2023 10:05          Creatinine Trend: 5.38<--, 5.09<--, 4.73<--, 4.90<--, 4.90<--, 3.10<--          Assessment   ANASTACIO CKD 4; pyuria and hematuria; gram positive bacteremia   UTI risk for infectious AIN vs infectious GN   Recent Laminectomy 7/14/23  S. Epi bacteremia     Plan    Empiric bicarbonate IVF  ID evaluation appreciated   ASO titers, C3  Hold ACE; HCTZ     Adin Jack MD

## 2023-08-01 NOTE — PROGRESS NOTE ADULT - SUBJECTIVE AND OBJECTIVE BOX
[facility]  INFECTIOUS DISEASES   [address] [place] [address]  Tel: [phone]     Fax: [phone]  ======================================================  Desai MD Jonathan Garellek, DO Chyna Guzman, SHARA   ======================================================    MARYAM OCHOA  MRN-[id number]  78y ([birth year])    Interval History: patient seen and examined. she reports feeling better then yesterday, still has pain in her fee     ROS:    [ ] Unobtainable because:  [ X] All other systems negative except as noted    Constitutional: no fever, no chills  Head: no trauma  Eyes: no vision changes, no eye pain  ENT:  no sore throat, no rhinorrhea  Cardiovascular:  no chest pain, no palpitation  Respiratory:  no SOB, no cough  GI:  no abd pain, no vomiting, no diarrhea  urinary: no dysuria, +hematuria, no flank pain  musculoskeletal:  +joint pain, no joint swelling  skin:  no rash  neurology:  no headache, no seizure, no change in mental status  psych: no anxiety, no depression         Allergies  No Known Allergies        ANTIMICROBIALS:  piperacillin/tazobactam IVPB.. 3.375 every 12 hours      OTHER MEDS:  acetaminophen     Tablet .. 650 milliGRAM(s) Oral every 6 hours PRN  cyanocobalamin 1000 MICROGram(s) Oral daily  dextrose 5%. 1000 milliLiter(s) IV Continuous <Continuous>  dextrose 50% Injectable 25 Gram(s) IV Push once  dextrose Oral Gel 15 Gram(s) Oral once PRN  folic acid 1 milliGRAM(s) Oral daily  gabapentin 100 milliGRAM(s) Oral two times a day  glucagon  Injectable 1 milliGRAM(s) IntraMuscular once  hemorrhoidal Ointment 1 Application(s) Rectal every 6 hours  heparin   Injectable 5000 Unit(s) SubCutaneous every 12 hours  hydrALAZINE 75 milliGRAM(s) Oral every 8 hours  insulin lispro (ADMELOG) corrective regimen sliding scale   SubCutaneous Before meals and at bedtime  melatonin 3 milliGRAM(s) Oral at bedtime PRN  NIFEdipine XL 60 milliGRAM(s) Oral two times a day  pantoprazole  Injectable 40 milliGRAM(s) IV Push two times a day  polyethylene glycol 3350 17 Gram(s) Oral two times a day  senna 2 Tablet(s) Oral at bedtime  sucralfate 1 Gram(s) Oral every 6 hours      Physical Exam:  Vital Signs Last 24 Hrs  T(C): 36.8 (01 Aug 2023 12:49), Max: 38.1 (31 Jul 2023 18:29)  T(F): 98.2 (01 Aug 2023 12:49), Max: 100.6 (31 Jul 2023 18:29)  HR: 66 (01 Aug 2023 12:49) (65 - 77)  BP: 158/65 (01 Aug 2023 12:49) (126/61 - 158/65)  BP(mean): --  RR: 17 (01 Aug 2023 12:49) (17 - 18)  SpO2: 97% (01 Aug 2023 12:49) (95% - 97%)    Parameters below as of 31 Jul 2023 18:29  Patient On (Oxygen Delivery Method): room air        General:    NAD,  non toxic, on room air   Head: atraumatic, normocephalic  Eye: normal sclera and conjunctiva  ENT:    no oral lesions, neck supple  Cardio:     regular S1, S2,  no murmur  Respiratory:    clear b/l,    no wheezing  abd:     soft,   BS +,   no tenderness  :   no CVAT,  no suprapubic tenderness,   +negrete with hematuria   Musculoskeletal:   no joint swelling,   no edema, pain upon palpation of both ankles and feet   vascular: no central lines, +PIV   Skin:    no rash  Neurologic:     no focal deficit  psych: normal affect    WBC Count: 21.05 K/uL (08-01 @ 10:05)  WBC Count: 23.01 K/uL (07-31 @ 20:05)  WBC Count: 24.55 K/uL (07-31 @ 07:40)  WBC Count: 24.40 K/uL (07-30 @ 10:45)  WBC Count: 22.68 K/uL (07-30 @ 07:20)  WBC Count: 29.20 K/uL (07-29 @ 13:07)  WBC Count: 28.80 K/uL (07-29 @ 00:15)    Hemoglobin: 8.1 g/dL (08-01 @ 10:05)  Hemoglobin: 7.9 g/dL (07-31 @ 20:05)  Hemoglobin: 6.8 g/dL (07-31 @ 07:40)  Hemoglobin: 7.3 g/dL (07-30 @ 10:45)  Hemoglobin: 6.7 g/dL (07-30 @ 07:20)  Hemoglobin: 7.5 g/dL (07-29 @ 13:07)  Hemoglobin: 8.1 g/dL (07-29 @ 00:15)                            8.1    21.05 )-----------( 506      ( 01 Aug 2023 10:05 )             24.9       08-01    129<L>  |  100  |  75<H>  ----------------------------<  254<H>  4.7   |  19<L>  |  5.38<H>    Ca    8.2<L>      01 Aug 2023 10:05        Urinalysis Basic - ( 01 Aug 2023 10:05 )    Color: x / Appearance: x / SG: x / pH: x  Gluc: 254 mg/dL / Ketone: x  / Bili: x / Urobili: x   Blood: x / Protein: x / Nitrite: x   Leuk Esterase: x / RBC: x / WBC x   Sq Epi: x / Non Sq Epi: x / Bacteria: x          Creatinine Trend: 5.38<--, 5.09<--, 4.73<--, 4.90<--, 4.90<--, 3.10<--      MICROBIOLOGY:  v  Clean Catch Clean Catch (Midstream)  07-30-23   <10,000 CFU/mL Normal Urogenital Nola  --  --      .Blood Blood  07-29-23   No growth at 72 Hours  --  Blood Culture PCR      Clean Catch Clean Catch (Midstream)  07-29-23   >=3 organisms. Probable collection contamination.  --  --    C-Reactive Protein, Serum: 290 (07-30)    SARS-CoV-2 Result: NotDetec (07-29-23 @ 00:47)      RADIOLOGY:    < from: US Duplex Venous Lower Ext Complete, Bilateral (07.29.23 @ 18:45) >  IMPRESSION:  No evidence of deep venous thrombosis in either lower extremity.  Small Baker's cyst bilaterally, right larger than left.  Subcutaneous edema in the right calf.      < from: CT Lumbar Spine No Cont (07.29.23 @ 02:06) >  IMPRESSION:  Thick-walled urinary bladder suspicious for cystitis Correlate with   urinalysis.    Dilated urinary bladder which may be related to voluntary holding versus   urinary retention. Correlate clinically.    Symmetric bilateral perinephric stranding, nonspecific though may   indicate underlying nephritis or nephropathy.    Trace bilateral pleural effusions, trace ascites and diffuse soft tissue   edema.    Postlaminectomy changes with fusion hardware from L2 through L5.   Degenerative changes of the visualized spine. No acute osseous   abnormality of the lumbar spine    < end of copied text >

## 2023-08-01 NOTE — PROGRESS NOTE ADULT - SUBJECTIVE AND OBJECTIVE BOX
Patient seen and examined. Pain overall controlled. Denies f/c, cp sob n/v any other acute complaints.      LABS:                        7.9    23.01 )-----------( 482      ( 31 Jul 2023 20:05 )             24.1     07-31    133<L>  |  107  |  72<H>  ----------------------------<  235<H>  5.2   |  16<L>  |  5.09<H>    Ca    8.3<L>      31 Jul 2023 05:47  Phos  3.9     07-30  Mg     2.4     07-30    TPro  6.4  /  Alb  1.7<L>  /  TBili  0.4  /  DBili  x   /  AST  40<H>  /  ALT  27  /  AlkPhos  102  07-30      Urinalysis Basic - ( 31 Jul 2023 05:47 )    Color: x / Appearance: x / SG: x / pH: x  Gluc: 235 mg/dL / Ketone: x  / Bili: x / Urobili: x   Blood: x / Protein: x / Nitrite: x   Leuk Esterase: x / RBC: x / WBC x   Sq Epi: x / Non Sq Epi: x / Bacteria: x        VITAL SIGNS:  T(C): 37.2 (07-31-23 @ 23:37), Max: 38.1 (07-31-23 @ 18:29)  HR: 71 (07-31-23 @ 23:37) (65 - 71)  BP: 138/61 (07-31-23 @ 23:37) (126/61 - 151/74)  RR: 18 (07-31-23 @ 23:37) (17 - 18)  SpO2: 97% (07-31-23 @ 23:37) (95% - 100%)        Physical Exam:  Gen: NAD  Spine:  incision intact, no surrounding erythema or expressible drainage  No midline TTP C/T/L/S spine; No paraspinal muscle ttp/hypertonicity; No bony step offs  Negative Straight leg raise bilaterally  Negative clonus, Negative babinski, Negative barlow    Motor:                   C5                C6              C7               C8           T1   R            5/5                5/5            5/5             5/5          5/5  L             5/5               5/5             5/5             5/5          5/5                L2             L3             L4               L5            S1  R         5/5           5/5          5/5             4/5           4/5  L          4/5          4/5           4/5             4/5           4/5    *weakness likely 2/2 pain    Sensory:            C5         C6         C7      C8       T1        (0=absent, 1=impaired, 2=normal, NT=not testable)  R         2            2           2        2         2  L          2            2           2        2         2               L2          L3         L4      L5       S1         (0=absent, 1=impaired, 2=normal, NT=not testable)  R         2            1            1        1        1  L          2            1           1        1         1    *sensory exam similar to documented postoperative exam      A/P: 78y Female with BLLE radicular pain s/p L2-5 lami/fus on 7/14/23    Medical comanagement appreciated  Pain control PRN  WBAT/PT/OT with assistive devices as needed  BCx: MRSE,   UA Positive, pending UCx results  Antibiotics per primary team: Zosyn  No plans for advanced imaging of lumbar spine at this time, will monitor for clinical improvement; If develops new onset neurologic deficits, will consider advanced imaging  DVT ppx per primary team  Will discuss with Dr. Richards and advise with changes to plan Patient seen and examined. Pain overall controlled. Endorses bilateral ankle pain likely 2/2 stiffness. Denies f/c, cp sob n/v any other acute complaints.      LABS:                        7.9    23.01 )-----------( 482      ( 31 Jul 2023 20:05 )             24.1     07-31    133<L>  |  107  |  72<H>  ----------------------------<  235<H>  5.2   |  16<L>  |  5.09<H>    Ca    8.3<L>      31 Jul 2023 05:47  Phos  3.9     07-30  Mg     2.4     07-30    TPro  6.4  /  Alb  1.7<L>  /  TBili  0.4  /  DBili  x   /  AST  40<H>  /  ALT  27  /  AlkPhos  102  07-30      Urinalysis Basic - ( 31 Jul 2023 05:47 )    Color: x / Appearance: x / SG: x / pH: x  Gluc: 235 mg/dL / Ketone: x  / Bili: x / Urobili: x   Blood: x / Protein: x / Nitrite: x   Leuk Esterase: x / RBC: x / WBC x   Sq Epi: x / Non Sq Epi: x / Bacteria: x        VITAL SIGNS:  T(C): 37.2 (07-31-23 @ 23:37), Max: 38.1 (07-31-23 @ 18:29)  HR: 71 (07-31-23 @ 23:37) (65 - 71)  BP: 138/61 (07-31-23 @ 23:37) (126/61 - 151/74)  RR: 18 (07-31-23 @ 23:37) (17 - 18)  SpO2: 97% (07-31-23 @ 23:37) (95% - 100%)        Physical Exam:  Gen: NAD  Spine:  incision intact, no surrounding erythema or expressible drainage  No midline TTP C/T/L/S spine; No paraspinal muscle ttp/hypertonicity; No bony step offs  Negative Straight leg raise bilaterally  Negative clonus, Negative babinski, Negative barlow    Motor:                   C5                C6              C7               C8           T1   R            5/5                5/5            5/5             5/5          5/5  L             5/5               5/5             5/5             5/5          5/5                L2             L3             L4               L5            S1  R         5/5           5/5          4/5             4/5           4/5  L          5/5          5/5           4/5             4/5           4/5    *weakness likely 2/2 pain    Sensory:            C5         C6         C7      C8       T1        (0=absent, 1=impaired, 2=normal, NT=not testable)  R         2            2           2        2         2  L          2            2           2        2         2               L2          L3         L4      L5       S1         (0=absent, 1=impaired, 2=normal, NT=not testable)  R         2            1            1        1        1  L          2            1           1        1         1    *sensory exam similar to documented postoperative exam      A/P: 78y Female with BLLE radicular pain s/p L2-5 lami/fus on 7/14/23    Medical comanagement appreciated  Pain control PRN  WBAT  PT/OT with assistive devices as needed, encouraged LE ROM exercises while in bed to prevent stiffness  BCx: MRSE,   UA Positive, pending UCx results  Antibiotics per primary team: Zosyn  No plans for advanced imaging of lumbar spine at this time, will monitor for clinical improvement; If develops new onset neurologic deficits, will consider advanced imaging  DVT ppx per primary team  Will discuss with Dr. Richards and advise with changes to plan

## 2023-08-01 NOTE — PROGRESS NOTE ADULT - SUBJECTIVE AND OBJECTIVE BOX
Patient seen and examined at bedside, reports she is doing "okay".  Complains of bilateral lower extremity pain and weakness.  Remains with negrete draining bloody urine without clots.   Denies abdominal pain, pain with catheter nausea, vomiting fever, chills, SOB, or CP.     MEDICATIONS  (STANDING):  cyanocobalamin 1000 MICROGram(s) Oral daily  dextrose 5%. 1000 milliLiter(s) (50 mL/Hr) IV Continuous <Continuous>  dextrose 50% Injectable 25 Gram(s) IV Push once  folic acid 1 milliGRAM(s) Oral daily  gabapentin 100 milliGRAM(s) Oral two times a day  glucagon  Injectable 1 milliGRAM(s) IntraMuscular once  hemorrhoidal Ointment 1 Application(s) Rectal every 6 hours  hydrALAZINE 75 milliGRAM(s) Oral every 8 hours  insulin lispro (ADMELOG) corrective regimen sliding scale   SubCutaneous Before meals and at bedtime  NIFEdipine XL 60 milliGRAM(s) Oral two times a day  pantoprazole  Injectable 40 milliGRAM(s) IV Push two times a day  piperacillin/tazobactam IVPB.. 3.375 Gram(s) IV Intermittent every 12 hours  senna 2 Tablet(s) Oral at bedtime  sucralfate 1 Gram(s) Oral every 6 hours    MEDICATIONS  (PRN):  acetaminophen     Tablet .. 650 milliGRAM(s) Oral every 6 hours PRN Temp greater or equal to 38C (100.4F), Mild Pain (1 - 3)  dextrose Oral Gel 15 Gram(s) Oral once PRN Blood Glucose LESS THAN 70 milliGRAM(s)/deciliter  melatonin 3 milliGRAM(s) Oral at bedtime PRN Insomnia      Vital Signs Last 24 Hrs  T(C): 36.8 (01 Aug 2023 05:14), Max: 38.1 (31 Jul 2023 18:29)  T(F): 98.2 (01 Aug 2023 05:14), Max: 100.6 (31 Jul 2023 18:29)  HR: 77 (01 Aug 2023 05:14) (65 - 77)  BP: 134/77 (01 Aug 2023 05:14) (126/61 - 151/74)  BP(mean): --  RR: 18 (01 Aug 2023 05:14) (17 - 18)  SpO2: 97% (01 Aug 2023 05:14) (95% - 100%)    Parameters below as of 31 Jul 2023 18:29  Patient On (Oxygen Delivery Method): room air      PHYSICAL EXAM:  General: NAD  Neuro:  Alert & oriented x 3  HEENT: NCAT, EOMI, conjunctiva clear  Lung:  respirations nonlabored, good inspiratory effort  : bladder soft, nonpalpable. Negrete draining bloody urine, no visible clots or sedimentation   Extremities: no pedal edema or calf tenderness noted     I&O's Detail    31 Jul 2023 07:01  -  01 Aug 2023 07:00  --------------------------------------------------------  IN:  Total IN: 0 mL    OUT:    Indwelling Catheter - Urethral (mL): 150 mL  Total OUT: 150 mL    Total NET: -150 mL          LABS:                        8.1    21.05 )-----------( 506      ( 01 Aug 2023 10:05 )             24.9     08-01    129<L>  |  100  |  75<H>  ----------------------------<  254<H>  4.7   |  19<L>  |  5.38<H>    Ca    8.2<L>      01 Aug 2023 10:05        Urinalysis Basic - ( 01 Aug 2023 10:05 )    Color: x / Appearance: x / SG: x / pH: x  Gluc: 254 mg/dL / Ketone: x  / Bili: x / Urobili: x   Blood: x / Protein: x / Nitrite: x   Leuk Esterase: x / RBC: x / WBC x   Sq Epi: x / Non Sq Epi: x / Bacteria: x

## 2023-08-01 NOTE — PROGRESS NOTE ADULT - NSPROGADDITIONALINFOA_GEN_ALL_CORE
Medically active  Transfuse  hemorrhoidal treatment    followup urine culture   ID consulted  monitor Cr
Medically active  abx  monitor CR  Bowel regime  monitor hematuria
Medically active  Transfuse  hemorrhoidal treatment?  Needs BM and PT  followup urine culture and bladder scan for PVR

## 2023-08-01 NOTE — PROGRESS NOTE ADULT - SUBJECTIVE AND OBJECTIVE BOX
Patient seen and examined  today feels better  laughing today  in better spirits  family at bedside  denies any complaints other than usual bilateral numbness      Febrile  white count down to 21  negrete in place :some mild hematuria  vancomycin dced  on iv zosyn  urine cultures negative  bloodcultures benign  although CR still uptrending now  > 5    Review of Systems:  General:denies fever chills, headache, weakness  HEENT: denies blurry vision,diffculty swallowing, difficulty hearing, tinnitus  Cardiovascular: denies chest pain  ,palpitations  Pulmonary:denies shortness of breath, cough, wheezing, hemoptysis  Gastrointestinal: denies abdominal pain, constipation, diarrhea,nausea , vomiting, hematochezia  : denies hematuria, dysuria, or incontinence  Neurological: denies weakness, numbness , tingling, dizziness, tremors  MSK: denies muscle pain, difficulty ambulating, swelling, back pain  skin: denies skin rash, itching, burning, or  skin lesions  Psychiatrical: denies mood disturbances, anxierty, feeling depressed, depression , or difficulty sleeping    Objective:  Vitals  T(C): 36.8 (08-01-23 @ 12:49), Max: 38.1 (07-31-23 @ 18:29)  HR: 66 (08-01-23 @ 12:49) (65 - 77)  BP: 158/65 (08-01-23 @ 12:49) (126/61 - 158/65)  RR: 17 (08-01-23 @ 12:49) (17 - 18)  SpO2: 97% (08-01-23 @ 12:49) (95% - 97%)    Physical Exam:  General: comfortable, no acute distress  HEENT: Atraumatic, no LAD, trachea midline, PERRLA  Cardiovascular: normal s1s2, no murmurs, gallops or fricition rubs  Pulmonary: clear to ausculation Bilaterally, no wheezing , rhonchi  Gastrointestinal: soft non tender non distended, no masses felt, no organomegally  :  Muscloskeletal: no lower extremity edema, intact bilateral lower extremity pulses  Neurological: CN II-12 intact. No focal weakness  Psychiatrical: normal mood, cooperative  SKIN: no rash, lesions or ulcers    Labs:                          8.1    21.05 )-----------( 506      ( 01 Aug 2023 10:05 )             24.9     08-01    129<L>  |  100  |  75<H>  ----------------------------<  254<H>  4.7   |  19<L>  |  5.38<H>    Ca    8.2<L>      01 Aug 2023 10:05              Active Medications  MEDICATIONS  (STANDING):  cyanocobalamin 1000 MICROGram(s) Oral daily  dextrose 5%. 1000 milliLiter(s) (50 mL/Hr) IV Continuous <Continuous>  dextrose 50% Injectable 25 Gram(s) IV Push once  folic acid 1 milliGRAM(s) Oral daily  gabapentin 100 milliGRAM(s) Oral two times a day  glucagon  Injectable 1 milliGRAM(s) IntraMuscular once  hemorrhoidal Ointment 1 Application(s) Rectal every 6 hours  hydrALAZINE 75 milliGRAM(s) Oral every 8 hours  insulin lispro (ADMELOG) corrective regimen sliding scale   SubCutaneous Before meals and at bedtime  NIFEdipine XL 60 milliGRAM(s) Oral two times a day  pantoprazole  Injectable 40 milliGRAM(s) IV Push two times a day  piperacillin/tazobactam IVPB.. 3.375 Gram(s) IV Intermittent every 12 hours  senna 2 Tablet(s) Oral at bedtime  sucralfate 1 Gram(s) Oral every 6 hours    MEDICATIONS  (PRN):  acetaminophen     Tablet .. 650 milliGRAM(s) Oral every 6 hours PRN Temp greater or equal to 38C (100.4F), Mild Pain (1 - 3)  dextrose Oral Gel 15 Gram(s) Oral once PRN Blood Glucose LESS THAN 70 milliGRAM(s)/deciliter  melatonin 3 milliGRAM(s) Oral at bedtime PRN Insomnia

## 2023-08-01 NOTE — PROGRESS NOTE ADULT - ASSESSMENT
78 year old female with PMH of HTN, DM present to the ED for hypoglycemic episode. [facility], she was found unresponsive with a glucose level of 30, she was given 1 amp of glucogan, upon ED arrival FS was 41 with AMS, received Dextrose 50ml.. On 7/14 patient had Laminectomy with fusion L2-4, endorses she has been having dysuria since she [facility] associated with suprapubic pain.  Labs  remarkable for leukocytosis (WBC 28.80) worsen kidney function with Cr 4.90 compared to 7/18 Cr 3.10. Upon evaluation patient was AAOx4, endorses dysuria with lower abd pain, denies fever, headache, dizziness, SOB, chest pain, palpitation or any other acute symptoms.      wbc remains high   creatinine is worsening and has significant hematuria with drop in H/H requiring blood transfusion   Blood cultures 1/4 bottles with MRSE  CT (I personally reviewed) with cystitis   surgical site does not appear to be culprit here but if blood cultures remains positiive will need to pursue   suspect this is a severe case of UTI with urinary retention     8/1: blood cultures were collected yesterday, she remains afebrile urine cultures with no organisms, creatinine still worsening but slowing down, WBC is slightly better, she is s/p a blood transfusion with improvement in her hemoglobin. her main complaint is her numbness and tingling and discomfort in her feet.     Plan:  continue renally dosed zosyn   follow all cultures  repeat blood cultures pending   monitor h/h and transfuse when less the 7.0  trend wbc toward normal   trend creatinine and IVF per renal  vancomycin given x1 dose yesterday and will check vancomycin level today     Discussed with Dr. Bianca Guan, DO  Infectious Disease Attending  Reachable via Microsoft Teams or ID office: [phone]  After 5pm/weekends please call [phone] for all inquiries and new consults

## 2023-08-01 NOTE — PROGRESS NOTE ADULT - ASSESSMENT
75 year old female PMHx CKD4, HTN, DM, s/p laminectomy 7/14, admitted with hypoglycemia (resolved), urinary retention 2/2 hematuria 2/2 UTI. Afebrile overnight, leukocytosis downtrending 23-> 21 on Rocephin, Cr elevated 4.73--> 5.09--> 5.38, hemoglobin improving 6.8-> 7.9->8.1, Ucx normal genital yudith, Bcx +staph epidermidis, negrete draining bloody urine no clots.    - continue with antibiotics per ID  - trend H/H, transfuse PRN  - continue negrete catheter, irrigate BID and PRN  - continue care per primary medical team  - will discuss with urology attending

## 2023-08-02 ENCOUNTER — OUTPATIENT (OUTPATIENT)
Dept: OUTPATIENT SERVICES | Facility: HOSPITAL | Age: 78
LOS: 1 days | End: 2023-08-02

## 2023-08-02 DIAGNOSIS — E16.2 HYPOGLYCEMIA, UNSPECIFIED: ICD-10-CM

## 2023-08-02 DIAGNOSIS — Z98.890 OTHER SPECIFIED POSTPROCEDURAL STATES: Chronic | ICD-10-CM

## 2023-08-02 LAB
ANION GAP SERPL CALC-SCNC: 12 MMOL/L — SIGNIFICANT CHANGE UP (ref 5–17)
BUN SERPL-MCNC: 78 MG/DL — HIGH (ref 7–23)
CALCIUM SERPL-MCNC: 8.3 MG/DL — LOW (ref 8.5–10.1)
CHLORIDE SERPL-SCNC: 100 MMOL/L — SIGNIFICANT CHANGE UP (ref 96–108)
CO2 SERPL-SCNC: 18 MMOL/L — LOW (ref 22–31)
CREAT SERPL-MCNC: 5.35 MG/DL — HIGH (ref 0.5–1.3)
CRP SERPL-MCNC: 222 MG/L — HIGH
EGFR: 8 ML/MIN/1.73M2 — LOW
ERYTHROCYTE [SEDIMENTATION RATE] IN BLOOD: 117 MM/HR — HIGH (ref 0–20)
GLUCOSE BLDC GLUCOMTR-MCNC: 164 MG/DL — HIGH (ref 70–99)
GLUCOSE BLDC GLUCOMTR-MCNC: 192 MG/DL — HIGH (ref 70–99)
GLUCOSE BLDC GLUCOMTR-MCNC: 215 MG/DL — HIGH (ref 70–99)
GLUCOSE BLDC GLUCOMTR-MCNC: 219 MG/DL — HIGH (ref 70–99)
GLUCOSE SERPL-MCNC: 207 MG/DL — HIGH (ref 70–99)
HCT VFR BLD CALC: 26.1 % — LOW (ref 34.5–45)
HGB BLD-MCNC: 8.5 G/DL — LOW (ref 11.5–15.5)
MCHC RBC-ENTMCNC: 27.3 PG — SIGNIFICANT CHANGE UP (ref 27–34)
MCHC RBC-ENTMCNC: 32.6 G/DL — SIGNIFICANT CHANGE UP (ref 32–36)
MCV RBC AUTO: 83.9 FL — SIGNIFICANT CHANGE UP (ref 80–100)
NRBC # BLD: 0 /100 WBCS — SIGNIFICANT CHANGE UP (ref 0–0)
OB PNL STL: NEGATIVE — SIGNIFICANT CHANGE UP
PLATELET # BLD AUTO: 544 K/UL — HIGH (ref 150–400)
POTASSIUM SERPL-MCNC: 4.7 MMOL/L — SIGNIFICANT CHANGE UP (ref 3.5–5.3)
POTASSIUM SERPL-SCNC: 4.7 MMOL/L — SIGNIFICANT CHANGE UP (ref 3.5–5.3)
RBC # BLD: 3.11 M/UL — LOW (ref 3.8–5.2)
RBC # FLD: 13.7 % — SIGNIFICANT CHANGE UP (ref 10.3–14.5)
SODIUM SERPL-SCNC: 130 MMOL/L — LOW (ref 135–145)
VANCOMYCIN FLD-MCNC: 16.5 UG/ML — SIGNIFICANT CHANGE UP
WBC # BLD: 19.97 K/UL — HIGH (ref 3.8–10.5)
WBC # FLD AUTO: 19.97 K/UL — HIGH (ref 3.8–10.5)

## 2023-08-02 PROCEDURE — 99232 SBSQ HOSP IP/OBS MODERATE 35: CPT

## 2023-08-02 RX ORDER — OXYCODONE HYDROCHLORIDE 5 MG/1
5 TABLET ORAL ONCE
Refills: 0 | Status: DISCONTINUED | OUTPATIENT
Start: 2023-08-02 | End: 2023-08-02

## 2023-08-02 RX ORDER — TRAMADOL HYDROCHLORIDE 50 MG/1
25 TABLET ORAL EVERY 8 HOURS
Refills: 0 | Status: DISCONTINUED | OUTPATIENT
Start: 2023-08-02 | End: 2023-08-09

## 2023-08-02 RX ORDER — MORPHINE SULFATE 50 MG/1
1 CAPSULE, EXTENDED RELEASE ORAL EVERY 6 HOURS
Refills: 0 | Status: DISCONTINUED | OUTPATIENT
Start: 2023-08-02 | End: 2023-08-09

## 2023-08-02 RX ADMIN — Medication 1 GRAM(S): at 01:45

## 2023-08-02 RX ADMIN — POLYETHYLENE GLYCOL 3350 17 GRAM(S): 17 POWDER, FOR SOLUTION ORAL at 05:36

## 2023-08-02 RX ADMIN — Medication 2: at 07:49

## 2023-08-02 RX ADMIN — TRAMADOL HYDROCHLORIDE 25 MILLIGRAM(S): 50 TABLET ORAL at 22:26

## 2023-08-02 RX ADMIN — POLYETHYLENE GLYCOL 3350 17 GRAM(S): 17 POWDER, FOR SOLUTION ORAL at 17:09

## 2023-08-02 RX ADMIN — Medication 1 GRAM(S): at 05:39

## 2023-08-02 RX ADMIN — OXYCODONE HYDROCHLORIDE 5 MILLIGRAM(S): 5 TABLET ORAL at 12:05

## 2023-08-02 RX ADMIN — PHENYLEPHRINE-SHARK LIVER OIL-MINERAL OIL-PETROLATUM RECTAL OINTMENT 1 APPLICATION(S): at 21:41

## 2023-08-02 RX ADMIN — Medication 75 MILLIGRAM(S): at 21:41

## 2023-08-02 RX ADMIN — Medication 650 MILLIGRAM(S): at 08:48

## 2023-08-02 RX ADMIN — Medication 1: at 22:27

## 2023-08-02 RX ADMIN — MORPHINE SULFATE 1 MILLIGRAM(S): 50 CAPSULE, EXTENDED RELEASE ORAL at 23:44

## 2023-08-02 RX ADMIN — PANTOPRAZOLE SODIUM 40 MILLIGRAM(S): 20 TABLET, DELAYED RELEASE ORAL at 05:35

## 2023-08-02 RX ADMIN — PHENYLEPHRINE-SHARK LIVER OIL-MINERAL OIL-PETROLATUM RECTAL OINTMENT 1 APPLICATION(S): at 01:45

## 2023-08-02 RX ADMIN — Medication 1 GRAM(S): at 17:07

## 2023-08-02 RX ADMIN — Medication 2: at 11:59

## 2023-08-02 RX ADMIN — Medication 3 MILLIGRAM(S): at 21:41

## 2023-08-02 RX ADMIN — Medication 60 MILLIGRAM(S): at 05:39

## 2023-08-02 RX ADMIN — Medication 1 MILLIGRAM(S): at 12:00

## 2023-08-02 RX ADMIN — Medication 75 MILLIGRAM(S): at 05:39

## 2023-08-02 RX ADMIN — PHENYLEPHRINE-SHARK LIVER OIL-MINERAL OIL-PETROLATUM RECTAL OINTMENT 1 APPLICATION(S): at 05:39

## 2023-08-02 RX ADMIN — GABAPENTIN 100 MILLIGRAM(S): 400 CAPSULE ORAL at 17:26

## 2023-08-02 RX ADMIN — PREGABALIN 1000 MICROGRAM(S): 225 CAPSULE ORAL at 11:59

## 2023-08-02 RX ADMIN — PIPERACILLIN AND TAZOBACTAM 25 GRAM(S): 4; .5 INJECTION, POWDER, LYOPHILIZED, FOR SOLUTION INTRAVENOUS at 05:32

## 2023-08-02 RX ADMIN — Medication 1 GRAM(S): at 21:41

## 2023-08-02 RX ADMIN — Medication 75 MILLIGRAM(S): at 13:41

## 2023-08-02 RX ADMIN — PHENYLEPHRINE-SHARK LIVER OIL-MINERAL OIL-PETROLATUM RECTAL OINTMENT 1 APPLICATION(S): at 17:07

## 2023-08-02 RX ADMIN — PIPERACILLIN AND TAZOBACTAM 25 GRAM(S): 4; .5 INJECTION, POWDER, LYOPHILIZED, FOR SOLUTION INTRAVENOUS at 17:11

## 2023-08-02 RX ADMIN — OXYCODONE HYDROCHLORIDE 5 MILLIGRAM(S): 5 TABLET ORAL at 13:05

## 2023-08-02 RX ADMIN — Medication 650 MILLIGRAM(S): at 07:48

## 2023-08-02 RX ADMIN — Medication 60 MILLIGRAM(S): at 17:08

## 2023-08-02 RX ADMIN — Medication 1: at 17:06

## 2023-08-02 RX ADMIN — PHENYLEPHRINE-SHARK LIVER OIL-MINERAL OIL-PETROLATUM RECTAL OINTMENT 1 APPLICATION(S): at 12:00

## 2023-08-02 RX ADMIN — PANTOPRAZOLE SODIUM 40 MILLIGRAM(S): 20 TABLET, DELAYED RELEASE ORAL at 17:09

## 2023-08-02 RX ADMIN — HEPARIN SODIUM 5000 UNIT(S): 5000 INJECTION INTRAVENOUS; SUBCUTANEOUS at 17:11

## 2023-08-02 RX ADMIN — GABAPENTIN 100 MILLIGRAM(S): 400 CAPSULE ORAL at 05:40

## 2023-08-02 RX ADMIN — TRAMADOL HYDROCHLORIDE 25 MILLIGRAM(S): 50 TABLET ORAL at 23:26

## 2023-08-02 RX ADMIN — Medication 1 GRAM(S): at 12:00

## 2023-08-02 NOTE — PROGRESS NOTE ADULT - ASSESSMENT
78 year old female with PMH of HTN, DM present to the ED for hypoglycemic episode. Patient is from [facility], she was found unresponsive with a glucose level of 30, she was given 1 amp of glucogan, upon ED arrival FS was 41 with AMS, received Dextrose 50ml.. On 7/14 patient had Laminectomy with fusion L2-4, endorses she has been having dysuria since she arrived at [facility] associated with suprapubic pain.  Labs  remarkable for leukocytosis (WBC 28.80) worsen kidney function with Cr 4.90 compared to 7/18 Cr 3.10. Afebrile. Upon evaluation patient was AAOx4, endorses dysuria with lower abd pain, denies fever, headache, dizziness, SOB, chest pain, palpitation or any other acute symptoms.    Patient is being admitted for UTI to be treated with antibiotics.     Hypoglycemia likely due to MOSS.   per my colleague's documentation "Patient is currently on Glipizide 5mg   Patient  was found unresponsive with FS 30 at [facility]  In the ED FS 41 received Dextrose & returned to baseline   s/p dextrose iV  hypogylcemia workup sent  sugars better"    8/2/2023 resolved    UTI (urinary tract infection) with evidence of urinary retention.   per my colleague's documentation  "one  Week+ history of dysuria associated with suprapubic pain   Leukocytosis (WBC 28.80) now 21  s/p Vancomycin  UA is GROSSLY positive and culture is negative, blood culture negative  US renal bladder scan with retention  negrete inserted"    c/w zosyn   hold vanco-- due to     kidney dysfunction      Acute blood loss anemia: presumed due to gross hematuria -s/p prbc on 7/31/2023   continue to  monitor hgb     Lower extremity radiculopathy with hx of L4 lami  patient reports leg pain and numbness of both legs up to the knee    plan: seen by ortho with [facility] for MRI of L spine hopefully to be completed on today         Acute kidney injury superimposed on CKD stage 4 presumed due to UTI and urinary retention and ATN  appreciate renal note  IVF   Monitor kidney function   Avoid Nephrotoxic drugs.    Hypertension.   ·  Continue with - Nifedipine  - Hydralazine   - Monitor.    Diabetes mellitus.   sliding scale   a1c 7.1

## 2023-08-02 NOTE — PROGRESS NOTE ADULT - ASSESSMENT
78 year old female with PMH of HTN, DM present to the ED for hypoglycemic episode. Patient is from [facility], she was found unresponsive with a glucose level of 30, she was given 1 amp of glucogan, upon ED arrival FS was 41 with AMS, received Dextrose 50ml.. On 7/14 patient had Laminectomy with fusion L2-4, endorses she has been having dysuria since she arrived at [facility] associated with suprapubic pain.  Labs  remarkable for leukocytosis (WBC 28.80) worsen kidney function with Cr 4.90 compared to 7/18 Cr 3.10. Upon evaluation patient was AAOx4, endorses dysuria with lower abd pain, denies fever, headache, dizziness, SOB, chest pain, palpitation or any other acute symptoms.      wbc remains high   creatinine is worsening and has significant hematuria with drop in H/H requiring blood transfusion   Blood cultures 1/4 bottles with MRSE  CT (I personally reviewed) with cystitis   surgical site does not appear to be culprit here but if blood cultures remains positiive will need to pursue   suspect this is a severe case of UTI with urinary retention     8/1: blood cultures were collected yesterday, she remains afebrile urine cultures with no organisms, creatinine still worsening but slowing down, WBC is slightly better, she is s/p a blood transfusion with improvement in her hemoglobin. her main complaint is her numbness and tingling and discomfort in her feet.   8/2: afebrile, RA, WBC continues to improve 19.97, Cr about the same 5.35, Vancomycin level is 16.5 today, hold off on any additional vancomycin IV doses, continue Zosyn IV for now, repeat BCs with no growth to date. Pt continues to complain of discomfort in her b/l feet. Bruce with bloody urine, urology is following. Pt needs to MRI of LS per ortho to r/o spinal etiology of urinary retention, will need transfer.       Plan:  continue renally dosed zosyn   hold off on any additional vancomycin doses for now  monitor pt's renal function   follow all cultures  follow repeat blood cultures NGTD  monitor h/h and transfuse when less the 7.0  trend wbc toward normal   trend creatinine and IVF per renal  obtain MRI of LS, will need transfer to tertiary facility   Bruce per urology      discussed with Dr. Guan

## 2023-08-02 NOTE — PROGRESS NOTE ADULT - ASSESSMENT
75 year old female PMHx CKD4, HTN, DM, s/p laminectomy 7/14, admitted with hypoglycemia (resolved), urinary retention 2/2 hematuria 2/2 UTI. Afebrile overnight, leukocytosis downtrending 23-> 21-> 19.97 on Rocephin, Cr elevated 4.73--> 5.09--> 5.38--> 5.35 this AM, hemoglobin improving 6.8-> 7.9->8.1-> 8.5, Ucx normal genital yudith, Bcx +staph epidermidis, negrete draining bloody urine no clots.  ** note incomplete       - continue with antibiotics per ID  - trend H/H, transfuse PRN  - continue negrete catheter, irrigate BID and PRN  - continue care per primary medical team  - will discuss with urology attending        75 year old female PMHx CKD4, HTN, DM, s/p laminectomy 7/14, admitted with hypoglycemia (resolved), urinary retention 2/2 hematuria 2/2 UTI. Afebrile overnight, leukocytosis downtrending 23-> 21-> 19.97 on Rocephin, Cr elevated 4.73--> 5.09--> 5.38--> 5.35 this AM, hemoglobin improving 6.8-> 7.9->8.1-> 8.5, Ucx normal genital yudith, Bcx +staph epidermidis, negrete draining bloody urine no clots.    - continue to monitor urine color and quality   - continue with antibiotics per ID  - trend H/H, transfuse PRN  - continue negrete catheter, irrigate BID and PRN  - continue care per primary medical team  - will discuss with urology attending

## 2023-08-02 NOTE — PROGRESS NOTE ADULT - NS ATTEND AMEND GEN_ALL_CORE FT
I have reviewed all pertinent clinical information and agree with the NP's note.   continue antibiotics   monitor wbc and creatinine   MRI at [facility] or [facility]    Discussed with hospitalist    Román Guan, DO  Infectious Disease Attending  Reachable via Microsoft Teams or ID office: [phone]  After 5pm/weekends please call [phone] for all inquiries and new consults

## 2023-08-02 NOTE — PROGRESS NOTE ADULT - SUBJECTIVE AND OBJECTIVE BOX
KATERINATH, JUNE  MRN-[id number]    Follow Up:  leukocytosis, positive BC, hematuria, urinary retention     Interval History: the pt was seen and examined earlier, no acute distress, complains of pain in her b/l feet. Pt is afebrile, RA, WBC is trending down.     PAST MEDICAL & SURGICAL HISTORY:  HTN (hypertension)      T2DM (type 2 diabetes mellitus)      Diabetes mellitus      Status post D&C          ROS:    [ ] Unobtainable because:  [x ] All other systems negative    Constitutional: no fever, no chills  Head: no trauma  Eyes: no vision changes, no eye pain  ENT:  no sore throat, no rhinorrhea  Cardiovascular:  no chest pain, no palpitation  Respiratory:  no SOB, no cough  GI:  no abd pain, no vomiting, no diarrhea  urinary: no dysuria, no hematuria, no flank pain  musculoskeletal:  b/l feet pain   skin:  no rash  neurology:  no headache, no seizure, no change in mental status  psych: no anxiety, no depression         Allergies  No Known Allergies        ANTIMICROBIALS:  piperacillin/tazobactam IVPB.. 3.375 every 12 hours      OTHER MEDS:  acetaminophen     Tablet .. 650 milliGRAM(s) Oral every 6 hours PRN  cyanocobalamin 1000 MICROGram(s) Oral daily  dextrose 5%. 1000 milliLiter(s) IV Continuous <Continuous>  dextrose 50% Injectable 25 Gram(s) IV Push once  dextrose Oral Gel 15 Gram(s) Oral once PRN  folic acid 1 milliGRAM(s) Oral daily  gabapentin 100 milliGRAM(s) Oral two times a day  glucagon  Injectable 1 milliGRAM(s) IntraMuscular once  hemorrhoidal Ointment 1 Application(s) Rectal every 6 hours  heparin   Injectable 5000 Unit(s) SubCutaneous every 12 hours  hydrALAZINE 75 milliGRAM(s) Oral every 8 hours  insulin lispro (ADMELOG) corrective regimen sliding scale   SubCutaneous Before meals and at bedtime  melatonin 3 milliGRAM(s) Oral at bedtime PRN  NIFEdipine XL 60 milliGRAM(s) Oral two times a day  oxyCODONE    IR 5 milliGRAM(s) Oral once  pantoprazole  Injectable 40 milliGRAM(s) IV Push two times a day  polyethylene glycol 3350 17 Gram(s) Oral two times a day  senna 2 Tablet(s) Oral at bedtime  sucralfate 1 Gram(s) Oral every 6 hours      Vital Signs Last 24 Hrs  T(C): 37.7 (02 Aug 2023 05:10), Max: 37.7 (02 Aug 2023 05:10)  T(F): 99.8 (02 Aug 2023 05:10), Max: 99.8 (02 Aug 2023 05:10)  HR: 66 (02 Aug 2023 05:10) (58 - 66)  BP: 150/66 (02 Aug 2023 05:10) (138/68 - 160/72)  BP(mean): --  RR: 20 (02 Aug 2023 05:10) (17 - 20)  SpO2: 98% (02 Aug 2023 05:10) (96% - 98%)        Physical Exam:  General:    NAD,  non toxic, on room air   Head: atraumatic, normocephalic  Eye: normal sclera and conjunctiva  ENT:    no oral lesions, neck supple  Cardio:     regular S1, S2,  no murmur  Respiratory:    clear b/l,    no wheezing  abd:     soft,   BS +,   no tenderness  :   no CVAT,  no suprapubic tenderness,   +negrete with hematuria   Musculoskeletal:   no joint swelling,   pain upon palpation of both ankles and feet, + edema L>R  vascular: no central lines, +PIV   Skin:    no rash  Neurologic:     no focal deficit  psych: normal affect    WBC Count: 19.97 K/uL (08-02 @ 08:13)  WBC Count: 21.05 K/uL (08-01 @ 10:05)  WBC Count: 23.01 K/uL (07-31 @ 20:05)  WBC Count: 24.55 K/uL (07-31 @ 07:40)  WBC Count: 24.40 K/uL (07-30 @ 10:45)  WBC Count: 22.68 K/uL (07-30 @ 07:20)  WBC Count: 29.20 K/uL (07-29 @ 13:07)                            8.5    19.97 )-----------( 544      ( 02 Aug 2023 08:13 )             26.1       08-02    130<L>  |  100  |  78<H>  ----------------------------<  207<H>  4.7   |  18<L>  |  5.35<H>    Ca    8.3<L>      02 Aug 2023 08:13        Urinalysis Basic - ( 02 Aug 2023 08:13 )    Color: x / Appearance: x / SG: x / pH: x  Gluc: 207 mg/dL / Ketone: x  / Bili: x / Urobili: x   Blood: x / Protein: x / Nitrite: x   Leuk Esterase: x / RBC: x / WBC x   Sq Epi: x / Non Sq Epi: x / Bacteria: x        Creatinine Trend: 5.35<--, 5.38<--, 5.09<--, 4.73<--, 4.90<--, 4.90<--      MICROBIOLOGY:  Vancomycin Level, Random: 16.5 ug/mL (08-02-23 @ 08:13)  Vancomycin Level, Trough: 17.6 ug/mL (08-01-23 @ 14:00)  v  .Blood Blood-Peripheral  07-31-23   No growth at 24 hours  --  --      .Blood Blood-Peripheral  07-31-23   No growth at 24 hours  --  --      Clean Catch Clean Catch (Midstream)  07-30-23   <10,000 CFU/mL Normal Urogenital Nola  --  --      .Blood Blood  07-29-23   No growth at 72 Hours  --  Blood Culture PCR      Clean Catch Clean Catch (Midstream)  07-29-23   >=3 organisms. Probable collection contamination.  --  --    C-Reactive Protein, Serum: 290 (07-30)      SARS-CoV-2 Result: NotDetec (07-29-23 @ 00:47)      RADIOLOGY:

## 2023-08-02 NOTE — PROGRESS NOTE ADULT - SUBJECTIVE AND OBJECTIVE BOX
Patient is a 78y old  Female who presents with a chief complaint of Hypoglycemia & UTI (02 Aug 2023 11:11)      OVERNIGHT EVENTS:      REVIEW OF SYSTEMS: denies chest pain/SOB, diaphoresis, no F/C, cough, dizziness, headache, blurry vision, nausea, vomiting, abdominal pain. Rest unremarkable     MEDICATIONS  (STANDING):  cyanocobalamin 1000 MICROGram(s) Oral daily  dextrose 5%. 1000 milliLiter(s) (50 mL/Hr) IV Continuous <Continuous>  dextrose 50% Injectable 25 Gram(s) IV Push once  folic acid 1 milliGRAM(s) Oral daily  gabapentin 100 milliGRAM(s) Oral two times a day  glucagon  Injectable 1 milliGRAM(s) IntraMuscular once  hemorrhoidal Ointment 1 Application(s) Rectal every 6 hours  heparin   Injectable 5000 Unit(s) SubCutaneous every 12 hours  hydrALAZINE 75 milliGRAM(s) Oral every 8 hours  insulin lispro (ADMELOG) corrective regimen sliding scale   SubCutaneous Before meals and at bedtime  NIFEdipine XL 60 milliGRAM(s) Oral two times a day  pantoprazole  Injectable 40 milliGRAM(s) IV Push two times a day  piperacillin/tazobactam IVPB.. 3.375 Gram(s) IV Intermittent every 12 hours  polyethylene glycol 3350 17 Gram(s) Oral two times a day  senna 2 Tablet(s) Oral at bedtime  sucralfate 1 Gram(s) Oral every 6 hours    MEDICATIONS  (PRN):  acetaminophen     Tablet .. 650 milliGRAM(s) Oral every 6 hours PRN Temp greater or equal to 38C (100.4F), Mild Pain (1 - 3)  dextrose Oral Gel 15 Gram(s) Oral once PRN Blood Glucose LESS THAN 70 milliGRAM(s)/deciliter  melatonin 3 milliGRAM(s) Oral at bedtime PRN Insomnia      Allergies    No Known Allergies    Intolerances        SUBJECTIVE: in bed in NAD, no acute events overnight     T(F): 98.9 (08-02-23 @ 11:57), Max: 99.8 (08-02-23 @ 05:10)  HR: 62 (08-02-23 @ 11:57) (58 - 66)  BP: 146/68 (08-02-23 @ 11:57) (138/68 - 160/72)  RR: 19 (08-02-23 @ 11:57) (17 - 20)  SpO2: 95% (08-02-23 @ 11:57) (95% - 98%)  Wt(kg): --    PHYSICAL EXAM:  GENERAL: NAD, well-groomed, well-developed  HEAD:  Atraumatic, Normocephalic  EYES: EOMI, PERRLA, conjunctiva and sclera clear  ENMT: No tonsillar erythema, exudates, or enlargement; Moist mucous membranes, Good dentition, No lesions  NECK: Supple  CHEST/LUNG: Clear to  auscultation bilaterally; No rales, rhonchi, wheezing, or rubs  bilaterally  HEART: Regular rate and rhythm; No murmurs, rubs, or gallops  ABDOMEN: Soft, Nontender, Nondistended; Bowel sounds present  EXTREMITIES:  2+ Peripheral Pulses, No clubbing, cyanosis, or edema BL LE  SKIN: No rashes or lesions  NERVOUS SYSTEM:  Alert & Oriented X3, Good concentration; Motor Strength 5/5 B/L upper and lower extremities;   DTRs 2+ intact and symmetric, sensation intact BL    LABS:                        8.5    19.97 )-----------( 544      ( 02 Aug 2023 08:13 )             26.1     08-02    130<L>  |  100  |  78<H>  ----------------------------<  207<H>  4.7   |  18<L>  |  5.35<H>    Ca    8.3<L>      02 Aug 2023 08:13    A1C with Estimated Average Glucose (07.30.23 @ 07:20)    A1C with Estimated Average Glucose Result: 7.1: Method: Immunoassay       Reference Range                4.0-5.6%       High risk (prediabetic)        5.7-6.4%       Diabetic, diagnostic             >=6.5%       ADA diabetic treatment goal       <7.0%  The Hemoglobin A1c testing is NGSP-certified.Reference ranges are based  upon the 2010 recommendations of  the American Diabetes Association.  Interpretation may vary for children  and adolescents. %   Estimated Average Glucose: 157: The Estimated Average Glucose (eAG) or Mean Plasma Glucose (MPG) value is  calculated from the hemoglobin A1c value and covers the same time period.   The American Diabetes Association (ADA) and other professional  organizations recommend reporting the eAG with the HgbA1c. mg/dL        Urinalysis Basic - ( 02 Aug 2023 08:13 )    Color: x / Appearance: x / SG: x / pH: x  Gluc: 207 mg/dL / Ketone: x  / Bili: x / Urobili: x   Blood: x / Protein: x / Nitrite: x   Leuk Esterase: x / RBC: x / WBC x   Sq Epi: x / Non Sq Epi: x / Bacteria: x      Cultures;   CAPILLARY BLOOD GLUCOSE      POCT Blood Glucose.: 219 mg/dL (02 Aug 2023 11:36)  POCT Blood Glucose.: 215 mg/dL (02 Aug 2023 07:36)  POCT Blood Glucose.: 245 mg/dL (01 Aug 2023 21:29)  POCT Blood Glucose.: 237 mg/dL (01 Aug 2023 16:45)    Lipid panel:           RADIOLOGY & ADDITIONAL TESTS:      Imaging Personally Reviewed:  [ x] YES      Consultant(s) Notes Reviewed:  [x ] YES     Care Discussed with [x ] Consultants [X ] Patient [x ] Family  [x ]    [x ]  Other; RN     Patient is a 78y old  Female who presents with a chief complaint of Hypoglycemia & UTI (02 Aug 2023 11:11)      OVERNIGHT EVENTS:  none    MEDICATIONS  (STANDING):  cyanocobalamin 1000 MICROGram(s) Oral daily  dextrose 5%. 1000 milliLiter(s) (50 mL/Hr) IV Continuous <Continuous>  dextrose 50% Injectable 25 Gram(s) IV Push once  folic acid 1 milliGRAM(s) Oral daily  gabapentin 100 milliGRAM(s) Oral two times a day  glucagon  Injectable 1 milliGRAM(s) IntraMuscular once  hemorrhoidal Ointment 1 Application(s) Rectal every 6 hours  heparin   Injectable 5000 Unit(s) SubCutaneous every 12 hours  hydrALAZINE 75 milliGRAM(s) Oral every 8 hours  insulin lispro (ADMELOG) corrective regimen sliding scale   SubCutaneous Before meals and at bedtime  NIFEdipine XL 60 milliGRAM(s) Oral two times a day  pantoprazole  Injectable 40 milliGRAM(s) IV Push two times a day  piperacillin/tazobactam IVPB.. 3.375 Gram(s) IV Intermittent every 12 hours  polyethylene glycol 3350 17 Gram(s) Oral two times a day  senna 2 Tablet(s) Oral at bedtime  sucralfate 1 Gram(s) Oral every 6 hours    MEDICATIONS  (PRN):  acetaminophen     Tablet .. 650 milliGRAM(s) Oral every 6 hours PRN Temp greater or equal to 38C (100.4F), Mild Pain (1 - 3)  dextrose Oral Gel 15 Gram(s) Oral once PRN Blood Glucose LESS THAN 70 milliGRAM(s)/deciliter  melatonin 3 milliGRAM(s) Oral at bedtime PRN Insomnia      Allergies    No Known Allergies    Intolerances        SUBJECTIVE: in bed in NAD, no acute events overnight     T(F): 98.9 (08-02-23 @ 11:57), Max: 99.8 (08-02-23 @ 05:10)  HR: 62 (08-02-23 @ 11:57) (58 - 66)  BP: 146/68 (08-02-23 @ 11:57) (138/68 - 160/72)  RR: 19 (08-02-23 @ 11:57) (17 - 20)  SpO2: 95% (08-02-23 @ 11:57) (95% - 98%)  Wt(kg): --    PHYSICAL EXAM:  GENERAL: NAD, well-groomed, well-developed  HEAD:  Atraumatic, Normocephalic  EYES: EOMI, PERRLA, conjunctiva and sclera clear  ENMT: No tonsillar erythema, exudates, or enlargement; Moist mucous membranes, Good dentition, No lesions  NECK: Supple  CHEST/LUNG: Clear to  auscultation bilaterally; No rales, rhonchi, wheezing, or rubs  bilaterally  HEART: Regular rate and rhythm; No murmurs, rubs, or gallops  ABDOMEN: Soft, Nontender, Nondistended; Bowel sounds present  EXTREMITIES:  2+ Peripheral Pulses, No clubbing, cyanosis, or edema BL LE  SKIN: No rashes or lesions  NERVOUS SYSTEM:  Alert & Oriented X3, Good concentration; Motor Strength 4/5 B/L upper and lower extremities; sensation intact but hypersensitive to dorsum and plantar surface of feet   LABS:                        8.5    19.97 )-----------( 544      ( 02 Aug 2023 08:13 )             26.1     08-02    130<L>  |  100  |  78<H>  ----------------------------<  207<H>  4.7   |  18<L>  |  5.35<H>    Ca    8.3<L>      02 Aug 2023 08:13    A1C with Estimated Average Glucose (07.30.23 @ 07:20)    A1C with Estimated Average Glucose Result: 7.1: Method: Immunoassay       Reference Range                4.0-5.6%       High risk (prediabetic)        5.7-6.4%       Diabetic, diagnostic             >=6.5%       ADA diabetic treatment goal       <7.0%  The Hemoglobin A1c testing is NGSP-certified.Reference ranges are based  upon the 2010 recommendations of  the American Diabetes Association.  Interpretation may vary for children  and adolescents. %   Estimated Average Glucose: 157: The Estimated Average Glucose (eAG) or Mean Plasma Glucose (MPG) value is  calculated from the hemoglobin A1c value and covers the same time period.   The American Diabetes Association (ADA) and other professional  organizations recommend reporting the eAG with the HgbA1c. mg/dL        Urinalysis Basic - ( 02 Aug 2023 08:13 )    Color: x / Appearance: x / SG: x / pH: x  Gluc: 207 mg/dL / Ketone: x  / Bili: x / Urobili: x   Blood: x / Protein: x / Nitrite: x   Leuk Esterase: x / RBC: x / WBC x   Sq Epi: x / Non Sq Epi: x / Bacteria: x      Cultures;   CAPILLARY BLOOD GLUCOSE      POCT Blood Glucose.: 219 mg/dL (02 Aug 2023 11:36)  POCT Blood Glucose.: 215 mg/dL (02 Aug 2023 07:36)  POCT Blood Glucose.: 245 mg/dL (01 Aug 2023 21:29)  POCT Blood Glucose.: 237 mg/dL (01 Aug 2023 16:45)    Lipid panel:           RADIOLOGY & ADDITIONAL TESTS:      Imaging Personally Reviewed:  [ x] YES      Consultant(s) Notes Reviewed:  [x ] YES     Care Discussed with [x ] Consultants [X ] Patient [x ] Family  [x ]    [x ]  Other; RN

## 2023-08-02 NOTE — PROGRESS NOTE ADULT - SUBJECTIVE AND OBJECTIVE BOX
Patient seen and examined at bedside.  Complains of bilateral lower extremity pain.  Remains with negrete catheter draining cloudy dark yellow urine with some sedimentation.   Denies abdominal pain, bladder fullness, nausea, vomiting, fever, chills.       MEDICATIONS  (STANDING):  cyanocobalamin 1000 MICROGram(s) Oral daily  dextrose 5%. 1000 milliLiter(s) (50 mL/Hr) IV Continuous <Continuous>  dextrose 50% Injectable 25 Gram(s) IV Push once  folic acid 1 milliGRAM(s) Oral daily  gabapentin 100 milliGRAM(s) Oral two times a day  glucagon  Injectable 1 milliGRAM(s) IntraMuscular once  hemorrhoidal Ointment 1 Application(s) Rectal every 6 hours  heparin   Injectable 5000 Unit(s) SubCutaneous every 12 hours  hydrALAZINE 75 milliGRAM(s) Oral every 8 hours  insulin lispro (ADMELOG) corrective regimen sliding scale   SubCutaneous Before meals and at bedtime  NIFEdipine XL 60 milliGRAM(s) Oral two times a day  oxyCODONE    IR 5 milliGRAM(s) Oral once  pantoprazole  Injectable 40 milliGRAM(s) IV Push two times a day  piperacillin/tazobactam IVPB.. 3.375 Gram(s) IV Intermittent every 12 hours  polyethylene glycol 3350 17 Gram(s) Oral two times a day  senna 2 Tablet(s) Oral at bedtime  sucralfate 1 Gram(s) Oral every 6 hours    MEDICATIONS  (PRN):  acetaminophen     Tablet .. 650 milliGRAM(s) Oral every 6 hours PRN Temp greater or equal to 38C (100.4F), Mild Pain (1 - 3)  dextrose Oral Gel 15 Gram(s) Oral once PRN Blood Glucose LESS THAN 70 milliGRAM(s)/deciliter  melatonin 3 milliGRAM(s) Oral at bedtime PRN Insomnia      Vital Signs Last 24 Hrs  T(C): 37.7 (02 Aug 2023 05:10), Max: 37.7 (02 Aug 2023 05:10)  T(F): 99.8 (02 Aug 2023 05:10), Max: 99.8 (02 Aug 2023 05:10)  HR: 66 (02 Aug 2023 05:10) (58 - 66)  BP: 150/66 (02 Aug 2023 05:10) (138/68 - 160/72)  BP(mean): --  RR: 20 (02 Aug 2023 05:10) (17 - 20)  SpO2: 98% (02 Aug 2023 05:10) (96% - 98%)      PHYSICAL EXAM:  General: NAD, appears comfortable resting in bed  Neuro:  Alert & oriented x 3  HEENT: NCAT, EOMI, conjunctiva clear  Lung: clear to ausculation bilaterally, respirations nonlabored, good inspiratory effort  ; bladder soft, nonpalpable. Negrete draining cloudy dark yellow urine, some sedimentation.   Extremities: no pedal edema or calf tenderness noted     I&O's Detail    01 Aug 2023 07:01  -  02 Aug 2023 07:00  --------------------------------------------------------  IN:  Total IN: 0 mL    OUT:    Indwelling Catheter - Urethral (mL): 450 mL  Total OUT: 450 mL    Total NET: -450 mL          LABS:                        8.5    19.97 )-----------( 544      ( 02 Aug 2023 08:13 )             26.1     08-02    130<L>  |  100  |  78<H>  ----------------------------<  207<H>  4.7   |  18<L>  |  5.35<H>    Ca    8.3<L>      02 Aug 2023 08:13        Urinalysis Basic - ( 02 Aug 2023 08:13 )    Color: x / Appearance: x / SG: x / pH: x  Gluc: 207 mg/dL / Ketone: x  / Bili: x / Urobili: x   Blood: x / Protein: x / Nitrite: x   Leuk Esterase: x / RBC: x / WBC x   Sq Epi: x / Non Sq Epi: x / Bacteria: x          Patient is a 78y Female    - continue local wound care  - antibiotics per ID  - f/u labs  - DVT prophylaxis, Incentive Spirometer, OOB, Ambulating, pain control  - continue current management per medicine  - will discuss with surgical attending Patient seen and examined at bedside.  Complains of bilateral lower extremity pain.  Remains with negrete catheter draining cloudy dark yellow urine with some sedimentation.   Denies abdominal pain, bladder fullness, nausea, vomiting, fever, chills.       MEDICATIONS  (STANDING):  cyanocobalamin 1000 MICROGram(s) Oral daily  dextrose 5%. 1000 milliLiter(s) (50 mL/Hr) IV Continuous <Continuous>  dextrose 50% Injectable 25 Gram(s) IV Push once  folic acid 1 milliGRAM(s) Oral daily  gabapentin 100 milliGRAM(s) Oral two times a day  glucagon  Injectable 1 milliGRAM(s) IntraMuscular once  hemorrhoidal Ointment 1 Application(s) Rectal every 6 hours  heparin   Injectable 5000 Unit(s) SubCutaneous every 12 hours  hydrALAZINE 75 milliGRAM(s) Oral every 8 hours  insulin lispro (ADMELOG) corrective regimen sliding scale   SubCutaneous Before meals and at bedtime  NIFEdipine XL 60 milliGRAM(s) Oral two times a day  oxyCODONE    IR 5 milliGRAM(s) Oral once  pantoprazole  Injectable 40 milliGRAM(s) IV Push two times a day  piperacillin/tazobactam IVPB.. 3.375 Gram(s) IV Intermittent every 12 hours  polyethylene glycol 3350 17 Gram(s) Oral two times a day  senna 2 Tablet(s) Oral at bedtime  sucralfate 1 Gram(s) Oral every 6 hours    MEDICATIONS  (PRN):  acetaminophen     Tablet .. 650 milliGRAM(s) Oral every 6 hours PRN Temp greater or equal to 38C (100.4F), Mild Pain (1 - 3)  dextrose Oral Gel 15 Gram(s) Oral once PRN Blood Glucose LESS THAN 70 milliGRAM(s)/deciliter  melatonin 3 milliGRAM(s) Oral at bedtime PRN Insomnia      Vital Signs Last 24 Hrs  T(C): 37.7 (02 Aug 2023 05:10), Max: 37.7 (02 Aug 2023 05:10)  T(F): 99.8 (02 Aug 2023 05:10), Max: 99.8 (02 Aug 2023 05:10)  HR: 66 (02 Aug 2023 05:10) (58 - 66)  BP: 150/66 (02 Aug 2023 05:10) (138/68 - 160/72)  BP(mean): --  RR: 20 (02 Aug 2023 05:10) (17 - 20)  SpO2: 98% (02 Aug 2023 05:10) (96% - 98%)      PHYSICAL EXAM:  General: NAD, appears comfortable resting in bed  Neuro:  Alert & oriented x 3  HEENT: NCAT, EOMI, conjunctiva clear  Lung: clear to ausculation bilaterally, respirations nonlabored, good inspiratory effort  ; bladder soft, nonpalpable. Negrete draining cloudy dark yellow urine, some sedimentation.   Extremities: no pedal edema or calf tenderness noted     I&O's Detail    01 Aug 2023 07:01  -  02 Aug 2023 07:00  --------------------------------------------------------  IN:  Total IN: 0 mL    OUT:    Indwelling Catheter - Urethral (mL): 450 mL  Total OUT: 450 mL    Total NET: -450 mL          LABS:                        8.5    19.97 )-----------( 544      ( 02 Aug 2023 08:13 )             26.1     08-02    130<L>  |  100  |  78<H>  ----------------------------<  207<H>  4.7   |  18<L>  |  5.35<H>    Ca    8.3<L>      02 Aug 2023 08:13        Urinalysis Basic - ( 02 Aug 2023 08:13 )    Color: x / Appearance: x / SG: x / pH: x  Gluc: 207 mg/dL / Ketone: x  / Bili: x / Urobili: x   Blood: x / Protein: x / Nitrite: x   Leuk Esterase: x / RBC: x / WBC x   Sq Epi: x / Non Sq Epi: x / Bacteria: x

## 2023-08-02 NOTE — PROGRESS NOTE ADULT - SUBJECTIVE AND OBJECTIVE BOX
[facility] NEPHROLOGY SERVICES, [facility]  NEPHROLOGY AND HYPERTENSION  [address]  [address]  [place] [address]  [phone]    MD MAT TUBBS MD YELENA ROSENBERG, MD BINNY KOSHY, MD CHRISTOPHER CAPUTO, MD EDWARD BOVER, MD          Patient events noted  No distress  LE neuopathic pain     MEDICATIONS  (STANDING):  cyanocobalamin 1000 MICROGram(s) Oral daily  dextrose 5%. 1000 milliLiter(s) (50 mL/Hr) IV Continuous <Continuous>  dextrose 50% Injectable 25 Gram(s) IV Push once  folic acid 1 milliGRAM(s) Oral daily  gabapentin 100 milliGRAM(s) Oral two times a day  glucagon  Injectable 1 milliGRAM(s) IntraMuscular once  hemorrhoidal Ointment 1 Application(s) Rectal every 6 hours  heparin   Injectable 5000 Unit(s) SubCutaneous every 12 hours  hydrALAZINE 75 milliGRAM(s) Oral every 8 hours  insulin lispro (ADMELOG) corrective regimen sliding scale   SubCutaneous Before meals and at bedtime  NIFEdipine XL 60 milliGRAM(s) Oral two times a day  pantoprazole  Injectable 40 milliGRAM(s) IV Push two times a day  piperacillin/tazobactam IVPB.. 3.375 Gram(s) IV Intermittent every 12 hours  polyethylene glycol 3350 17 Gram(s) Oral two times a day  senna 2 Tablet(s) Oral at bedtime  sucralfate 1 Gram(s) Oral every 6 hours    MEDICATIONS  (PRN):  acetaminophen     Tablet .. 650 milliGRAM(s) Oral every 6 hours PRN Temp greater or equal to 38C (100.4F), Mild Pain (1 - 3)  dextrose Oral Gel 15 Gram(s) Oral once PRN Blood Glucose LESS THAN 70 milliGRAM(s)/deciliter  melatonin 3 milliGRAM(s) Oral at bedtime PRN Insomnia  morphine  - Injectable 1 milliGRAM(s) IV Push every 6 hours PRN Severe Pain (7 - 10)  traMADol 25 milliGRAM(s) Oral every 8 hours PRN Moderate Pain (4 - 6)      08-01-23 @ 07:01  -  08-02-23 @ 07:00  --------------------------------------------------------  IN: 0 mL / OUT: 450 mL / NET: -450 mL      PHYSICAL EXAM:      T(C): 37.2 (08-02-23 @ 11:57), Max: 37.7 (08-02-23 @ 05:10)  HR: 63 (08-02-23 @ 13:37) (58 - 66)  BP: 152/70 (08-02-23 @ 13:37) (138/74 - 160/72)  RR: 19 (08-02-23 @ 11:57) (17 - 20)  SpO2: 95% (08-02-23 @ 11:57) (95% - 98%)  Wt(kg): --  Lungs clear  Heart S1S2  Abd soft NT ND  Extremities:   1 edema                                    8.5    19.97 )-----------( 544      ( 02 Aug 2023 08:13 )             26.1     08-02    130<L>  |  100  |  78<H>  ----------------------------<  207<H>  4.7   |  18<L>  |  5.35<H>    Ca    8.3<L>      02 Aug 2023 08:13          Creatinine Trend: 5.35<--, 5.38<--, 5.09<--, 4.73<--, 4.90<--, 4.90<--          Assessment   ANASTACIO CKD 4; pyuria and hematuria; gram positive bacteremia   UTI risk for infectious AIN vs infectious GN   Recent Laminectomy 7/14/23  S. Epi bacteremia   Renal indices stabilizing     Plan    Empiric bicarbonate IVF  ID evaluation appreciated   ASO titers, C3  Hold ACE; HCTZ     Adin Jack MD

## 2023-08-02 NOTE — PROGRESS NOTE ADULT - SUBJECTIVE AND OBJECTIVE BOX
Patient seen and examined. Pain overall controlled. Endorses bilateral ankle pain likely 2/2 stiffness. Denies f/c, cp sob n/v any other acute complaints.      LABS:                        8.5    19.97 )-----------( 544      ( 02 Aug 2023 08:13 )             26.1     08-02    130<L>  |  100  |  78<H>  ----------------------------<  207<H>  4.7   |  18<L>  |  5.35<H>    Ca    8.3<L>      02 Aug 2023 08:13        Urinalysis Basic - ( 02 Aug 2023 08:13 )    Color: x / Appearance: x / SG: x / pH: x  Gluc: 207 mg/dL / Ketone: x  / Bili: x / Urobili: x   Blood: x / Protein: x / Nitrite: x   Leuk Esterase: x / RBC: x / WBC x   Sq Epi: x / Non Sq Epi: x / Bacteria: x        VITAL SIGNS:  T(C): 37.7 (08-02-23 @ 05:10), Max: 37.7 (08-02-23 @ 05:10)  HR: 66 (08-02-23 @ 05:10) (58 - 66)  BP: 150/66 (08-02-23 @ 05:10) (138/68 - 160/72)  RR: 20 (08-02-23 @ 05:10) (17 - 20)  SpO2: 98% (08-02-23 @ 05:10) (96% - 98%)    Physical Exam:  Gen: NAD  Spine:  incision intact, no surrounding erythema or expressible drainage  No midline TTP C/T/L/S spine; No paraspinal muscle ttp/hypertonicity; No bony step offs  Negative Straight leg raise bilaterally  Negative clonus, Negative babinski, Negative barlow    Motor:                   C5                C6              C7               C8           T1   R            5/5                5/5            5/5             5/5          5/5  L             5/5               5/5             5/5             5/5          5/5                L2             L3             L4               L5            S1  R         5/5           5/5          4/5             4/5           4/5  L          5/5          5/5           4/5             4/5           4/5    *weakness likely 2/2 pain    Sensory:            C5         C6         C7      C8       T1        (0=absent, 1=impaired, 2=normal, NT=not testable)  R         2            2           2        2         2  L          2            2           2        2         2               L2          L3         L4      L5       S1         (0=absent, 1=impaired, 2=normal, NT=not testable)  R         2            1            1        1        1  L          2            1           1        1         1    *sensory exam similar to documented postoperative exam    + Weak put present active/passive tone, no perianal anesthesia    A/P: 78y Female with BLLE radicular pain s/p L2-5 lami/fus on 7/14/23    Patient having urinary retention; possibly from UTI/Cystitis; Recommend ruling out spinal etiology of retention; Recommend MRI Lumbar Spine; Patient will likely need transfer to another facility for advanced imaging  Medical comanagement appreciated  Pain control PRN  WBAT  PT/OT with assistive devices as needed, encouraged LE ROM exercises while in bed to prevent stiffness  BCx: MRSE,   UA Positive, pending UCx results  Antibiotics per primary team: Zosyn  DVT ppx per primary team  Will discuss with Dr. Richards and advise with changes to plan

## 2023-08-03 LAB
ANION GAP SERPL CALC-SCNC: 10 MMOL/L — SIGNIFICANT CHANGE UP (ref 5–17)
BUN SERPL-MCNC: 76 MG/DL — HIGH (ref 7–23)
CALCIUM SERPL-MCNC: 8.5 MG/DL — SIGNIFICANT CHANGE UP (ref 8.5–10.1)
CHLORIDE SERPL-SCNC: 104 MMOL/L — SIGNIFICANT CHANGE UP (ref 96–108)
CO2 SERPL-SCNC: 18 MMOL/L — LOW (ref 22–31)
CREAT SERPL-MCNC: 4.82 MG/DL — HIGH (ref 0.5–1.3)
CULTURE RESULTS: SIGNIFICANT CHANGE UP
EGFR: 9 ML/MIN/1.73M2 — LOW
GLUCOSE BLDC GLUCOMTR-MCNC: 151 MG/DL — HIGH (ref 70–99)
GLUCOSE BLDC GLUCOMTR-MCNC: 173 MG/DL — HIGH (ref 70–99)
GLUCOSE BLDC GLUCOMTR-MCNC: 188 MG/DL — HIGH (ref 70–99)
GLUCOSE BLDC GLUCOMTR-MCNC: 223 MG/DL — HIGH (ref 70–99)
GLUCOSE SERPL-MCNC: 163 MG/DL — HIGH (ref 70–99)
HCT VFR BLD CALC: 25.2 % — LOW (ref 34.5–45)
HGB BLD-MCNC: 8.4 G/DL — LOW (ref 11.5–15.5)
MAGNESIUM SERPL-MCNC: 2.7 MG/DL — HIGH (ref 1.6–2.6)
MCHC RBC-ENTMCNC: 27.8 PG — SIGNIFICANT CHANGE UP (ref 27–34)
MCHC RBC-ENTMCNC: 33.3 G/DL — SIGNIFICANT CHANGE UP (ref 32–36)
MCV RBC AUTO: 83.4 FL — SIGNIFICANT CHANGE UP (ref 80–100)
NRBC # BLD: 0 /100 WBCS — SIGNIFICANT CHANGE UP (ref 0–0)
PHOSPHATE SERPL-MCNC: 4.6 MG/DL — HIGH (ref 2.5–4.5)
PLATELET # BLD AUTO: 549 K/UL — HIGH (ref 150–400)
POTASSIUM SERPL-MCNC: 4.9 MMOL/L — SIGNIFICANT CHANGE UP (ref 3.5–5.3)
POTASSIUM SERPL-SCNC: 4.9 MMOL/L — SIGNIFICANT CHANGE UP (ref 3.5–5.3)
RBC # BLD: 3.02 M/UL — LOW (ref 3.8–5.2)
RBC # FLD: 14 % — SIGNIFICANT CHANGE UP (ref 10.3–14.5)
SODIUM SERPL-SCNC: 132 MMOL/L — LOW (ref 135–145)
SPECIMEN SOURCE: SIGNIFICANT CHANGE UP
WBC # BLD: 17.17 K/UL — HIGH (ref 3.8–10.5)
WBC # FLD AUTO: 17.17 K/UL — HIGH (ref 3.8–10.5)

## 2023-08-03 PROCEDURE — 99232 SBSQ HOSP IP/OBS MODERATE 35: CPT

## 2023-08-03 PROCEDURE — 72148 MRI LUMBAR SPINE W/O DYE: CPT | Mod: 26

## 2023-08-03 RX ORDER — HYDRALAZINE HCL 50 MG
100 TABLET ORAL THREE TIMES A DAY
Refills: 0 | Status: DISCONTINUED | OUTPATIENT
Start: 2023-08-03 | End: 2023-08-11

## 2023-08-03 RX ADMIN — Medication 2: at 17:15

## 2023-08-03 RX ADMIN — PHENYLEPHRINE-SHARK LIVER OIL-MINERAL OIL-PETROLATUM RECTAL OINTMENT 1 APPLICATION(S): at 06:05

## 2023-08-03 RX ADMIN — GABAPENTIN 100 MILLIGRAM(S): 400 CAPSULE ORAL at 17:17

## 2023-08-03 RX ADMIN — Medication 100 MILLIGRAM(S): at 22:03

## 2023-08-03 RX ADMIN — PANTOPRAZOLE SODIUM 40 MILLIGRAM(S): 20 TABLET, DELAYED RELEASE ORAL at 17:17

## 2023-08-03 RX ADMIN — PIPERACILLIN AND TAZOBACTAM 25 GRAM(S): 4; .5 INJECTION, POWDER, LYOPHILIZED, FOR SOLUTION INTRAVENOUS at 17:17

## 2023-08-03 RX ADMIN — Medication 60 MILLIGRAM(S): at 17:15

## 2023-08-03 RX ADMIN — Medication 3 MILLIGRAM(S): at 21:58

## 2023-08-03 RX ADMIN — MORPHINE SULFATE 1 MILLIGRAM(S): 50 CAPSULE, EXTENDED RELEASE ORAL at 05:32

## 2023-08-03 RX ADMIN — Medication 1 GRAM(S): at 17:15

## 2023-08-03 RX ADMIN — SENNA PLUS 2 TABLET(S): 8.6 TABLET ORAL at 21:57

## 2023-08-03 RX ADMIN — Medication 1 GRAM(S): at 06:04

## 2023-08-03 RX ADMIN — Medication 75 MILLIGRAM(S): at 13:29

## 2023-08-03 RX ADMIN — PIPERACILLIN AND TAZOBACTAM 25 GRAM(S): 4; .5 INJECTION, POWDER, LYOPHILIZED, FOR SOLUTION INTRAVENOUS at 06:08

## 2023-08-03 RX ADMIN — HEPARIN SODIUM 5000 UNIT(S): 5000 INJECTION INTRAVENOUS; SUBCUTANEOUS at 06:08

## 2023-08-03 RX ADMIN — MORPHINE SULFATE 1 MILLIGRAM(S): 50 CAPSULE, EXTENDED RELEASE ORAL at 09:00

## 2023-08-03 RX ADMIN — Medication 650 MILLIGRAM(S): at 01:45

## 2023-08-03 RX ADMIN — Medication 1: at 21:59

## 2023-08-03 RX ADMIN — Medication 1: at 12:19

## 2023-08-03 RX ADMIN — MORPHINE SULFATE 1 MILLIGRAM(S): 50 CAPSULE, EXTENDED RELEASE ORAL at 08:09

## 2023-08-03 RX ADMIN — PREGABALIN 1000 MICROGRAM(S): 225 CAPSULE ORAL at 13:30

## 2023-08-03 RX ADMIN — Medication 1 GRAM(S): at 13:29

## 2023-08-03 RX ADMIN — Medication 1 GRAM(S): at 22:03

## 2023-08-03 RX ADMIN — Medication 75 MILLIGRAM(S): at 06:04

## 2023-08-03 RX ADMIN — Medication 1: at 08:29

## 2023-08-03 RX ADMIN — Medication 60 MILLIGRAM(S): at 06:04

## 2023-08-03 RX ADMIN — POLYETHYLENE GLYCOL 3350 17 GRAM(S): 17 POWDER, FOR SOLUTION ORAL at 17:17

## 2023-08-03 RX ADMIN — GABAPENTIN 100 MILLIGRAM(S): 400 CAPSULE ORAL at 06:04

## 2023-08-03 RX ADMIN — PANTOPRAZOLE SODIUM 40 MILLIGRAM(S): 20 TABLET, DELAYED RELEASE ORAL at 06:05

## 2023-08-03 RX ADMIN — PHENYLEPHRINE-SHARK LIVER OIL-MINERAL OIL-PETROLATUM RECTAL OINTMENT 1 APPLICATION(S): at 12:00

## 2023-08-03 RX ADMIN — Medication 650 MILLIGRAM(S): at 05:32

## 2023-08-03 RX ADMIN — PHENYLEPHRINE-SHARK LIVER OIL-MINERAL OIL-PETROLATUM RECTAL OINTMENT 1 APPLICATION(S): at 17:25

## 2023-08-03 RX ADMIN — HEPARIN SODIUM 5000 UNIT(S): 5000 INJECTION INTRAVENOUS; SUBCUTANEOUS at 17:17

## 2023-08-03 RX ADMIN — Medication 1 MILLIGRAM(S): at 13:30

## 2023-08-03 RX ADMIN — PHENYLEPHRINE-SHARK LIVER OIL-MINERAL OIL-PETROLATUM RECTAL OINTMENT 1 APPLICATION(S): at 22:03

## 2023-08-03 NOTE — PROGRESS NOTE ADULT - SUBJECTIVE AND OBJECTIVE BOX
Patient seen and examined. Pain overall controlled. Endorses bilateral ankle pain likely 2/2 stiffness. No new weakness, numbness, tingling. Denies f/c, cp sob n/v any other acute complaints.    LABS:                        8.5    19.97 )-----------( 544      ( 02 Aug 2023 08:13 )             26.1     08-02    130<L>  |  100  |  78<H>  ----------------------------<  207<H>  4.7   |  18<L>  |  5.35<H>    Ca    8.3<L>      02 Aug 2023 08:13        Urinalysis Basic - ( 02 Aug 2023 08:13 )    Color: x / Appearance: x / SG: x / pH: x  Gluc: 207 mg/dL / Ketone: x  / Bili: x / Urobili: x   Blood: x / Protein: x / Nitrite: x   Leuk Esterase: x / RBC: x / WBC x   Sq Epi: x / Non Sq Epi: x / Bacteria: x        VITAL SIGNS:  T(C): 37.2 (08-03-23 @ 05:06), Max: 37.2 (08-02-23 @ 11:57)  HR: 90 (08-03-23 @ 05:06) (60 - 90)  BP: 155/70 (08-03-23 @ 05:06) (145/65 - 155/70)  RR: 19 (08-03-23 @ 05:06) (17 - 19)  SpO2: 97% (08-03-23 @ 05:06) (94% - 98%)      Physical Exam:  Gen: NAD  Spine:  incision intact, no surrounding erythema or expressible drainage  No midline TTP C/T/L/S spine; No paraspinal muscle ttp/hypertonicity; No bony step offs  Negative Straight leg raise bilaterally  Negative clonus, Negative babinski, Negative barlow    Motor:                   C5                C6              C7               C8           T1   R            5/5                5/5            5/5             5/5          5/5  L             5/5               5/5             5/5             5/5          5/5                L2             L3             L4               L5            S1  R         5/5           5/5          4/5             4/5           4/5  L          5/5          5/5           4/5             4/5           4/5    *weakness likely 2/2 pain    Sensory:            C5         C6         C7      C8       T1        (0=absent, 1=impaired, 2=normal, NT=not testable)  R         2            2           2        2         2  L          2            2           2        2         2               L2          L3         L4      L5       S1         (0=absent, 1=impaired, 2=normal, NT=not testable)  R         2            1            1        1        1  L          2            1           1        1         1    *sensory exam similar to documented postoperative exam        A/P: 78y Female with BLLE radicular pain s/p L2-5 lami/fus on 7/14/23    Patient having urinary retention; possibly from UTI/Cystitis; Recommend ruling out spinal etiology of retention;   Recommend urgent MRI Lumbar Spine; Patient will likely need transfer to another facility for advanced imaging  Medical comanagement appreciated  Pain control PRN  WBAT  PT/OT with assistive devices as needed, encouraged LE ROM exercises while in bed to prevent stiffness  BCx: MRSE,   UA Positive, pending UCx results  Antibiotics per primary team: Zosyn  DVT ppx per primary team  Will discuss with Dr. Richards and advise with changes to plan

## 2023-08-03 NOTE — PROGRESS NOTE ADULT - ASSESSMENT
78 year old female with PMH of HTN, DM present to the ED for hypoglycemic episode. Patient is from [facility], she was found unresponsive with a glucose level of 30, she was given 1 amp of glucogan, upon ED arrival FS was 41 with AMS, received Dextrose 50ml.. On 7/14 patient had Laminectomy with fusion L2-4, endorses she has been having dysuria since she arrived at [facility] associated with suprapubic pain.  Labs  remarkable for leukocytosis (WBC 28.80) worsen kidney function with Cr 4.90 compared to 7/18 Cr 3.10. Upon evaluation patient was AAOx4, endorses dysuria with lower abd pain, denies fever, headache, dizziness, SOB, chest pain, palpitation or any other acute symptoms.      wbc remains high   creatinine is worsening and has significant hematuria with drop in H/H requiring blood transfusion   Blood cultures 1/4 bottles with MRSE  CT (I personally reviewed) with cystitis   surgical site does not appear to be culprit here but if blood cultures remains positiive will need to pursue   suspect this is a severe case of UTI with urinary retention     8/1: blood cultures were collected yesterday, she remains afebrile urine cultures with no organisms, creatinine still worsening but slowing down, WBC is slightly better, she is s/p a blood transfusion with improvement in her hemoglobin. her main complaint is her numbness and tingling and discomfort in her feet.   8/2: afebrile, RA, WBC continues to improve 19.97, Cr about the same 5.35, Vancomycin level is 16.5 today, hold off on any additional vancomycin IV doses, continue Zosyn IV for now, repeat BCs with no growth to date. Pt continues to complain of discomfort in her b/l feet. Bruce with bloody urine, urology is following. Pt needs to MRI of LS per ortho to r/o spinal etiology of urinary retention, will need transfer.   8/3:MRI was able to be performed and showed fluid collection with mild thecal sack impigment, did not appear to be hematoma based on radiologist,wbc getting better, had a BM yesterday, reports less leg pain and discomfort, has good ROM of knees, hips and ankles       Plan:  continue renally dosed zosyn   hold off on any additional vancomycin doses for now  monitor pt's renal function   follow all cultures until final   follow repeat blood cultures NGTD  monitor h/h and transfuse when less the 7.0  trend wbc toward normal   trend creatinine and IVF per renal  Bruce per urology    PT and OT while inpatient     Discussed with Dr. Brian Guan, DO  Infectious Disease Attending  Reachable via Microsoft Teams or ID office: [phone]  After 5pm/weekends please call [phone] for all inquiries and new consults

## 2023-08-03 NOTE — PROGRESS NOTE ADULT - SUBJECTIVE AND OBJECTIVE BOX
Patient seen and examined bedside resting comfortably.  complaining of some pain soles of her feet, denied numbness.  denies abd pain, fevers, n/v/d, hematuria.    T(F): 97.8 (08-03-23 @ 11:15), Max: 98.9 (08-02-23 @ 23:31)  HR: 67 (08-03-23 @ 13:20) (60 - 90)  BP: 153/70 (08-03-23 @ 13:20) (145/62 - 160/59)  RR: 18 (08-03-23 @ 11:15) (17 - 19)  SpO2: 96% (08-03-23 @ 11:15) (94% - 99%)  Wt(kg): --  CAPILLARY BLOOD GLUCOSE      POCT Blood Glucose.: 173 mg/dL (03 Aug 2023 11:57)  POCT Blood Glucose.: 151 mg/dL (03 Aug 2023 08:25)  POCT Blood Glucose.: 164 mg/dL (02 Aug 2023 22:24)  POCT Blood Glucose.: 192 mg/dL (02 Aug 2023 16:52)      PHYSICAL EXAM:  General: NAD, alert and awake  HEENT: NCAT, EOMI, conjunctiva clear  Chest: nonlabored respirations, good inspiratory effort  Abdomen: soft, NTND.   Extremities: no pedal edema or calf tenderness noted   : negrete cath with clear yellow urine with some sedimentation    LABS:                        8.4    17.17 )-----------( 549      ( 03 Aug 2023 12:15 )             25.2   08-03    132<L>  |  104  |  76<H>  ----------------------------<  163<H>  4.9   |  18<L>  |  4.82<H>    Ca    8.5      03 Aug 2023 12:15  Phos  4.6     08-03  Mg     2.7     08-03      I&O's Detail    02 Aug 2023 07:01  -  03 Aug 2023 07:00  --------------------------------------------------------  IN:    Oral Fluid: 180 mL  Total IN: 180 mL    OUT:    Indwelling Catheter - Urethral (mL): 1600 mL  Total OUT: 1600 mL    Total NET: -1420 mL      03 Aug 2023 07:01  -  03 Aug 2023 14:59  --------------------------------------------------------  IN:    Oral Fluid: 240 mL  Total IN: 240 mL    OUT:    Indwelling Catheter - Urethral (mL): 600 mL  Total OUT: 600 mL    Total NET: -360 mL            Plan:  77 yo F pmhx of ht, dm, CKD 4, s/p laminectomy on 7/14 admitted for hematuria, urinary retention and ANASTACIO. 20 Fr negrete placed. Ucx from 7/29 > 3 organisms, blood cx from 7/29+ staph epidermidis. Cr downtrending, WBC downtrending. MRI today revealed fluid collection at lumbar laminectomy L2-L4 that is mildly compressing thesac Patient seen and examined bedside resting comfortably.  complaining of some pain soles of her feet, denied numbness.  denies abd pain, fevers, n/v/d, hematuria.    T(F): 97.8 (08-03-23 @ 11:15), Max: 98.9 (08-02-23 @ 23:31)  HR: 67 (08-03-23 @ 13:20) (60 - 90)  BP: 153/70 (08-03-23 @ 13:20) (145/62 - 160/59)  RR: 18 (08-03-23 @ 11:15) (17 - 19)  SpO2: 96% (08-03-23 @ 11:15) (94% - 99%)  Wt(kg): --  CAPILLARY BLOOD GLUCOSE      POCT Blood Glucose.: 173 mg/dL (03 Aug 2023 11:57)  POCT Blood Glucose.: 151 mg/dL (03 Aug 2023 08:25)  POCT Blood Glucose.: 164 mg/dL (02 Aug 2023 22:24)  POCT Blood Glucose.: 192 mg/dL (02 Aug 2023 16:52)      PHYSICAL EXAM:  General: NAD, alert and awake  HEENT: NCAT, EOMI, conjunctiva clear  Chest: nonlabored respirations, good inspiratory effort  Abdomen: soft, NTND.   Extremities: no pedal edema or calf tenderness noted   : negrete cath with clear yellow urine with some sedimentation    LABS:                        8.4    17.17 )-----------( 549      ( 03 Aug 2023 12:15 )             25.2   08-03    132<L>  |  104  |  76<H>  ----------------------------<  163<H>  4.9   |  18<L>  |  4.82<H>    Ca    8.5      03 Aug 2023 12:15  Phos  4.6     08-03  Mg     2.7     08-03      I&O's Detail    02 Aug 2023 07:01  -  03 Aug 2023 07:00  --------------------------------------------------------  IN:    Oral Fluid: 180 mL  Total IN: 180 mL    OUT:    Indwelling Catheter - Urethral (mL): 1600 mL  Total OUT: 1600 mL    Total NET: -1420 mL      03 Aug 2023 07:01  -  03 Aug 2023 14:59  --------------------------------------------------------  IN:    Oral Fluid: 240 mL  Total IN: 240 mL    OUT:    Indwelling Catheter - Urethral (mL): 600 mL  Total OUT: 600 mL    Total NET: -360 mL            Plan:  79 yo F pmhx of ht, dm, CKD 4, s/p laminectomy on 7/14 admitted for hematuria, urinary retention and ANASTACIO. 20 Fr negrete placed. Ucx from 7/29 > 3 organisms, blood cx from 7/29+ staph epidermidis. Cr downtrending, WBC downtrending. MRI today revealed fluid collection at lumbar laminectomy L2-L4 that is mildly compressing thesac  -c/w zosyn as per ID  -monitor urine color and quality  -trend WBC, Cr, H/H  -Irrigate negrete BID and PRN  -continue care as per medicine team  - will discuss with urology attending  Patient seen and examined bedside resting comfortably.  complaining of some pain soles of her feet, denied numbness.  denies abd pain, fevers, n/v/d, hematuria.    T(F): 97.8 (08-03-23 @ 11:15), Max: 98.9 (08-02-23 @ 23:31)  HR: 67 (08-03-23 @ 13:20) (60 - 90)  BP: 153/70 (08-03-23 @ 13:20) (145/62 - 160/59)  RR: 18 (08-03-23 @ 11:15) (17 - 19)  SpO2: 96% (08-03-23 @ 11:15) (94% - 99%)  Wt(kg): --  CAPILLARY BLOOD GLUCOSE      POCT Blood Glucose.: 173 mg/dL (03 Aug 2023 11:57)  POCT Blood Glucose.: 151 mg/dL (03 Aug 2023 08:25)  POCT Blood Glucose.: 164 mg/dL (02 Aug 2023 22:24)  POCT Blood Glucose.: 192 mg/dL (02 Aug 2023 16:52)      PHYSICAL EXAM:  General: NAD, alert and awake  HEENT: NCAT, EOMI, conjunctiva clear  Chest: nonlabored respirations, good inspiratory effort  Abdomen: soft, NTND.   Extremities: no pedal edema or calf tenderness noted   : negrete cath with clear yellow urine with some sedimentation    LABS:                        8.4    17.17 )-----------( 549      ( 03 Aug 2023 12:15 )             25.2   08-03    132<L>  |  104  |  76<H>  ----------------------------<  163<H>  4.9   |  18<L>  |  4.82<H>    Ca    8.5      03 Aug 2023 12:15  Phos  4.6     08-03  Mg     2.7     08-03      I&O's Detail    02 Aug 2023 07:01  -  03 Aug 2023 07:00  --------------------------------------------------------  IN:    Oral Fluid: 180 mL  Total IN: 180 mL    OUT:    Indwelling Catheter - Urethral (mL): 1600 mL  Total OUT: 1600 mL    Total NET: -1420 mL      03 Aug 2023 07:01  -  03 Aug 2023 14:59  --------------------------------------------------------  IN:    Oral Fluid: 240 mL  Total IN: 240 mL    OUT:    Indwelling Catheter - Urethral (mL): 600 mL  Total OUT: 600 mL    Total NET: -360 mL            Plan:  A/P:  77 yo F pmhx of htn, dm, CKD 4, s/p laminectomy on 7/14 seen with renal failure, gross hematuria , and urinary retention most likely 2/2 hypotonic bladder could be from diabetic neuropathy or from lumbar neuropathy, and infx. managed with negrete cath and abx. renal fx improved, hematuria resolved, 20 Fr negrete placed. Ucx from 7/29 > 3 organisms, 2nd ucx normal yudith, blood cx from 7/29+ staph epidermidis. Cr downtrending, WBC downtrending. MRI today revealed fluid collection at lumbar laminectomy L2-L4 that is mildly compressing the sac    -continue to monitor renal fx with indwelling negrete cath  -continue care as per medicine team  -pt will require eventual cystoscopy, likely as outpt  -all above discussed with Dr Bowman Patient seen and examined bedside resting comfortably.  complaining of some pain soles of her feet, denied numbness.  denies abd pain, fevers, n/v/d, hematuria.    T(F): 97.8 (08-03-23 @ 11:15), Max: 98.9 (08-02-23 @ 23:31)  HR: 67 (08-03-23 @ 13:20) (60 - 90)  BP: 153/70 (08-03-23 @ 13:20) (145/62 - 160/59)  RR: 18 (08-03-23 @ 11:15) (17 - 19)  SpO2: 96% (08-03-23 @ 11:15) (94% - 99%)  Wt(kg): --  CAPILLARY BLOOD GLUCOSE    POCT Blood Glucose.: 173 mg/dL (03 Aug 2023 11:57)  POCT Blood Glucose.: 151 mg/dL (03 Aug 2023 08:25)  POCT Blood Glucose.: 164 mg/dL (02 Aug 2023 22:24)  POCT Blood Glucose.: 192 mg/dL (02 Aug 2023 16:52)      PHYSICAL EXAM:  General: NAD, alert and awake  HEENT: NCAT, EOMI, conjunctiva clear  Chest: nonlabored respirations, good inspiratory effort  Abdomen: soft, NTND.   Extremities: no pedal edema or calf tenderness noted   : negrete cath with clear yellow urine with some sedimentation    LABS:                        8.4    17.17 )-----------( 549      ( 03 Aug 2023 12:15 )             25.2   08-03    132<L>  |  104  |  76<H>  ----------------------------<  163<H>  4.9   |  18<L>  |  4.82<H>    Ca    8.5      03 Aug 2023 12:15  Phos  4.6     08-03  Mg     2.7     08-03      I&O's Detail    02 Aug 2023 07:01  -  03 Aug 2023 07:00  --------------------------------------------------------  IN:    Oral Fluid: 180 mL  Total IN: 180 mL    OUT:    Indwelling Catheter - Urethral (mL): 1600 mL  Total OUT: 1600 mL    Total NET: -1420 mL      03 Aug 2023 07:01  -  03 Aug 2023 14:59  --------------------------------------------------------  IN:    Oral Fluid: 240 mL  Total IN: 240 mL    OUT:    Indwelling Catheter - Urethral (mL): 600 mL  Total OUT: 600 mL    Total NET: -360 mL    A/P: 78F PMH HTN DM CKD 4, s/p laminectomy on 7/14, seen with renal failure, gross hematuria (now resolved), and urinary retention most likely 2/2 hypotonic bladder, s/p negrete catheter placement   Per Dr Bowman, patient may have possible diabetic neuropathy / lumbar neuropathy,   Ucx from 7/29 > 3 organisms, 2nd ucx normal yudith, blood cx from 7/29+ staph epidermidis. Cr downtrending, WBC downtrending.   MRI lumbar spine today revealed fluid collection at lumbar laminectomy L2-L4 that is mildly compressing the sac  -continue to monitor renal fx with indwelling negrete cath  -continue care as per medicine team  -pt will require eventual cystoscopy, likely as outpt  -all above discussed with Dr Bowman

## 2023-08-03 NOTE — PROGRESS NOTE ADULT - ASSESSMENT
78 year old female with PMH of HTN, DM present to the ED for hypoglycemic episode. Patient is from [facility], she was found unresponsive with a glucose level of 30, she was given 1 amp of glucogan, upon ED arrival FS was 41 with AMS, received Dextrose 50ml.. On 7/14 patient had Laminectomy with fusion L2-4, endorses she has been having dysuria since she arrived at [facility] associated with suprapubic pain.  Labs  remarkable for leukocytosis (WBC 28.80) worsen kidney function with Cr 4.90 compared to 7/18 Cr 3.10. Afebrile. Upon evaluation patient was AAOx4, endorses dysuria with lower abd pain, denies fever, headache, dizziness, SOB, chest pain, palpitation or any other acute symptoms.    Patient is being admitted for UTI to be treated with antibiotics.     Hypoglycemia likely due to MOSS.   per my colleague's documentation "Patient is currently on Glipizide 5mg   Patient  was found unresponsive with FS 30 at [facility]  In the ED FS 41 received Dextrose & returned to baseline   s/p dextrose iV  hypogylcemia workup sent  sugars better"    8/2/2023 resolved    UTI (urinary tract infection) with evidence of urinary retention.   per my colleague's documentation  "one  Week+ history of dysuria associated with suprapubic pain   Leukocytosis (WBC 28.80) now 21  s/p Vancomycin  UA is GROSSLY positive and culture is negative, blood culture negative  US renal bladder scan with retention  negrete inserted"    c/w zosyn   hold vanco-- due to     kidney dysfunction  8/3/2023 continue with antibx , f/u cbc      Acute blood loss anemia: presumed due to gross hematuria -s/p prbc on 7/31/2023   continue to  monitor hgb     Lower extremity radiculopathy with hx of L4 lami  patient reports leg pain and numbness of both legs up to the knee    plan: seen by ortho with [facility] for MRI of L spine hopefully to be completed on today     8/3/2023- f/u mri       Acute kidney injury superimposed on CKD stage 4 presumed due to UTI and urinary retention and ATN  appreciate renal note  IVF   Monitor kidney function   Avoid Nephrotoxic drugs.    Hypertension.   ·  Continue with - Nifedipine  - Hydralazine   - Monitor.    Diabetes mellitus.   sliding scale   a1c 7.1

## 2023-08-03 NOTE — PROGRESS NOTE ADULT - SUBJECTIVE AND OBJECTIVE BOX
Patient is a 78y old  Female who presents with a chief complaint of Hypoglycemia & UTI (02 Aug 2023 11:11)      OVERNIGHT EVENTS:  none    MEDICATIONS  (STANDING):  cyanocobalamin 1000 MICROGram(s) Oral daily  dextrose 5%. 1000 milliLiter(s) (50 mL/Hr) IV Continuous <Continuous>  dextrose 50% Injectable 25 Gram(s) IV Push once  folic acid 1 milliGRAM(s) Oral daily  gabapentin 100 milliGRAM(s) Oral two times a day  glucagon  Injectable 1 milliGRAM(s) IntraMuscular once  hemorrhoidal Ointment 1 Application(s) Rectal every 6 hours  heparin   Injectable 5000 Unit(s) SubCutaneous every 12 hours  hydrALAZINE 75 milliGRAM(s) Oral every 8 hours  insulin lispro (ADMELOG) corrective regimen sliding scale   SubCutaneous Before meals and at bedtime  NIFEdipine XL 60 milliGRAM(s) Oral two times a day  pantoprazole  Injectable 40 milliGRAM(s) IV Push two times a day  piperacillin/tazobactam IVPB.. 3.375 Gram(s) IV Intermittent every 12 hours  polyethylene glycol 3350 17 Gram(s) Oral two times a day  senna 2 Tablet(s) Oral at bedtime  sucralfate 1 Gram(s) Oral every 6 hours    MEDICATIONS  (PRN):  acetaminophen     Tablet .. 650 milliGRAM(s) Oral every 6 hours PRN Temp greater or equal to 38C (100.4F), Mild Pain (1 - 3)  dextrose Oral Gel 15 Gram(s) Oral once PRN Blood Glucose LESS THAN 70 milliGRAM(s)/deciliter  melatonin 3 milliGRAM(s) Oral at bedtime PRN Insomnia  morphine  - Injectable 1 milliGRAM(s) IV Push every 6 hours PRN Severe Pain (7 - 10)  traMADol 25 milliGRAM(s) Oral every 8 hours PRN Moderate Pain (4 - 6)    Allergies    No Known Allergies    Intolerances        SUBJECTIVE: in bed in NAD, no acute events overnight     Vital Signs Last 24 Hrs  T(C): 37.2 (03 Aug 2023 05:06), Max: 37.2 (02 Aug 2023 11:57)  T(F): 98.9 (03 Aug 2023 05:06), Max: 98.9 (02 Aug 2023 11:57)  HR: 90 (03 Aug 2023 05:06) (60 - 90)  BP: 155/70 (03 Aug 2023 05:06) (145/65 - 155/70)  BP(mean): --  RR: 19 (03 Aug 2023 05:06) (17 - 19)  SpO2: 97% (03 Aug 2023 05:06) (94% - 98%)    Parameters below as of 02 Aug 2023 21:30  Patient On (Oxygen Delivery Method): room air      PHYSICAL EXAM:  GENERAL: NAD, well-groomed, well-developed  HEAD:  Atraumatic, Normocephalic  EYES: EOMI, PERRLA, conjunctiva and sclera clear  ENMT: No tonsillar erythema, exudates, or enlargement; Moist mucous membranes, Good dentition, No lesions  NECK: Supple  CHEST/LUNG: Clear to  auscultation bilaterally; No rales, rhonchi, wheezing, or rubs  bilaterally  HEART: Regular rate and rhythm; No murmurs, rubs, or gallops  ABDOMEN: Soft, Nontender, Nondistended; Bowel sounds present  EXTREMITIES:  2+ Peripheral Pulses, No clubbing, cyanosis, or edema BL LE  SKIN: No rashes or lesions  NERVOUS SYSTEM:  Alert & Oriented X3, Good concentration; Motor Strength 4/5 B/L upper and lower extremities; sensation intact but hypersensitive to dorsum and plantar surface of feet   LABS:                 A1C with Estimated Average Glucose (07.30.23 @ 07:20)    A1C with Estimated Average Glucose Result: 7.1: Method: Immunoassay       Reference Range                4.0-5.6%       High risk (prediabetic)        5.7-6.4%       Diabetic, diagnostic             >=6.5%       ADA diabetic treatment goal       <7.0%  The Hemoglobin A1c testing is NGSP-certified.Reference ranges are based  upon the 2010 recommendations of  the American Diabetes Association.  Interpretation may vary for children  and adolescents. %   Estimated Average Glucose: 157: The Estimated Average Glucose (eAG) or Mean Plasma Glucose (MPG) value is  calculated from the hemoglobin A1c value and covers the same time period.   The American Diabetes Association (ADA) and other professional  organizations recommend reporting the eAG with the HgbA1c. mg/dL        Urinalysis Basic - ( 02 Aug 2023 08:13 )    Color: x / Appearance: x / SG: x / pH: x  Gluc: 207 mg/dL / Ketone: x  / Bili: x / Urobili: x   Blood: x / Protein: x / Nitrite: x   Leuk Esterase: x / RBC: x / WBC x   Sq Epi: x / Non Sq Epi: x / Bacteria: x      Cultures;   CAPILLARY BLOOD GLUCOSE      POCT Blood Glucose.: 219 mg/dL (02 Aug 2023 11:36)  POCT Blood Glucose.: 215 mg/dL (02 Aug 2023 07:36)  POCT Blood Glucose.: 245 mg/dL (01 Aug 2023 21:29)  POCT Blood Glucose.: 237 mg/dL (01 Aug 2023 16:45)    Lipid panel:           RADIOLOGY & ADDITIONAL TESTS:      Imaging Personally Reviewed:  [ x] YES      Consultant(s) Notes Reviewed:  [x ] YES     Care Discussed with [x ] Consultants [X ] Patient [x ] Family  [x ]    [x ]  Other; RN

## 2023-08-03 NOTE — PROGRESS NOTE ADULT - SUBJECTIVE AND OBJECTIVE BOX
[facility] NEPHROLOGY SERVICES, [facility]  NEPHROLOGY AND HYPERTENSION  [address]  [address]  [place] [address]  [phone]    MD MAT TUBBS MD YELENA ROSENBERG, MD BINNY KOSHY, MD CHRISTOPHER CAPUTO, MD EDWARD BOVER, MD          Patient events noted    MEDICATIONS  (STANDING):  cyanocobalamin 1000 MICROGram(s) Oral daily  dextrose 5%. 1000 milliLiter(s) (50 mL/Hr) IV Continuous <Continuous>  dextrose 50% Injectable 25 Gram(s) IV Push once  folic acid 1 milliGRAM(s) Oral daily  gabapentin 100 milliGRAM(s) Oral two times a day  glucagon  Injectable 1 milliGRAM(s) IntraMuscular once  hemorrhoidal Ointment 1 Application(s) Rectal every 6 hours  heparin   Injectable 5000 Unit(s) SubCutaneous every 12 hours  hydrALAZINE 100 milliGRAM(s) Oral three times a day  insulin lispro (ADMELOG) corrective regimen sliding scale   SubCutaneous Before meals and at bedtime  NIFEdipine XL 60 milliGRAM(s) Oral two times a day  pantoprazole  Injectable 40 milliGRAM(s) IV Push two times a day  piperacillin/tazobactam IVPB.. 3.375 Gram(s) IV Intermittent every 12 hours  polyethylene glycol 3350 17 Gram(s) Oral two times a day  senna 2 Tablet(s) Oral at bedtime  sucralfate 1 Gram(s) Oral every 6 hours    MEDICATIONS  (PRN):  acetaminophen     Tablet .. 650 milliGRAM(s) Oral every 6 hours PRN Temp greater or equal to 38C (100.4F), Mild Pain (1 - 3)  dextrose Oral Gel 15 Gram(s) Oral once PRN Blood Glucose LESS THAN 70 milliGRAM(s)/deciliter  melatonin 3 milliGRAM(s) Oral at bedtime PRN Insomnia  morphine  - Injectable 1 milliGRAM(s) IV Push every 6 hours PRN Severe Pain (7 - 10)  traMADol 25 milliGRAM(s) Oral every 8 hours PRN Moderate Pain (4 - 6)      08-02-23 @ 07:01  -  08-03-23 @ 07:00  --------------------------------------------------------  IN: 180 mL / OUT: 1600 mL / NET: -1420 mL    08-03-23 @ 07:01  -  08-03-23 @ 19:54  --------------------------------------------------------  IN: 240 mL / OUT: 1300 mL / NET: -1060 mL      PHYSICAL EXAM:      T(C): 37.1 (08-03-23 @ 17:04), Max: 37.2 (08-02-23 @ 23:31)  HR: 67 (08-03-23 @ 17:04) (60 - 90)  BP: 154/66 (08-03-23 @ 17:04) (145/62 - 160/59)  RR: 18 (08-03-23 @ 17:04) (17 - 19)  SpO2: 96% (08-03-23 @ 17:04) (94% - 99%)  Wt(kg): --  Lungs clear  Heart S1S2  Abd soft NT ND  Extremities:   tr edema                                    8.4    17.17 )-----------( 549      ( 03 Aug 2023 12:15 )             25.2     08-03    132<L>  |  104  |  76<H>  ----------------------------<  163<H>  4.9   |  18<L>  |  4.82<H>    Ca    8.5      03 Aug 2023 12:15  Phos  4.6     08-03  Mg     2.7     08-03          Creatinine Trend: 4.82<--, 5.35<--, 5.38<--, 5.09<--, 4.73<--, 4.90<--            Assessment   ANASTACIO CKD 4; pyuria and hematuria; gram positive bacteremia   UTI risk for infectious AIN vs infectious GN   Recent Laminectomy 7/14/23  S. Epi bacteremia   Renal indices stabilizing     Plan    Empiric bicarbonate IVF  ID evaluation appreciated   Hold ACE; HCTZ   Adin Jack MD

## 2023-08-03 NOTE — PROGRESS NOTE ADULT - SUBJECTIVE AND OBJECTIVE BOX
[facility]  INFECTIOUS DISEASES   [address] [place] [address]  Tel: [phone]     Fax: [phone]  ======================================================  Arie Braga,MD Román, DO Chyna Guzman, SHARA   ======================================================    MARYAM OCHOA  MRN-[id number]  78y ([birth year])    Interval History: patient seen and examined and reports less pain and numbness in her.     ROS:    [ ] Unobtainable because:  [ x] All other systems negative except as noted    Constitutional: no fever, no chills  Head: no trauma  Eyes: no vision changes, no eye pain  ENT:  no sore throat, no rhinorrhea  Cardiovascular:  no chest pain, no palpitation  Respiratory:  no SOB, no cough  GI:  no abd pain, no vomiting, no diarrhea  urinary: +hematuria, no flank pain  musculoskeletal:  no joint pain, no joint swelling  skin:  no rash  neurology:  no headache, no seizure, no change in mental status  psych: no anxiety, no depression         Allergies  No Known Allergies        ANTIMICROBIALS:  piperacillin/tazobactam IVPB.. 3.375 every 12 hours      OTHER MEDS:  acetaminophen     Tablet .. 650 milliGRAM(s) Oral every 6 hours PRN  cyanocobalamin 1000 MICROGram(s) Oral daily  dextrose 5%. 1000 milliLiter(s) IV Continuous <Continuous>  dextrose 50% Injectable 25 Gram(s) IV Push once  dextrose Oral Gel 15 Gram(s) Oral once PRN  folic acid 1 milliGRAM(s) Oral daily  gabapentin 100 milliGRAM(s) Oral two times a day  glucagon  Injectable 1 milliGRAM(s) IntraMuscular once  hemorrhoidal Ointment 1 Application(s) Rectal every 6 hours  heparin   Injectable 5000 Unit(s) SubCutaneous every 12 hours  hydrALAZINE 100 milliGRAM(s) Oral three times a day  insulin lispro (ADMELOG) corrective regimen sliding scale   SubCutaneous Before meals and at bedtime  melatonin 3 milliGRAM(s) Oral at bedtime PRN  morphine  - Injectable 1 milliGRAM(s) IV Push every 6 hours PRN  NIFEdipine XL 60 milliGRAM(s) Oral two times a day  pantoprazole  Injectable 40 milliGRAM(s) IV Push two times a day  polyethylene glycol 3350 17 Gram(s) Oral two times a day  senna 2 Tablet(s) Oral at bedtime  sucralfate 1 Gram(s) Oral every 6 hours  traMADol 25 milliGRAM(s) Oral every 8 hours PRN      Physical Exam:  Vital Signs Last 24 Hrs  T(C): 37.1 (03 Aug 2023 17:04), Max: 37.2 (02 Aug 2023 23:31)  T(F): 98.8 (03 Aug 2023 17:04), Max: 98.9 (02 Aug 2023 23:31)  HR: 67 (03 Aug 2023 17:04) (60 - 90)  BP: 154/66 (03 Aug 2023 17:04) (145/62 - 160/59)  BP(mean): --  RR: 18 (03 Aug 2023 17:04) (17 - 19)  SpO2: 96% (03 Aug 2023 17:04) (94% - 99%)    Parameters below as of 03 Aug 2023 09:30  Patient On (Oxygen Delivery Method): room air        General:    NAD,  non toxic  Head: atraumatic, normocephalic  Eye: normal sclera and conjunctiva  ENT:    no oral lesions, neck supple  Cardio:     regular S1, S2,  no murmur  Respiratory:    clear b/l,    no wheezing  abd:     soft,   BS +,   no tenderness  :   no CVAT,  no suprapubic tenderness,   +negrete  Musculoskeletal:   no joint swelling,   no edema  vascular: no central lines, +PIV   Skin:    no rash, less sensitivity to bilateral feet   Neurologic:     no focal deficit  psych: normal affect    WBC Count: 17.17 K/uL (08-03 @ 12:15)  WBC Count: 19.97 K/uL (08-02 @ 08:13)  WBC Count: 21.05 K/uL (08-01 @ 10:05)  WBC Count: 23.01 K/uL (07-31 @ 20:05)  WBC Count: 24.55 K/uL (07-31 @ 07:40)  WBC Count: 24.40 K/uL (07-30 @ 10:45)  WBC Count: 22.68 K/uL (07-30 @ 07:20)                            8.4    17.17 )-----------( 549      ( 03 Aug 2023 12:15 )             25.2       08-03    132<L>  |  104  |  76<H>  ----------------------------<  163<H>  4.9   |  18<L>  |  4.82<H>    Ca    8.5      03 Aug 2023 12:15  Phos  4.6     08-03  Mg     2.7     08-03        Urinalysis Basic - ( 03 Aug 2023 12:15 )    Color: x / Appearance: x / SG: x / pH: x  Gluc: 163 mg/dL / Ketone: x  / Bili: x / Urobili: x   Blood: x / Protein: x / Nitrite: x   Leuk Esterase: x / RBC: x / WBC x   Sq Epi: x / Non Sq Epi: x / Bacteria: x          Creatinine Trend: 4.82<--, 5.35<--, 5.38<--, 5.09<--, 4.73<--, 4.90<--      MICROBIOLOGY:  v  .Blood Blood-Peripheral  07-31-23   No growth at 48 Hours  --  --      .Blood Blood-Peripheral  07-31-23   No growth at 48 Hours  --  --      Clean Catch Clean Catch (Midstream)  07-30-23   <10,000 CFU/mL Normal Urogenital Nola  --  --      .Blood Blood  07-29-23   No growth at 5 days  --  Blood Culture PCR      Clean Catch Clean Catch (Midstream)  07-29-23   >=3 organisms. Probable collection contamination.  --  --        C-Reactive Protein, Serum: 222 (08-02)  C-Reactive Protein, Serum: 290 (07-30)    RADIOLOGY:  < from: MR Lumbar Spine No Cont (08.03.23 @ 10:48) >  IMPRESSION: Lumbar laminectomy L2-L4 with fluid collection mildly   compressing the sac. Transpedicular screws and connecting rods L2-L5.    < end of copied text >

## 2023-08-04 LAB
ANION GAP SERPL CALC-SCNC: 8 MMOL/L — SIGNIFICANT CHANGE UP (ref 5–17)
BUN SERPL-MCNC: 73 MG/DL — HIGH (ref 7–23)
CALCIUM SERPL-MCNC: 8.6 MG/DL — SIGNIFICANT CHANGE UP (ref 8.5–10.1)
CHLORIDE SERPL-SCNC: 104 MMOL/L — SIGNIFICANT CHANGE UP (ref 96–108)
CO2 SERPL-SCNC: 19 MMOL/L — LOW (ref 22–31)
CREAT SERPL-MCNC: 4.28 MG/DL — HIGH (ref 0.5–1.3)
EGFR: 10 ML/MIN/1.73M2 — LOW
GLUCOSE BLDC GLUCOMTR-MCNC: 177 MG/DL — HIGH (ref 70–99)
GLUCOSE BLDC GLUCOMTR-MCNC: 181 MG/DL — HIGH (ref 70–99)
GLUCOSE BLDC GLUCOMTR-MCNC: 191 MG/DL — HIGH (ref 70–99)
GLUCOSE BLDC GLUCOMTR-MCNC: 237 MG/DL — HIGH (ref 70–99)
GLUCOSE SERPL-MCNC: 185 MG/DL — HIGH (ref 70–99)
HCT VFR BLD CALC: 26.6 % — LOW (ref 34.5–45)
HGB BLD-MCNC: 8.5 G/DL — LOW (ref 11.5–15.5)
MAGNESIUM SERPL-MCNC: 2.8 MG/DL — HIGH (ref 1.6–2.6)
MCHC RBC-ENTMCNC: 27.2 PG — SIGNIFICANT CHANGE UP (ref 27–34)
MCHC RBC-ENTMCNC: 32 G/DL — SIGNIFICANT CHANGE UP (ref 32–36)
MCV RBC AUTO: 85 FL — SIGNIFICANT CHANGE UP (ref 80–100)
NRBC # BLD: 0 /100 WBCS — SIGNIFICANT CHANGE UP (ref 0–0)
PHOSPHATE SERPL-MCNC: 4.1 MG/DL — SIGNIFICANT CHANGE UP (ref 2.5–4.5)
PLATELET # BLD AUTO: 577 K/UL — HIGH (ref 150–400)
POTASSIUM SERPL-MCNC: 4.7 MMOL/L — SIGNIFICANT CHANGE UP (ref 3.5–5.3)
POTASSIUM SERPL-SCNC: 4.7 MMOL/L — SIGNIFICANT CHANGE UP (ref 3.5–5.3)
RBC # BLD: 3.13 M/UL — LOW (ref 3.8–5.2)
RBC # FLD: 14.1 % — SIGNIFICANT CHANGE UP (ref 10.3–14.5)
SODIUM SERPL-SCNC: 131 MMOL/L — LOW (ref 135–145)
WBC # BLD: 17.2 K/UL — HIGH (ref 3.8–10.5)
WBC # FLD AUTO: 17.2 K/UL — HIGH (ref 3.8–10.5)

## 2023-08-04 PROCEDURE — 99232 SBSQ HOSP IP/OBS MODERATE 35: CPT

## 2023-08-04 RX ADMIN — HEPARIN SODIUM 5000 UNIT(S): 5000 INJECTION INTRAVENOUS; SUBCUTANEOUS at 05:24

## 2023-08-04 RX ADMIN — Medication 1 GRAM(S): at 05:23

## 2023-08-04 RX ADMIN — MORPHINE SULFATE 1 MILLIGRAM(S): 50 CAPSULE, EXTENDED RELEASE ORAL at 06:45

## 2023-08-04 RX ADMIN — Medication 1: at 12:34

## 2023-08-04 RX ADMIN — PHENYLEPHRINE-SHARK LIVER OIL-MINERAL OIL-PETROLATUM RECTAL OINTMENT 1 APPLICATION(S): at 05:23

## 2023-08-04 RX ADMIN — Medication 2: at 16:51

## 2023-08-04 RX ADMIN — TRAMADOL HYDROCHLORIDE 25 MILLIGRAM(S): 50 TABLET ORAL at 11:00

## 2023-08-04 RX ADMIN — Medication 100 MILLIGRAM(S): at 05:22

## 2023-08-04 RX ADMIN — POLYETHYLENE GLYCOL 3350 17 GRAM(S): 17 POWDER, FOR SOLUTION ORAL at 05:23

## 2023-08-04 RX ADMIN — PHENYLEPHRINE-SHARK LIVER OIL-MINERAL OIL-PETROLATUM RECTAL OINTMENT 1 APPLICATION(S): at 12:31

## 2023-08-04 RX ADMIN — PANTOPRAZOLE SODIUM 40 MILLIGRAM(S): 20 TABLET, DELAYED RELEASE ORAL at 05:22

## 2023-08-04 RX ADMIN — SENNA PLUS 2 TABLET(S): 8.6 TABLET ORAL at 21:15

## 2023-08-04 RX ADMIN — Medication 1 GRAM(S): at 17:01

## 2023-08-04 RX ADMIN — Medication 100 MILLIGRAM(S): at 14:32

## 2023-08-04 RX ADMIN — MORPHINE SULFATE 1 MILLIGRAM(S): 50 CAPSULE, EXTENDED RELEASE ORAL at 07:30

## 2023-08-04 RX ADMIN — Medication 1 MILLIGRAM(S): at 12:35

## 2023-08-04 RX ADMIN — PIPERACILLIN AND TAZOBACTAM 25 GRAM(S): 4; .5 INJECTION, POWDER, LYOPHILIZED, FOR SOLUTION INTRAVENOUS at 05:22

## 2023-08-04 RX ADMIN — Medication 1: at 07:53

## 2023-08-04 RX ADMIN — PHENYLEPHRINE-SHARK LIVER OIL-MINERAL OIL-PETROLATUM RECTAL OINTMENT 1 APPLICATION(S): at 17:08

## 2023-08-04 RX ADMIN — Medication 1: at 21:16

## 2023-08-04 RX ADMIN — PIPERACILLIN AND TAZOBACTAM 25 GRAM(S): 4; .5 INJECTION, POWDER, LYOPHILIZED, FOR SOLUTION INTRAVENOUS at 17:05

## 2023-08-04 RX ADMIN — GABAPENTIN 100 MILLIGRAM(S): 400 CAPSULE ORAL at 05:24

## 2023-08-04 RX ADMIN — HEPARIN SODIUM 5000 UNIT(S): 5000 INJECTION INTRAVENOUS; SUBCUTANEOUS at 17:00

## 2023-08-04 RX ADMIN — GABAPENTIN 100 MILLIGRAM(S): 400 CAPSULE ORAL at 17:01

## 2023-08-04 RX ADMIN — Medication 1 GRAM(S): at 12:34

## 2023-08-04 RX ADMIN — PANTOPRAZOLE SODIUM 40 MILLIGRAM(S): 20 TABLET, DELAYED RELEASE ORAL at 17:00

## 2023-08-04 RX ADMIN — TRAMADOL HYDROCHLORIDE 25 MILLIGRAM(S): 50 TABLET ORAL at 10:14

## 2023-08-04 RX ADMIN — Medication 60 MILLIGRAM(S): at 05:23

## 2023-08-04 RX ADMIN — PREGABALIN 1000 MICROGRAM(S): 225 CAPSULE ORAL at 12:34

## 2023-08-04 RX ADMIN — Medication 60 MILLIGRAM(S): at 17:01

## 2023-08-04 RX ADMIN — Medication 100 MILLIGRAM(S): at 21:16

## 2023-08-04 NOTE — PROGRESS NOTE ADULT - SUBJECTIVE AND OBJECTIVE BOX
Patient is a 78y old  Female who presents with a chief complaint of Hypoglycemia & UTI (02 Aug 2023 11:11)      OVERNIGHT EVENTS:  none        MEDICATIONS  (STANDING):  cyanocobalamin 1000 MICROGram(s) Oral daily  dextrose 5%. 1000 milliLiter(s) (50 mL/Hr) IV Continuous <Continuous>  dextrose 50% Injectable 25 Gram(s) IV Push once  folic acid 1 milliGRAM(s) Oral daily  gabapentin 100 milliGRAM(s) Oral two times a day  glucagon  Injectable 1 milliGRAM(s) IntraMuscular once  hemorrhoidal Ointment 1 Application(s) Rectal every 6 hours  heparin   Injectable 5000 Unit(s) SubCutaneous every 12 hours  hydrALAZINE 100 milliGRAM(s) Oral three times a day  insulin lispro (ADMELOG) corrective regimen sliding scale   SubCutaneous Before meals and at bedtime  NIFEdipine XL 60 milliGRAM(s) Oral two times a day  pantoprazole  Injectable 40 milliGRAM(s) IV Push two times a day  piperacillin/tazobactam IVPB.. 3.375 Gram(s) IV Intermittent every 12 hours  polyethylene glycol 3350 17 Gram(s) Oral two times a day  senna 2 Tablet(s) Oral at bedtime  sucralfate 1 Gram(s) Oral every 6 hours    MEDICATIONS  (PRN):  acetaminophen     Tablet .. 650 milliGRAM(s) Oral every 6 hours PRN Temp greater or equal to 38C (100.4F), Mild Pain (1 - 3)  dextrose Oral Gel 15 Gram(s) Oral once PRN Blood Glucose LESS THAN 70 milliGRAM(s)/deciliter  melatonin 3 milliGRAM(s) Oral at bedtime PRN Insomnia  morphine  - Injectable 1 milliGRAM(s) IV Push every 6 hours PRN Severe Pain (7 - 10)  traMADol 25 milliGRAM(s) Oral every 8 hours PRN Moderate Pain (4 - 6)      Allergies    No Known Allergies    Intolerances        SUBJECTIVE: in bed in NAD, no acute events overnight     Vital Signs Last 24 Hrs  T(C): 37.2 (04 Aug 2023 05:11), Max: 37.2 (03 Aug 2023 23:11)  T(F): 98.9 (04 Aug 2023 05:11), Max: 98.9 (03 Aug 2023 23:11)  HR: 62 (04 Aug 2023 05:11) (57 - 67)  BP: 137/71 (04 Aug 2023 05:11) (136/72 - 154/66)  BP(mean): --  RR: 18 (04 Aug 2023 05:11) (18 - 19)  SpO2: 95% (04 Aug 2023 05:11) (95% - 97%)    Parameters below as of 03 Aug 2023 22:02  Patient On (Oxygen Delivery Method): room air      PHYSICAL EXAM:  GENERAL: NAD, well-groomed, well-developed  HEAD:  Atraumatic, Normocephalic  EYES: EOMI, PERRLA, conjunctiva and sclera clear  ENMT: No tonsillar erythema, exudates, or enlargement; Moist mucous membranes, Good dentition, No lesions  NECK: Supple  CHEST/LUNG: Clear to  auscultation bilaterally; No rales, rhonchi, wheezing, or rubs  bilaterally  HEART: Regular rate and rhythm; No murmurs, rubs, or gallops  ABDOMEN: Soft, Nontender, Nondistended; Bowel sounds present  EXTREMITIES:  2+ Peripheral Pulses, No clubbing, cyanosis, or edema BL LE  SKIN: No rashes or lesions  NERVOUS SYSTEM:  Alert & Oriented X3, Good concentration; Motor Strength 4/5 B/L upper and lower extremities; sensation intact but hypersensitive to dorsum and plantar surface of feet       LABS:                                   8.4    17.17 )-----------( 549      ( 03 Aug 2023 12:15 )             25.2       08-03    132<L>  |  104  |  76<H>  ----------------------------<  163<H>  4.9   |  18<L>  |  4.82<H>    Ca    8.5      03 Aug 2023 12:15  Phos  4.6     08-03  Mg     2.7     08-03        A1C with Estimated Average Glucose (07.30.23 @ 07:20)    A1C with Estimated Average Glucose Result: 7.1: Method: Immunoassay       Reference Range                4.0-5.6%       High risk (prediabetic)        5.7-6.4%       Diabetic, diagnostic             >=6.5%       ADA diabetic treatment goal       <7.0%  The Hemoglobin A1c testing is NGSP-certified.Reference ranges are based  upon the 2010 recommendations of  the American Diabetes Association.  Interpretation may vary for children  and adolescents. %   Estimated Average Glucose: 157: The Estimated Average Glucose (eAG) or Mean Plasma Glucose (MPG) value is  calculated from the hemoglobin A1c value and covers the same time period.   The American Diabetes Association (ADA) and other professional  organizations recommend reporting the eAG with the HgbA1c. mg/dL        Urinalysis Basic - ( 02 Aug 2023 08:13 )    Color: x / Appearance: x / SG: x / pH: x  Gluc: 207 mg/dL / Ketone: x  / Bili: x / Urobili: x   Blood: x / Protein: x / Nitrite: x   Leuk Esterase: x / RBC: x / WBC x   Sq Epi: x / Non Sq Epi: x / Bacteria: x      Cultures;     CAPILLARY BLOOD GLUCOSE      POCT Blood Glucose.: 177 mg/dL (04 Aug 2023 07:34)  POCT Blood Glucose.: 188 mg/dL (03 Aug 2023 21:53)  POCT Blood Glucose.: 223 mg/dL (03 Aug 2023 16:40)  POCT Blood Glucose.: 173 mg/dL (03 Aug 2023 11:57)      Lipid panel:           RADIOLOGY & ADDITIONAL TESTS:    < from: MR Lumbar Spine No Cont (08.03.23 @ 10:48) >  IMPRESSION: Lumbar laminectomy L2-L4 with fluid collection mildly   compressing the sac. Transpedicular screws and connecting rods L2-L5.    < end of copied text >      Imaging Personally Reviewed:  [ x] YES      Consultant(s) Notes Reviewed:  [x ] YES     Care Discussed with [x ] Consultants [X ] Patient [x ] Family  [x ]    [x ]  Other; RN

## 2023-08-04 NOTE — PROGRESS NOTE ADULT - SUBJECTIVE AND OBJECTIVE BOX
[facility] NEPHROLOGY SERVICES, [facility]  NEPHROLOGY AND HYPERTENSION  [address]  [address]  [place] [address]  [phone]    MD MAT TUBBS MD YELENA ROSENBERG, MD BINNY KOSHY, MD CHRISTOPHER CAPUTO, MD EDWARD BOVER, MD          Patient events noted  No distress    MEDICATIONS  (STANDING):  cyanocobalamin 1000 MICROGram(s) Oral daily  dextrose 5%. 1000 milliLiter(s) (50 mL/Hr) IV Continuous <Continuous>  dextrose 50% Injectable 25 Gram(s) IV Push once  folic acid 1 milliGRAM(s) Oral daily  gabapentin 100 milliGRAM(s) Oral two times a day  glucagon  Injectable 1 milliGRAM(s) IntraMuscular once  hemorrhoidal Ointment 1 Application(s) Rectal every 6 hours  heparin   Injectable 5000 Unit(s) SubCutaneous every 12 hours  hydrALAZINE 100 milliGRAM(s) Oral three times a day  insulin lispro (ADMELOG) corrective regimen sliding scale   SubCutaneous Before meals and at bedtime  NIFEdipine XL 60 milliGRAM(s) Oral two times a day  pantoprazole  Injectable 40 milliGRAM(s) IV Push two times a day  piperacillin/tazobactam IVPB.. 3.375 Gram(s) IV Intermittent every 12 hours  polyethylene glycol 3350 17 Gram(s) Oral two times a day  senna 2 Tablet(s) Oral at bedtime  sucralfate 1 Gram(s) Oral every 6 hours    MEDICATIONS  (PRN):  acetaminophen     Tablet .. 650 milliGRAM(s) Oral every 6 hours PRN Temp greater or equal to 38C (100.4F), Mild Pain (1 - 3)  dextrose Oral Gel 15 Gram(s) Oral once PRN Blood Glucose LESS THAN 70 milliGRAM(s)/deciliter  melatonin 3 milliGRAM(s) Oral at bedtime PRN Insomnia  morphine  - Injectable 1 milliGRAM(s) IV Push every 6 hours PRN Severe Pain (7 - 10)  traMADol 25 milliGRAM(s) Oral every 8 hours PRN Moderate Pain (4 - 6)      08-03-23 @ 07:01  -  08-04-23 @ 07:00  --------------------------------------------------------  IN: 480 mL / OUT: 1800 mL / NET: -1320 mL    08-04-23 @ 07:01  -  08-04-23 @ 19:15  --------------------------------------------------------  IN: 0 mL / OUT: 400 mL / NET: -400 mL      PHYSICAL EXAM:      T(C): 37.5 (08-04-23 @ 17:26), Max: 37.5 (08-04-23 @ 17:26)  HR: 68 (08-04-23 @ 17:26) (57 - 72)  BP: 131/66 (08-04-23 @ 17:26) (131/66 - 165/67)  RR: 18 (08-04-23 @ 17:26) (17 - 19)  SpO2: 97% (08-04-23 @ 17:26) (95% - 100%)  Wt(kg): --  Lungs clear  Heart S1S2  Abd soft NT ND  Extremities:   1 edema                                    8.5    17.20 )-----------( 577      ( 04 Aug 2023 12:32 )             26.6     08-04    131<L>  |  104  |  73<H>  ----------------------------<  185<H>  4.7   |  19<L>  |  4.28<H>    Ca    8.6      04 Aug 2023 12:32  Phos  4.1     08-04  Mg     2.8     08-04          Creatinine Trend: 4.28<--, 4.82<--, 5.35<--, 5.38<--, 5.09<--, 4.73<--    Assessment   ANASTACIO CKD 4; pyuria and hematuria; gram positive bacteremia   UTI risk for infectious AIN vs infectious GN   Recent Laminectomy 7/14/23  S. Epi bacteremia   Renal indices stabilizing     Plan    Complete IVF   Stable from renal view  Hold ACE; HCTZ at discharge      Adin Jack MD

## 2023-08-04 NOTE — PROGRESS NOTE ADULT - ASSESSMENT
78 year old female with PMH of HTN, DM present to the ED for hypoglycemic episode. Patient is from [facility], she was found unresponsive with a glucose level of 30, she was given 1 amp of glucogan, upon ED arrival FS was 41 with AMS, received Dextrose 50ml.. On 7/14 patient had Laminectomy with fusion L2-4, endorses she has been having dysuria since she arrived at [facility] associated with suprapubic pain.  Labs  remarkable for leukocytosis (WBC 28.80) worsen kidney function with Cr 4.90 compared to 7/18 Cr 3.10. Upon evaluation patient was AAOx4, endorses dysuria with lower abd pain, denies fever, headache, dizziness, SOB, chest pain, palpitation or any other acute symptoms.      wbc remains high   creatinine is worsening and has significant hematuria with drop in H/H requiring blood transfusion   Blood cultures 1/4 bottles with MRSE  CT (I personally reviewed) with cystitis   surgical site does not appear to be culprit here but if blood cultures remains positiive will need to pursue   suspect this is a severe case of UTI with urinary retention     8/1: blood cultures were collected yesterday, she remains afebrile urine cultures with no organisms, creatinine still worsening but slowing down, WBC is slightly better, she is s/p a blood transfusion with improvement in her hemoglobin. her main complaint is her numbness and tingling and discomfort in her feet.   8/2: afebrile, RA, WBC continues to improve 19.97, Cr about the same 5.35, Vancomycin level is 16.5 today, hold off on any additional vancomycin IV doses, continue Zosyn IV for now, repeat BCs with no growth to date. Pt continues to complain of discomfort in her b/l feet. Bruce with bloody urine, urology is following. Pt needs to MRI of LS per ortho to r/o spinal etiology of urinary retention, will need transfer.   8/3: MRI was able to be performed and showed fluid collection with mild thecal sack impingent did not appear to be hematoma based on radiologist, wbc getting better, had a BM yesterday, reports less leg pain and discomfort, has good ROM of knees, hips and ankles   8/4: continue zosyn but ok to change to vantin once daily to complete 10 days,     Plan:  continue renally dosed zosyn   ok to change to vantin 200mg daily to complete 10 days   monitor pt's renal function   follow all cultures until final   follow repeat blood cultures NGTD  monitor h/h and transfuse when less the 7.0  trend wbc toward normal   trend creatinine and IVF per renal  Bruce per urology    PT and OT while inpatient     Discussed with Dr. Brian Guan, DO  Infectious Disease Attending  Reachable via Microsoft Teams or ID office: [phone]  After 5pm/weekends please call [phone] for all inquiries and new consults

## 2023-08-04 NOTE — PROGRESS NOTE ADULT - ASSESSMENT
75 year old female PMHx CKD4, HTN, DM, s/p laminectomy 7/14, admitted with hypoglycemia (resolved), urinary retention 2/2 hematuria 2/2 UTI. Afebrile overnight, leukocytosis downtrending 23-> 21-> 19.97-> 17.7 on Rocephin, Cr elevated but slowly improving 4.73--> 5.09--> 5.38--> 5.35--> 4.82 this AM, Ucx normal genital yudith, Bcx +staph epidermidis, hematuria now resolved, negrete draining yellow urine.     - patient will require cystoscopy, likely outpatient  - possible TOV later this admission if continues to improve and able to ambulate   - continue to monitor urine color and quality   - continue with antibiotics per ID  - trend H/H, transfuse PRN  - continue negrete catheter, irrigate BID and PRN  - continue care per primary medical team  - discussed and seen with Dr. Helm

## 2023-08-04 NOTE — PROGRESS NOTE ADULT - SUBJECTIVE AND OBJECTIVE BOX
Patient seen and examined. Pain overall controlled. Endorses bilateral ankle pain likely 2/2 stiffness. No new weakness, numbness, tingling.     Vital Signs Last 24 Hrs  T(C): 37.2 (03 Aug 2023 23:11), Max: 37.2 (03 Aug 2023 05:06)  T(F): 98.9 (03 Aug 2023 23:11), Max: 98.9 (03 Aug 2023 05:06)  HR: 57 (03 Aug 2023 23:11) (57 - 90)  BP: 136/72 (03 Aug 2023 23:11) (136/72 - 160/59)  BP(mean): --  RR: 19 (03 Aug 2023 23:11) (17 - 19)  SpO2: 97% (03 Aug 2023 23:11) (96% - 99%)    Parameters below as of 03 Aug 2023 22:02  Patient On (Oxygen Delivery Method): room air        Physical Exam:  Gen: NAD  Spine:  incision intact, no surrounding erythema or expressible drainage  Negative clonus, Negative babinski, Negative barlow    Motor:                   C5                C6              C7               C8           T1   R            5/5                5/5            5/5             5/5          5/5  L             5/5               5/5             5/5             5/5          5/5                L2             L3             L4               L5            S1  R         5/5           5/5          4/5             4/5           4/5  L          5/5          5/5           4/5             4/5           4/5    *weakness likely 2/2 pain    Sensory:            C5         C6         C7      C8       T1        (0=absent, 1=impaired, 2=normal, NT=not testable)  R         2            2           2        2         2  L          2            2           2        2         2               L2          L3         L4      L5       S1         (0=absent, 1=impaired, 2=normal, NT=not testable)  R         2            1            1        1        1  L          2            1           1        1         1    *sensory exam similar to documented postoperative exam        A/P: 78y Female with BLLE radicular pain s/p L2-5 lami/fus on 7/14/23    Patient having urinary retention; possibly from UTI/Cystitis; Recommend ruling out spinal etiology of retention;   MRI completed showing normal postop fluid collection with no severe compression on the cord; no acute intervention indicated; stable from orthopedic spine perspective for discharge with outpt   Medical comanagement appreciated  Pain control PRN  WBAT  PT/OT with assistive devices as needed, encouraged LE ROM exercises while in bed to prevent stiffness  BCx: MRSE,   UA Positive, pending UCx results  Antibiotics per primary team: Genaro  DVT ppx per primary team  Will discuss with Dr. Richards and advise with changes to plan   Patient seen and examined. Pain overall controlled. Endorses bilateral ankle pain likely 2/2 stiffness. No new weakness, numbness, tingling.     Vital Signs Last 24 Hrs  T(C): 37.2 (03 Aug 2023 23:11), Max: 37.2 (03 Aug 2023 05:06)  T(F): 98.9 (03 Aug 2023 23:11), Max: 98.9 (03 Aug 2023 05:06)  HR: 57 (03 Aug 2023 23:11) (57 - 90)  BP: 136/72 (03 Aug 2023 23:11) (136/72 - 160/59)  BP(mean): --  RR: 19 (03 Aug 2023 23:11) (17 - 19)  SpO2: 97% (03 Aug 2023 23:11) (96% - 99%)    Parameters below as of 03 Aug 2023 22:02  Patient On (Oxygen Delivery Method): room air        Physical Exam:  Gen: NAD  Spine:  incision intact, no surrounding erythema or expressible drainage  Negative clonus, Negative babinski, Negative barlow    Motor:                   C5                C6              C7               C8           T1   R            5/5                5/5            5/5             5/5          5/5  L             5/5               5/5             5/5             5/5          5/5                L2             L3             L4               L5            S1  R         5/5           5/5          4/5             4/5           4/5  L          5/5          5/5           4/5             4/5           4/5    *weakness likely 2/2 pain    Sensory:            C5         C6         C7      C8       T1        (0=absent, 1=impaired, 2=normal, NT=not testable)  R         2            2           2        2         2  L          2            2           2        2         2               L2          L3         L4      L5       S1         (0=absent, 1=impaired, 2=normal, NT=not testable)  R         2            1            1        1        1  L          2            1           1        1         1    *sensory exam similar to documented postoperative exam        A/P: 78y Female with BLLE radicular pain s/p L2-5 lami/fus on 7/14/23    Patient having urinary retention; possibly from UTI/Cystitis; Recommend ruling out spinal etiology of retention;   MRI completed showing normal postop fluid collection with no severe compression on the cord; no acute intervention indicated; stable from orthopedic spine perspective for discharge with outpt   Medical comanagement appreciated  Pain control PRN  WBAT  PT/OT with assistive devices as needed, encouraged LE ROM exercises while in bed to prevent stiffness  BCx: MRSE,   UA Positive, pending UCx results  Antibiotics per primary team: Zosyn  DVT ppx per primary team  No acute orthopaedic surgical intervention indicated at this time. This patient is orthopaedically stable for discharge.   Patient to follow up with Dr. Bedoya as an outpatient for further evaluation and management.   All of the patient's questions and concerns were answered and addressed.  please reconsult as needed  discussed with Dr. Bedoya who agrees with plan

## 2023-08-04 NOTE — PROGRESS NOTE ADULT - SUBJECTIVE AND OBJECTIVE BOX
[facility]  INFECTIOUS DISEASES   [address] [place] [address]  Tel: [phone]     Fax: [phone]  ======================================================  Desai MD Jonathan Garellek, DO Chyna Guzman, SHARA   ======================================================    MARYAM OCHOA  MRN-[id number]  78y ([birth year])    Interval History: patient seen and examined and reports less pain and numbness in her.     ROS:    [ ] Unobtainable because:  [ x] All other systems negative except as noted    Constitutional: no fever, no chills  Head: no trauma  Eyes: no vision changes, no eye pain  ENT:  no sore throat, no rhinorrhea  Cardiovascular:  no chest pain, no palpitation  Respiratory:  no SOB, no cough  GI:  no abd pain, no vomiting, no diarrhea  urinary: +hematuria, no flank pain  musculoskeletal:  no joint pain, no joint swelling  skin:  no rash  neurology:  no headache, no seizure, no change in mental status  psych: no anxiety, no depression         Allergies  No Known Allergies        ANTIMICROBIALS:    piperacillin/tazobactam IVPB.. 3.375 every 12 hours      MEDICATIONS  (STANDING):  dextrose 50% Injectable 25 once  gabapentin 100 two times a day  glucagon  Injectable 1 once  heparin   Injectable 5000 every 12 hours  hydrALAZINE 100 three times a day  insulin lispro (ADMELOG) corrective regimen sliding scale  Before meals and at bedtime  NIFEdipine XL 60 two times a day  pantoprazole  Injectable 40 two times a day  polyethylene glycol 3350 17 two times a day  senna 2 at bedtime  sucralfate 1 every 6 hours      PRN  acetaminophen     Tablet .. 650 milliGRAM(s) Oral every 6 hours PRN  dextrose Oral Gel 15 Gram(s) Oral once PRN  melatonin 3 milliGRAM(s) Oral at bedtime PRN  morphine  - Injectable 1 milliGRAM(s) IV Push every 6 hours PRN  traMADol 25 milliGRAM(s) Oral every 8 hours PRN      Physical Exam:  Vital Signs Last 24 Hrs  T(F): 98.7 (08-04-23 @ 12:33), Max: 98.9 (08-03-23 @ 23:11)  HR: 70 (08-04-23 @ 12:33)  BP: 152/58 (08-04-23 @ 12:33)  RR: 17 (08-04-23 @ 12:33)  SpO2: 96% (08-04-23 @ 12:33) (95% - 97%)  Wt(kg): --      General:    NAD,  non toxic  Head: atraumatic, normocephalic  Eye: normal sclera and conjunctiva  ENT:    no oral lesions, neck supple  Cardio:     regular S1, S2,  no murmur  Respiratory:    clear b/l,    no wheezing  abd:     soft,   BS +,   no tenderness  :   no CVAT,  no suprapubic tenderness,   +negrete  Musculoskeletal:   no joint swelling,   no edema  vascular: no central lines, +PIV   Skin:    no rash, less sensitivity to bilateral feet   Neurologic:     no focal deficit  psych: normal affect      WBC Count: 17.20 K/uL (08-04 @ 12:32)  WBC Count: 17.17 K/uL (08-03 @ 12:15)  WBC Count: 19.97 K/uL (08-02 @ 08:13)  WBC Count: 21.05 K/uL (08-01 @ 10:05)  WBC Count: 23.01 K/uL (07-31 @ 20:05)  WBC Count: 24.55 K/uL (07-31 @ 07:40)  WBC Count: 24.40 K/uL (07-30 @ 10:45)                            8.5    17.20 )-----------( 577      ( 04 Aug 2023 12:32 )             26.6       08-04    131<L>  |  104  |  73<H>  ----------------------------<  185<H>  4.7   |  19<L>  |  4.28<H>    Ca    8.6      04 Aug 2023 12:32  Phos  4.1     08-04  Mg     2.8     08-04        Creatinine Trend: 4.28<--, 4.82<--, 5.35<--, 5.38<--, 5.09<--, 4.73<--        MICROBIOLOGY:  v  .Blood Blood-Peripheral  07-31-23   No growth at 48 Hours  --  --      .Blood Blood-Peripheral  07-31-23   No growth at 48 Hours  --  --      Clean Catch Clean Catch (Midstream)  07-30-23   <10,000 CFU/mL Normal Urogenital Nola  --  --      .Blood Blood  07-29-23   No growth at 5 days  --  Blood Culture PCR      Clean Catch Clean Catch (Midstream)  07-29-23   >=3 organisms. Probable collection contamination.  --  --        C-Reactive Protein, Serum: 222 (08-02)  C-Reactive Protein, Serum: 290 (07-30)    RADIOLOGY:  < from: MR Lumbar Spine No Cont (08.03.23 @ 10:48) >  IMPRESSION: Lumbar laminectomy L2-L4 with fluid collection mildly   compressing the sac. Transpedicular screws and connecting rods L2-L5.    < end of copied text >

## 2023-08-04 NOTE — PROGRESS NOTE ADULT - ASSESSMENT
78 year old female with PMH of HTN, DM present to the ED for hypoglycemic episode. Patient is from [facility], she was found unresponsive with a glucose level of 30, she was given 1 amp of glucogan, upon ED arrival FS was 41 with AMS, received Dextrose 50ml.. On 7/14 patient had Laminectomy with fusion L2-4, endorses she has been having dysuria since she arrived at [facility] associated with suprapubic pain.  Labs  remarkable for leukocytosis (WBC 28.80) worsen kidney function with Cr 4.90 compared to 7/18 Cr 3.10. Afebrile. Upon evaluation patient was AAOx4, endorses dysuria with lower abd pain, denies fever, headache, dizziness, SOB, chest pain, palpitation or any other acute symptoms.    Patient is being admitted for UTI to be treated with antibiotics.     Hypoglycemia likely due to MOSS.   per my colleague's documentation "Patient is currently on Glipizide 5mg   Patient  was found unresponsive with FS 30 at [facility]  In the ED FS 41 received Dextrose & returned to baseline   s/p dextrose iV  hypogylcemia workup sent  sugars better"    8/2/2023 resolved    UTI (urinary tract infection) with evidence of urinary retention.   per my colleague's documentation  "one  Week+ history of dysuria associated with suprapubic pain   Leukocytosis (WBC 28.80) now 21  s/p Vancomycin  UA is GROSSLY positive and culture is negative, blood culture negative  US renal bladder scan with retention  negrete inserted"    c/w zosyn   hold vanco-- due to     kidney dysfunction  8/3/2023 continue with antibx , f/u cbc      Acute blood loss anemia: presumed due to gross hematuria -s/p prbc on 7/31/2023   continue to  monitor hgb     Lower extremity radiculopathy with hx of L4 lami  patient reports leg pain and numbness of both legs up to the knee    plan: seen by ortho with [facility] for MRI of L spine hopefully to be completed on today     8/3/2023- f/u mri   8/4/2023 - mri  as above - case d/w ID and ortho feel its post operative changes .   titrate gabapentin as creatinine allows      Acute kidney injury superimposed on CKD stage 4 presumed due to UTI and urinary retention and ATN  appreciate renal note  IVF   Monitor kidney function   Avoid Nephrotoxic drugs.  8/4/2023 f/u creatinine today will d/w renal     Hypertension.   ·  Continue with - Nifedipine  - Hydralazine   - Monitor.    Diabetes mellitus.   sliding scale   a1c 7.1

## 2023-08-04 NOTE — PROGRESS NOTE ADULT - SUBJECTIVE AND OBJECTIVE BOX
Patient seen and examined at bedside with Dr. Helm, complains of bilateral foot pain.  Remains with negrete catheter draining clear yellow urine with some sedimentation.  Denies abdominal pain, nausea, vomiting, fever, chills.      MEDICATIONS  (STANDING):  cyanocobalamin 1000 MICROGram(s) Oral daily  dextrose 5%. 1000 milliLiter(s) (50 mL/Hr) IV Continuous <Continuous>  dextrose 50% Injectable 25 Gram(s) IV Push once  folic acid 1 milliGRAM(s) Oral daily  gabapentin 100 milliGRAM(s) Oral two times a day  glucagon  Injectable 1 milliGRAM(s) IntraMuscular once  hemorrhoidal Ointment 1 Application(s) Rectal every 6 hours  heparin   Injectable 5000 Unit(s) SubCutaneous every 12 hours  hydrALAZINE 100 milliGRAM(s) Oral three times a day  insulin lispro (ADMELOG) corrective regimen sliding scale   SubCutaneous Before meals and at bedtime  NIFEdipine XL 60 milliGRAM(s) Oral two times a day  pantoprazole  Injectable 40 milliGRAM(s) IV Push two times a day  piperacillin/tazobactam IVPB.. 3.375 Gram(s) IV Intermittent every 12 hours  polyethylene glycol 3350 17 Gram(s) Oral two times a day  senna 2 Tablet(s) Oral at bedtime  sucralfate 1 Gram(s) Oral every 6 hours    MEDICATIONS  (PRN):  acetaminophen     Tablet .. 650 milliGRAM(s) Oral every 6 hours PRN Temp greater or equal to 38C (100.4F), Mild Pain (1 - 3)  dextrose Oral Gel 15 Gram(s) Oral once PRN Blood Glucose LESS THAN 70 milliGRAM(s)/deciliter  melatonin 3 milliGRAM(s) Oral at bedtime PRN Insomnia  morphine  - Injectable 1 milliGRAM(s) IV Push every 6 hours PRN Severe Pain (7 - 10)  traMADol 25 milliGRAM(s) Oral every 8 hours PRN Moderate Pain (4 - 6)      Vital Signs Last 24 Hrs  T(C): 37.2 (04 Aug 2023 05:11), Max: 37.2 (03 Aug 2023 23:11)  T(F): 98.9 (04 Aug 2023 05:11), Max: 98.9 (03 Aug 2023 23:11)  HR: 62 (04 Aug 2023 05:11) (57 - 67)  BP: 137/71 (04 Aug 2023 05:11) (136/72 - 154/66)  BP(mean): --  RR: 18 (04 Aug 2023 05:11) (18 - 19)  SpO2: 95% (04 Aug 2023 05:11) (95% - 97%)    Parameters below as of 03 Aug 2023 22:02  Patient On (Oxygen Delivery Method): room air      PHYSICAL EXAM:  General: NAD  Neuro:  Alert & oriented x 3  HEENT: NCAT, EOMI, conjunctiva clear  Lung:  respirations nonlabored, good inspiratory effort  : bladder soft, nonpalpable. Negrete draining yellow urine with some sedimentation, draining to gravity.  Extremities: no pedal edema or calf tenderness noted     I&O's Detail    03 Aug 2023 07:01  -  04 Aug 2023 07:00  --------------------------------------------------------  IN:    Oral Fluid: 480 mL  Total IN: 480 mL    OUT:    Indwelling Catheter - Urethral (mL): 1800 mL  Total OUT: 1800 mL    Total NET: -1320 mL          LABS:                        8.4    17.17 )-----------( 549      ( 03 Aug 2023 12:15 )             25.2     08-03    132<L>  |  104  |  76<H>  ----------------------------<  163<H>  4.9   |  18<L>  |  4.82<H>    Ca    8.5      03 Aug 2023 12:15  Phos  4.6     08-03  Mg     2.7     08-03        Urinalysis Basic - ( 03 Aug 2023 12:15 )    Color: x / Appearance: x / SG: x / pH: x  Gluc: 163 mg/dL / Ketone: x  / Bili: x / Urobili: x   Blood: x / Protein: x / Nitrite: x   Leuk Esterase: x / RBC: x / WBC x   Sq Epi: x / Non Sq Epi: x / Bacteria: x

## 2023-08-04 NOTE — PROGRESS NOTE ADULT - NS ATTEND AMEND GEN_ALL_CORE FT
PT w neuropathic foot pain today again as main complaint  Not moving too much w PT or getting OOB  Negrete in place draining  She will likely go home w negrete   Retains likely secondary to atony from diabetes  CRF w  no change in Cr despite negrete  Due to distention, likely bled from rapid decompression, BUT will need w/u  Out pt cystoscopy PT w neuropathic foot pain today again as main complaint  Not moving too much w PT or getting OOB  Negrete in place draining  She will likely go home w negrete   Retains likely secondary to atony from diabetes  CRF w  no change in Cr despite negrete  Due to distention, likely bled from rapid decompression, BUT will need w/u  Out pt cystoscopy    Out pt UDS    Would D/C w negrete    Reconsult as needed. signing off

## 2023-08-05 LAB
ANION GAP SERPL CALC-SCNC: 9 MMOL/L — SIGNIFICANT CHANGE UP (ref 5–17)
BUN SERPL-MCNC: 67 MG/DL — HIGH (ref 7–23)
CALCIUM SERPL-MCNC: 8.6 MG/DL — SIGNIFICANT CHANGE UP (ref 8.5–10.1)
CHLORIDE SERPL-SCNC: 107 MMOL/L — SIGNIFICANT CHANGE UP (ref 96–108)
CO2 SERPL-SCNC: 19 MMOL/L — LOW (ref 22–31)
CREAT SERPL-MCNC: 4.12 MG/DL — HIGH (ref 0.5–1.3)
CULTURE RESULTS: SIGNIFICANT CHANGE UP
CULTURE RESULTS: SIGNIFICANT CHANGE UP
EGFR: 11 ML/MIN/1.73M2 — LOW
GLUCOSE BLDC GLUCOMTR-MCNC: 153 MG/DL — HIGH (ref 70–99)
GLUCOSE BLDC GLUCOMTR-MCNC: 155 MG/DL — HIGH (ref 70–99)
GLUCOSE BLDC GLUCOMTR-MCNC: 177 MG/DL — HIGH (ref 70–99)
GLUCOSE BLDC GLUCOMTR-MCNC: 220 MG/DL — HIGH (ref 70–99)
GLUCOSE SERPL-MCNC: 145 MG/DL — HIGH (ref 70–99)
HCT VFR BLD CALC: 25.3 % — LOW (ref 34.5–45)
HGB BLD-MCNC: 8 G/DL — LOW (ref 11.5–15.5)
MAGNESIUM SERPL-MCNC: 2.5 MG/DL — SIGNIFICANT CHANGE UP (ref 1.6–2.6)
MCHC RBC-ENTMCNC: 27.2 PG — SIGNIFICANT CHANGE UP (ref 27–34)
MCHC RBC-ENTMCNC: 31.6 G/DL — LOW (ref 32–36)
MCV RBC AUTO: 86.1 FL — SIGNIFICANT CHANGE UP (ref 80–100)
NRBC # BLD: 0 /100 WBCS — SIGNIFICANT CHANGE UP (ref 0–0)
PHOSPHATE SERPL-MCNC: 3.9 MG/DL — SIGNIFICANT CHANGE UP (ref 2.5–4.5)
PLATELET # BLD AUTO: 508 K/UL — HIGH (ref 150–400)
POTASSIUM SERPL-MCNC: 5 MMOL/L — SIGNIFICANT CHANGE UP (ref 3.5–5.3)
POTASSIUM SERPL-SCNC: 5 MMOL/L — SIGNIFICANT CHANGE UP (ref 3.5–5.3)
RBC # BLD: 2.94 M/UL — LOW (ref 3.8–5.2)
RBC # FLD: 14.3 % — SIGNIFICANT CHANGE UP (ref 10.3–14.5)
SODIUM SERPL-SCNC: 135 MMOL/L — SIGNIFICANT CHANGE UP (ref 135–145)
SPECIMEN SOURCE: SIGNIFICANT CHANGE UP
SPECIMEN SOURCE: SIGNIFICANT CHANGE UP
WBC # BLD: 15.96 K/UL — HIGH (ref 3.8–10.5)
WBC # FLD AUTO: 15.96 K/UL — HIGH (ref 3.8–10.5)

## 2023-08-05 PROCEDURE — 99232 SBSQ HOSP IP/OBS MODERATE 35: CPT

## 2023-08-05 RX ADMIN — Medication 1 GRAM(S): at 11:26

## 2023-08-05 RX ADMIN — PHENYLEPHRINE-SHARK LIVER OIL-MINERAL OIL-PETROLATUM RECTAL OINTMENT 1 APPLICATION(S): at 22:16

## 2023-08-05 RX ADMIN — Medication 100 MILLIGRAM(S): at 05:49

## 2023-08-05 RX ADMIN — PHENYLEPHRINE-SHARK LIVER OIL-MINERAL OIL-PETROLATUM RECTAL OINTMENT 1 APPLICATION(S): at 11:26

## 2023-08-05 RX ADMIN — Medication 1: at 17:20

## 2023-08-05 RX ADMIN — Medication 650 MILLIGRAM(S): at 05:48

## 2023-08-05 RX ADMIN — PHENYLEPHRINE-SHARK LIVER OIL-MINERAL OIL-PETROLATUM RECTAL OINTMENT 1 APPLICATION(S): at 00:27

## 2023-08-05 RX ADMIN — SENNA PLUS 2 TABLET(S): 8.6 TABLET ORAL at 22:14

## 2023-08-05 RX ADMIN — TRAMADOL HYDROCHLORIDE 25 MILLIGRAM(S): 50 TABLET ORAL at 23:14

## 2023-08-05 RX ADMIN — Medication 1 MILLIGRAM(S): at 11:26

## 2023-08-05 RX ADMIN — Medication 1 GRAM(S): at 17:19

## 2023-08-05 RX ADMIN — Medication 1: at 07:42

## 2023-08-05 RX ADMIN — Medication 1 GRAM(S): at 22:16

## 2023-08-05 RX ADMIN — GABAPENTIN 100 MILLIGRAM(S): 400 CAPSULE ORAL at 05:48

## 2023-08-05 RX ADMIN — Medication 60 MILLIGRAM(S): at 05:49

## 2023-08-05 RX ADMIN — PANTOPRAZOLE SODIUM 40 MILLIGRAM(S): 20 TABLET, DELAYED RELEASE ORAL at 05:48

## 2023-08-05 RX ADMIN — PHENYLEPHRINE-SHARK LIVER OIL-MINERAL OIL-PETROLATUM RECTAL OINTMENT 1 APPLICATION(S): at 05:49

## 2023-08-05 RX ADMIN — PHENYLEPHRINE-SHARK LIVER OIL-MINERAL OIL-PETROLATUM RECTAL OINTMENT 1 APPLICATION(S): at 17:19

## 2023-08-05 RX ADMIN — TRAMADOL HYDROCHLORIDE 25 MILLIGRAM(S): 50 TABLET ORAL at 22:14

## 2023-08-05 RX ADMIN — PREGABALIN 1000 MICROGRAM(S): 225 CAPSULE ORAL at 11:26

## 2023-08-05 RX ADMIN — MORPHINE SULFATE 1 MILLIGRAM(S): 50 CAPSULE, EXTENDED RELEASE ORAL at 20:36

## 2023-08-05 RX ADMIN — HEPARIN SODIUM 5000 UNIT(S): 5000 INJECTION INTRAVENOUS; SUBCUTANEOUS at 05:48

## 2023-08-05 RX ADMIN — Medication 100 MILLIGRAM(S): at 22:14

## 2023-08-05 RX ADMIN — GABAPENTIN 100 MILLIGRAM(S): 400 CAPSULE ORAL at 17:18

## 2023-08-05 RX ADMIN — HEPARIN SODIUM 5000 UNIT(S): 5000 INJECTION INTRAVENOUS; SUBCUTANEOUS at 17:18

## 2023-08-05 RX ADMIN — Medication 3 MILLIGRAM(S): at 22:14

## 2023-08-05 RX ADMIN — Medication 1: at 11:25

## 2023-08-05 RX ADMIN — Medication 1 GRAM(S): at 02:28

## 2023-08-05 RX ADMIN — Medication 100 MILLIGRAM(S): at 13:43

## 2023-08-05 RX ADMIN — PIPERACILLIN AND TAZOBACTAM 25 GRAM(S): 4; .5 INJECTION, POWDER, LYOPHILIZED, FOR SOLUTION INTRAVENOUS at 05:48

## 2023-08-05 RX ADMIN — Medication 650 MILLIGRAM(S): at 06:51

## 2023-08-05 RX ADMIN — Medication 2: at 22:15

## 2023-08-05 RX ADMIN — POLYETHYLENE GLYCOL 3350 17 GRAM(S): 17 POWDER, FOR SOLUTION ORAL at 05:47

## 2023-08-05 RX ADMIN — PANTOPRAZOLE SODIUM 40 MILLIGRAM(S): 20 TABLET, DELAYED RELEASE ORAL at 17:18

## 2023-08-05 RX ADMIN — PIPERACILLIN AND TAZOBACTAM 25 GRAM(S): 4; .5 INJECTION, POWDER, LYOPHILIZED, FOR SOLUTION INTRAVENOUS at 17:18

## 2023-08-05 RX ADMIN — MORPHINE SULFATE 1 MILLIGRAM(S): 50 CAPSULE, EXTENDED RELEASE ORAL at 21:36

## 2023-08-05 RX ADMIN — Medication 650 MILLIGRAM(S): at 17:34

## 2023-08-05 RX ADMIN — Medication 1 GRAM(S): at 05:48

## 2023-08-05 RX ADMIN — Medication 60 MILLIGRAM(S): at 17:20

## 2023-08-05 RX ADMIN — POLYETHYLENE GLYCOL 3350 17 GRAM(S): 17 POWDER, FOR SOLUTION ORAL at 17:17

## 2023-08-05 NOTE — PROGRESS NOTE ADULT - SUBJECTIVE AND OBJECTIVE BOX
Patient is a 78y old  Female who presents with a chief complaint of Hypoglycemia & UTI (02 Aug 2023 11:11)      OVERNIGHT EVENTS:  none    MEDICATIONS  (STANDING):  cyanocobalamin 1000 MICROGram(s) Oral daily  dextrose 5%. 1000 milliLiter(s) (50 mL/Hr) IV Continuous <Continuous>  dextrose 50% Injectable 25 Gram(s) IV Push once  folic acid 1 milliGRAM(s) Oral daily  gabapentin 100 milliGRAM(s) Oral two times a day  glucagon  Injectable 1 milliGRAM(s) IntraMuscular once  hemorrhoidal Ointment 1 Application(s) Rectal every 6 hours  heparin   Injectable 5000 Unit(s) SubCutaneous every 12 hours  hydrALAZINE 100 milliGRAM(s) Oral three times a day  insulin lispro (ADMELOG) corrective regimen sliding scale   SubCutaneous Before meals and at bedtime  NIFEdipine XL 60 milliGRAM(s) Oral two times a day  pantoprazole  Injectable 40 milliGRAM(s) IV Push two times a day  piperacillin/tazobactam IVPB.. 3.375 Gram(s) IV Intermittent every 12 hours  polyethylene glycol 3350 17 Gram(s) Oral two times a day  senna 2 Tablet(s) Oral at bedtime  sucralfate 1 Gram(s) Oral every 6 hours    MEDICATIONS  (PRN):  acetaminophen     Tablet .. 650 milliGRAM(s) Oral every 6 hours PRN Temp greater or equal to 38C (100.4F), Mild Pain (1 - 3)  dextrose Oral Gel 15 Gram(s) Oral once PRN Blood Glucose LESS THAN 70 milliGRAM(s)/deciliter  melatonin 3 milliGRAM(s) Oral at bedtime PRN Insomnia  morphine  - Injectable 1 milliGRAM(s) IV Push every 6 hours PRN Severe Pain (7 - 10)  traMADol 25 milliGRAM(s) Oral every 8 hours PRN Moderate Pain (4 - 6)      Allergies    No Known Allergies    Intolerances        SUBJECTIVE: in bed in NAD, no acute events overnight     Vital Signs Last 24 Hrs  T(C): 37.7 (05 Aug 2023 06:51), Max: 38.1 (05 Aug 2023 06:09)  T(F): 99.8 (05 Aug 2023 06:51), Max: 100.6 (05 Aug 2023 06:09)  HR: 71 (05 Aug 2023 06:09) (68 - 72)  BP: 151/75 (05 Aug 2023 06:09) (131/66 - 165/67)  BP(mean): --  RR: 18 (05 Aug 2023 06:09) (17 - 18)  SpO2: 95% (05 Aug 2023 06:09) (95% - 100%)    Parameters below as of 05 Aug 2023 06:09  Patient On (Oxygen Delivery Method): room air      PHYSICAL EXAM:  GENERAL: NAD, well-groomed, well-developed  HEAD:  Atraumatic, Normocephalic  EYES: EOMI, PERRLA, conjunctiva and sclera clear  ENMT: No tonsillar erythema, exudates, or enlargement; Moist mucous membranes, Good dentition, No lesions  NECK: Supple  CHEST/LUNG: Clear to  auscultation bilaterally; No rales, rhonchi, wheezing, or rubs  bilaterally  HEART: Regular rate and rhythm; No murmurs, rubs, or gallops  ABDOMEN: Soft, Nontender, Nondistended; Bowel sounds present  EXTREMITIES:  2+ Peripheral Pulses, No clubbing, cyanosis, or edema BL LE  SKIN: No rashes or lesions  NERVOUS SYSTEM:  Alert & Oriented X3, Good concentration; Motor Strength 4/5 B/L upper and lower extremities; sensation intact but hypersensitive to dorsum and plantar surface of feet       LABS:                                            8.0    15.96 )-----------( 508      ( 05 Aug 2023 07:50 )             25.3   08-05    135  |  107  |  67<H>  ----------------------------<  145<H>  5.0   |  19<L>  |  4.12<H>    Ca    8.6      05 Aug 2023 07:50  Phos  4.1     08-04  Mg     2.8     08-04          A1C with Estimated Average Glucose (07.30.23 @ 07:20)    A1C with Estimated Average Glucose Result: 7.1: Method: Immunoassay       Reference Range                4.0-5.6%       High risk (prediabetic)        5.7-6.4%       Diabetic, diagnostic             >=6.5%       ADA diabetic treatment goal       <7.0%  The Hemoglobin A1c testing is NGSP-certified.Reference ranges are based  upon the 2010 recommendations of  the American Diabetes Association.  Interpretation may vary for children  and adolescents. %   Estimated Average Glucose: 157: The Estimated Average Glucose (eAG) or Mean Plasma Glucose (MPG) value is  calculated from the hemoglobin A1c value and covers the same time period.   The American Diabetes Association (ADA) and other professional  organizations recommend reporting the eAG with the HgbA1c. mg/dL        Urinalysis Basic - ( 02 Aug 2023 08:13 )    Color: x / Appearance: x / SG: x / pH: x  Gluc: 207 mg/dL / Ketone: x  / Bili: x / Urobili: x   Blood: x / Protein: x / Nitrite: x   Leuk Esterase: x / RBC: x / WBC x   Sq Epi: x / Non Sq Epi: x / Bacteria: x      Cultures;     CAPILLARY BLOOD GLUCOSE      POCT Blood Glucose.: 177 mg/dL (04 Aug 2023 07:34)  POCT Blood Glucose.: 188 mg/dL (03 Aug 2023 21:53)  POCT Blood Glucose.: 223 mg/dL (03 Aug 2023 16:40)  POCT Blood Glucose.: 173 mg/dL (03 Aug 2023 11:57)      Lipid panel:           RADIOLOGY & ADDITIONAL TESTS:    < from: MR Lumbar Spine No Cont (08.03.23 @ 10:48) >  IMPRESSION: Lumbar laminectomy L2-L4 with fluid collection mildly   compressing the sac. Transpedicular screws and connecting rods L2-L5.    < end of copied text >      Imaging Personally Reviewed:  [ x] YES      Consultant(s) Notes Reviewed:  [x ] YES     Care Discussed with [x ] Consultants [X ] Patient [x ] Family  [x ]    [x ]  Other; RN     Patient is a 78y old  Female who presents with a chief complaint of Hypoglycemia & UTI (02 Aug 2023 11:11)      OVERNIGHT EVENTS:  none    MEDICATIONS  (STANDING):  cyanocobalamin 1000 MICROGram(s) Oral daily  dextrose 5%. 1000 milliLiter(s) (50 mL/Hr) IV Continuous <Continuous>  dextrose 50% Injectable 25 Gram(s) IV Push once  folic acid 1 milliGRAM(s) Oral daily  gabapentin 100 milliGRAM(s) Oral two times a day  glucagon  Injectable 1 milliGRAM(s) IntraMuscular once  hemorrhoidal Ointment 1 Application(s) Rectal every 6 hours  heparin   Injectable 5000 Unit(s) SubCutaneous every 12 hours  hydrALAZINE 100 milliGRAM(s) Oral three times a day  insulin lispro (ADMELOG) corrective regimen sliding scale   SubCutaneous Before meals and at bedtime  NIFEdipine XL 60 milliGRAM(s) Oral two times a day  pantoprazole  Injectable 40 milliGRAM(s) IV Push two times a day  piperacillin/tazobactam IVPB.. 3.375 Gram(s) IV Intermittent every 12 hours  polyethylene glycol 3350 17 Gram(s) Oral two times a day  senna 2 Tablet(s) Oral at bedtime  sucralfate 1 Gram(s) Oral every 6 hours    MEDICATIONS  (PRN):  acetaminophen     Tablet .. 650 milliGRAM(s) Oral every 6 hours PRN Temp greater or equal to 38C (100.4F), Mild Pain (1 - 3)  dextrose Oral Gel 15 Gram(s) Oral once PRN Blood Glucose LESS THAN 70 milliGRAM(s)/deciliter  melatonin 3 milliGRAM(s) Oral at bedtime PRN Insomnia  morphine  - Injectable 1 milliGRAM(s) IV Push every 6 hours PRN Severe Pain (7 - 10)  traMADol 25 milliGRAM(s) Oral every 8 hours PRN Moderate Pain (4 - 6)      Allergies    No Known Allergies    Intolerances        SUBJECTIVE: in bed in NAD, no acute events overnight     Vital Signs Last 24 Hrs  T(C): 37.7 (05 Aug 2023 06:51), Max: 38.1 (05 Aug 2023 06:09)  T(F): 99.8 (05 Aug 2023 06:51), Max: 100.6 (05 Aug 2023 06:09)  HR: 71 (05 Aug 2023 06:09) (68 - 72)  BP: 151/75 (05 Aug 2023 06:09) (131/66 - 165/67)  BP(mean): --  RR: 18 (05 Aug 2023 06:09) (17 - 18)  SpO2: 95% (05 Aug 2023 06:09) (95% - 100%)    Parameters below as of 05 Aug 2023 06:09  Patient On (Oxygen Delivery Method): room air      PHYSICAL EXAM:  GENERAL: NAD, well-groomed, well-developed  HEAD:  Atraumatic, Normocephalic  EYES: EOMI, PERRLA, conjunctiva and sclera clear  ENMT: No tonsillar erythema, exudates, or enlargement; Moist mucous membranes, Good dentition, No lesions  NECK: Supple  CHEST/LUNG: Clear to  auscultation bilaterally; No rales, rhonchi, wheezing, or rubs  bilaterally  HEART: Regular rate and rhythm; No murmurs, rubs, or gallops  ABDOMEN: Soft, Nontender, Nondistended; Bowel sounds present  EXTREMITIES:  2+ Peripheral Pulses, No clubbing, cyanosis, or edema BL LE  SKIN: No rashes or lesions  NERVOUS SYSTEM:  Alert & Oriented X3, Good concentration; Motor Strength 4/5 B/L upper and lower extremities; sensation intact , improving hypersensitive to dorsum and plantar surface of feet       LABS:                                            8.0    15.96 )-----------( 508      ( 05 Aug 2023 07:50 )             25.3   08-05    135  |  107  |  67<H>  ----------------------------<  145<H>  5.0   |  19<L>  |  4.12<H>    Ca    8.6      05 Aug 2023 07:50  Phos  4.1     08-04  Mg     2.8     08-04          A1C with Estimated Average Glucose (07.30.23 @ 07:20)    A1C with Estimated Average Glucose Result: 7.1: Method: Immunoassay       Reference Range                4.0-5.6%       High risk (prediabetic)        5.7-6.4%       Diabetic, diagnostic             >=6.5%       ADA diabetic treatment goal       <7.0%  The Hemoglobin A1c testing is NGSP-certified.Reference ranges are based  upon the 2010 recommendations of  the American Diabetes Association.  Interpretation may vary for children  and adolescents. %   Estimated Average Glucose: 157: The Estimated Average Glucose (eAG) or Mean Plasma Glucose (MPG) value is  calculated from the hemoglobin A1c value and covers the same time period.   The American Diabetes Association (ADA) and other professional  organizations recommend reporting the eAG with the HgbA1c. mg/dL        Urinalysis Basic - ( 02 Aug 2023 08:13 )    Color: x / Appearance: x / SG: x / pH: x  Gluc: 207 mg/dL / Ketone: x  / Bili: x / Urobili: x   Blood: x / Protein: x / Nitrite: x   Leuk Esterase: x / RBC: x / WBC x   Sq Epi: x / Non Sq Epi: x / Bacteria: x      Cultures;     CAPILLARY BLOOD GLUCOSE      POCT Blood Glucose.: 177 mg/dL (04 Aug 2023 07:34)  POCT Blood Glucose.: 188 mg/dL (03 Aug 2023 21:53)  POCT Blood Glucose.: 223 mg/dL (03 Aug 2023 16:40)  POCT Blood Glucose.: 173 mg/dL (03 Aug 2023 11:57)      Lipid panel:           RADIOLOGY & ADDITIONAL TESTS:    < from: MR Lumbar Spine No Cont (08.03.23 @ 10:48) >  IMPRESSION: Lumbar laminectomy L2-L4 with fluid collection mildly   compressing the sac. Transpedicular screws and connecting rods L2-L5.    < end of copied text >      Imaging Personally Reviewed:  [ x] YES      Consultant(s) Notes Reviewed:  [x ] YES     Care Discussed with [x ] Consultants [X ] Patient [x ] Family  [x ]    [x ]  Other; RN

## 2023-08-05 NOTE — DIETITIAN INITIAL EVALUATION ADULT - PROBLEM SELECTOR PLAN 1
Patient is currently on Glipizide 5mg   Patient  [facility]  In the ED FS 41 received Dextrose & returned to baseline   - Monitor Blood glucose   - FS Q2hrs

## 2023-08-05 NOTE — DIETITIAN INITIAL EVALUATION ADULT - PERTINENT MEDS FT
MEDICATIONS  (STANDING):  cyanocobalamin 1000 MICROGram(s) Oral daily  dextrose 5%. 1000 milliLiter(s) (50 mL/Hr) IV Continuous <Continuous>  dextrose 50% Injectable 25 Gram(s) IV Push once  folic acid 1 milliGRAM(s) Oral daily  gabapentin 100 milliGRAM(s) Oral two times a day  glucagon  Injectable 1 milliGRAM(s) IntraMuscular once  hemorrhoidal Ointment 1 Application(s) Rectal every 6 hours  heparin   Injectable 5000 Unit(s) SubCutaneous every 12 hours  hydrALAZINE 100 milliGRAM(s) Oral three times a day  insulin lispro (ADMELOG) corrective regimen sliding scale   SubCutaneous Before meals and at bedtime  NIFEdipine XL 60 milliGRAM(s) Oral two times a day  pantoprazole  Injectable 40 milliGRAM(s) IV Push two times a day  piperacillin/tazobactam IVPB.. 3.375 Gram(s) IV Intermittent every 12 hours  polyethylene glycol 3350 17 Gram(s) Oral two times a day  senna 2 Tablet(s) Oral at bedtime  sucralfate 1 Gram(s) Oral every 6 hours    MEDICATIONS  (PRN):  acetaminophen     Tablet .. 650 milliGRAM(s) Oral every 6 hours PRN Temp greater or equal to 38C (100.4F), Mild Pain (1 - 3)  dextrose Oral Gel 15 Gram(s) Oral once PRN Blood Glucose LESS THAN 70 milliGRAM(s)/deciliter  melatonin 3 milliGRAM(s) Oral at bedtime PRN Insomnia  morphine  - Injectable 1 milliGRAM(s) IV Push every 6 hours PRN Severe Pain (7 - 10)  traMADol 25 milliGRAM(s) Oral every 8 hours PRN Moderate Pain (4 - 6)

## 2023-08-05 NOTE — DIETITIAN INITIAL EVALUATION ADULT - PERTINENT LABORATORY DATA
08-05    135  |  107  |  67<H>  ----------------------------<  145<H>  5.0   |  19<L>  |  4.12<H>      Ca    8.6      05 Aug 2023 07:50  Phos  3.9     08-05  Mg     2.5     08-05    POCT Blood Glucose.: 153 mg/dL <H>(08-05-23 @ 11:14)  A1C with Estimated Average Glucose Result: 7.1 % <H>(07-30-23 @ 07:20)  A1C with Estimated Average Glucose Result: 7.3 % <H>(07-06-23 @ 06:15)  A1C with Estimated Average Glucose Result: 8.2 % <H>(06-15-23 @ 08:50)  08/04, POCT blood Glucose range 177-237 mg/dL<H>

## 2023-08-05 NOTE — DIETITIAN INITIAL EVALUATION ADULT - ORAL INTAKE PTA/DIET HISTORY
Pt seen c her niece who is from [place] who is here to help pt.  Pt reports depressed appetite while she was in [facility] for Rehab, was unable to state time frame of depressed appetite.  Pt currently c improved oral intake since adm, consuming <75% of meals.  Pt lived alone prior to hospitalization/Rehab, was able to do own shopping/cooking.  Diet PTA: diabetic, CHO controlled @ home.

## 2023-08-05 NOTE — DIETITIAN INITIAL EVALUATION ADULT - OTHER INFO
Per chart review, hypoglycemia secondary to sulfonylureas(glipizide), problem diagnosis include acute blood loss anemia, presumed due to gross hematuria, lower extremity radiculopathy, hx of laminectomy, for MRI of spine.  Pt self monitored blood glucose 2 x day @ home.  Nutrition therapy for consistent CHO intake, older adult blood glucose, low blood glucose management, CKD nutrition therapy explained in simple terms.  Advised to follow up c MD regarding specific restrictions for renal impairment as indicated.  Per niece, family members will be checking in on pt frequently and provide assistance as indicated when pt goes home.

## 2023-08-05 NOTE — PROGRESS NOTE ADULT - ASSESSMENT
78 year old female with PMH of HTN, DM present to the ED for hypoglycemic episode. Patient is from [facility], she was found unresponsive with a glucose level of 30, she was given 1 amp of glucogan, upon ED arrival FS was 41 with AMS, received Dextrose 50ml.. On 7/14 patient had Laminectomy with fusion L2-4, endorses she has been having dysuria since she arrived at [facility] associated with suprapubic pain.  Labs  remarkable for leukocytosis (WBC 28.80) worsen kidney function with Cr 4.90 compared to 7/18 Cr 3.10. Afebrile. Upon evaluation patient was AAOx4, endorses dysuria with lower abd pain, denies fever, headache, dizziness, SOB, chest pain, palpitation or any other acute symptoms.    Patient is being admitted for UTI to be treated with antibiotics.     Hypoglycemia likely due to MOSS.   per my colleague's documentation "Patient is currently on Glipizide 5mg   Patient  was found unresponsive with FS 30 at [facility]  In the ED FS 41 received Dextrose & returned to baseline   s/p dextrose iV  hypogylcemia workup sent  sugars better"    8/2/2023 resolved    UTI (urinary tract infection) with evidence of urinary retention.   per my colleague's documentation  "one  Week+ history of dysuria associated with suprapubic pain   Leukocytosis (WBC 28.80) now 21  s/p Vancomycin  UA is GROSSLY positive and culture is negative, blood culture negative  US renal bladder scan with retention  negrete inserted"    c/w zosyn   hold vanco-- due to     kidney dysfunction  8/3/2023 continue with antibx , f/u cbc  /5/2023 failed TOV times two : negrete will be reinserted      Acute blood loss anemia: presumed due to gross hematuria -s/p prbc on 7/31/2023   continue to  monitor hgb     Lower extremity radiculopathy with hx of L4 lami  patient reports leg pain and numbness of both legs up to the knee    plan: seen by ortho with rcecs for MRI of L spine hopefully to be completed on today     8/3/2023- f/u mri   8/4/2023 - mri  as above - case d/w ID and ortho feel its post operative changes .   titrate gabapentin as creatinine allows      Acute kidney injury superimposed on CKD stage 4 presumed due to UTI and urinary retention and ATN  appreciate renal note  IVF   Monitor kidney function   Avoid Nephrotoxic drugs.  8/4/2023 f/u creatinine today will d/w renal   8/5/2023 continued improvement in creatinine, renal states no ACE a/HCTZ at discharge    Hypertension.   ·  Continue with - Nifedipine  - Hydralazine   - Monitor.  8/5/2023 increased hydralazine for better bp control from 75 to 100  Diabetes mellitus.   sliding scale   a1c 7.1

## 2023-08-05 NOTE — DIETITIAN INITIAL EVALUATION ADULT - PHYSCIAL ASSESSMENT
Pt reports wt. of 160 LBS(72.7 kg) @ home, prior to hospitalization, was unaware of wt. changes PTA/obese Vaginal Delivery

## 2023-08-05 NOTE — PROGRESS NOTE ADULT - SUBJECTIVE AND OBJECTIVE BOX
Health system NEPHROLOGY SERVICES, [facility]  NEPHROLOGY AND HYPERTENSION  [address]  [address]  [place] [address]  [phone]    MD MAT TUBBS MD YELENA ROSENBERG, MD BINNY KOSHY, MD CHRISTOPHER CAPUTO, MD EDWARD BOVER, MD          Patient events noted    MEDICATIONS  (STANDING):  cyanocobalamin 1000 MICROGram(s) Oral daily  dextrose 5%. 1000 milliLiter(s) (50 mL/Hr) IV Continuous <Continuous>  dextrose 50% Injectable 25 Gram(s) IV Push once  folic acid 1 milliGRAM(s) Oral daily  gabapentin 100 milliGRAM(s) Oral two times a day  glucagon  Injectable 1 milliGRAM(s) IntraMuscular once  hemorrhoidal Ointment 1 Application(s) Rectal every 6 hours  heparin   Injectable 5000 Unit(s) SubCutaneous every 12 hours  hydrALAZINE 100 milliGRAM(s) Oral three times a day  insulin lispro (ADMELOG) corrective regimen sliding scale   SubCutaneous Before meals and at bedtime  NIFEdipine XL 60 milliGRAM(s) Oral two times a day  pantoprazole  Injectable 40 milliGRAM(s) IV Push two times a day  piperacillin/tazobactam IVPB.. 3.375 Gram(s) IV Intermittent every 12 hours  polyethylene glycol 3350 17 Gram(s) Oral two times a day  senna 2 Tablet(s) Oral at bedtime  sucralfate 1 Gram(s) Oral every 6 hours    MEDICATIONS  (PRN):  acetaminophen     Tablet .. 650 milliGRAM(s) Oral every 6 hours PRN Temp greater or equal to 38C (100.4F), Mild Pain (1 - 3)  dextrose Oral Gel 15 Gram(s) Oral once PRN Blood Glucose LESS THAN 70 milliGRAM(s)/deciliter  melatonin 3 milliGRAM(s) Oral at bedtime PRN Insomnia  morphine  - Injectable 1 milliGRAM(s) IV Push every 6 hours PRN Severe Pain (7 - 10)  traMADol 25 milliGRAM(s) Oral every 8 hours PRN Moderate Pain (4 - 6)      08-04-23 @ 07:01  -  08-05-23 @ 07:00  --------------------------------------------------------  IN: 0 mL / OUT: 1250 mL / NET: -1250 mL    08-05-23 @ 07:01  -  08-05-23 @ 21:54  --------------------------------------------------------  IN: 240 mL / OUT: 240 mL / NET: 0 mL      PHYSICAL EXAM:      T(C): 37.5 (08-05-23 @ 18:32), Max: 38.1 (08-05-23 @ 06:09)  HR: 68 (08-05-23 @ 17:20) (62 - 71)  BP: 162/72 (08-05-23 @ 17:20) (146/68 - 162/72)  RR: 17 (08-05-23 @ 17:20) (17 - 18)  SpO2: 97% (08-05-23 @ 17:20) (95% - 99%)  Wt(kg): --  Lungs clear  Heart S1S2  Abd soft NT ND  Extremities:   tr edema                                    8.0    15.96 )-----------( 508      ( 05 Aug 2023 07:50 )             25.3     08-05    135  |  107  |  67<H>  ----------------------------<  145<H>  5.0   |  19<L>  |  4.12<H>    Ca    8.6      05 Aug 2023 07:50  Phos  3.9     08-05  Mg     2.5     08-05          Creatinine Trend: 4.12<--, 4.28<--, 4.82<--, 5.35<--, 5.38<--, 5.09<--          Assessment   ANASTACIO CKD 4; pyuria and hematuria; gram positive bacteremia   UTI risk for infectious AIN vs infectious GN   Recent Laminectomy 7/14/23  S. Epi bacteremia   Renal indices stabilizing     Plan    Complete IVF   Stable from renal view  Hold ACE; HCTZ at discharge    dAin Jack MD

## 2023-08-06 LAB
ANION GAP SERPL CALC-SCNC: 5 MMOL/L — SIGNIFICANT CHANGE UP (ref 5–17)
BUN SERPL-MCNC: 59 MG/DL — HIGH (ref 7–23)
CALCIUM SERPL-MCNC: 8.7 MG/DL — SIGNIFICANT CHANGE UP (ref 8.5–10.1)
CHLORIDE SERPL-SCNC: 110 MMOL/L — HIGH (ref 96–108)
CO2 SERPL-SCNC: 21 MMOL/L — LOW (ref 22–31)
CORTICOSTEROID BINDING GLOBULIN RESULT: 1.4 MG/DL — LOW
CORTIS F/TOTAL MFR SERPL: 66 % — SIGNIFICANT CHANGE UP
CORTIS SERPL-MCNC: 28 UG/DL — HIGH
CORTISOL, FREE RESULT: 19 UG/DL — HIGH
CREAT SERPL-MCNC: 3.57 MG/DL — HIGH (ref 0.5–1.3)
EGFR: 13 ML/MIN/1.73M2 — LOW
GLUCOSE BLDC GLUCOMTR-MCNC: 141 MG/DL — HIGH (ref 70–99)
GLUCOSE BLDC GLUCOMTR-MCNC: 149 MG/DL — HIGH (ref 70–99)
GLUCOSE BLDC GLUCOMTR-MCNC: 152 MG/DL — HIGH (ref 70–99)
GLUCOSE BLDC GLUCOMTR-MCNC: 156 MG/DL — HIGH (ref 70–99)
GLUCOSE SERPL-MCNC: 122 MG/DL — HIGH (ref 70–99)
HCT VFR BLD CALC: 25 % — LOW (ref 34.5–45)
HGB BLD-MCNC: 8.2 G/DL — LOW (ref 11.5–15.5)
MCHC RBC-ENTMCNC: 27.6 PG — SIGNIFICANT CHANGE UP (ref 27–34)
MCHC RBC-ENTMCNC: 32.8 G/DL — SIGNIFICANT CHANGE UP (ref 32–36)
MCV RBC AUTO: 84.2 FL — SIGNIFICANT CHANGE UP (ref 80–100)
NRBC # BLD: 0 /100 WBCS — SIGNIFICANT CHANGE UP (ref 0–0)
PLATELET # BLD AUTO: 524 K/UL — HIGH (ref 150–400)
POTASSIUM SERPL-MCNC: 4.7 MMOL/L — SIGNIFICANT CHANGE UP (ref 3.5–5.3)
POTASSIUM SERPL-SCNC: 4.7 MMOL/L — SIGNIFICANT CHANGE UP (ref 3.5–5.3)
RBC # BLD: 2.97 M/UL — LOW (ref 3.8–5.2)
RBC # FLD: 14.4 % — SIGNIFICANT CHANGE UP (ref 10.3–14.5)
SODIUM SERPL-SCNC: 136 MMOL/L — SIGNIFICANT CHANGE UP (ref 135–145)
WBC # BLD: 11.96 K/UL — HIGH (ref 3.8–10.5)
WBC # FLD AUTO: 11.96 K/UL — HIGH (ref 3.8–10.5)

## 2023-08-06 PROCEDURE — 99232 SBSQ HOSP IP/OBS MODERATE 35: CPT

## 2023-08-06 RX ADMIN — MORPHINE SULFATE 1 MILLIGRAM(S): 50 CAPSULE, EXTENDED RELEASE ORAL at 23:40

## 2023-08-06 RX ADMIN — POLYETHYLENE GLYCOL 3350 17 GRAM(S): 17 POWDER, FOR SOLUTION ORAL at 05:33

## 2023-08-06 RX ADMIN — Medication 1 GRAM(S): at 11:33

## 2023-08-06 RX ADMIN — Medication 1: at 11:34

## 2023-08-06 RX ADMIN — PANTOPRAZOLE SODIUM 40 MILLIGRAM(S): 20 TABLET, DELAYED RELEASE ORAL at 05:33

## 2023-08-06 RX ADMIN — Medication 1 GRAM(S): at 22:37

## 2023-08-06 RX ADMIN — Medication 100 MILLIGRAM(S): at 22:38

## 2023-08-06 RX ADMIN — Medication 60 MILLIGRAM(S): at 05:33

## 2023-08-06 RX ADMIN — PIPERACILLIN AND TAZOBACTAM 25 GRAM(S): 4; .5 INJECTION, POWDER, LYOPHILIZED, FOR SOLUTION INTRAVENOUS at 05:35

## 2023-08-06 RX ADMIN — HEPARIN SODIUM 5000 UNIT(S): 5000 INJECTION INTRAVENOUS; SUBCUTANEOUS at 17:03

## 2023-08-06 RX ADMIN — Medication 650 MILLIGRAM(S): at 00:47

## 2023-08-06 RX ADMIN — Medication 100 MILLIGRAM(S): at 05:33

## 2023-08-06 RX ADMIN — PHENYLEPHRINE-SHARK LIVER OIL-MINERAL OIL-PETROLATUM RECTAL OINTMENT 1 APPLICATION(S): at 22:38

## 2023-08-06 RX ADMIN — Medication 1 GRAM(S): at 17:05

## 2023-08-06 RX ADMIN — MORPHINE SULFATE 1 MILLIGRAM(S): 50 CAPSULE, EXTENDED RELEASE ORAL at 16:19

## 2023-08-06 RX ADMIN — Medication 60 MILLIGRAM(S): at 17:05

## 2023-08-06 RX ADMIN — Medication 1: at 07:56

## 2023-08-06 RX ADMIN — PIPERACILLIN AND TAZOBACTAM 25 GRAM(S): 4; .5 INJECTION, POWDER, LYOPHILIZED, FOR SOLUTION INTRAVENOUS at 17:04

## 2023-08-06 RX ADMIN — PREGABALIN 1000 MICROGRAM(S): 225 CAPSULE ORAL at 11:33

## 2023-08-06 RX ADMIN — Medication 100 MILLIGRAM(S): at 14:00

## 2023-08-06 RX ADMIN — PHENYLEPHRINE-SHARK LIVER OIL-MINERAL OIL-PETROLATUM RECTAL OINTMENT 1 APPLICATION(S): at 11:34

## 2023-08-06 RX ADMIN — GABAPENTIN 100 MILLIGRAM(S): 400 CAPSULE ORAL at 17:05

## 2023-08-06 RX ADMIN — POLYETHYLENE GLYCOL 3350 17 GRAM(S): 17 POWDER, FOR SOLUTION ORAL at 17:03

## 2023-08-06 RX ADMIN — Medication 75 MILLIGRAM(S): at 23:50

## 2023-08-06 RX ADMIN — PANTOPRAZOLE SODIUM 40 MILLIGRAM(S): 20 TABLET, DELAYED RELEASE ORAL at 17:04

## 2023-08-06 RX ADMIN — MORPHINE SULFATE 1 MILLIGRAM(S): 50 CAPSULE, EXTENDED RELEASE ORAL at 22:40

## 2023-08-06 RX ADMIN — MORPHINE SULFATE 1 MILLIGRAM(S): 50 CAPSULE, EXTENDED RELEASE ORAL at 16:34

## 2023-08-06 RX ADMIN — GABAPENTIN 100 MILLIGRAM(S): 400 CAPSULE ORAL at 05:33

## 2023-08-06 RX ADMIN — Medication 1 MILLIGRAM(S): at 11:33

## 2023-08-06 RX ADMIN — MORPHINE SULFATE 1 MILLIGRAM(S): 50 CAPSULE, EXTENDED RELEASE ORAL at 04:43

## 2023-08-06 RX ADMIN — HEPARIN SODIUM 5000 UNIT(S): 5000 INJECTION INTRAVENOUS; SUBCUTANEOUS at 05:33

## 2023-08-06 RX ADMIN — PHENYLEPHRINE-SHARK LIVER OIL-MINERAL OIL-PETROLATUM RECTAL OINTMENT 1 APPLICATION(S): at 05:33

## 2023-08-06 RX ADMIN — Medication 1 GRAM(S): at 05:33

## 2023-08-06 RX ADMIN — MORPHINE SULFATE 1 MILLIGRAM(S): 50 CAPSULE, EXTENDED RELEASE ORAL at 05:43

## 2023-08-06 RX ADMIN — PHENYLEPHRINE-SHARK LIVER OIL-MINERAL OIL-PETROLATUM RECTAL OINTMENT 1 APPLICATION(S): at 17:03

## 2023-08-06 RX ADMIN — SENNA PLUS 2 TABLET(S): 8.6 TABLET ORAL at 22:38

## 2023-08-06 RX ADMIN — Medication 650 MILLIGRAM(S): at 01:47

## 2023-08-06 NOTE — PROGRESS NOTE ADULT - ASSESSMENT
78 year old female with PMH of HTN, DM present to the ED for hypoglycemic episode. Patient is from [facility], she was found unresponsive with a glucose level of 30, she was given 1 amp of glucogan, upon ED arrival FS was 41 with AMS, received Dextrose 50ml.. On 7/14 patient had Laminectomy with fusion L2-4, endorses she has been having dysuria since she arrived at [facility] associated with suprapubic pain.  Labs  remarkable for leukocytosis (WBC 28.80) worsen kidney function with Cr 4.90 compared to 7/18 Cr 3.10. Afebrile. Upon evaluation patient was AAOx4, endorses dysuria with lower abd pain, denies fever, headache, dizziness, SOB, chest pain, palpitation or any other acute symptoms.    Patient is being admitted for UTI to be treated with antibiotics.     Hypoglycemia likely due to MOSS.   per my colleague's documentation "Patient is currently on Glipizide 5mg   Patient  was found unresponsive with FS 30 at [facility]  In the ED FS 41 received Dextrose & returned to baseline   s/p dextrose iV  hypogylcemia workup sent  sugars better"    8/2/2023 resolved    UTI (urinary tract infection) with evidence of urinary retention.   per my colleague's documentation  "one  Week+ history of dysuria associated with suprapubic pain   Leukocytosis (WBC 28.80) now 21  s/p Vancomycin  UA is GROSSLY positive and culture is negative, blood culture negative  US renal bladder scan with retention  negrete inserted"    c/w zosyn   hold vanco-- due to     kidney dysfunction  8/3/2023 continue with antibx , f/u cbc  8/5/2023 failed TOV times two : negrete will be reinserted      Acute blood loss anemia: presumed due to gross hematuria -s/p prbc on 7/31/2023   continue to  monitor hgb     Lower extremity radiculopathy with hx of L4 lami  patient reports leg pain and numbness of both legs up to the knee    plan: seen by ortho with rce[facility] for MRI of L spine hopefully to be completed on today     8/3/2023- f/u mri   8/4/2023 - mri  as above - case d/w ID and ortho feel its post operative changes .   titrate gabapentin as creatinine allows      Acute kidney injury superimposed on CKD stage 4 presumed due to UTI and urinary retention and ATN  appreciate renal note  IVF   Monitor kidney function   Avoid Nephrotoxic drugs.  8/4/2023 f/u creatinine today will d/w renal   8/5/2023 continued improvement in creatinine, renal states no ACE a/HCTZ at discharge    Hypertension.   ·  Continue with - Nifedipine  - Hydralazine   - Monitor.  8/5/2023 increased hydralazine for better bp control from 75 to 100    Diabetes mellitus.   sliding scale   a1c 7.1  dispo - await d/c back to [facility]

## 2023-08-06 NOTE — PROGRESS NOTE ADULT - SUBJECTIVE AND OBJECTIVE BOX
[facility] NEPHROLOGY SERVICES, [facility]  NEPHROLOGY AND HYPERTENSION  [address]  [address]  [place] [address]  [phone]    MD MAT TUBBS MD YELENA ROSENBERG, MD BINNY KOSHY, MD CHRISTOPHER CAPUTO, MD EDWARD BOVER, MD          Patient events noted  No distress  MEDICATIONS  (STANDING):  cyanocobalamin 1000 MICROGram(s) Oral daily  dextrose 5%. 1000 milliLiter(s) (50 mL/Hr) IV Continuous <Continuous>  dextrose 50% Injectable 25 Gram(s) IV Push once  folic acid 1 milliGRAM(s) Oral daily  gabapentin 100 milliGRAM(s) Oral two times a day  glucagon  Injectable 1 milliGRAM(s) IntraMuscular once  hemorrhoidal Ointment 1 Application(s) Rectal every 6 hours  heparin   Injectable 5000 Unit(s) SubCutaneous every 12 hours  hydrALAZINE 100 milliGRAM(s) Oral three times a day  insulin lispro (ADMELOG) corrective regimen sliding scale   SubCutaneous Before meals and at bedtime  NIFEdipine XL 60 milliGRAM(s) Oral two times a day  pantoprazole  Injectable 40 milliGRAM(s) IV Push two times a day  piperacillin/tazobactam IVPB.. 3.375 Gram(s) IV Intermittent every 12 hours  polyethylene glycol 3350 17 Gram(s) Oral two times a day  senna 2 Tablet(s) Oral at bedtime  sucralfate 1 Gram(s) Oral every 6 hours    MEDICATIONS  (PRN):  acetaminophen     Tablet .. 650 milliGRAM(s) Oral every 6 hours PRN Temp greater or equal to 38C (100.4F), Mild Pain (1 - 3)  dextrose Oral Gel 15 Gram(s) Oral once PRN Blood Glucose LESS THAN 70 milliGRAM(s)/deciliter  melatonin 3 milliGRAM(s) Oral at bedtime PRN Insomnia  morphine  - Injectable 1 milliGRAM(s) IV Push every 6 hours PRN Severe Pain (7 - 10)  traMADol 25 milliGRAM(s) Oral every 8 hours PRN Moderate Pain (4 - 6)      08-05-23 @ 07:01  -  08-06-23 @ 07:00  --------------------------------------------------------  IN: 240 mL / OUT: 240 mL / NET: 0 mL      PHYSICAL EXAM:      T(C): 37.1 (08-06-23 @ 17:10), Max: 37.3 (08-05-23 @ 22:09)  HR: 63 (08-06-23 @ 17:10) (57 - 63)  BP: 142/59 (08-06-23 @ 17:10) (125/56 - 152/74)  RR: 18 (08-06-23 @ 17:10) (18 - 19)  SpO2: 94% (08-06-23 @ 17:10) (94% - 98%)  Wt(kg): --  Lungs clear  Heart S1S2  Abd soft NT ND  Extremities:   tr edema                                    8.2    11.96 )-----------( 524      ( 06 Aug 2023 09:57 )             25.0     08-06    136  |  110<H>  |  59<H>  ----------------------------<  122<H>  4.7   |  21<L>  |  3.57<H>    Ca    8.7      06 Aug 2023 09:57  Phos  3.9     08-05  Mg     2.5     08-05          Creatinine Trend: 3.57<--, 4.12<--, 4.28<--, 4.82<--, 5.35<--, 5.38<--      Assessment   ANASTACIO CKD 4; pyuria and hematuria; gram positive bacteremia   UTI risk for infectious AIN vs infectious GN   Recent Laminectomy 7/14/23  S. Epi bacteremia   Renal indices stabilizing     Plan      Stable from renal view  Hold ACE; HCTZ at discharge    Adin Jack MD

## 2023-08-06 NOTE — PROGRESS NOTE ADULT - SUBJECTIVE AND OBJECTIVE BOX
Patient is a 78y old  Female who presents with a chief complaint of Hypoglycemia & UTI (02 Aug 2023 11:11)      OVERNIGHT EVENTS:  none      MEDICATIONS  (STANDING):  cyanocobalamin 1000 MICROGram(s) Oral daily  dextrose 5%. 1000 milliLiter(s) (50 mL/Hr) IV Continuous <Continuous>  dextrose 50% Injectable 25 Gram(s) IV Push once  folic acid 1 milliGRAM(s) Oral daily  gabapentin 100 milliGRAM(s) Oral two times a day  glucagon  Injectable 1 milliGRAM(s) IntraMuscular once  hemorrhoidal Ointment 1 Application(s) Rectal every 6 hours  heparin   Injectable 5000 Unit(s) SubCutaneous every 12 hours  hydrALAZINE 100 milliGRAM(s) Oral three times a day  insulin lispro (ADMELOG) corrective regimen sliding scale   SubCutaneous Before meals and at bedtime  NIFEdipine XL 60 milliGRAM(s) Oral two times a day  pantoprazole  Injectable 40 milliGRAM(s) IV Push two times a day  piperacillin/tazobactam IVPB.. 3.375 Gram(s) IV Intermittent every 12 hours  polyethylene glycol 3350 17 Gram(s) Oral two times a day  senna 2 Tablet(s) Oral at bedtime  sucralfate 1 Gram(s) Oral every 6 hours    MEDICATIONS  (PRN):  acetaminophen     Tablet .. 650 milliGRAM(s) Oral every 6 hours PRN Temp greater or equal to 38C (100.4F), Mild Pain (1 - 3)  dextrose Oral Gel 15 Gram(s) Oral once PRN Blood Glucose LESS THAN 70 milliGRAM(s)/deciliter  melatonin 3 milliGRAM(s) Oral at bedtime PRN Insomnia  morphine  - Injectable 1 milliGRAM(s) IV Push every 6 hours PRN Severe Pain (7 - 10)  traMADol 25 milliGRAM(s) Oral every 8 hours PRN Moderate Pain (4 - 6)      Allergies    No Known Allergies    Intolerances        SUBJECTIVE: in bed in NAD, no acute events overnight     Vital Signs Last 24 Hrs  T(C): 37.2 (06 Aug 2023 05:29), Max: 37.8 (05 Aug 2023 17:20)  T(F): 98.9 (06 Aug 2023 05:29), Max: 100.1 (05 Aug 2023 17:20)  HR: 57 (06 Aug 2023 05:29) (57 - 68)  BP: 146/70 (06 Aug 2023 05:29) (146/70 - 162/72)  BP(mean): --  RR: 19 (06 Aug 2023 05:29) (17 - 19)  SpO2: 96% (06 Aug 2023 05:29) (96% - 99%)    Parameters below as of 06 Aug 2023 05:29  Patient On (Oxygen Delivery Method): room air        PHYSICAL EXAM:  GENERAL: NAD, well-groomed, well-developed  HEAD:  Atraumatic, Normocephalic  EYES: EOMI, PERRLA, conjunctiva and sclera clear  ENMT: No tonsillar erythema, exudates, or enlargement; Moist mucous membranes, Good dentition, No lesions  NECK: Supple  CHEST/LUNG: Clear to  auscultation bilaterally; No rales, rhonchi, wheezing, or rubs  bilaterally  HEART: Regular rate and rhythm; No murmurs, rubs, or gallops  ABDOMEN: Soft, Nontender, Nondistended; Bowel sounds present  EXTREMITIES:  2+ Peripheral Pulses, No clubbing, cyanosis, or edema BL LE  SKIN: No rashes or lesions  NERVOUS SYSTEM:  Alert & Oriented X3, Good concentration; Motor Strength 4/5 B/L upper and lower extremities; sensation intact , improving hypersensitive to dorsum and plantar surface of feet       LABS:                                            8.0    15.96 )-----------( 508      ( 05 Aug 2023 07:50 )             25.3   08-05    135  |  107  |  67<H>  ----------------------------<  145<H>  5.0   |  19<L>  |  4.12<H>    Ca    8.6      05 Aug 2023 07:50  Phos  4.1     08-04  Mg     2.8     08-04          A1C with Estimated Average Glucose (07.30.23 @ 07:20)    A1C with Estimated Average Glucose Result: 7.1: Method: Immunoassay       Reference Range                4.0-5.6%       High risk (prediabetic)        5.7-6.4%       Diabetic, diagnostic             >=6.5%       ADA diabetic treatment goal       <7.0%  The Hemoglobin A1c testing is NGSP-certified.Reference ranges are based  upon the 2010 recommendations of  the American Diabetes Association.  Interpretation may vary for children  and adolescents. %   Estimated Average Glucose: 157: The Estimated Average Glucose (eAG) or Mean Plasma Glucose (MPG) value is  calculated from the hemoglobin A1c value and covers the same time period.   The American Diabetes Association (ADA) and other professional  organizations recommend reporting the eAG with the HgbA1c. mg/dL        Urinalysis Basic - ( 02 Aug 2023 08:13 )    Color: x / Appearance: x / SG: x / pH: x  Gluc: 207 mg/dL / Ketone: x  / Bili: x / Urobili: x   Blood: x / Protein: x / Nitrite: x   Leuk Esterase: x / RBC: x / WBC x   Sq Epi: x / Non Sq Epi: x / Bacteria: x      Cultures;     CAPILLARY BLOOD GLUCOSE      POCT Blood Glucose.: 177 mg/dL (04 Aug 2023 07:34)  POCT Blood Glucose.: 188 mg/dL (03 Aug 2023 21:53)  POCT Blood Glucose.: 223 mg/dL (03 Aug 2023 16:40)  POCT Blood Glucose.: 173 mg/dL (03 Aug 2023 11:57)      Lipid panel:           RADIOLOGY & ADDITIONAL TESTS:    < from: MR Lumbar Spine No Cont (08.03.23 @ 10:48) >  IMPRESSION: Lumbar laminectomy L2-L4 with fluid collection mildly   compressing the sac. Transpedicular screws and connecting rods L2-L5.    < end of copied text >      Imaging Personally Reviewed:  [ x] YES      Consultant(s) Notes Reviewed:  [x ] YES     Care Discussed with [x ] Consultants [X ] Patient [x ] Family  [x ]    [x ]  Other; RN

## 2023-08-07 LAB
ANION GAP SERPL CALC-SCNC: 12 MMOL/L — SIGNIFICANT CHANGE UP (ref 5–17)
BUN SERPL-MCNC: 53 MG/DL — HIGH (ref 7–23)
CALCIUM SERPL-MCNC: 8.8 MG/DL — SIGNIFICANT CHANGE UP (ref 8.5–10.1)
CHLORIDE SERPL-SCNC: 105 MMOL/L — SIGNIFICANT CHANGE UP (ref 96–108)
CO2 SERPL-SCNC: 18 MMOL/L — LOW (ref 22–31)
CREAT SERPL-MCNC: 3.35 MG/DL — HIGH (ref 0.5–1.3)
EGFR: 14 ML/MIN/1.73M2 — LOW
GLUCOSE BLDC GLUCOMTR-MCNC: 117 MG/DL — HIGH (ref 70–99)
GLUCOSE BLDC GLUCOMTR-MCNC: 163 MG/DL — HIGH (ref 70–99)
GLUCOSE BLDC GLUCOMTR-MCNC: 213 MG/DL — HIGH (ref 70–99)
GLUCOSE BLDC GLUCOMTR-MCNC: 250 MG/DL — HIGH (ref 70–99)
GLUCOSE SERPL-MCNC: 215 MG/DL — HIGH (ref 70–99)
HCT VFR BLD CALC: 26.3 % — LOW (ref 34.5–45)
HGB BLD-MCNC: 8.7 G/DL — LOW (ref 11.5–15.5)
MCHC RBC-ENTMCNC: 27.9 PG — SIGNIFICANT CHANGE UP (ref 27–34)
MCHC RBC-ENTMCNC: 33.1 G/DL — SIGNIFICANT CHANGE UP (ref 32–36)
MCV RBC AUTO: 84.3 FL — SIGNIFICANT CHANGE UP (ref 80–100)
NRBC # BLD: 0 /100 WBCS — SIGNIFICANT CHANGE UP (ref 0–0)
PLATELET # BLD AUTO: 590 K/UL — HIGH (ref 150–400)
POTASSIUM SERPL-MCNC: 4.5 MMOL/L — SIGNIFICANT CHANGE UP (ref 3.5–5.3)
POTASSIUM SERPL-SCNC: 4.5 MMOL/L — SIGNIFICANT CHANGE UP (ref 3.5–5.3)
RBC # BLD: 3.12 M/UL — LOW (ref 3.8–5.2)
RBC # FLD: 14.5 % — SIGNIFICANT CHANGE UP (ref 10.3–14.5)
SODIUM SERPL-SCNC: 135 MMOL/L — SIGNIFICANT CHANGE UP (ref 135–145)
WBC # BLD: 12.27 K/UL — HIGH (ref 3.8–10.5)
WBC # FLD AUTO: 12.27 K/UL — HIGH (ref 3.8–10.5)

## 2023-08-07 PROCEDURE — 99231 SBSQ HOSP IP/OBS SF/LOW 25: CPT

## 2023-08-07 PROCEDURE — 99232 SBSQ HOSP IP/OBS MODERATE 35: CPT

## 2023-08-07 RX ORDER — MORPHINE SULFATE 50 MG/1
1 CAPSULE, EXTENDED RELEASE ORAL ONCE
Refills: 0 | Status: DISCONTINUED | OUTPATIENT
Start: 2023-08-07 | End: 2023-08-07

## 2023-08-07 RX ORDER — SODIUM BICARBONATE 1 MEQ/ML
650 SYRINGE (ML) INTRAVENOUS EVERY 12 HOURS
Refills: 0 | Status: DISCONTINUED | OUTPATIENT
Start: 2023-08-07 | End: 2023-08-11

## 2023-08-07 RX ADMIN — PHENYLEPHRINE-SHARK LIVER OIL-MINERAL OIL-PETROLATUM RECTAL OINTMENT 1 APPLICATION(S): at 17:10

## 2023-08-07 RX ADMIN — Medication 100 MILLIGRAM(S): at 06:07

## 2023-08-07 RX ADMIN — PANTOPRAZOLE SODIUM 40 MILLIGRAM(S): 20 TABLET, DELAYED RELEASE ORAL at 17:15

## 2023-08-07 RX ADMIN — Medication 60 MILLIGRAM(S): at 06:07

## 2023-08-07 RX ADMIN — PHENYLEPHRINE-SHARK LIVER OIL-MINERAL OIL-PETROLATUM RECTAL OINTMENT 1 APPLICATION(S): at 06:08

## 2023-08-07 RX ADMIN — MORPHINE SULFATE 1 MILLIGRAM(S): 50 CAPSULE, EXTENDED RELEASE ORAL at 16:30

## 2023-08-07 RX ADMIN — GABAPENTIN 100 MILLIGRAM(S): 400 CAPSULE ORAL at 06:07

## 2023-08-07 RX ADMIN — Medication 2: at 17:13

## 2023-08-07 RX ADMIN — Medication 2: at 12:09

## 2023-08-07 RX ADMIN — Medication 1: at 21:54

## 2023-08-07 RX ADMIN — Medication 100 MILLIGRAM(S): at 21:54

## 2023-08-07 RX ADMIN — Medication 1 GRAM(S): at 06:07

## 2023-08-07 RX ADMIN — Medication 1 GRAM(S): at 12:10

## 2023-08-07 RX ADMIN — Medication 60 MILLIGRAM(S): at 17:15

## 2023-08-07 RX ADMIN — PIPERACILLIN AND TAZOBACTAM 25 GRAM(S): 4; .5 INJECTION, POWDER, LYOPHILIZED, FOR SOLUTION INTRAVENOUS at 17:14

## 2023-08-07 RX ADMIN — MORPHINE SULFATE 1 MILLIGRAM(S): 50 CAPSULE, EXTENDED RELEASE ORAL at 11:35

## 2023-08-07 RX ADMIN — GABAPENTIN 100 MILLIGRAM(S): 400 CAPSULE ORAL at 17:15

## 2023-08-07 RX ADMIN — Medication 1 GRAM(S): at 23:05

## 2023-08-07 RX ADMIN — PANTOPRAZOLE SODIUM 40 MILLIGRAM(S): 20 TABLET, DELAYED RELEASE ORAL at 06:07

## 2023-08-07 RX ADMIN — MORPHINE SULFATE 1 MILLIGRAM(S): 50 CAPSULE, EXTENDED RELEASE ORAL at 12:15

## 2023-08-07 RX ADMIN — Medication 1 MILLIGRAM(S): at 12:10

## 2023-08-07 RX ADMIN — PREGABALIN 1000 MICROGRAM(S): 225 CAPSULE ORAL at 12:10

## 2023-08-07 RX ADMIN — TRAMADOL HYDROCHLORIDE 25 MILLIGRAM(S): 50 TABLET ORAL at 13:46

## 2023-08-07 RX ADMIN — SENNA PLUS 2 TABLET(S): 8.6 TABLET ORAL at 21:54

## 2023-08-07 RX ADMIN — TRAMADOL HYDROCHLORIDE 25 MILLIGRAM(S): 50 TABLET ORAL at 14:30

## 2023-08-07 RX ADMIN — POLYETHYLENE GLYCOL 3350 17 GRAM(S): 17 POWDER, FOR SOLUTION ORAL at 06:06

## 2023-08-07 RX ADMIN — Medication 1 GRAM(S): at 17:14

## 2023-08-07 RX ADMIN — HEPARIN SODIUM 5000 UNIT(S): 5000 INJECTION INTRAVENOUS; SUBCUTANEOUS at 17:15

## 2023-08-07 RX ADMIN — PHENYLEPHRINE-SHARK LIVER OIL-MINERAL OIL-PETROLATUM RECTAL OINTMENT 1 APPLICATION(S): at 11:57

## 2023-08-07 RX ADMIN — PIPERACILLIN AND TAZOBACTAM 25 GRAM(S): 4; .5 INJECTION, POWDER, LYOPHILIZED, FOR SOLUTION INTRAVENOUS at 06:06

## 2023-08-07 RX ADMIN — HEPARIN SODIUM 5000 UNIT(S): 5000 INJECTION INTRAVENOUS; SUBCUTANEOUS at 06:07

## 2023-08-07 RX ADMIN — Medication 100 MILLIGRAM(S): at 15:43

## 2023-08-07 RX ADMIN — MORPHINE SULFATE 1 MILLIGRAM(S): 50 CAPSULE, EXTENDED RELEASE ORAL at 15:43

## 2023-08-07 RX ADMIN — Medication 650 MILLIGRAM(S): at 17:14

## 2023-08-07 NOTE — PROGRESS NOTE ADULT - ASSESSMENT
78 year old female with PMH of HTN, DM present to the ED for hypoglycemic episode. Patient is from [facility], she was found unresponsive with a glucose level of 30, she was given 1 amp of glucogan, upon ED arrival FS was 41 with AMS, received Dextrose 50ml.. On 7/14 patient had Laminectomy with fusion L2-4, endorses she has been having dysuria since she arrived at [facility] associated with suprapubic pain.  Labs  remarkable for leukocytosis (WBC 28.80) worsen kidney function with Cr 4.90 compared to 7/18 Cr 3.10. Upon evaluation patient was AAOx4, endorses dysuria with lower abd pain, denies fever, headache, dizziness, SOB, chest pain, palpitation or any other acute symptoms.      wbc remains high   creatinine is worsening and has significant hematuria with drop in H/H requiring blood transfusion   Blood cultures 1/4 bottles with MRSE  CT (I personally reviewed) with cystitis   surgical site does not appear to be culprit here but if blood cultures remains positiive will need to pursue   suspect this is a severe case of UTI with urinary retention     8/1: blood cultures were collected yesterday, she remains afebrile urine cultures with no organisms, creatinine still worsening but slowing down, WBC is slightly better, she is s/p a blood transfusion with improvement in her hemoglobin. her main complaint is her numbness and tingling and discomfort in her feet.   8/2: afebrile, RA, WBC continues to improve 19.97, Cr about the same 5.35, Vancomycin level is 16.5 today, hold off on any additional vancomycin IV doses, continue Zosyn IV for now, repeat BCs with no growth to date. Pt continues to complain of discomfort in her b/l feet. Bruce with bloody urine, urology is following. Pt needs to MRI of LS per ortho to r/o spinal etiology of urinary retention, will need transfer.   8/3: MRI was able to be performed and showed fluid collection with mild thecal sack impingent did not appear to be hematoma based on radiologist, wbc getting better, had a BM yesterday, reports less leg pain and discomfort, has good ROM of knees, hips and ankles   8/4: continue zosyn but ok to change to vantin once daily to complete 10 days,   8/7: afebrilke, RA, WBC elevated 12.27, Cr better 3.57, repeat BCs with no growth to date, today is the last day of abx    Plan:  stop zosyn after today's doses - to complete 10 days course total  monitor pt's renal function   follow all cultures until final   follow repeat blood cultures NGTD  monitor h/h and transfuse when less the 7.0  trend wbc toward normal   trend creatinine and IVF per renal  Bruce per urology    PT and OT while inpatient  78 year old female with PMH of HTN, DM present to the ED for hypoglycemic episode. Patient is from [facility], she was found unresponsive with a glucose level of 30, she was given 1 amp of glucogan, upon ED arrival FS was 41 with AMS, received Dextrose 50ml.. On 7/14 patient had Laminectomy with fusion L2-4, endorses she has been having dysuria since she arrived at [facility] associated with suprapubic pain.  Labs  remarkable for leukocytosis (WBC 28.80) worsen kidney function with Cr 4.90 compared to 7/18 Cr 3.10. Upon evaluation patient was AAOx4, endorses dysuria with lower abd pain, denies fever, headache, dizziness, SOB, chest pain, palpitation or any other acute symptoms.      wbc remains high   creatinine is worsening and has significant hematuria with drop in H/H requiring blood transfusion   Blood cultures 1/4 bottles with MRSE  CT (I personally reviewed) with cystitis   surgical site does not appear to be culprit here but if blood cultures remains positiive will need to pursue   suspect this is a severe case of UTI with urinary retention     8/1: blood cultures were collected yesterday, she remains afebrile urine cultures with no organisms, creatinine still worsening but slowing down, WBC is slightly better, she is s/p a blood transfusion with improvement in her hemoglobin. her main complaint is her numbness and tingling and discomfort in her feet.   8/2: afebrile, RA, WBC continues to improve 19.97, Cr about the same 5.35, Vancomycin level is 16.5 today, hold off on any additional vancomycin IV doses, continue Zosyn IV for now, repeat BCs with no growth to date. Pt continues to complain of discomfort in her b/l feet. Bruce with bloody urine, urology is following. Pt needs to MRI of LS per ortho to r/o spinal etiology of urinary retention, will need transfer.   8/3: MRI was able to be performed and showed fluid collection with mild thecal sack impingent did not appear to be hematoma based on radiologist, wbc getting better, had a BM yesterday, reports less leg pain and discomfort, has good ROM of knees, hips and ankles   8/4: continue zosyn but ok to change to vantin once daily to complete 10 days,   8/7: afebrilke, RA, WBC elevated 12.27, Cr better 3.57, repeat BCs with no growth to date, today is the last day of abx    Plan:  stop zosyn after today's doses - to complete 10 days course total  monitor pt's renal function   follow all cultures until final   follow repeat blood cultures NGTD  monitor h/h and transfuse when less the 7.0  trend wbc toward normal   trend creatinine and IVF per renal  Bruce per urology    PT and OT while inpatient     signing off

## 2023-08-07 NOTE — PROGRESS NOTE ADULT - SUBJECTIVE AND OBJECTIVE BOX
KATERINATH, JUNE  MRN-[id number]    Follow Up:  uti    Interval History: the pt was seen and examined earlier, no acute distress, awake and alert, out of bed to chair, was able to transfer with the help of PT. Pt has no fever, RA, WBC 12.27.    PAST MEDICAL & SURGICAL HISTORY:  HTN (hypertension)      T2DM (type 2 diabetes mellitus)      Diabetes mellitus      Status post D&C          ROS:    [ ] Unobtainable because:  [x ] All other systems negative    Constitutional: no fever, no chills  Head: no trauma  Eyes: no vision changes, no eye pain  ENT:  no sore throat, no rhinorrhea  Cardiovascular:  no chest pain, no palpitation  Respiratory:  no SOB, no cough  GI:  no abd pain, no vomiting, no diarrhea  urinary: no dysuria, no hematuria, no flank pain  musculoskeletal:  b/l LEs pain controlled with pain medication   skin:  no rash  neurology:  no headache, no seizure, no change in mental status  psych: no anxiety, no depression         Allergies  No Known Allergies        ANTIMICROBIALS:  piperacillin/tazobactam IVPB.. 3.375 every 12 hours      OTHER MEDS:  acetaminophen     Tablet .. 650 milliGRAM(s) Oral every 6 hours PRN  cyanocobalamin 1000 MICROGram(s) Oral daily  dextrose 5%. 1000 milliLiter(s) IV Continuous <Continuous>  dextrose 50% Injectable 25 Gram(s) IV Push once  dextrose Oral Gel 15 Gram(s) Oral once PRN  folic acid 1 milliGRAM(s) Oral daily  gabapentin 100 milliGRAM(s) Oral two times a day  glucagon  Injectable 1 milliGRAM(s) IntraMuscular once  hemorrhoidal Ointment 1 Application(s) Rectal every 6 hours  heparin   Injectable 5000 Unit(s) SubCutaneous every 12 hours  hydrALAZINE 100 milliGRAM(s) Oral three times a day  insulin lispro (ADMELOG) corrective regimen sliding scale   SubCutaneous Before meals and at bedtime  melatonin 3 milliGRAM(s) Oral at bedtime PRN  morphine  - Injectable 1 milliGRAM(s) IV Push every 6 hours PRN  NIFEdipine XL 60 milliGRAM(s) Oral two times a day  pantoprazole  Injectable 40 milliGRAM(s) IV Push two times a day  polyethylene glycol 3350 17 Gram(s) Oral two times a day  senna 2 Tablet(s) Oral at bedtime  sodium bicarbonate 650 milliGRAM(s) Oral every 12 hours  sucralfate 1 Gram(s) Oral every 6 hours  traMADol 25 milliGRAM(s) Oral every 8 hours PRN      Vital Signs Last 24 Hrs  T(C): 37.2 (07 Aug 2023 10:34), Max: 37.7 (07 Aug 2023 06:29)  T(F): 98.9 (07 Aug 2023 10:34), Max: 99.8 (07 Aug 2023 06:29)  HR: 68 (07 Aug 2023 10:34) (63 - 73)  BP: 149/70 (07 Aug 2023 10:34) (142/59 - 166/70)  BP(mean): --  RR: 17 (07 Aug 2023 10:34) (17 - 18)  SpO2: 94% (07 Aug 2023 10:34) (94% - 96%)    Parameters below as of 07 Aug 2023 06:29  Patient On (Oxygen Delivery Method): room air        Physical Exam:  General:    NAD,  non toxic  Head: atraumatic, normocephalic  Eye: normal sclera and conjunctiva  ENT:    no oral lesions, neck supple  Cardio:     regular S1, S2,  no murmur  Respiratory:    clear b/l,    no wheezing  abd:     soft,   BS +,   no tenderness  :   no CVAT,  no suprapubic tenderness,   +negrete  Musculoskeletal:   no joint swelling,   no edema  vascular: no central lines, +PIV   Skin:    no rash, less sensitivity to bilateral feet   Neurologic:     no focal deficit  psych: normal affect    WBC Count: 12.27 K/uL (08-07 @ 12:45)  WBC Count: 11.96 K/uL (08-06 @ 09:57)  WBC Count: 15.96 K/uL (08-05 @ 07:50)  WBC Count: 17.20 K/uL (08-04 @ 12:32)  WBC Count: 17.17 K/uL (08-03 @ 12:15)  WBC Count: 19.97 K/uL (08-02 @ 08:13)  WBC Count: 21.05 K/uL (08-01 @ 10:05)                            8.7    12.27 )-----------( 590      ( 07 Aug 2023 12:45 )             26.3       08-07    135  |  105  |  53<H>  ----------------------------<  215<H>  4.5   |  18<L>  |  3.35<H>    Ca    8.8      07 Aug 2023 12:45        Urinalysis Basic - ( 07 Aug 2023 12:45 )    Color: x / Appearance: x / SG: x / pH: x  Gluc: 215 mg/dL / Ketone: x  / Bili: x / Urobili: x   Blood: x / Protein: x / Nitrite: x   Leuk Esterase: x / RBC: x / WBC x   Sq Epi: x / Non Sq Epi: x / Bacteria: x        Creatinine Trend: 3.35<--, 3.57<--, 4.12<--, 4.28<--, 4.82<--, 5.35<--      MICROBIOLOGY:  v  .Blood Blood-Peripheral  07-31-23   No growth at 5 days  --  --      .Blood Blood-Peripheral  07-31-23   No growth at 5 days  --  --      Clean Catch Clean Catch (Midstream)  07-30-23   <10,000 CFU/mL Normal Urogenital Nola  --  --      .Blood Blood  07-29-23   No growth at 5 days  --  Blood Culture PCR      Clean Catch Clean Catch (Midstream)  07-29-23   >=3 organisms. Probable collection contamination.  --  --    C-Reactive Protein, Serum: 222 (08-02)  C-Reactive Protein, Serum: 290 (07-30)      RADIOLOGY:

## 2023-08-07 NOTE — PROGRESS NOTE ADULT - NS ATTEND AMEND GEN_ALL_CORE FT
I have reviewed all pertinent clinical information and agree with the NP's note.   complete antibiotics   trend creatinine   physical therapy   negrete care     Will sign off. Please call with questions.     Román Guan, DO  Infectious Disease Attending  Reachable via Microsoft Teams or ID office: [phone]  After 5pm/weekends please call [phone] for all inquiries and new consult(s)

## 2023-08-07 NOTE — PROGRESS NOTE ADULT - ASSESSMENT
78 year old female with PMH of HTN, DM present to the ED for hypoglycemic episode. Patient is from [facility], she was found unresponsive with a glucose level of 30, she was given 1 amp of glucogan, upon ED arrival FS was 41 with AMS, received Dextrose 50ml.. On 7/14 patient had Laminectomy with fusion L2-4, endorses she has been having dysuria since she arrived at [facility] associated with suprapubic pain.  Labs  remarkable for leukocytosis (WBC 28.80) worsen kidney function with Cr 4.90 compared to 7/18 Cr 3.10. Afebrile. Upon evaluation patient was AAOx4, endorses dysuria with lower abd pain, denies fever, headache, dizziness, SOB, chest pain, palpitation or any other acute symptoms.    Patient is being admitted for UTI to be treated with antibiotics.     Hypoglycemia likely due to MOSS.   per my colleague's documentation "Patient is currently on Glipizide 5mg   Patient  was found unresponsive with FS 30 at [facility]  In the ED FS 41 received Dextrose & returned to baseline   s/p dextrose iV  hypogylcemia workup sent  sugars better"    8/2/2023 resolved    UTI (urinary tract infection) with evidence of urinary retention.   per my colleague's documentation  "one  Week+ history of dysuria associated with suprapubic pain   Leukocytosis (WBC 28.80) now 21  s/p Vancomycin  UA is GROSSLY positive and culture is negative, blood culture negative  US renal bladder scan with retention  negrete inserted"    c/w zosyn   hold vanco-- due to     kidney dysfunction  8/3/2023 continue with antibx , f/u cbc  8/5/2023 failed TOV times two : negrete will be reinserted      Acute blood loss anemia: presumed due to gross hematuria -s/p prbc on 7/31/2023   continue to  monitor hgb     Lower extremity radiculopathy with hx of L4 lami  patient reports leg pain and numbness of both legs up to the knee    plan: seen by ortho with [facility] for MRI of L spine hopefully to be completed on today     8/3/2023- f/u mri   8/4/2023 - mri  as above - case d/w ID and ortho feel its post operative changes .   titrate gabapentin as creatinine allows      Acute kidney injury superimposed on CKD stage 4 presumed due to UTI and urinary retention and ATN  appreciate renal note  IVF   Monitor kidney function   Avoid Nephrotoxic drugs.  8/4/2023 f/u creatinine today will d/w renal   8/5/2023 continued improvement in creatinine, renal states no ACE a/HCTZ at discharge    Hypertension.   ·  Continue with - Nifedipine  - Hydralazine   - Monitor.  8/5/2023 increased hydralazine for better bp control from 75 to 100    Diabetes mellitus.   sliding scale   a1c 7.1  dispo - await d/c back to [facility]

## 2023-08-07 NOTE — PROGRESS NOTE ADULT - SUBJECTIVE AND OBJECTIVE BOX
[facility] NEPHROLOGY SERVICES, [facility]  NEPHROLOGY AND HYPERTENSION  [address]  [address]  [place] [address]  [phone]    MD MAT TUBBS MD YELENA ROSENBERG, MD BINNY KOSHY, MD CHRISTOPHER CAPUTO, MD EDWARD BOVER, MD          Patient events noted    MEDICATIONS  (STANDING):  cyanocobalamin 1000 MICROGram(s) Oral daily  dextrose 5%. 1000 milliLiter(s) (50 mL/Hr) IV Continuous <Continuous>  dextrose 50% Injectable 25 Gram(s) IV Push once  folic acid 1 milliGRAM(s) Oral daily  gabapentin 100 milliGRAM(s) Oral two times a day  glucagon  Injectable 1 milliGRAM(s) IntraMuscular once  hemorrhoidal Ointment 1 Application(s) Rectal every 6 hours  heparin   Injectable 5000 Unit(s) SubCutaneous every 12 hours  hydrALAZINE 100 milliGRAM(s) Oral three times a day  insulin lispro (ADMELOG) corrective regimen sliding scale   SubCutaneous Before meals and at bedtime  NIFEdipine XL 60 milliGRAM(s) Oral two times a day  pantoprazole  Injectable 40 milliGRAM(s) IV Push two times a day  polyethylene glycol 3350 17 Gram(s) Oral two times a day  senna 2 Tablet(s) Oral at bedtime  sodium bicarbonate 650 milliGRAM(s) Oral every 12 hours  sucralfate 1 Gram(s) Oral every 6 hours    MEDICATIONS  (PRN):  acetaminophen     Tablet .. 650 milliGRAM(s) Oral every 6 hours PRN Temp greater or equal to 38C (100.4F), Mild Pain (1 - 3)  dextrose Oral Gel 15 Gram(s) Oral once PRN Blood Glucose LESS THAN 70 milliGRAM(s)/deciliter  melatonin 3 milliGRAM(s) Oral at bedtime PRN Insomnia  morphine  - Injectable 1 milliGRAM(s) IV Push every 6 hours PRN Severe Pain (7 - 10)  traMADol 25 milliGRAM(s) Oral every 8 hours PRN Moderate Pain (4 - 6)      08-06-23 @ 07:01  -  08-07-23 @ 07:00  --------------------------------------------------------  IN: 0 mL / OUT: 2800 mL / NET: -2800 mL    08-07-23 @ 07:01  -  08-07-23 @ 22:07  --------------------------------------------------------  IN: 0 mL / OUT: 475 mL / NET: -475 mL      PHYSICAL EXAM:      T(C): 37.2 (08-07-23 @ 10:34), Max: 37.7 (08-07-23 @ 06:29)  HR: 73 (08-07-23 @ 21:52) (64 - 82)  BP: 151/69 (08-07-23 @ 21:52) (149/70 - 171/72)  RR: 16 (08-07-23 @ 17:10) (16 - 18)  SpO2: 98% (08-07-23 @ 17:10) (94% - 98%)  Wt(kg): --  Lungs clear  Heart S1S2  Abd soft NT ND  Extremities:   tr edema                                    8.7    12.27 )-----------( 590      ( 07 Aug 2023 12:45 )             26.3     08-07    135  |  105  |  53<H>  ----------------------------<  215<H>  4.5   |  18<L>  |  3.35<H>    Ca    8.8      07 Aug 2023 12:45          Creatinine Trend: 3.35<--, 3.57<--, 4.12<--, 4.28<--, 4.82<--, 5.35<--            Assessment   ANASTACIO CKD 4; pyuria and hematuria  UTI risk for infectious AIN vs infectious GN   Recent Laminectomy 7/14/23  S. Epi bacteremia   Renal indices stabilizing     Plan      Stable from renal view  Hold ACE; HCTZ at discharge  Adin Jack MD

## 2023-08-07 NOTE — PROGRESS NOTE ADULT - SUBJECTIVE AND OBJECTIVE BOX
Patient is a 78y old  Female who presents with a chief complaint of Hypoglycemia & UTI (02 Aug 2023 11:11)      OVERNIGHT EVENTS:  none    MEDICATIONS  (STANDING):  cyanocobalamin 1000 MICROGram(s) Oral daily  dextrose 5%. 1000 milliLiter(s) (50 mL/Hr) IV Continuous <Continuous>  dextrose 50% Injectable 25 Gram(s) IV Push once  folic acid 1 milliGRAM(s) Oral daily  gabapentin 100 milliGRAM(s) Oral two times a day  glucagon  Injectable 1 milliGRAM(s) IntraMuscular once  hemorrhoidal Ointment 1 Application(s) Rectal every 6 hours  heparin   Injectable 5000 Unit(s) SubCutaneous every 12 hours  hydrALAZINE 100 milliGRAM(s) Oral three times a day  insulin lispro (ADMELOG) corrective regimen sliding scale   SubCutaneous Before meals and at bedtime  NIFEdipine XL 60 milliGRAM(s) Oral two times a day  pantoprazole  Injectable 40 milliGRAM(s) IV Push two times a day  piperacillin/tazobactam IVPB.. 3.375 Gram(s) IV Intermittent every 12 hours  polyethylene glycol 3350 17 Gram(s) Oral two times a day  senna 2 Tablet(s) Oral at bedtime  sucralfate 1 Gram(s) Oral every 6 hours    MEDICATIONS  (PRN):  acetaminophen     Tablet .. 650 milliGRAM(s) Oral every 6 hours PRN Temp greater or equal to 38C (100.4F), Mild Pain (1 - 3)  dextrose Oral Gel 15 Gram(s) Oral once PRN Blood Glucose LESS THAN 70 milliGRAM(s)/deciliter  melatonin 3 milliGRAM(s) Oral at bedtime PRN Insomnia  morphine  - Injectable 1 milliGRAM(s) IV Push every 6 hours PRN Severe Pain (7 - 10)  traMADol 25 milliGRAM(s) Oral every 8 hours PRN Moderate Pain (4 - 6)      Allergies    No Known Allergies    Intolerances        SUBJECTIVE: in bed in NAD, no acute events overnight     Vital Signs Last 24 Hrs  T(C): 37.7 (07 Aug 2023 06:29), Max: 37.7 (07 Aug 2023 06:29)  T(F): 99.8 (07 Aug 2023 06:29), Max: 99.8 (07 Aug 2023 06:29)  HR: 73 (07 Aug 2023 06:29) (61 - 73)  BP: 155/67 (07 Aug 2023 06:29) (125/56 - 166/70)  BP(mean): --  RR: 18 (07 Aug 2023 06:29) (18 - 18)  SpO2: 96% (07 Aug 2023 06:29) (94% - 98%)    Parameters below as of 07 Aug 2023 06:29  Patient On (Oxygen Delivery Method): room air        PHYSICAL EXAM:  GENERAL: NAD, well-groomed, well-developed  HEAD:  Atraumatic, Normocephalic  EYES: EOMI, PERRLA, conjunctiva and sclera clear  ENMT: No tonsillar erythema, exudates, or enlargement; Moist mucous membranes, Good dentition, No lesions  NECK: Supple  CHEST/LUNG: Clear to  auscultation bilaterally; No rales, rhonchi, wheezing, or rubs  bilaterally  HEART: Regular rate and rhythm; No murmurs, rubs, or gallops  ABDOMEN: Soft, Nontender, Nondistended; Bowel sounds present  EXTREMITIES:  2+ Peripheral Pulses, No clubbing, cyanosis, or edema BL LE  SKIN: No rashes or lesions  NERVOUS SYSTEM:  Alert & Oriented X3, Good concentration; Motor Strength 4/5 B/L upper and lower extremities; sensation intact , improving hypersensitive to dorsum and plantar surface of feet       LABS:                                                            8.2    11.96 )-----------( 524      ( 06 Aug 2023 09:57 )             25.0   08-06    136  |  110<H>  |  59<H>  ----------------------------<  122<H>  4.7   |  21<L>  |  3.57<H>    Ca    8.7      06 Aug 2023 09:57          A1C with Estimated Average Glucose (07.30.23 @ 07:20)    A1C with Estimated Average Glucose Result: 7.1: Method: Immunoassay       Reference Range                4.0-5.6%       High risk (prediabetic)        5.7-6.4%       Diabetic, diagnostic             >=6.5%       ADA diabetic treatment goal       <7.0%  The Hemoglobin A1c testing is NGSP-certified.Reference ranges are based  upon the 2010 recommendations of  the American Diabetes Association.  Interpretation may vary for children  and adolescents. %   Estimated Average Glucose: 157: The Estimated Average Glucose (eAG) or Mean Plasma Glucose (MPG) value is  calculated from the hemoglobin A1c value and covers the same time period.   The American Diabetes Association (ADA) and other professional  organizations recommend reporting the eAG with the HgbA1c. mg/dL        Urinalysis Basic - ( 02 Aug 2023 08:13 )    Color: x / Appearance: x / SG: x / pH: x  Gluc: 207 mg/dL / Ketone: x  / Bili: x / Urobili: x   Blood: x / Protein: x / Nitrite: x   Leuk Esterase: x / RBC: x / WBC x   Sq Epi: x / Non Sq Epi: x / Bacteria: x      Cultures;     CAPILLARY BLOOD GLUCOSE    CAPILLARY BLOOD GLUCOSE      POCT Blood Glucose.: 117 mg/dL (07 Aug 2023 08:02)  POCT Blood Glucose.: 141 mg/dL (06 Aug 2023 22:12)  POCT Blood Glucose.: 149 mg/dL (06 Aug 2023 16:43)  POCT Blood Glucose.: 152 mg/dL (06 Aug 2023 11:08)      Lipid panel:           RADIOLOGY & ADDITIONAL TESTS:    < from: MR Lumbar Spine No Cont (08.03.23 @ 10:48) >  IMPRESSION: Lumbar laminectomy L2-L4 with fluid collection mildly   compressing the sac. Transpedicular screws and connecting rods L2-L5.    < end of copied text >      Imaging Personally Reviewed:  [ x] YES      Consultant(s) Notes Reviewed:  [x ] YES     Care Discussed with [x ] Consultants [X ] Patient [x ] Family  [x ]    [x ]  Other; RN

## 2023-08-08 LAB
GLUCOSE BLDC GLUCOMTR-MCNC: 140 MG/DL — HIGH (ref 70–99)
GLUCOSE BLDC GLUCOMTR-MCNC: 143 MG/DL — HIGH (ref 70–99)
GLUCOSE BLDC GLUCOMTR-MCNC: 189 MG/DL — HIGH (ref 70–99)
GLUCOSE BLDC GLUCOMTR-MCNC: 192 MG/DL — HIGH (ref 70–99)

## 2023-08-08 PROCEDURE — 99231 SBSQ HOSP IP/OBS SF/LOW 25: CPT

## 2023-08-08 RX ADMIN — Medication 60 MILLIGRAM(S): at 18:17

## 2023-08-08 RX ADMIN — PREGABALIN 1000 MICROGRAM(S): 225 CAPSULE ORAL at 13:39

## 2023-08-08 RX ADMIN — Medication 60 MILLIGRAM(S): at 05:44

## 2023-08-08 RX ADMIN — Medication 650 MILLIGRAM(S): at 05:44

## 2023-08-08 RX ADMIN — Medication 1 GRAM(S): at 05:44

## 2023-08-08 RX ADMIN — Medication 100 MILLIGRAM(S): at 21:52

## 2023-08-08 RX ADMIN — Medication 100 MILLIGRAM(S): at 05:44

## 2023-08-08 RX ADMIN — Medication 1: at 17:22

## 2023-08-08 RX ADMIN — Medication 1 MILLIGRAM(S): at 13:40

## 2023-08-08 RX ADMIN — PANTOPRAZOLE SODIUM 40 MILLIGRAM(S): 20 TABLET, DELAYED RELEASE ORAL at 17:22

## 2023-08-08 RX ADMIN — GABAPENTIN 100 MILLIGRAM(S): 400 CAPSULE ORAL at 05:51

## 2023-08-08 RX ADMIN — MORPHINE SULFATE 1 MILLIGRAM(S): 50 CAPSULE, EXTENDED RELEASE ORAL at 22:49

## 2023-08-08 RX ADMIN — Medication 1 GRAM(S): at 17:21

## 2023-08-08 RX ADMIN — Medication 650 MILLIGRAM(S): at 17:21

## 2023-08-08 RX ADMIN — POLYETHYLENE GLYCOL 3350 17 GRAM(S): 17 POWDER, FOR SOLUTION ORAL at 05:45

## 2023-08-08 RX ADMIN — HEPARIN SODIUM 5000 UNIT(S): 5000 INJECTION INTRAVENOUS; SUBCUTANEOUS at 05:43

## 2023-08-08 RX ADMIN — GABAPENTIN 100 MILLIGRAM(S): 400 CAPSULE ORAL at 17:20

## 2023-08-08 RX ADMIN — Medication 1: at 12:15

## 2023-08-08 RX ADMIN — Medication 1 GRAM(S): at 13:39

## 2023-08-08 RX ADMIN — MORPHINE SULFATE 1 MILLIGRAM(S): 50 CAPSULE, EXTENDED RELEASE ORAL at 23:49

## 2023-08-08 RX ADMIN — Medication 100 MILLIGRAM(S): at 13:39

## 2023-08-08 RX ADMIN — PANTOPRAZOLE SODIUM 40 MILLIGRAM(S): 20 TABLET, DELAYED RELEASE ORAL at 05:43

## 2023-08-08 RX ADMIN — HEPARIN SODIUM 5000 UNIT(S): 5000 INJECTION INTRAVENOUS; SUBCUTANEOUS at 17:20

## 2023-08-08 NOTE — PROGRESS NOTE ADULT - ASSESSMENT
78 year old female with PMH of HTN, DM present to the ED for hypoglycemic episode. Patient is from [facility], she was found unresponsive with a glucose level of 30, she was given 1 amp of glucogan, upon ED arrival FS was 41 with AMS, received Dextrose 50ml.. On 7/14 patient had Laminectomy with fusion L2-4, endorses she has been having dysuria since she arrived at [facility] associated with suprapubic pain.  Labs  remarkable for leukocytosis (WBC 28.80) worsen kidney function with Cr 4.90 compared to 7/18 Cr 3.10. Afebrile. Upon evaluation patient was AAOx4, endorses dysuria with lower abd pain, denies fever, headache, dizziness, SOB, chest pain, palpitation or any other acute symptoms.    Patient is being admitted for UTI to be treated with antibiotics.     Hypoglycemia likely due to MOSS.   per my colleague's documentation "Patient is currently on Glipizide 5mg   Patient  was found unresponsive with FS 30 at [facility]  In the ED FS 41 received Dextrose & returned to baseline   s/p dextrose iV  hypogylcemia workup sent  sugars better"    8/2/2023 resolved    UTI (urinary tract infection) with evidence of urinary retention.   per my colleague's documentation  "one  Week+ history of dysuria associated with suprapubic pain   Leukocytosis (WBC 28.80) now 21  s/p Vancomycin  UA is GROSSLY positive and culture is negative, blood culture negative  US renal bladder scan with retention  negrete inserted"    c/w zosyn   hold vanco-- due to     kidney dysfunction  8/3/2023 continue with antibx , f/u cbc  8/5/2023 failed TOV times two : negrete will be reinserted      Acute blood loss anemia: presumed due to gross hematuria -s/p prbc on 7/31/2023   continue to  monitor hgb     Lower extremity radiculopathy with hx of L4 lami  patient reports leg pain and numbness of both legs up to the knee    plan: seen by ortho with rcecs for MRI of L spine hopefully to be completed on today     8/3/2023- f/u mri   8/4/2023 - mri  as above - case d/w ID and ortho feel its post operative changes .   titrate gabapentin as creatinine allows      Acute kidney injury superimposed on CKD stage 4 presumed due to UTI and urinary retention and ATN  appreciate renal note  IVF   Monitor kidney function   Avoid Nephrotoxic drugs.  8/4/2023 f/u creatinine today will d/w renal   8/5/2023 continued improvement in creatinine, renal states no ACE a/HCTZ at discharge    Hypertension.   ·  Continue with - Nifedipine  - Hydralazine   - Monitor.  8/5/2023 increased hydralazine for better bp control from 75 to 100    Diabetes mellitus.   sliding scale   a1c 7.1  dispo - await d/c back to [facility] been medically ready since8/5/2023

## 2023-08-08 NOTE — PROGRESS NOTE ADULT - SUBJECTIVE AND OBJECTIVE BOX
Patient is a 78y old  Female who presents with a chief complaint of Hypoglycemia & UTI (02 Aug 2023 11:11)      OVERNIGHT EVENTS:  none    MEDICATIONS  (STANDING):  cyanocobalamin 1000 MICROGram(s) Oral daily  dextrose 5%. 1000 milliLiter(s) (50 mL/Hr) IV Continuous <Continuous>  dextrose 50% Injectable 25 Gram(s) IV Push once  folic acid 1 milliGRAM(s) Oral daily  gabapentin 100 milliGRAM(s) Oral two times a day  glucagon  Injectable 1 milliGRAM(s) IntraMuscular once  hemorrhoidal Ointment 1 Application(s) Rectal every 6 hours  heparin   Injectable 5000 Unit(s) SubCutaneous every 12 hours  hydrALAZINE 100 milliGRAM(s) Oral three times a day  insulin lispro (ADMELOG) corrective regimen sliding scale   SubCutaneous Before meals and at bedtime  NIFEdipine XL 60 milliGRAM(s) Oral two times a day  pantoprazole  Injectable 40 milliGRAM(s) IV Push two times a day  polyethylene glycol 3350 17 Gram(s) Oral two times a day  senna 2 Tablet(s) Oral at bedtime  sodium bicarbonate 650 milliGRAM(s) Oral every 12 hours  sucralfate 1 Gram(s) Oral every 6 hours    MEDICATIONS  (PRN):  acetaminophen     Tablet .. 650 milliGRAM(s) Oral every 6 hours PRN Temp greater or equal to 38C (100.4F), Mild Pain (1 - 3)  dextrose Oral Gel 15 Gram(s) Oral once PRN Blood Glucose LESS THAN 70 milliGRAM(s)/deciliter  melatonin 3 milliGRAM(s) Oral at bedtime PRN Insomnia  morphine  - Injectable 1 milliGRAM(s) IV Push every 6 hours PRN Severe Pain (7 - 10)  traMADol 25 milliGRAM(s) Oral every 8 hours PRN Moderate Pain (4 - 6)        Allergies    No Known Allergies    Intolerances        SUBJECTIVE: in bed in NAD, no acute events overnight     Vital Signs Last 24 Hrs  T(C): 37.1 (08 Aug 2023 05:29), Max: 37.8 (08 Aug 2023 00:19)  T(F): 98.8 (08 Aug 2023 05:29), Max: 100 (08 Aug 2023 00:19)  HR: 62 (08 Aug 2023 05:29) (61 - 82)  BP: 145/70 (08 Aug 2023 05:29) (130/65 - 171/72)  BP(mean): --  RR: 16 (08 Aug 2023 05:29) (16 - 16)  SpO2: 96% (08 Aug 2023 05:29) (94% - 98%)        PHYSICAL EXAM:  GENERAL: NAD, well-groomed, well-developed  HEAD:  Atraumatic, Normocephalic  EYES: EOMI, PERRLA, conjunctiva and sclera clear  ENMT: No tonsillar erythema, exudates, or enlargement; Moist mucous membranes, Good dentition, No lesions  NECK: Supple  CHEST/LUNG: Clear to  auscultation bilaterally; No rales, rhonchi, wheezing, or rubs  bilaterally  HEART: Regular rate and rhythm; No murmurs, rubs, or gallops  ABDOMEN: Soft, Nontender, Nondistended; Bowel sounds present  EXTREMITIES:  2+ Peripheral Pulses, No clubbing, cyanosis, or edema BL LE  SKIN: No rashes or lesions  NERVOUS SYSTEM:  Alert & Oriented X3, Good concentration; Motor Strength 4/5 B/L upper and lower extremities; sensation intact , improving hypersensitive to dorsum and plantar surface of feet       LABS:                                                  8.7    12.27 )-----------( 590      ( 07 Aug 2023 12:45 )             26.3   08-07    135  |  105  |  53<H>  ----------------------------<  215<H>  4.5   |  18<L>  |  3.35<H>    Ca    8.8      07 Aug 2023 12:45            A1C with Estimated Average Glucose (07.30.23 @ 07:20)    A1C with Estimated Average Glucose Result: 7.1: Method: Immunoassay       Reference Range                4.0-5.6%       High risk (prediabetic)        5.7-6.4%       Diabetic, diagnostic             >=6.5%       ADA diabetic treatment goal       <7.0%  The Hemoglobin A1c testing is NGSP-certified.Reference ranges are based  upon the 2010 recommendations of  the American Diabetes Association.  Interpretation may vary for children  and adolescents. %   Estimated Average Glucose: 157: The Estimated Average Glucose (eAG) or Mean Plasma Glucose (MPG) value is  calculated from the hemoglobin A1c value and covers the same time period.   The American Diabetes Association (ADA) and other professional  organizations recommend reporting the eAG with the HgbA1c. mg/dL        Urinalysis Basic - ( 02 Aug 2023 08:13 )    Color: x / Appearance: x / SG: x / pH: x  Gluc: 207 mg/dL / Ketone: x  / Bili: x / Urobili: x   Blood: x / Protein: x / Nitrite: x   Leuk Esterase: x / RBC: x / WBC x   Sq Epi: x / Non Sq Epi: x / Bacteria: x      Cultures;     CAPILLARY BLOOD GLUCOSE  CAPILLARY BLOOD GLUCOSE      POCT Blood Glucose.: 143 mg/dL (08 Aug 2023 07:40)  POCT Blood Glucose.: 163 mg/dL (07 Aug 2023 21:14)  POCT Blood Glucose.: 250 mg/dL (07 Aug 2023 16:17)  POCT Blood Glucose.: 213 mg/dL (07 Aug 2023 11:58)        Lipid panel:           RADIOLOGY & ADDITIONAL TESTS:    < from: MR Lumbar Spine No Cont (08.03.23 @ 10:48) >  IMPRESSION: Lumbar laminectomy L2-L4 with fluid collection mildly   compressing the sac. Transpedicular screws and connecting rods L2-L5.    < end of copied text >      Imaging Personally Reviewed:  [ x] YES      Consultant(s) Notes Reviewed:  [x ] YES     Care Discussed with [x ] Consultants [X ] Patient [x ] Family  [x ]    [x ]  Other; RN

## 2023-08-09 LAB
ANION GAP SERPL CALC-SCNC: 5 MMOL/L — SIGNIFICANT CHANGE UP (ref 5–17)
BUN SERPL-MCNC: 43 MG/DL — HIGH (ref 7–23)
CALCIUM SERPL-MCNC: 8.9 MG/DL — SIGNIFICANT CHANGE UP (ref 8.5–10.1)
CHLORIDE SERPL-SCNC: 109 MMOL/L — HIGH (ref 96–108)
CO2 SERPL-SCNC: 22 MMOL/L — SIGNIFICANT CHANGE UP (ref 22–31)
CREAT SERPL-MCNC: 2.83 MG/DL — HIGH (ref 0.5–1.3)
EGFR: 17 ML/MIN/1.73M2 — LOW
GLUCOSE BLDC GLUCOMTR-MCNC: 128 MG/DL — HIGH (ref 70–99)
GLUCOSE BLDC GLUCOMTR-MCNC: 160 MG/DL — HIGH (ref 70–99)
GLUCOSE BLDC GLUCOMTR-MCNC: 173 MG/DL — HIGH (ref 70–99)
GLUCOSE BLDC GLUCOMTR-MCNC: 195 MG/DL — HIGH (ref 70–99)
GLUCOSE SERPL-MCNC: 115 MG/DL — HIGH (ref 70–99)
HCT VFR BLD CALC: 25.9 % — LOW (ref 34.5–45)
HGB BLD-MCNC: 8.1 G/DL — LOW (ref 11.5–15.5)
MCHC RBC-ENTMCNC: 27.1 PG — SIGNIFICANT CHANGE UP (ref 27–34)
MCHC RBC-ENTMCNC: 31.3 G/DL — LOW (ref 32–36)
MCV RBC AUTO: 86.6 FL — SIGNIFICANT CHANGE UP (ref 80–100)
NRBC # BLD: 0 /100 WBCS — SIGNIFICANT CHANGE UP (ref 0–0)
PLATELET # BLD AUTO: 531 K/UL — HIGH (ref 150–400)
POTASSIUM SERPL-MCNC: 4.5 MMOL/L — SIGNIFICANT CHANGE UP (ref 3.5–5.3)
POTASSIUM SERPL-SCNC: 4.5 MMOL/L — SIGNIFICANT CHANGE UP (ref 3.5–5.3)
RBC # BLD: 2.99 M/UL — LOW (ref 3.8–5.2)
RBC # FLD: 14.6 % — HIGH (ref 10.3–14.5)
SODIUM SERPL-SCNC: 136 MMOL/L — SIGNIFICANT CHANGE UP (ref 135–145)
WBC # BLD: 9.39 K/UL — SIGNIFICANT CHANGE UP (ref 3.8–10.5)
WBC # FLD AUTO: 9.39 K/UL — SIGNIFICANT CHANGE UP (ref 3.8–10.5)

## 2023-08-09 PROCEDURE — 99232 SBSQ HOSP IP/OBS MODERATE 35: CPT

## 2023-08-09 RX ORDER — OXYCODONE AND ACETAMINOPHEN 5; 325 MG/1; MG/1
1 TABLET ORAL EVERY 8 HOURS
Refills: 0 | Status: DISCONTINUED | OUTPATIENT
Start: 2023-08-09 | End: 2023-08-09

## 2023-08-09 RX ORDER — TRAMADOL HYDROCHLORIDE 50 MG/1
25 TABLET ORAL EVERY 8 HOURS
Refills: 0 | Status: DISCONTINUED | OUTPATIENT
Start: 2023-08-09 | End: 2023-08-11

## 2023-08-09 RX ADMIN — POLYETHYLENE GLYCOL 3350 17 GRAM(S): 17 POWDER, FOR SOLUTION ORAL at 17:13

## 2023-08-09 RX ADMIN — GABAPENTIN 100 MILLIGRAM(S): 400 CAPSULE ORAL at 17:12

## 2023-08-09 RX ADMIN — PREGABALIN 1000 MICROGRAM(S): 225 CAPSULE ORAL at 12:12

## 2023-08-09 RX ADMIN — Medication 1: at 17:12

## 2023-08-09 RX ADMIN — Medication 75 MILLIGRAM(S): at 00:59

## 2023-08-09 RX ADMIN — MORPHINE SULFATE 1 MILLIGRAM(S): 50 CAPSULE, EXTENDED RELEASE ORAL at 08:15

## 2023-08-09 RX ADMIN — Medication 1 GRAM(S): at 12:13

## 2023-08-09 RX ADMIN — Medication 1 GRAM(S): at 06:00

## 2023-08-09 RX ADMIN — Medication 1: at 12:13

## 2023-08-09 RX ADMIN — HEPARIN SODIUM 5000 UNIT(S): 5000 INJECTION INTRAVENOUS; SUBCUTANEOUS at 06:02

## 2023-08-09 RX ADMIN — HEPARIN SODIUM 5000 UNIT(S): 5000 INJECTION INTRAVENOUS; SUBCUTANEOUS at 17:12

## 2023-08-09 RX ADMIN — GABAPENTIN 100 MILLIGRAM(S): 400 CAPSULE ORAL at 06:00

## 2023-08-09 RX ADMIN — Medication 100 MILLIGRAM(S): at 15:37

## 2023-08-09 RX ADMIN — Medication 100 MILLIGRAM(S): at 22:10

## 2023-08-09 RX ADMIN — Medication 60 MILLIGRAM(S): at 17:13

## 2023-08-09 RX ADMIN — Medication 1: at 22:13

## 2023-08-09 RX ADMIN — Medication 1 MILLIGRAM(S): at 12:13

## 2023-08-09 RX ADMIN — SENNA PLUS 2 TABLET(S): 8.6 TABLET ORAL at 22:10

## 2023-08-09 RX ADMIN — PHENYLEPHRINE-SHARK LIVER OIL-MINERAL OIL-PETROLATUM RECTAL OINTMENT 1 APPLICATION(S): at 12:10

## 2023-08-09 RX ADMIN — Medication 650 MILLIGRAM(S): at 06:00

## 2023-08-09 RX ADMIN — Medication 60 MILLIGRAM(S): at 06:01

## 2023-08-09 RX ADMIN — MORPHINE SULFATE 1 MILLIGRAM(S): 50 CAPSULE, EXTENDED RELEASE ORAL at 17:17

## 2023-08-09 RX ADMIN — PANTOPRAZOLE SODIUM 40 MILLIGRAM(S): 20 TABLET, DELAYED RELEASE ORAL at 17:12

## 2023-08-09 RX ADMIN — TRAMADOL HYDROCHLORIDE 25 MILLIGRAM(S): 50 TABLET ORAL at 02:33

## 2023-08-09 RX ADMIN — MORPHINE SULFATE 1 MILLIGRAM(S): 50 CAPSULE, EXTENDED RELEASE ORAL at 18:00

## 2023-08-09 RX ADMIN — Medication 650 MILLIGRAM(S): at 17:13

## 2023-08-09 RX ADMIN — Medication 100 MILLIGRAM(S): at 06:00

## 2023-08-09 RX ADMIN — Medication 1 GRAM(S): at 00:59

## 2023-08-09 RX ADMIN — MORPHINE SULFATE 1 MILLIGRAM(S): 50 CAPSULE, EXTENDED RELEASE ORAL at 07:37

## 2023-08-09 RX ADMIN — PANTOPRAZOLE SODIUM 40 MILLIGRAM(S): 20 TABLET, DELAYED RELEASE ORAL at 06:00

## 2023-08-09 RX ADMIN — TRAMADOL HYDROCHLORIDE 25 MILLIGRAM(S): 50 TABLET ORAL at 03:33

## 2023-08-09 RX ADMIN — Medication 1 GRAM(S): at 17:12

## 2023-08-09 RX ADMIN — PHENYLEPHRINE-SHARK LIVER OIL-MINERAL OIL-PETROLATUM RECTAL OINTMENT 1 APPLICATION(S): at 17:09

## 2023-08-09 NOTE — PROGRESS NOTE ADULT - ASSESSMENT
78 year old female with PMH of HTN, DM present to the ED for hypoglycemic episode. Patient is from [facility], she was found unresponsive with a glucose level of 30, Patient was admitted for UTI and hypoglycemia.     #Hypoglycemia likely due to MOSS.   per my colleague's documentation "Patient is currently on Glipizide 5mg   Patient  was found unresponsive with FS 30 at [facility]  fingerstick glucose improved, no hypoglycemia in 24 hours      #UTI (urinary tract infection) with evidence of urinary retention.   per my colleague's documentation  "one  Week+ history of dysuria associated with suprapubic pain   Leukocytosis (WBC 28.80) now 21  s/p Vancomycin  UA is GROSSLY positive, but cultures  negative, blood culture negative  completed antibiotics  negrete in place      #Acute blood loss anemia: presumed due to gross hematuria -s/p prbc on 7/31/2023   continue to  monitor hgb     #Lower extremity radiculopathy with hx of L4 lami  patient reports leg pain and numbness of both legs up to the knee, MRI L spine-  Lumbar laminectomy L2-L4 with fluid collection mildly   compressing the sac. Transpedicular screws and connecting rods L2-L5, seen by orthopedics- post op changes as per them  c/w gabapentin as per creat clearance, pain control with tramadol prn    #Acute kidney injury superimposed on CKD stage 4 presumed due to UTI and urinary retention and ATN  appreciate nephrology recs  IVF   Monitor kidney function   creat improving, no ACE a/HCTZ at discharge    #Hypertension.   ·  Continue with - Nifedipine  - c/w Hydralazine   - Monitor.      #Diabetes mellitus.   sliding scale   a1c 7.1    #dvt ppx with hepari subq    dispo - await d/c back to [facility],  been medically ready since 8/5/2023

## 2023-08-09 NOTE — CHART NOTE - NSCHARTNOTEFT_GEN_A_CORE
Date August 9th, 2023    [place] [address]      To Whom It May Concern,     Patient Ronel Maharaj  was admitted on July 29th, 2023 and is being treated. The patient still requires inpatient treatment.     If any questions or concerns please call Dr NADIA Batista at [phone].    Thanks     Anca Jaeger NP-C  [phone]

## 2023-08-09 NOTE — PROGRESS NOTE ADULT - SUBJECTIVE AND OBJECTIVE BOX
pt seen and examined by the bedside, sitting comfortably in bed,  c/o numbness in her feet, denied any other complaints.    Vital Signs Last 24 Hrs  T(F): 98.8 (09 Aug 2023 11:23), Max: 99.3 (08 Aug 2023 23:27)  HR: 84 (09 Aug 2023 15:35) (69 - 84)  BP: 180/72 (09 Aug 2023 15:35) (147/77 - 184/70)  RR: 17 (09 Aug 2023 11:23) (17 - 18)  SpO2: 94% (09 Aug 2023 11:23) (94% - 95%)    PHYSICAL EXAM:    Constitutional: obese female, not in acute distress    Eyes: normal conjunctiva    Neck: supple, normal ROM    respiratory: clear breath sounds    Cardiovascular:  s1 s2 normal    Gastrointestinal: soft, non tender BS present    Extremities: no edema    Neurological: AAOX 3, following commands    Skin: No rashes          CBC:            8.1    9.39  )-----------( 531      ( 08-09-23 @ 05:20 )             25.9         Chem:         ( 08-09-23 @ 05:20 )    136  |  109<H>  |  43<H>  ----------------------------<  115<H>  4.5   |  22  |  2.83<H>

## 2023-08-10 LAB
ANION GAP SERPL CALC-SCNC: 7 MMOL/L — SIGNIFICANT CHANGE UP (ref 5–17)
BUN SERPL-MCNC: 36 MG/DL — HIGH (ref 7–23)
CALCIUM SERPL-MCNC: 9 MG/DL — SIGNIFICANT CHANGE UP (ref 8.5–10.1)
CHLORIDE SERPL-SCNC: 111 MMOL/L — HIGH (ref 96–108)
CO2 SERPL-SCNC: 21 MMOL/L — LOW (ref 22–31)
CREAT SERPL-MCNC: 2.45 MG/DL — HIGH (ref 0.5–1.3)
EGFR: 20 ML/MIN/1.73M2 — LOW
GLUCOSE BLDC GLUCOMTR-MCNC: 109 MG/DL — HIGH (ref 70–99)
GLUCOSE BLDC GLUCOMTR-MCNC: 123 MG/DL — HIGH (ref 70–99)
GLUCOSE BLDC GLUCOMTR-MCNC: 176 MG/DL — HIGH (ref 70–99)
GLUCOSE BLDC GLUCOMTR-MCNC: 185 MG/DL — HIGH (ref 70–99)
GLUCOSE SERPL-MCNC: 118 MG/DL — HIGH (ref 70–99)
POTASSIUM SERPL-MCNC: 4.4 MMOL/L — SIGNIFICANT CHANGE UP (ref 3.5–5.3)
POTASSIUM SERPL-SCNC: 4.4 MMOL/L — SIGNIFICANT CHANGE UP (ref 3.5–5.3)
SODIUM SERPL-SCNC: 139 MMOL/L — SIGNIFICANT CHANGE UP (ref 135–145)
VIT B12 SERPL-MCNC: 1730 PG/ML — HIGH (ref 232–1245)

## 2023-08-10 PROCEDURE — 99232 SBSQ HOSP IP/OBS MODERATE 35: CPT

## 2023-08-10 RX ORDER — LISINOPRIL 2.5 MG/1
5 TABLET ORAL DAILY
Refills: 0 | Status: DISCONTINUED | OUTPATIENT
Start: 2023-08-10 | End: 2023-08-10

## 2023-08-10 RX ORDER — HYDRALAZINE HCL 50 MG
10 TABLET ORAL EVERY 8 HOURS
Refills: 0 | Status: DISCONTINUED | OUTPATIENT
Start: 2023-08-10 | End: 2023-08-11

## 2023-08-10 RX ADMIN — HEPARIN SODIUM 5000 UNIT(S): 5000 INJECTION INTRAVENOUS; SUBCUTANEOUS at 17:12

## 2023-08-10 RX ADMIN — SENNA PLUS 2 TABLET(S): 8.6 TABLET ORAL at 21:11

## 2023-08-10 RX ADMIN — Medication 1: at 12:04

## 2023-08-10 RX ADMIN — POLYETHYLENE GLYCOL 3350 17 GRAM(S): 17 POWDER, FOR SOLUTION ORAL at 17:10

## 2023-08-10 RX ADMIN — TRAMADOL HYDROCHLORIDE 25 MILLIGRAM(S): 50 TABLET ORAL at 09:27

## 2023-08-10 RX ADMIN — Medication 10 MILLIGRAM(S): at 17:07

## 2023-08-10 RX ADMIN — Medication 60 MILLIGRAM(S): at 06:31

## 2023-08-10 RX ADMIN — Medication 650 MILLIGRAM(S): at 01:30

## 2023-08-10 RX ADMIN — Medication 1 GRAM(S): at 11:51

## 2023-08-10 RX ADMIN — Medication 1 GRAM(S): at 06:31

## 2023-08-10 RX ADMIN — Medication 100 MILLIGRAM(S): at 14:09

## 2023-08-10 RX ADMIN — Medication 100 MILLIGRAM(S): at 21:11

## 2023-08-10 RX ADMIN — GABAPENTIN 100 MILLIGRAM(S): 400 CAPSULE ORAL at 06:30

## 2023-08-10 RX ADMIN — Medication 650 MILLIGRAM(S): at 16:36

## 2023-08-10 RX ADMIN — GABAPENTIN 100 MILLIGRAM(S): 400 CAPSULE ORAL at 17:10

## 2023-08-10 RX ADMIN — TRAMADOL HYDROCHLORIDE 25 MILLIGRAM(S): 50 TABLET ORAL at 21:19

## 2023-08-10 RX ADMIN — Medication 650 MILLIGRAM(S): at 17:10

## 2023-08-10 RX ADMIN — Medication 650 MILLIGRAM(S): at 17:06

## 2023-08-10 RX ADMIN — TRAMADOL HYDROCHLORIDE 25 MILLIGRAM(S): 50 TABLET ORAL at 22:19

## 2023-08-10 RX ADMIN — Medication 1 GRAM(S): at 00:21

## 2023-08-10 RX ADMIN — Medication 1 GRAM(S): at 17:10

## 2023-08-10 RX ADMIN — PANTOPRAZOLE SODIUM 40 MILLIGRAM(S): 20 TABLET, DELAYED RELEASE ORAL at 17:11

## 2023-08-10 RX ADMIN — Medication 1: at 21:16

## 2023-08-10 RX ADMIN — Medication 1 MILLIGRAM(S): at 11:52

## 2023-08-10 RX ADMIN — TRAMADOL HYDROCHLORIDE 25 MILLIGRAM(S): 50 TABLET ORAL at 09:57

## 2023-08-10 RX ADMIN — Medication 60 MILLIGRAM(S): at 17:10

## 2023-08-10 RX ADMIN — Medication 650 MILLIGRAM(S): at 06:31

## 2023-08-10 RX ADMIN — PHENYLEPHRINE-SHARK LIVER OIL-MINERAL OIL-PETROLATUM RECTAL OINTMENT 1 APPLICATION(S): at 11:52

## 2023-08-10 RX ADMIN — Medication 100 MILLIGRAM(S): at 06:31

## 2023-08-10 RX ADMIN — PANTOPRAZOLE SODIUM 40 MILLIGRAM(S): 20 TABLET, DELAYED RELEASE ORAL at 06:30

## 2023-08-10 RX ADMIN — PHENYLEPHRINE-SHARK LIVER OIL-MINERAL OIL-PETROLATUM RECTAL OINTMENT 1 APPLICATION(S): at 23:30

## 2023-08-10 RX ADMIN — HEPARIN SODIUM 5000 UNIT(S): 5000 INJECTION INTRAVENOUS; SUBCUTANEOUS at 06:31

## 2023-08-10 RX ADMIN — Medication 1 GRAM(S): at 23:28

## 2023-08-10 RX ADMIN — PHENYLEPHRINE-SHARK LIVER OIL-MINERAL OIL-PETROLATUM RECTAL OINTMENT 1 APPLICATION(S): at 17:09

## 2023-08-10 RX ADMIN — PREGABALIN 1000 MICROGRAM(S): 225 CAPSULE ORAL at 11:54

## 2023-08-10 NOTE — PROGRESS NOTE ADULT - REASON FOR ADMISSION
Hypoglycemia & UTI

## 2023-08-10 NOTE — PROGRESS NOTE ADULT - NS ATTEND OPT1 GEN_ALL_CORE
I independently performed the documented:
I attest my time as attending is greater than 50% of the total combined time spent on qualifying patient care activities by the PA/NP and attending.
I independently performed the documented:
I attest my time as attending is greater than 50% of the total combined time spent on qualifying patient care activities by the PA/NP and attending.
I independently performed the documented:
I independently performed the documented:

## 2023-08-10 NOTE — PROGRESS NOTE ADULT - PROVIDER SPECIALTY LIST ADULT
Hospitalist
Nephrology
Nephrology
Orthopedics
Urology
Urology
Hospitalist
Internal Medicine
Nephrology
Orthopedics
Orthopedics
Hospitalist
Hospitalist
Infectious Disease
Infectious Disease
Internal Medicine
Nephrology
Orthopedics
Orthopedics
Urology
Hospitalist
Infectious Disease
Urology
Hospitalist
Internal Medicine
Hospitalist

## 2023-08-10 NOTE — PROGRESS NOTE ADULT - ASSESSMENT
Assessment and Plan:   · Assessment	  78 year old female with PMH of HTN, DM present to the ED for hypoglycemic episode. Patient is from [facility], she was found unresponsive with a glucose level of 30, Patient was admitted for UTI and hypoglycemia.     #Hypoglycemia likely due to MOSS.   Patient  was found unresponsive with FS 30 at [facility]  fingerstick glucose improved, no hypoglycemia in 24 hours      #UTI (urinary tract infection) with evidence of urinary retention.   per my colleague's documentation  "one  Week+ history of dysuria associated with suprapubic pain   Leukocytosis (WBC 28.80) now 21  s/p Vancomycin  UA is GROSSLY positive, but cultures  negative, blood culture negative  completed antibiotics  negrete in place for urine retention, will need outpt follow up by urology  monitor for fever, if spikes again, will need sepsis work up    #Acute blood loss anemia: presumed due to gross hematuria -s/p prbc on 7/31/2023   continue to  monitor hgb     #Lower extremity radiculopathy with hx of L4 lami  patient reports leg pain and numbness of both legs up to the knee, MRI L spine-  Lumbar laminectomy L2-L4 with fluid collection mildly   compressing the sac. Transpedicular screws and connecting rods L2-L5, seen by orthopedics- post op changes as per them  c/w gabapentin as per creat clearance, pain control with tramadol prn    #Acute kidney injury superimposed on CKD stage 4 presumed due to UTI and urinary retention and ATN  appreciate nephrology recs  IVF   Monitor kidney function   creat improving, no ACE a/HCTZ at discharge    #Hypertension, elevated at times  ·  Continue with - Nifedipine  - c/w Hydralazine   - add amlodipine, on adm ACE/HCTZ were discontinued (pt's home meds)  - Monitor.      #Diabetes mellitus.   sliding scale   a1c 7.1    #dvt ppx with hepari subq    dispo - await d/c back to [facility],  been medically ready since 8/5/2023         Assessment and Plan:   · Assessment	  78 year old female with PMH of HTN, DM present to the ED for hypoglycemic episode. Patient is from [facility], she was found unresponsive with a glucose level of 30, Patient was admitted for UTI and hypoglycemia.     #Hypoglycemia likely due to MOSS.   Patient  was found unresponsive with FS 30 at [facility]  fingerstick glucose improved, no hypoglycemia in 24 hours      #UTI (urinary tract infection) with evidence of urinary retention.   per my colleague's documentation  "one  Week+ history of dysuria associated with suprapubic pain   Leukocytosis (WBC 28.80) now 21  s/p Vancomycin  UA is GROSSLY positive, but cultures  negative, blood culture negative  completed antibiotics  negrete in place for urine retention, will need outpt follow up by urology  monitor for fever, if spikes again, will need sepsis work up    #Acute blood loss anemia: presumed due to gross hematuria -s/p prbc on 7/31/2023   continue to  monitor hgb     #Lower extremity radiculopathy with hx of L4 lami  patient reports leg pain and numbness of both legs up to the knee, MRI L spine-  Lumbar laminectomy L2-L4 with fluid collection mildly   compressing the sac. Transpedicular screws and connecting rods L2-L5, seen by orthopedics- post op changes as per them  c/w gabapentin as per creat clearance, pain control with tramadol prn    #Acute kidney injury superimposed on CKD stage 4 presumed due to UTI and urinary retention and ATN  appreciate nephrology recs  IVF   Monitor kidney function   creat improving, no ACE a/HCTZ at discharge    #Hypertension, elevated at times  ·  Continue with - Nifedipine  - c/w Hydralazine   - prn hydralazine if bp>170/100,  - on admission ACE/HCTZ were discontinued (pt's home meds)  - Monitor BP      #Diabetes mellitus.   sliding scale   a1c 7.1    #dvt ppx with hepari subq    dispo - await d/c back to [facility],  been medically ready since 8/5/2023

## 2023-08-10 NOTE — PROGRESS NOTE ADULT - SUBJECTIVE AND OBJECTIVE BOX
pt seen and examined by the bedside, sitting comfortably in bed,  denied any other complaints/symptoms  overnight had one time temp of 100.7F      Vital Signs Last 24 Hrs  T(C): 37.6 (10 Aug 2023 11:38), Max: 38.2 (10 Aug 2023 01:31)  T(F): 99.7 (10 Aug 2023 11:38), Max: 100.7 (10 Aug 2023 01:31)  HR: 66 (10 Aug 2023 11:38) (60 - 82)  BP: 161/68 (10 Aug 2023 11:38) (152/72 - 180/74)  RR: 17 (10 Aug 2023 11:38) (17 - 18)  SpO2: 95% (10 Aug 2023 11:38) (94% - 95%)        PHYSICAL EXAM:    Constitutional: obese female, not in acute distress    Eyes: normal conjunctiva    Neck: supple, normal ROM    respiratory: clear breath sounds    Cardiovascular:  s1 s2 normal    Gastrointestinal: soft, non tender BS present    Extremities: no edema    Neurological: AAOX 3, following commands    : Bruce cath in place    Skin: No rashes    LABS:    CBC:            8.1    9.39  )-----------( 531      ( 08-09-23 @ 05:20 )             25.9         Chem:         ( 08-10-23 @ 06:16 )    139  |  111<H>  |  36<H>  ----------------------------<  118<H>  4.4   |  21<L>  |  2.45<H>

## 2023-08-11 ENCOUNTER — TRANSCRIPTION ENCOUNTER (OUTPATIENT)
Age: 78
End: 2023-08-11

## 2023-08-11 VITALS
OXYGEN SATURATION: 99 % | HEART RATE: 73 BPM | DIASTOLIC BLOOD PRESSURE: 72 MMHG | SYSTOLIC BLOOD PRESSURE: 174 MMHG | RESPIRATION RATE: 17 BRPM | TEMPERATURE: 98 F

## 2023-08-11 LAB
ANION GAP SERPL CALC-SCNC: 4 MMOL/L — LOW (ref 5–17)
BUN SERPL-MCNC: 33 MG/DL — HIGH (ref 7–23)
CALCIUM SERPL-MCNC: 9.4 MG/DL — SIGNIFICANT CHANGE UP (ref 8.5–10.1)
CHLORIDE SERPL-SCNC: 110 MMOL/L — HIGH (ref 96–108)
CO2 SERPL-SCNC: 25 MMOL/L — SIGNIFICANT CHANGE UP (ref 22–31)
CREAT SERPL-MCNC: 2.45 MG/DL — HIGH (ref 0.5–1.3)
EGFR: 20 ML/MIN/1.73M2 — LOW
GLUCOSE BLDC GLUCOMTR-MCNC: 132 MG/DL — HIGH (ref 70–99)
GLUCOSE BLDC GLUCOMTR-MCNC: 138 MG/DL — HIGH (ref 70–99)
GLUCOSE SERPL-MCNC: 143 MG/DL — HIGH (ref 70–99)
HCT VFR BLD CALC: 26 % — LOW (ref 34.5–45)
HGB BLD-MCNC: 8 G/DL — LOW (ref 11.5–15.5)
MCHC RBC-ENTMCNC: 27 PG — SIGNIFICANT CHANGE UP (ref 27–34)
MCHC RBC-ENTMCNC: 30.8 G/DL — LOW (ref 32–36)
MCV RBC AUTO: 87.8 FL — SIGNIFICANT CHANGE UP (ref 80–100)
NRBC # BLD: 0 /100 WBCS — SIGNIFICANT CHANGE UP (ref 0–0)
PLATELET # BLD AUTO: 508 K/UL — HIGH (ref 150–400)
POTASSIUM SERPL-MCNC: 4.7 MMOL/L — SIGNIFICANT CHANGE UP (ref 3.5–5.3)
POTASSIUM SERPL-SCNC: 4.7 MMOL/L — SIGNIFICANT CHANGE UP (ref 3.5–5.3)
RBC # BLD: 2.96 M/UL — LOW (ref 3.8–5.2)
RBC # FLD: 14.8 % — HIGH (ref 10.3–14.5)
SODIUM SERPL-SCNC: 139 MMOL/L — SIGNIFICANT CHANGE UP (ref 135–145)
WBC # BLD: 7.46 K/UL — SIGNIFICANT CHANGE UP (ref 3.8–10.5)
WBC # FLD AUTO: 7.46 K/UL — SIGNIFICANT CHANGE UP (ref 3.8–10.5)

## 2023-08-11 PROCEDURE — 99239 HOSP IP/OBS DSCHRG MGMT >30: CPT

## 2023-08-11 RX ORDER — PANTOPRAZOLE SODIUM 20 MG/1
1 TABLET, DELAYED RELEASE ORAL
Qty: 30 | Refills: 0
Start: 2023-08-11

## 2023-08-11 RX ORDER — GABAPENTIN 400 MG/1
1 CAPSULE ORAL
Qty: 0 | Refills: 0 | DISCHARGE
Start: 2023-08-11

## 2023-08-11 RX ORDER — SODIUM BICARBONATE 1 MEQ/ML
1 SYRINGE (ML) INTRAVENOUS
Qty: 0 | Refills: 0 | DISCHARGE
Start: 2023-08-11

## 2023-08-11 RX ORDER — LANOLIN ALCOHOL/MO/W.PET/CERES
1 CREAM (GRAM) TOPICAL
Qty: 0 | Refills: 0 | DISCHARGE
Start: 2023-08-11

## 2023-08-11 RX ORDER — HYDROCHLOROTHIAZIDE 25 MG
1 TABLET ORAL
Qty: 30 | Refills: 0
Start: 2023-08-11

## 2023-08-11 RX ORDER — FOLIC ACID 0.8 MG
1 TABLET ORAL
Qty: 0 | Refills: 0 | DISCHARGE
Start: 2023-08-11

## 2023-08-11 RX ORDER — SUCRALFATE 1 G
1 TABLET ORAL
Qty: 30 | Refills: 0
Start: 2023-08-11

## 2023-08-11 RX ORDER — LISINOPRIL/HYDROCHLOROTHIAZIDE 10-12.5 MG
1 TABLET ORAL
Refills: 0 | DISCHARGE

## 2023-08-11 RX ORDER — TRAMADOL HYDROCHLORIDE 50 MG/1
0.5 TABLET ORAL
Qty: 0 | Refills: 0 | DISCHARGE
Start: 2023-08-11

## 2023-08-11 RX ADMIN — PHENYLEPHRINE-SHARK LIVER OIL-MINERAL OIL-PETROLATUM RECTAL OINTMENT 1 APPLICATION(S): at 11:49

## 2023-08-11 RX ADMIN — Medication 100 MILLIGRAM(S): at 05:17

## 2023-08-11 RX ADMIN — Medication 1 GRAM(S): at 11:49

## 2023-08-11 RX ADMIN — PANTOPRAZOLE SODIUM 40 MILLIGRAM(S): 20 TABLET, DELAYED RELEASE ORAL at 05:22

## 2023-08-11 RX ADMIN — Medication 60 MILLIGRAM(S): at 05:17

## 2023-08-11 RX ADMIN — TRAMADOL HYDROCHLORIDE 25 MILLIGRAM(S): 50 TABLET ORAL at 08:24

## 2023-08-11 RX ADMIN — Medication 1 GRAM(S): at 05:17

## 2023-08-11 RX ADMIN — Medication 1 MILLIGRAM(S): at 11:48

## 2023-08-11 RX ADMIN — HEPARIN SODIUM 5000 UNIT(S): 5000 INJECTION INTRAVENOUS; SUBCUTANEOUS at 05:18

## 2023-08-11 RX ADMIN — PREGABALIN 1000 MICROGRAM(S): 225 CAPSULE ORAL at 11:50

## 2023-08-11 RX ADMIN — POLYETHYLENE GLYCOL 3350 17 GRAM(S): 17 POWDER, FOR SOLUTION ORAL at 05:16

## 2023-08-11 RX ADMIN — GABAPENTIN 100 MILLIGRAM(S): 400 CAPSULE ORAL at 05:17

## 2023-08-11 RX ADMIN — Medication 100 MILLIGRAM(S): at 13:35

## 2023-08-11 RX ADMIN — Medication 10 MILLIGRAM(S): at 08:24

## 2023-08-11 RX ADMIN — Medication 650 MILLIGRAM(S): at 05:17

## 2023-08-11 RX ADMIN — PHENYLEPHRINE-SHARK LIVER OIL-MINERAL OIL-PETROLATUM RECTAL OINTMENT 1 APPLICATION(S): at 05:23

## 2023-08-11 NOTE — DISCHARGE NOTE PROVIDER - PROVIDER TOKENS
FREE:[LAST:[urology],PHONE:[(   )    -],FAX:[(   )    -],FOLLOWUP:[1 week]],FREE:[LAST:[pcp],PHONE:[(   )    -],FAX:[(   )    -],FOLLOWUP:[2 weeks]]

## 2023-08-11 NOTE — DISCHARGE NOTE PROVIDER - NSDCFUADDINST_GEN_ALL_CORE_FT
pt have negrete catheter, will need urology follow up upon discharge  follow up with PCP within 1-2 weeks upon discharge pt have negrete catheter, will need urology follow up upon discharge  follow up with PCP within 1-2 weeks upon discharge  follow up with nephrology  monitor blood pressure

## 2023-08-11 NOTE — DISCHARGE NOTE PROVIDER - YES NO FOR MLM POSITIVE OR NEGATIVE COVID RESULT
I left a VM message with patient last night about no signs of DVT. There is some mentioning of enlarged lymph nodes at the groin. Tell her that usually swollen lymph nodes aren't a reason to worry, but please ask her the followin.  Has seen had any ,

## 2023-08-11 NOTE — DISCHARGE NOTE PROVIDER - NSDCFUSCHEDAPPT_GEN_ALL_CORE_FT
[facility]  [address]  Scheduled Appointment: 09/11/2023

## 2023-08-11 NOTE — CHART NOTE - NSCHARTNOTEFT_GEN_A_CORE
[facility]       To Whom It May Concern,     Carol Ann BYERS was admitted on 7/29/23, treated and discharged on 8/11/23 from [facility].   If any further questions or concerns please call.     Thanks   Barbara Batista  [phone]

## 2023-08-11 NOTE — DISCHARGE NOTE PROVIDER - HOSPITAL COURSE
78 year old female with PMH of HTN, DM present to the ED from [facility] for hypoglycemic episode. Patient is from [facility], she was found unresponsive with a glucose level of 30, Patient was admitted for UTI and hypoglycemia.     #Hypoglycemia likely due to MOSS.   Patient  was found unresponsive with FS 30 at [facility]  fingerstick glucose improved, no hypoglycemia in 24 hours      #UTI (urinary tract infection) with evidence of urinary retention.   per my colleague's documentation  "one  Week+ history of dysuria associated with suprapubic pain   Leukocytosis (WBC 28.80) now 21  s/p Vancomycin  UA is GROSSLY positive, but cultures  negative, blood culture negative  completed antibiotics  negrete in place for urine retention, will need outpt follow up by urology  monitor for fever, if spikes again, will need sepsis work up    #Acute blood loss anemia: presumed due to gross hematuria -s/p prbc on 7/31/2023   continue to  monitor hgb     #Lower extremity radiculopathy with hx of L4 lami  patient reports leg pain and numbness of both legs up to the knee, MRI L spine-  Lumbar laminectomy L2-L4 with fluid collection mildly   compressing the sac. Transpedicular screws and connecting rods L2-L5, seen by orthopedics- post op changes as per them  c/w gabapentin as per creat clearance, pain control with tramadol prn    #Acute kidney injury superimposed on CKD stage 4 presumed due to UTI and urinary retention and ATN  appreciate nephrology recs  IVF   Monitor kidney function   creat improving, no ACE a/HCTZ at discharge    #Hypertension, elevated at times  ·  Continue with - Nifedipine  - c/w Hydralazine   - prn hydralazine if bp>170/100,  - on admission ACE/HCTZ were discontinued (pt's home meds)  - Monitor BP      #Diabetes mellitus.   sliding scale   a1c 7.1    #dvt ppx with hepari subq    dispo - await d/c back to [facility],  been medically ready since 8/5/2023     78 year old female with PMH of HTN, DM present to the ED from [facility] for hypoglycemic episode. Patient is from [facility], she was found unresponsive with a glucose level of 30, Patient was admitted for UTI and hypoglycemia, fingersticks were monitored, hypoglycemia resolved. UTI was treated with antibiotics, pt was retaining urine, therefore negrete catheter was placed. Had Acute blood loss anemia: presumed due to gross hematuria -s/p prbc on 7/31/2023, Hb remained stable after that. Lower extremity radiculopathy with hx of L4 lami  patient c/o leg pain and numbness of both legs up to the knee, MRI L spine-  Lumbar laminectomy L2-L4 with fluid collection mildly compressing the sac. Transpedicular screws and connecting rods L2-L5, seen by orthopedics- post op changes as per them, started on gabapentin , pain control with tramadol prn. Also noted to have acute kidney injury superimposed on CKD stage 4 presumed due to UTI and urinary retention and ATN, ACE held and HCTZ resumed on discharge, creat stabilised, to follow up nephro as outpatient. Hypertension was uncontrolled while inpatient, medications were optimized. Diabetes was management with ISS. Patient remained hemodynamically stable, planned for dc back to [facility].       78 year old female with PMH of HTN, DM present to the ED from [facility] for hypoglycemic episode. Patient is from [facility], she was found unresponsive with a glucose level of 30, Patient was admitted for UTI and hypoglycemia, fingersticks were monitored, hypoglycemia resolved. UTI was treated with antibiotics, pt was retaining urine, therefore negrete catheter was placed. Had Acute blood loss anemia: presumed due to gross hematuria -s/p prbc on 7/31/2023, Hb remained stable after that. Lower extremity radiculopathy with hx of L4 lami  patient c/o leg pain and numbness of both legs up to the knee, MRI L spine-  Lumbar laminectomy L2-L4 with fluid collection mildly compressing the sac. Transpedicular screws and connecting rods L2-L5, seen by orthopedics- post op changes as per them, started on gabapentin , pain control with tramadol prn. Also noted to have acute kidney injury superimposed on CKD stage 4 presumed due to UTI and urinary retention and ATN, ACE held and HCTZ resumed on discharge, creat stabilised, to follow up nephro as outpatient. Hypertension was uncontrolled while inpatient, medications were optimized. Diabetes was management with ISS. Patient remained hemodynamically stable, planned for dc back to [facility].  sepsis was present on admission secondary to a urinary tract infection

## 2023-08-11 NOTE — DISCHARGE NOTE NURSING/CASE MANAGEMENT/SOCIAL WORK - PATIENT PORTAL LINK FT
You can access the FollowMyHealth Patient Portal offered by [facility] by registering at the following website: [email or web address]. By joining [employer]’s FollowMyHealth portal, you will also be able to view your health information using other applications (apps) compatible with our system.

## 2023-08-11 NOTE — DISCHARGE NOTE PROVIDER - CARE PROVIDER_API CALL
urology,   Phone: (   )    -  Fax: (   )    -  Follow Up Time: 1 week    pcp,   Phone: (   )    -  Fax: (   )    -  Follow Up Time: 2 weeks

## 2023-08-11 NOTE — DISCHARGE NOTE PROVIDER - NSDCCPCAREPLAN_GEN_ALL_CORE_FT
PRINCIPAL DISCHARGE DIAGNOSIS  Diagnosis: Hypoglycemia  Assessment and Plan of Treatment:       SECONDARY DISCHARGE DIAGNOSES  Diagnosis: Leukocytosis  Assessment and Plan of Treatment:     Diagnosis: Acute UTI  Assessment and Plan of Treatment:     Diagnosis: Diabetes mellitus  Assessment and Plan of Treatment:     Diagnosis: HTN (hypertension)  Assessment and Plan of Treatment:     Diagnosis: Acute kidney injury superimposed on CKD  Assessment and Plan of Treatment:      PRINCIPAL DISCHARGE DIAGNOSIS  Diagnosis: Hypoglycemia  Assessment and Plan of Treatment:       SECONDARY DISCHARGE DIAGNOSES  Diagnosis: Gram-negative sepsis, unspecified  Assessment and Plan of Treatment: present on admission    Diagnosis: Leukocytosis  Assessment and Plan of Treatment:     Diagnosis: Acute UTI  Assessment and Plan of Treatment:     Diagnosis: Diabetes mellitus  Assessment and Plan of Treatment:     Diagnosis: HTN (hypertension)  Assessment and Plan of Treatment:     Diagnosis: Acute kidney injury superimposed on CKD  Assessment and Plan of Treatment:

## 2023-08-11 NOTE — DISCHARGE NOTE PROVIDER - NSDCMRMEDTOKEN_GEN_ALL_CORE_FT
acetaminophen 325 mg oral tablet: 3 tab(s) orally every 8 hours  aluminum hydroxide-magnesium hydroxide 200 mg-200 mg/5 mL oral suspension: 30 milliliter(s) orally every 4 hours As needed Dyspepsia  cyclobenzaprine 10 mg oral tablet: 1 tab(s) orally every 8 hours As needed Muscle Spasm  fluticasone 50 mcg/inh nasal spray: 1 puff(s) nasal 2 times a day  folic acid 1 mg oral tablet: 1 tab(s) orally once a day  gabapentin 100 mg oral capsule: 1 cap(s) orally 2 times a day  glipiZIDE 5 mg oral tablet: 1 tab(s) orally 2 times a day  hydrALAZINE 50 mg oral tablet: 1 tab(s) orally 2 times a day  melatonin 3 mg oral tablet: 1 tab(s) orally once a day (at bedtime) As needed Insomnia  NIFEdipine 60 mg oral tablet, extended release: 1 tab(s) orally 2 times a day  polyethylene glycol 3350 oral powder for reconstitution: 17 gram(s) orally 2 times a day  senna leaf extract oral tablet: 2 tab(s) orally once a day (at bedtime)  sodium bicarbonate 650 mg oral tablet: 1 tab(s) orally every 12 hours  traMADol 50 mg oral tablet: 0.5 tab(s) orally every 8 hours As needed Moderate Pain (4 - 6)

## 2023-08-11 NOTE — DISCHARGE NOTE PROVIDER - ATTENDING DISCHARGE PHYSICAL EXAMINATION:
Pt was seen and examined by the bedside, not in acute distress, denied any complaints.    Constitutional: obese female, not in acute distress    Eyes: normal conjunctiva    Neck: supple, normal ROM    respiratory: clear breath sounds    Cardiovascular:  s1 s2 normal    Gastrointestinal: soft, non tender BS present    Extremities: no edema    Neurological: AAOX 3, following commands    : Bruce cath in place    Skin: No rashes

## 2023-08-21 ENCOUNTER — OUTPATIENT (OUTPATIENT)
Dept: OUTPATIENT SERVICES | Facility: HOSPITAL | Age: 78
LOS: 1 days | Discharge: HOME | End: 2023-08-21
Payer: MEDICARE

## 2023-08-21 DIAGNOSIS — Z98.890 OTHER SPECIFIED POSTPROCEDURAL STATES: Chronic | ICD-10-CM

## 2023-08-21 DIAGNOSIS — I82.402 ACUTE EMBOLISM AND THROMBOSIS OF UNSPECIFIED DEEP VEINS OF LEFT LOWER EXTREMITY: ICD-10-CM

## 2023-08-22 PROCEDURE — 93971 EXTREMITY STUDY: CPT | Mod: 26

## 2023-08-23 ENCOUNTER — OUTPATIENT (OUTPATIENT)
Dept: OUTPATIENT SERVICES | Facility: HOSPITAL | Age: 78
LOS: 1 days | Discharge: HOME | End: 2023-08-23
Payer: MEDICARE

## 2023-08-23 DIAGNOSIS — M10.9 GOUT, UNSPECIFIED: ICD-10-CM

## 2023-08-23 DIAGNOSIS — Z98.890 OTHER SPECIFIED POSTPROCEDURAL STATES: Chronic | ICD-10-CM

## 2023-08-23 PROCEDURE — 73630 X-RAY EXAM OF FOOT: CPT | Mod: 26,LT

## 2023-08-23 PROCEDURE — 93923 UPR/LXTR ART STDY 3+ LVLS: CPT | Mod: 26

## 2023-08-24 ENCOUNTER — APPOINTMENT (OUTPATIENT)
Dept: ORTHOPEDIC SURGERY | Facility: CLINIC | Age: 78
End: 2023-08-24

## 2023-08-28 NOTE — H&P ADULT - PROBLEM/PLAN-1
See dentist-[facility]   [address], [place] [address]    Phone: [phone]    Flu shot today    Med refilled. The number of refills on the meds match when you need to return to the office for an appt. I recommend making your next appt today so you don't run out of your medication as it may take me up to 3 days to refill it.    Handouts given to pt:  physical handout      Labs:    You can use the lab in our building when fasting. The hrs are: Monday-Thursday, 7 a.m. - 6 p.m., Friday, 7 a.m. - 5 p.m., Saturday 8 a.m. - 12 noon.   No appt needed, BUT YOU DO NEED THE PAPER ORDER.    Fasting is no food, drink, gum or mints other than water for 12 hrs.   Results will be back in 2-3 business days for most labs. It is always recommended for any orders (labs, xrays, ultrasounds,MRI, ct scan, procedures etc) to check with your insurance provider for expected costs or expenses to you.     Mammogram due in nov    You will get your results via phone from my medical assistant if you do not have MyChart.  OR  You will get your results via [employer]    If a result is urgent, I will call to speak to you.    Vaccines:  ---- Tdap-declines    ---- Flu shot-today    General recommendations:  Exercise-cardio 4-5d/wk 30min each day  Diet-Breakfast-toast (my favorite Mabel Coker Delighful Multigrain or Sukumar's Killer Bread Good Seed thin-sliced)/bagel/English muffin-whole wheat flour as a 1st ingredient or cereal/oatmeal/granola bar-fiber 4g or more or protein like eggs or peanut butter; optional veggies  Lunch-protein, 1/2c carb or 2 slices bread, veg 1c  Dinner-protein, fist sized carb, veg 1c  Fruit 2 a day  Dairy 2 a day-milk, soy milk, almond milk, cheese, yogurt, cottage cheese  Snacks-Protein-hard boiled egg, nuts (walnuts/almonds/pecans/pistachios 1/4c), hummus, beef/deer jerky or meat sticks; vegetable, fruit, dairy-milk(1%, skim, almond, soy)/cheese (not a lot of cheddar)/yogurt (Greek is best-my favorite Dannon Fruit  on the Bottom Greek)/cottage cheese 2%; triscuits/ popcorn/wheat thins have a lot of fiber; follow serving size on bag/box/container  increase water  Limit alcohol to 1 drink per day for women and 2 drinks per day for men (1 drink=12oz beer or 5oz wine or 1 1/2oz liquor)  Calcium: 500mg 1 twice a day if age 50 and younger and 600mg 1 twice a day if over age 50 (calcium citrate can be taken without food)  Vitamin D: 800-5000 IU/day  Limit salt to <2300mg a day if age 50 and under and <1500mg a day if over age 50/have high bp or diabetes or kidney disease  Recommend folate for childbearing age women 0.4mg per day (can be found in a multivitamin)  Recommend 18mg/dL of iron a day if age 50 and under and 8mg/dL a day if over age 50; take on an empty stomach at bedtime  Use sunscreen   Wear seatbelt  Recommend safe sex practices: using condoms everytime you have sex, discuss with a new partner about their past partners/history of STDs/drug use, avoid drinking alcohol or using drugs as this increases the chance that you will participate in high-risk sex, for oral sex help protect your mouth by having your partner use a condom (male or female), women should not douche after sex, be aware of your partner's body and your body-look for signs of a sore, blister, rash, or discharge, and have regular exams and periodic tests for STDs.  No distracted driving  No driving when under influence of substances  Wear a seatbelt  Eye dr every 1-2yrs  Dentist every 6-12 mon  Tetanus shot every 10yrs  Recommend flu vaccine in the fall  Appt in 1 year for physical      I will communicate with you via [employer] regarding messages and results. If you need help with this, you can call the support line at [phone].    IT WAS A PLEASURE TO SEE YOU TODAY. THANK YOU FOR CHOOSING US FOR YOUR HEALTHCARE NEEDS.     DISPLAY PLAN FREE TEXT

## 2023-08-30 ENCOUNTER — OUTPATIENT (OUTPATIENT)
Dept: OUTPATIENT SERVICES | Facility: HOSPITAL | Age: 78
LOS: 1 days | Discharge: HOME | End: 2023-08-30
Payer: MEDICARE

## 2023-08-30 DIAGNOSIS — J18.9 PNEUMONIA, UNSPECIFIED ORGANISM: ICD-10-CM

## 2023-08-30 DIAGNOSIS — Z98.890 OTHER SPECIFIED POSTPROCEDURAL STATES: Chronic | ICD-10-CM

## 2023-08-30 PROCEDURE — 71045 X-RAY EXAM CHEST 1 VIEW: CPT | Mod: 26

## 2023-09-06 ENCOUNTER — OUTPATIENT (OUTPATIENT)
Dept: OUTPATIENT SERVICES | Facility: HOSPITAL | Age: 78
LOS: 1 days | Discharge: HOME | End: 2023-09-06
Payer: MEDICARE

## 2023-09-06 DIAGNOSIS — R05.9 COUGH, UNSPECIFIED: ICD-10-CM

## 2023-09-06 DIAGNOSIS — Z98.890 OTHER SPECIFIED POSTPROCEDURAL STATES: Chronic | ICD-10-CM

## 2023-09-06 PROCEDURE — 71045 X-RAY EXAM CHEST 1 VIEW: CPT | Mod: 26

## 2023-09-07 ENCOUNTER — EMERGENCY (EMERGENCY)
Facility: HOSPITAL | Age: 78
LOS: 0 days | Discharge: HOME | End: 2023-09-07
Attending: EMERGENCY MEDICINE
Payer: MEDICARE

## 2023-09-07 VITALS
HEART RATE: 74 BPM | HEIGHT: 63 IN | SYSTOLIC BLOOD PRESSURE: 152 MMHG | OXYGEN SATURATION: 90 % | RESPIRATION RATE: 17 BRPM | DIASTOLIC BLOOD PRESSURE: 67 MMHG | TEMPERATURE: 99 F | WEIGHT: 214.73 LBS

## 2023-09-07 VITALS
DIASTOLIC BLOOD PRESSURE: 43 MMHG | RESPIRATION RATE: 18 BRPM | SYSTOLIC BLOOD PRESSURE: 139 MMHG | HEART RATE: 76 BPM | OXYGEN SATURATION: 95 % | TEMPERATURE: 99 F

## 2023-09-07 DIAGNOSIS — U07.1 COVID-19: ICD-10-CM

## 2023-09-07 DIAGNOSIS — I10 ESSENTIAL (PRIMARY) HYPERTENSION: ICD-10-CM
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
## 2023-09-07 DIAGNOSIS — Z98.890 OTHER SPECIFIED POSTPROCEDURAL STATES: Chronic | ICD-10-CM

## 2023-09-07 DIAGNOSIS — Z79.84 LONG TERM (CURRENT) USE OF ORAL HYPOGLYCEMIC DRUGS: ICD-10-CM

## 2023-09-07 DIAGNOSIS — E11.9 TYPE 2 DIABETES MELLITUS WITHOUT COMPLICATIONS: ICD-10-CM

## 2023-09-07 DIAGNOSIS — Z98.1 ARTHRODESIS STATUS: ICD-10-CM

## 2023-09-07 DIAGNOSIS — N39.0 URINARY TRACT INFECTION, SITE NOT SPECIFIED: ICD-10-CM

## 2023-09-07 DIAGNOSIS — T83.031A LEAKAGE OF INDWELLING URETHRAL CATHETER, INITIAL ENCOUNTER: ICD-10-CM

## 2023-09-07 LAB
ALBUMIN SERPL ELPH-MCNC: 2 G/DL — LOW (ref 3.3–5)
ALP SERPL-CCNC: 65 U/L — SIGNIFICANT CHANGE UP (ref 40–120)
ALT FLD-CCNC: 9 U/L — LOW (ref 12–78)
ANION GAP SERPL CALC-SCNC: 12 MMOL/L — SIGNIFICANT CHANGE UP (ref 5–17)
APPEARANCE UR: CLEAR — SIGNIFICANT CHANGE UP
APTT BLD: 32.1 SEC — SIGNIFICANT CHANGE UP (ref 24.5–35.6)
AST SERPL-CCNC: 29 U/L — SIGNIFICANT CHANGE UP (ref 15–37)
BACTERIA # UR AUTO: ABNORMAL
BASE EXCESS BLDA CALC-SCNC: -2.6 MMOL/L — LOW (ref -2–3)
BASOPHILS # BLD AUTO: 0.05 K/UL — SIGNIFICANT CHANGE UP (ref 0–0.2)
BASOPHILS NFR BLD AUTO: 0.4 % — SIGNIFICANT CHANGE UP (ref 0–2)
BILIRUB SERPL-MCNC: 0.3 MG/DL — SIGNIFICANT CHANGE UP (ref 0.2–1.2)
BILIRUB UR-MCNC: NEGATIVE — SIGNIFICANT CHANGE UP
BLD GP AB SCN SERPL QL: SIGNIFICANT CHANGE UP
BLOOD GAS COMMENTS ARTERIAL: SIGNIFICANT CHANGE UP
BUN SERPL-MCNC: 46 MG/DL — HIGH (ref 7–23)
CALCIUM SERPL-MCNC: 8.8 MG/DL — SIGNIFICANT CHANGE UP (ref 8.5–10.1)
CHLORIDE SERPL-SCNC: 107 MMOL/L — SIGNIFICANT CHANGE UP (ref 96–108)
CO2 BLDA-SCNC: 21 MMOL/L — SIGNIFICANT CHANGE UP (ref 19–24)
CO2 SERPL-SCNC: 19 MMOL/L — LOW (ref 22–31)
COLOR SPEC: YELLOW — SIGNIFICANT CHANGE UP
CREAT SERPL-MCNC: 3.85 MG/DL — HIGH (ref 0.5–1.3)
DIFF PNL FLD: ABNORMAL
EGFR: 12 ML/MIN/1.73M2 — LOW
EOSINOPHIL # BLD AUTO: 0.1 K/UL — SIGNIFICANT CHANGE UP (ref 0–0.5)
EOSINOPHIL NFR BLD AUTO: 0.7 % — SIGNIFICANT CHANGE UP (ref 0–6)
EPI CELLS # UR: ABNORMAL
GAS PNL BLDA: SIGNIFICANT CHANGE UP
GLUCOSE SERPL-MCNC: 122 MG/DL — HIGH (ref 70–99)
GLUCOSE UR QL: NEGATIVE MG/DL — SIGNIFICANT CHANGE UP
HCO3 BLDA-SCNC: 20 MMOL/L — LOW (ref 21–28)
HCT VFR BLD CALC: 26.6 % — LOW (ref 34.5–45)
HGB BLD-MCNC: 8.5 G/DL — LOW (ref 11.5–15.5)
HOROWITZ INDEX BLDA+IHG-RTO: 28 — SIGNIFICANT CHANGE UP
IMM GRANULOCYTES NFR BLD AUTO: 1.4 % — HIGH (ref 0–0.9)
INR BLD: 1.04 RATIO — SIGNIFICANT CHANGE UP (ref 0.85–1.18)
KETONES UR-MCNC: ABNORMAL
LACTATE SERPL-SCNC: 0.7 MMOL/L — SIGNIFICANT CHANGE UP (ref 0.7–2)
LEUKOCYTE ESTERASE UR-ACNC: ABNORMAL
LYMPHOCYTES # BLD AUTO: 17.1 % — SIGNIFICANT CHANGE UP (ref 13–44)
LYMPHOCYTES # BLD AUTO: 2.4 K/UL — SIGNIFICANT CHANGE UP (ref 1–3.3)
MAGNESIUM SERPL-MCNC: 2 MG/DL — SIGNIFICANT CHANGE UP (ref 1.6–2.6)
MCHC RBC-ENTMCNC: 26.4 PG — LOW (ref 27–34)
MCHC RBC-ENTMCNC: 32 G/DL — SIGNIFICANT CHANGE UP (ref 32–36)
MCV RBC AUTO: 82.6 FL — SIGNIFICANT CHANGE UP (ref 80–100)
MONOCYTES # BLD AUTO: 1.24 K/UL — HIGH (ref 0–0.9)
MONOCYTES NFR BLD AUTO: 8.8 % — SIGNIFICANT CHANGE UP (ref 2–14)
NEUTROPHILS # BLD AUTO: 10.04 K/UL — HIGH (ref 1.8–7.4)
NEUTROPHILS NFR BLD AUTO: 71.6 % — SIGNIFICANT CHANGE UP (ref 43–77)
NITRITE UR-MCNC: NEGATIVE — SIGNIFICANT CHANGE UP
NRBC # BLD: 0 /100 WBCS — SIGNIFICANT CHANGE UP (ref 0–0)
PCO2 BLDA: 29 MMHG — LOW (ref 32–46)
PH BLDA: 7.45 — SIGNIFICANT CHANGE UP (ref 7.35–7.45)
PH UR: 7 — SIGNIFICANT CHANGE UP (ref 5–8)
PLATELET # BLD AUTO: 372 K/UL — SIGNIFICANT CHANGE UP (ref 150–400)
PO2 BLDA: 72 MMHG — LOW (ref 83–108)
POTASSIUM SERPL-MCNC: 3.2 MMOL/L — LOW (ref 3.5–5.3)
POTASSIUM SERPL-SCNC: 3.2 MMOL/L — LOW (ref 3.5–5.3)
PROT SERPL-MCNC: 7.2 GM/DL — SIGNIFICANT CHANGE UP (ref 6–8.3)
PROT UR-MCNC: 500 MG/DL
PROTHROM AB SERPL-ACNC: 12.5 SEC — SIGNIFICANT CHANGE UP (ref 9.5–13)
RBC # BLD: 3.22 M/UL — LOW (ref 3.8–5.2)
RBC # FLD: 14.2 % — SIGNIFICANT CHANGE UP (ref 10.3–14.5)
RBC CASTS # UR COMP ASSIST: ABNORMAL /HPF (ref 0–4)
SAO2 % BLDA: 97.2 % — SIGNIFICANT CHANGE UP (ref 94–98)
SODIUM SERPL-SCNC: 138 MMOL/L — SIGNIFICANT CHANGE UP (ref 135–145)
SP GR SPEC: 1.01 — SIGNIFICANT CHANGE UP (ref 1.01–1.02)
UROBILINOGEN FLD QL: NEGATIVE MG/DL — SIGNIFICANT CHANGE UP
WBC # BLD: 14.03 K/UL — HIGH (ref 3.8–10.5)
WBC # FLD AUTO: 14.03 K/UL — HIGH (ref 3.8–10.5)
WBC UR QL: >50

## 2023-09-07 PROCEDURE — 93010 ELECTROCARDIOGRAM REPORT: CPT

## 2023-09-07 PROCEDURE — 71045 X-RAY EXAM CHEST 1 VIEW: CPT | Mod: 26

## 2023-09-07 PROCEDURE — 99285 EMERGENCY DEPT VISIT HI MDM: CPT

## 2023-09-07 RX ORDER — SODIUM CHLORIDE 9 MG/ML
1000 INJECTION, SOLUTION INTRAVENOUS ONCE
Refills: 0 | Status: COMPLETED | OUTPATIENT
Start: 2023-09-07 | End: 2023-09-07

## 2023-09-07 RX ORDER — CEFTRIAXONE 500 MG/1
1000 INJECTION, POWDER, FOR SOLUTION INTRAMUSCULAR; INTRAVENOUS ONCE
Refills: 0 | Status: COMPLETED | OUTPATIENT
Start: 2023-09-07 | End: 2023-09-07

## 2023-09-07 RX ADMIN — CEFTRIAXONE 100 MILLIGRAM(S): 500 INJECTION, POWDER, FOR SOLUTION INTRAMUSCULAR; INTRAVENOUS at 13:39

## 2023-09-07 RX ADMIN — SODIUM CHLORIDE 1000 MILLILITER(S): 9 INJECTION, SOLUTION INTRAVENOUS at 12:14

## 2023-09-07 NOTE — ED ADULT NURSE NOTE - CHIEF COMPLAINT QUOTE
Patient from [facility] comes  to ED with leaking negrete catheter. Patient also appears lethargic and oxygen saturation between 89-90 % on room air. Patient tested positive for covid on 8/31/23. IV to Left arm   hx spinal fusion, impaired mobility, laminectomy, HTN

## 2023-09-07 NOTE — ED ADULT NURSE REASSESSMENT NOTE - NS ED NURSE REASSESS COMMENT FT1
Covering RN spoke with Sosa at [facility] xt [id number] and gave handoff report. pt is able to return at this time. Dr. Mahoney placed d/c orders for pt. RN instructed Sosa that pt is going to require an additional 3 doses of ceftriaxone upon return to facility and that she needs to be kept on 2L NC for 02. Rn confirmed understand and reported she is in middle of a med pass but that she will attempt to come for pt when she is finished. time now is 1336.

## 2023-09-07 NOTE — ED PROVIDER NOTE - CONSTITUTIONAL, MLM
normal... Well appearing, awake, alert, oriented to person, place, time/situation and in no apparent distress. smiling no lethargic speaking in clear full sentences no nasal flaring no shoulders retractions no diaphoresis

## 2023-09-07 NOTE — ED PROVIDER NOTE - CARE PLAN
1 Principal Discharge DX:	Complication of Bruce catheter  Secondary Diagnosis:	Acute UTI  Secondary Diagnosis:	2019 novel coronavirus disease (COVID-19)

## 2023-09-07 NOTE — ED ADULT NURSE NOTE - CHPI ED NUR SYMPTOMS POS
Physical Therapy Daily Treatment     Visit Count: 2  Plan of Care Dates: Initial: 6/27/2018 Through: 9/19/2018  Insurance Information: THERAPY BENEFITS:  PAYOR: [facility] MEDICARE COMPLETE  VISIT LIMIT: MEDICAL NECESSITY  AUTHORIZATION NEEDED: NO  NOTES: FOLLOW MEDICARE GUIDELINES     DEDUCTIBLE: $0                      MET: N/A  OUT OF POCKET: $5900.00     MET: $15  CO-INSURANCE: 0%  COPAY: $40.00  CALL REF # ONLINE  THIS QUOTE OF BENEFITS IS NOT A GUARANTEE OF PAYMENT     Next Referring Provider Visit: 09/27/2018     Referred by: Ladonna Alanis MD  Medical Diagnosis (from order): Strain of right hamstring [S76.311A]; Strain of right quadriceps, subsequent encounter [S76.111D]  Treatment Diagnosis: Knee Symptoms with Pain, Impaired Motor Function/Muscle Performance, Radiating Pain, Impaired Gait/Locomotion Deficits and Impaired Mobility  Insurance: 1. [facility] MEDICARE COMPLETE  2. N/A     Date of onset/injury: June 1, 2018  Diagnosis Precautions: none  Chart reviewed: Relevant co-morbidities, allergies, tests and medications: arthritis - low back and thumbs     SUBJECTIVE   Patient reports still having pain at right proximal hamstring.  Has only been doing stretches 1-2x/day and also classical stretch  Current Pain: 3/10.    Functional Change: none    OBJECTIVE     Range of Motion (%) Initial   Lumbar Flexion 75*   Lumbar Extension     Lumbar Lateral Flexion Left     Lumbar Lateral Flexion Right  75*   Lumbar Rotation Left      Lumbar Rotation Right  90*   standard testing positions unless otherwise noted; Key: ranges are reported in active range of motion unless noted as AA=active assistive or P=passive range of motion, * denotes pain   Comments: All motions within functional/normal limits except as noted., tightness right hip to hamstring     Range of Motion (degrees)  [] All motions within functional/normal limits except those noted.   [] Only those motions that were assessed are noted.  [] Uninvolved extremity motion  within functional/normal limits.     Norm Left Right   Date   Initial Initial   Hip Flexion 110-120  WNL Limited *   Hip Extension 10-15       Hip Abduction 30-50        Hip Adduction 30       Hip Internal Rotation 30-40        Hip External Rotation 40-60        standard testing positions unless otherwise noted; Key: ranges are reported in active range of motion unless noted as AA=active assistive or P=passive range of motion, * denotes pain   Comments: pain at right hamstring and quad with single knee to chest     Strength (out of 5)  [] Gross muscle strength within functional/normal limits except as noted.  [] Only muscle strength that was assessed are noted.  [] Uninvolved muscle strength within functional/normal limits.    Left Right   Date Initial Initial   Hip Flexion 4+* 4+   Hip Extension       Hip Abduction 4+* 4+   Hip Gluteus Medius       Hip Adduction 5 (cavitation) 5   Hip Internal Rotation 4 4+   Hip External Rotation 4+ 5   Knee Flexion 4-* 5   Knee Extension 4+ 5   Ankle Dorsiflexion 5 5   Ankle Plantar flexion       standard testing positions unless otherwise noted, key: * denotes pain  Comments: pain at right hamstring      Treatment   Therapeutic Exercise:   Nustep 6 min level 5  Standing march x10  Standing 3 way hip in parallel bars x10 each bilaterally  Stair hamstring stretch 30 sec bilaterally  Calf stretch at stair 30 sec bilaterally  Lunge on stair glut stretch (turn to hug knee) 30 sec      Manual Therapy:   Prone soft tissue mobilization at right medial hamstring proximally    Ultrasound (63369):  Patient has been made aware of potential contraindications and possible risks associated with the use of modality and has agreed.  Location: right medial proximal hamstring  Position: prone  Duration: 6 minutes Duty Cycle: 100% duty cycle  Frequency: 1 Mhz  Intensity: 1.4 W/cm2   Results: decreased pain; no adverse reaction to treatment     Plan for next session: review stretching, hip/hamstring  strength progression - bridge, squat, lunge, heel slides, stool scoots (as able)      Current Home Program (not performed this date except as noted above):   Seated stretching:  Hamstring 30 sec  Sciatic nerve glide sitting right x 10  Piriformis with internal rotation and external rotation moment  Self massage with roller stick to hamstring, quad in sitting.  March  HS curl  3 way hip standing  Hamstring stretch  Single knee to chest stretch  Calf stretch    ASSESSMENT   Patient did well with new exercises with balance and strength challenge but not pain limited.  Patient had tenderness improved with manual and US.    Pain after treatment: 1/10  Result of above outlined education: Verbalizes understanding, Demonstrates understanding and Needs reinforcement    Goals:  To be obtained by end of this plan of care:  1. Patient independent with modified and progressed home exercise program.  2. Patient will decrease involved hip pain to 0/10  to aid in normalization of gait for independent living, stair ambulation, completion of self-care tasks/dressing, completion of household tasks for independent living, returning to community activities, age appropriate activities and bending.   3. Patient will increase involved hamstring passive range of motion to equal bilaterally to aid in normalization of gait for independent living and bending.  4. Patient will increase involved hip strength to 5/5 to aid in normalization of gait for independent living, stair ambulation, completion of self-care tasks/dressing, completion of household tasks for independent living, returning to community activities and age appropriate activities.   5. Patient will demonstrate ability to negotiate level and unlevel surfaces at variable velocities, including change of direction without increased pain or instability to return to age appropriate and community activities at prior level of function.  6. Patient will be able to ascend and descend 1  flight of stairs using reciprocal pattern without  pain/difficulty.  7. Patient will tolerate sitting up to 1 hour to aid in driving without pain/difficulty.  Lower Extremity Functional Scale: Patient will complete form to reflect an improved score from initial score of 44 to greater than or equal to 60 (0=extreme difficulty; 80=no difficulty) to indicate pt reported improvement in function/disability/impairment (minimal detectable change: 9 points).       PLAN   3xweek for 12 weeks     THERAPY DAILY BILLING   Primary Insurance:  [employer] MEDICARE COMPLETE  Secondary Insurance: N/A    Evaluation Procedures:  No evaluation codes were used on this date of service    Timed Procedures:  Manual Therapy, 12 minutes  Therapeutic Exercise, 25 minutes  Ultrasound, 8 minutes    Untimed Procedures:  No untimed codes were used on this date of service    Total Treatment Time: 45 minutes        G-Code:  G-Code Score ABN form  reporting not required this treatment session  Modifier based on outcome measure(s)/functional testing/clinical judgement as listed above    The referring provider's electronic or written signature on the evaluation authorizes the therapy plan of care and certifies the need for these services, furnished under this plan of care while under their care.  Referring provider signature on file        ctheter not draining properly

## 2023-09-07 NOTE — ED ADULT TRIAGE NOTE - CHIEF COMPLAINT QUOTE
Patient from [facility] comes  to ED with leaking negrete catheter. Patient also appears lethargic and oxygen saturation between 89-90 % on room air. Patient tested positive for covid on 8/31/23. IV to Left arm   hx spinal fusion, impaired mobility, laminectomy, HTN

## 2023-09-07 NOTE — ED PROVIDER NOTE - NSFOLLOWUPINSTRUCTIONS_ED_ALL_ED_FT
Please see your care taker in the rehab Patient needs * Rocephin 1 gram qd x 3 days.* Pt also needs Oxygen Nasal cannulae 2 Liters.  Return if increases urine out put from the negrete catheter, fever, nausea, vomiting, for changing mental status.

## 2023-09-07 NOTE — ED PROVIDER NOTE - PATIENT PORTAL LINK FT
You can access the FollowMyHealth Patient Portal offered by [facility] by registering at the following website: [email or web address]. By joining [employer]’s FollowMyHealth portal, you will also be able to view your health information using other applications (apps) compatible with our system.

## 2023-09-07 NOTE — ED PROVIDER NOTE - CLINICAL SUMMARY MEDICAL DECISION MAKING FREE TEXT BOX
pt is from next door [facility] for leaking negrete catheter. pt has recently dx covid pt is alert and oriented x 3 smiling pleases wbc [id number] lactate 0.7 urine was cloud ( seen by me) and new negrete catheter is inserted by the RN with 100 cc yellow urine out pt. Discussed the case with Dr. Stauffer hospitalist who reviewed the labs and sts pt can be discharged after one dose of Rocephin 1 gm. and he sts [facility] can give pt three more doses of Rocephin iv 1 gm once a day. xray of chest was ready by me

## 2023-09-07 NOTE — ED ADULT NURSE NOTE - OBJECTIVE STATEMENT
78y female A&Ox3 bib [place] for urinary catheter problems. pt reports that she her catheter does not appear to be draining normally as there is only very scant urine in the bag. pt was bladder scanned and retaining 480 urine. As per MD Mahoney, pt requires new negrete to be inserted. pt denies all pain at this time. pt was placed on 2L NC upon arrival for low 02 (90% on RA). pt has no hx of being on 02. pt tested positive for COVID on 8/31/23.

## 2023-09-07 NOTE — ED PROVIDER NOTE - PROGRESS NOTE DETAILS
pulse ox is 97% on 2 liter oxygen. hr 67 bp 147/67 rr 16 at bed side now Dr. Stauffer hospitalist is here discussed the case with he He reviewed the labs and sts pt can get one dose of  Rocephine 1 gm iv here and discharge pt back. the negrete is reinserted and flushed. slow draining possibly due the very cloudy urine nobloody Dr. Stauffer hospitalist is here discussed the case with he He reviewed the labs and sts pt can get one dose of  Rocephin 1 gm iv here and discharge pt back.

## 2023-09-07 NOTE — ED PROVIDER NOTE - OBJECTIVE STATEMENT
78 years old female here from next door [facility] rehab for leaking negrete catheter pt had recent spinal fusion and had covid positive on 8/31/23 Pt is alert and oriented x 3 denies headache, dizziness, blurred visions, light sensitivities, focal/distal weakness or numbness, neck/back/hips/calfs pain, cough, sob, chest pain, nausea, vomiting, fever, chills, abd pain, or irregular bowel movements. 78 years old female here from next door [facility] for leaking negrete catheter pt had recent spinal fusion ( lumbar region) and had covid positive on 8/31/23 Pt is alert and oriented x 3 denies headache, dizziness, blurred visions, light sensitivities, focal/distal weakness or numbness, neck/back/hips/calfs pain, cough, sob, chest pain, nausea, vomiting, fever, chills, abd pain, or irregular bowel movements.

## 2023-09-07 NOTE — ED ADULT NURSE REASSESSMENT NOTE - NS ED NURSE REASSESS COMMENT FT1
patient had multiple PVC'S and rr 40 at this time .,MD Mahoney made aware. ekg done at this time no meds ordered will continue to monitor

## 2023-09-07 NOTE — ED ADULT NURSE NOTE - NSFALLRISKINTERV_ED_ALL_ED
Assistance OOB with selected safe patient handling equipment if applicable/Assistance with ambulation/Communicate fall risk and risk factors to all staff, patient, and family/Monitor gait and stability/Provide visual cue: yellow wristband, yellow gown, etc/Reinforce activity limits and safety measures with patient and family/Toileting schedule using arm’s reach rule for commode and bathroom/Call bell, personal items and telephone in reach/Instruct patient to call for assistance before getting out of bed/chair/stretcher/Non-slip footwear applied when patient is off stretcher/[place] to call system/Physically safe environment - no spills, clutter or unnecessary equipment/Purposeful Proactive Rounding/Room/bathroom lighting operational, light cord in reach

## 2023-09-10 LAB
-  AMIKACIN: SIGNIFICANT CHANGE UP
-  AMOXICILLIN/CLAVULANIC ACID: SIGNIFICANT CHANGE UP
-  AMPICILLIN/SULBACTAM: SIGNIFICANT CHANGE UP
-  AMPICILLIN: SIGNIFICANT CHANGE UP
-  AZTREONAM: SIGNIFICANT CHANGE UP
-  CEFAZOLIN: SIGNIFICANT CHANGE UP
-  CEFEPIME: SIGNIFICANT CHANGE UP
-  CEFOXITIN: SIGNIFICANT CHANGE UP
-  CEFTRIAXONE: SIGNIFICANT CHANGE UP
-  CIPROFLOXACIN: SIGNIFICANT CHANGE UP
-  ERTAPENEM: SIGNIFICANT CHANGE UP
-  GENTAMICIN: SIGNIFICANT CHANGE UP
-  LEVOFLOXACIN: SIGNIFICANT CHANGE UP
-  MEROPENEM: SIGNIFICANT CHANGE UP
-  NITROFURANTOIN: SIGNIFICANT CHANGE UP
-  PIPERACILLIN/TAZOBACTAM: SIGNIFICANT CHANGE UP
-  TOBRAMYCIN: SIGNIFICANT CHANGE UP
-  TRIMETHOPRIM/SULFAMETHOXAZOLE: SIGNIFICANT CHANGE UP
CULTURE RESULTS: SIGNIFICANT CHANGE UP
METHOD TYPE: SIGNIFICANT CHANGE UP
ORGANISM # SPEC MICROSCOPIC CNT: SIGNIFICANT CHANGE UP
ORGANISM # SPEC MICROSCOPIC CNT: SIGNIFICANT CHANGE UP
SPECIMEN SOURCE: SIGNIFICANT CHANGE UP

## 2023-09-11 ENCOUNTER — APPOINTMENT (OUTPATIENT)
Dept: ORTHOPEDIC SURGERY | Facility: CLINIC | Age: 78
End: 2023-09-11

## 2023-09-14 ENCOUNTER — APPOINTMENT (OUTPATIENT)
Dept: UROLOGY | Facility: CLINIC | Age: 78
End: 2023-09-14

## 2023-10-10 ENCOUNTER — APPOINTMENT (OUTPATIENT)
Dept: ORTHOPEDIC SURGERY | Facility: CLINIC | Age: 78
End: 2023-10-10
Payer: OTHER MISCELLANEOUS

## 2023-10-10 VITALS — HEIGHT: 64 IN | BODY MASS INDEX: 30.73 KG/M2 | WEIGHT: 180 LBS

## 2023-10-10 DIAGNOSIS — M54.17 RADICULOPATHY, LUMBOSACRAL REGION: ICD-10-CM

## 2023-10-10 PROCEDURE — 99024 POSTOP FOLLOW-UP VISIT: CPT

## 2023-10-10 PROCEDURE — 72100 X-RAY EXAM L-S SPINE 2/3 VWS: CPT

## 2023-11-13 ENCOUNTER — APPOINTMENT (OUTPATIENT)
Dept: ORTHOPEDIC SURGERY | Facility: CLINIC | Age: 78
End: 2023-11-13

## 2023-11-13 ENCOUNTER — APPOINTMENT (OUTPATIENT)
Dept: ORTHOPEDIC SURGERY | Facility: CLINIC | Age: 78
End: 2023-11-13
Payer: OTHER MISCELLANEOUS

## 2023-11-13 PROCEDURE — 99214 OFFICE O/P EST MOD 30 MIN: CPT | Mod: ACP

## 2023-11-13 PROCEDURE — 72100 X-RAY EXAM L-S SPINE 2/3 VWS: CPT

## 2023-11-22 NOTE — ED ADULT NURSE NOTE - PRO INTERPRETER NEED 2
You can access the FollowMyHealth Patient Portal offered by [facility] by registering at the following website: [email or web address]. By joining [employer]’s FollowMyHealth portal, you will also be able to view your health information using other applications (apps) compatible with our system. English

## 2024-01-29 ENCOUNTER — APPOINTMENT (OUTPATIENT)
Dept: ORTHOPEDIC SURGERY | Facility: CLINIC | Age: 79
End: 2024-01-29
Payer: OTHER MISCELLANEOUS

## 2024-01-29 PROCEDURE — 99214 OFFICE O/P EST MOD 30 MIN: CPT

## 2024-01-29 PROCEDURE — 72100 X-RAY EXAM L-S SPINE 2/3 VWS: CPT

## 2024-01-29 NOTE — IMAGING
[Implants in position] : Implants in position [No loosening of hardware] : No loosening of hardware [Fusion intact] : Fusion intact [de-identified] : No lumbar paraspinal muscle tenderness No lumbar paraspinal muscle spasm   No pain with flexion No pain with extension diminished ROM in all planes   motor exam 5/5 strength in RLE. Left ankle plantar flexors and dorsiflexors 4/5, otherwise 5/5.  sensory intact SLR dermatome of pain   Mobilizes in a wheelchair.  non antalgic gait Difficulty to heel/toe walk with LLE.

## 2024-01-29 NOTE — HISTORY OF PRESENT ILLNESS
[Lower back] : lower back [Sudden] : sudden [10] : 10 [Shooting] : shooting [Constant] : constant [Sleep] : sleep [Nothing helps with pain getting better] : Nothing helps with pain getting better [Walking] : walking [de-identified] : DOS:  07/14/23 Laminectomy L2, L3, L4. Hemilaminectomy L5, Posterolateral fusion at L2-3, L3-4, L4-5.    01/29/24: F/up L spine. 6 months s/p PLIF. Remains with some tingling down b/l legs.   11/13/23: 4 months s/p laminectomy and fusion. Doing okay. Pre op symptoms subsiding. Remains with some numbness in left shin and intermittent left hip pain. denies pain. ambulates with walker. here with wheelchair. didnt start PT.   10/10/2023: she is s/p PLIF from 2-5 7/14/23. States pre op pain much improved. she had remained in the hospital during her post op course due to UTI and COVID. She was released to home earlier this week and is ambulating with a walker   6/6/23: Symptoms unchanged since last visit. Reports surgery has been approved. Recently having numbness in LT calf and foot.   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 pain radiates down both leg into the back of the legs with numbness in the top of the feet. Pain with prolonged walking. Using cane. Ok with sitting. Has doing extensive PT over the years that has only provided temporary relief. taking ibuprofen and Tylenol with temporary relief. no bb incontinence. Saw Dr. Choi early last year, was scheduled for RONEY but canceled d/t HTN and elevated blood sugar. no prior spine surgeries.   DM, last A1C around 7. oow since injury [] : no [FreeTextEntry7] : b/l legs [de-identified] : MRI L Spine 2022

## 2024-01-29 NOTE — PHYSICAL EXAM
[Flexion] : flexion [Extension] : extension [Bending to left] : bending to left [Bending to right] : bending to right [5___] : right extensor hallicus longus 5[unfilled]/5 [] : negative sitting straight leg raise [de-identified] : unable to heel walk bilaterally; able to toe walk [de-identified] : top of foot numbness with decreased sensation to touch

## 2024-01-29 NOTE — ASSESSMENT
[FreeTextEntry1] : ~6 months s/p lumbar fusion. Preop radicular pain has resolved. X-rays w/ construct stable, no loosen hardware. Remains with N/T down b/l legs.   - Start PT to strengthen core and b/l LE.  - F/up in 4 weeks  - Advised increasing activity as tolerated.

## 2024-03-05 ENCOUNTER — APPOINTMENT (OUTPATIENT)
Dept: ORTHOPEDIC SURGERY | Facility: CLINIC | Age: 79
End: 2024-03-05

## 2024-03-05 ENCOUNTER — APPOINTMENT (OUTPATIENT)
Dept: ORTHOPEDIC SURGERY | Facility: CLINIC | Age: 79
End: 2024-03-05
Payer: OTHER MISCELLANEOUS

## 2024-03-05 PROCEDURE — 72100 X-RAY EXAM L-S SPINE 2/3 VWS: CPT

## 2024-03-05 PROCEDURE — 99214 OFFICE O/P EST MOD 30 MIN: CPT | Mod: ACP

## 2024-03-05 NOTE — IMAGING
[Implants in position] : Implants in position [No loosening of hardware] : No loosening of hardware [Fusion intact] : Fusion intact [de-identified] : No lumbar paraspinal muscle tenderness No lumbar paraspinal muscle spasm   No pain with flexion No pain with extension diminished ROM in all planes   motor exam 5/5 strength in RLE. Left ankle plantar flexors 5/5 and dorsiflexors 2/5, otherwise 5/5.  sensory intact - b/l SLR  Here in wheelchair today. Usually ambuates with walker when outside or unassisted when at home per patient.  mildly antalgic gait Unable to heel/toe walk with LLE.

## 2024-03-05 NOTE — HISTORY OF PRESENT ILLNESS
[Sudden] : sudden [Lower back] : lower back [10] : 10 [Shooting] : shooting [Constant] : constant [Sleep] : sleep [Nothing helps with pain getting better] : Nothing helps with pain getting better [Walking] : walking [de-identified] : DOS:  07/14/23 Laminectomy L2, L3, L4. Hemilaminectomy L5, Posterolateral fusion at L2-3, L3-4, L4-5.    03/05/24: about 8 months s/p laminectomy and fusion. Denies pain. Doing well w/PT, which she recently started only about 1 month ago since surgery [place]. Still unable to walk long distances. Needs walker assistance when out of the house. Doesnt use assistive device when in the house.  Reports left foot is weak, which she's notice from postop but has been improving. No low back pain but feels the metal/tightness in her back.   01/29/24: F/up L spine. 6 months s/p PLIF. Remains with some tingling down b/l legs.   11/13/23: 4 months s/p laminectomy and fusion. Doing okay. Pre op symptoms subsiding. Remains with some numbness in left shin and intermittent left hip pain. denies pain. ambulates with walker. here with wheelchair. didnt start PT.   10/10/2023: she is s/p PLIF from 2-5 7/14/23. States pre op pain much improved. she had remained in the hospital during her post op course due to UTI and COVID. She was released to home earlier this week and is ambulating with a walker   6/6/23: Symptoms unchanged since last visit. Reports surgery has been approved. Recently having numbness in LT calf and foot.   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 pain radiates down both leg into the back of the legs with numbness in the top of the feet. Pain with prolonged walking. Using cane. Ok with sitting. Has doing extensive PT over the years that has only provided temporary relief. taking ibuprofen and Tylenol with temporary relief. no bb incontinence. Saw Dr. Choi early last year, was scheduled for RONEY but canceled d/t HTN and elevated blood sugar. no prior spine surgeries.   DM, last A1C around 7. oow since injury [] : no [FreeTextEntry7] : b/l legs [de-identified] : MRI L Spine 2022

## 2024-03-05 NOTE — ASSESSMENT
[FreeTextEntry1] : ~7 months s/p lumbar fusion. X-rays w/ construct stable today. Preop pain/symptoms have resolved. Remains with left foot weakness states she developed post op. She did start PT recently, almost 6 months after sx d/t being out of the country.   - Ordered L Spine CT scan to eval hardware and healing. - Continue PT for core and LLE strengthening - F/up in1 month

## 2024-03-18 ENCOUNTER — APPOINTMENT (OUTPATIENT)
Dept: CT IMAGING | Facility: CLINIC | Age: 79
End: 2024-03-18
Payer: OTHER MISCELLANEOUS

## 2024-03-18 PROCEDURE — 72131 CT LUMBAR SPINE W/O DYE: CPT

## 2024-04-02 ENCOUNTER — APPOINTMENT (OUTPATIENT)
Dept: ORTHOPEDIC SURGERY | Facility: CLINIC | Age: 79
End: 2024-04-02
Payer: OTHER MISCELLANEOUS

## 2024-04-02 DIAGNOSIS — M54.17 RADICULOPATHY, LUMBOSACRAL REGION: ICD-10-CM

## 2024-04-02 PROCEDURE — 99213 OFFICE O/P EST LOW 20 MIN: CPT

## 2024-04-02 NOTE — ASSESSMENT
[FreeTextEntry1] : ~9 months s/p lumbar fusion. X-rays w/ construct stable today. Preop pain/symptoms have resolved. CT scan shows minimal "lucency" of left L5 pedicle screw, no risk of contacting any nerve roots.  Remains with n/t and subjective weakness in b/l LE, uses wheelchair.   - Assured Ronel no risk of screw loosening more over time.  - Renewed therapy to strengthen b/l LE.  - F/up in 6 weeks.

## 2024-04-02 NOTE — IMAGING
[Implants in position] : Implants in position [No loosening of hardware] : No loosening of hardware [Fusion intact] : Fusion intact [de-identified] : No lumbar paraspinal muscle tenderness No lumbar paraspinal muscle spasm   No pain with flexion No pain with extension diminished ROM in all planes   motor exam 5/5 strength in RLE. Left ankle plantar flexors 5/5 and dorsiflexors 2/5, otherwise 5/5.  sensory intact - b/l SLR  Here in wheelchair today. Usually ambuates with walker when outside or unassisted when at home per patient.  mildly antalgic gait Unable to heel/toe walk with LLE.

## 2024-04-02 NOTE — HISTORY OF PRESENT ILLNESS
[Lower back] : lower back [Sudden] : sudden [10] : 10 [Shooting] : shooting [Constant] : constant [Sleep] : sleep [Nothing helps with pain getting better] : Nothing helps with pain getting better [Walking] : walking [de-identified] : DOS:  07/14/23 Laminectomy L2, L3, L4. Hemilaminectomy L5, Posterolateral fusion at L2-5   4/2/24: Follow up L Spine. ~ 9 months s/p laminectomy and fusion. Remains with numbness down b/l legs and weakness. No back pain.   03/05/24: about 8 months s/p laminectomy and fusion. Denies pain. Doing well w/PT, which she recently started only about 1 month ago since surgery d/t being [place] the country. Still unable to walk long distances. Needs walker assistance when out of the house. Doesnt use assistive device when in the house.  Reports left foot is weak, which she's notice from postop but has been improving. No low back pain but feels the metal/tightness in her back.   01/29/24: F/up L spine. 6 months s/p PLIF. Remains with some tingling down b/l legs.   11/13/23: 4 months s/p laminectomy and fusion. Doing okay. Pre op symptoms subsiding. Remains with some numbness in left shin and intermittent left hip pain. denies pain. ambulates with walker. here with wheelchair. didnt start PT.   10/10/2023: she is s/p PLIF from 2-5 7/14/23. States pre op pain much improved. she had remained in the hospital during her post op course due to UTI and COVID. She was released to home earlier this week and is ambulating with a walker   6/6/23: Symptoms unchanged since last visit. Reports surgery has been approved. Recently having numbness in LT calf and foot.   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 pain radiates down both leg into the back of the legs with numbness in the top of the feet. Pain with prolonged walking. Using cane. Ok with sitting. Has doing extensive PT over the years that has only provided temporary relief. taking ibuprofen and Tylenol with temporary relief. no bb incontinence. Saw Dr. Choi early last year, was scheduled for RONEY but canceled d/t HTN and elevated blood sugar. no prior spine surgeries.   DM, last A1C around 7. oow since injury [] : no [FreeTextEntry5] : CT REVIEW [de-identified] : MRI L Spine 2022 [FreeTextEntry7] : b/l legs

## 2024-04-02 NOTE — DATA REVIEWED
[CT Scan] : CT scan [Lumbar Spine] : lumbar spine [Report was reviewed and noted in the chart] : The report was reviewed and noted in the chart [I independently reviewed and interpreted images and report] : I independently reviewed and interpreted images and report [FreeTextEntry1] : [facility] L Spine CT scan 3/18/24 1. There is minimal lucency adjacent to the bilateral L5 screws.

## 2024-05-14 ENCOUNTER — APPOINTMENT (OUTPATIENT)
Dept: ORTHOPEDIC SURGERY | Facility: CLINIC | Age: 79
End: 2024-05-14
Payer: OTHER MISCELLANEOUS

## 2024-05-14 DIAGNOSIS — M43.17 SPONDYLOLISTHESIS, LUMBOSACRAL REGION: ICD-10-CM

## 2024-05-14 DIAGNOSIS — M48.062 SPINAL STENOSIS, LUMBAR REGION WITH NEUROGENIC CLAUDICATION: ICD-10-CM

## 2024-05-14 DIAGNOSIS — M41.50 OTHER SECONDARY SCOLIOSIS, SITE UNSPECIFIED: ICD-10-CM

## 2024-05-14 DIAGNOSIS — Z98.1 ARTHRODESIS STATUS: ICD-10-CM

## 2024-05-14 PROCEDURE — 72100 X-RAY EXAM L-S SPINE 2/3 VWS: CPT

## 2024-05-14 PROCEDURE — 99213 OFFICE O/P EST LOW 20 MIN: CPT

## 2024-05-14 NOTE — ASSESSMENT
[FreeTextEntry1] : ~10 months s/p lumbar fusion. X-rays w/ construct stable today. Preop pain/symptoms have resolved. CT scan shows minimal "lucency" of left L5 pedicle screw, no risk of contacting any nerve roots.  Remains with n/t and subjective weakness in b/l LE,   She has made significant improvement since the last follow-up  - Assured Ronel no risk of screw loosening more over time.  - Renewed therapy to strengthen b/l LE.  - F/up in 6 weeks.

## 2024-05-14 NOTE — IMAGING
[Implants in position] : Implants in position [No loosening of hardware] : No loosening of hardware [Fusion intact] : Fusion intact [de-identified] : No lumbar paraspinal muscle tenderness No lumbar paraspinal muscle spasm   No pain with flexion No pain with extension diminished ROM in all planes   motor exam 5/5 strength in RLE. Left ankle plantar flexors 5/5 and dorsiflexors 2/5, otherwise 5/5.  sensory intact - b/l SLR    mildly antalgic gait Unable to heel/toe walk with LLE.

## 2024-05-14 NOTE — HISTORY OF PRESENT ILLNESS
[Lower back] : lower back [Sudden] : sudden [10] : 10 [Shooting] : shooting [Constant] : constant [Sleep] : sleep [Nothing helps with pain getting better] : Nothing helps with pain getting better [Walking] : walking [de-identified] : DOS:  07/14/23 Laminectomy L2, L3, L4. Hemilaminectomy L5, Posterolateral fusion at L2-5    05/14/2024: She is now 10+ months postop  And continues to improve.  She continues with physical therapy and would like to continue.  She is out of the wheelchair and doing better with regular ambulation she is not taking any medications does not complain of back pain presently still with some weakness in the legs  4/2/24: Follow up L Spine. ~ 9 months s/p laminectomy and fusion. Remains with numbness down b/l legs and weakness. No back pain.   03/05/24: about 8 months s/p laminectomy and fusion. Denies pain. Doing well w/PT, which she recently started only about 1 month ago since surgery d/[place] being [place] country. Still unable to walk long distances. Needs walker assistance when out of the house. Doesnt use assistive device when in the house.  Reports left foot is weak, which she's notice from postop but has been improving. No low back pain but feels the metal/tightness in her back.   01/29/24: F/up L spine. 6 months s/p PLIF. Remains with some tingling down b/l legs.   11/13/23: 4 months s/p laminectomy and fusion. Doing okay. Pre op symptoms subsiding. Remains with some numbness in left shin and intermittent left hip pain. denies pain. ambulates with walker. here with wheelchair. didnt start PT.   10/10/2023: she is s/p PLIF from 2-5 7/14/23. States pre op pain much improved. she had remained in the hospital during her post op course due to UTI and COVID. She was released to home earlier this week and is ambulating with a walker   6/6/23: Symptoms unchanged since last visit. Reports surgery has been approved. Recently having numbness in LT calf and foot.   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 pain radiates down both leg into the back of the legs with numbness in the top of the feet. Pain with prolonged walking. Using cane. Ok with sitting. Has doing extensive PT over the years that has only provided temporary relief. taking ibuprofen and Tylenol with temporary relief. no bb incontinence. Saw Dr. Choi early last year, was scheduled for RONEY but canceled d/t HTN and elevated blood sugar. no prior spine surgeries.   DM, last A1C around 7. oow since injury [] : no [FreeTextEntry5] : Follow Up- L Spine. Doing well; no longer ambulating w/wheelchair. Still feels numbness down b/l legs. [FreeTextEntry7] : b/l legs [de-identified] : MRI L Spine 2022

## 2024-06-25 ENCOUNTER — APPOINTMENT (OUTPATIENT)
Dept: ORTHOPEDIC SURGERY | Facility: CLINIC | Age: 79
End: 2024-06-25

## 2024-09-09 ENCOUNTER — APPOINTMENT (OUTPATIENT)
Dept: ORTHOPEDIC SURGERY | Facility: CLINIC | Age: 79
End: 2024-09-09
Payer: OTHER MISCELLANEOUS

## 2024-09-09 DIAGNOSIS — M48.062 SPINAL STENOSIS, LUMBAR REGION WITH NEUROGENIC CLAUDICATION: ICD-10-CM

## 2024-09-09 DIAGNOSIS — Z98.1 ARTHRODESIS STATUS: ICD-10-CM

## 2024-09-09 DIAGNOSIS — M43.17 SPONDYLOLISTHESIS, LUMBOSACRAL REGION: ICD-10-CM

## 2024-09-09 PROCEDURE — 72100 X-RAY EXAM L-S SPINE 2/3 VWS: CPT

## 2024-09-09 PROCEDURE — 99213 OFFICE O/P EST LOW 20 MIN: CPT

## 2024-09-09 NOTE — DATA REVIEWED
[CT Scan] : CT scan [Lumbar Spine] : lumbar spine [Report was reviewed and noted in the chart] : The report was reviewed and noted in the chart [I independently reviewed and interpreted images and report] : I independently reviewed and interpreted images and report

## 2024-09-09 NOTE — HISTORY OF PRESENT ILLNESS
[Lower back] : lower back [Sudden] : sudden [10] : 10 [Shooting] : shooting [Constant] : constant [Sleep] : sleep [Nothing helps with pain getting better] : Nothing helps with pain getting better [Walking] : walking [] : yes

## 2024-10-14 ENCOUNTER — APPOINTMENT (OUTPATIENT)
Dept: ORTHOPEDIC SURGERY | Facility: CLINIC | Age: 79
End: 2024-10-14
Payer: OTHER MISCELLANEOUS

## 2024-10-14 VITALS — BODY MASS INDEX: 30.73 KG/M2 | WEIGHT: 180 LBS | HEIGHT: 64 IN

## 2024-10-14 DIAGNOSIS — M43.17 SPONDYLOLISTHESIS, LUMBOSACRAL REGION: ICD-10-CM

## 2024-10-14 DIAGNOSIS — Z98.1 ARTHRODESIS STATUS: ICD-10-CM

## 2024-10-14 DIAGNOSIS — M48.062 SPINAL STENOSIS, LUMBAR REGION WITH NEUROGENIC CLAUDICATION: ICD-10-CM

## 2024-10-14 PROCEDURE — 99213 OFFICE O/P EST LOW 20 MIN: CPT

## 2024-10-17 ENCOUNTER — APPOINTMENT (OUTPATIENT)
Dept: CT IMAGING | Facility: CLINIC | Age: 79
End: 2024-10-17

## 2024-10-24 ENCOUNTER — APPOINTMENT (OUTPATIENT)
Dept: CT IMAGING | Facility: CLINIC | Age: 79
End: 2024-10-24

## 2024-10-24 PROCEDURE — 72131 CT LUMBAR SPINE W/O DYE: CPT

## 2024-11-04 ENCOUNTER — APPOINTMENT (OUTPATIENT)
Dept: ORTHOPEDIC SURGERY | Facility: CLINIC | Age: 79
End: 2024-11-04
Payer: OTHER MISCELLANEOUS

## 2024-11-04 VITALS — WEIGHT: 180 LBS | HEIGHT: 64 IN | BODY MASS INDEX: 30.73 KG/M2

## 2024-11-04 DIAGNOSIS — M54.17 RADICULOPATHY, LUMBOSACRAL REGION: ICD-10-CM

## 2024-11-04 DIAGNOSIS — Z98.1 ARTHRODESIS STATUS: ICD-10-CM

## 2024-11-04 PROCEDURE — 99214 OFFICE O/P EST MOD 30 MIN: CPT

## 2024-12-14 NOTE — PHYSICAL THERAPY INITIAL EVALUATION ADULT - NAME OF CLINICIAN
Medication List        CHANGE how you take these medications      aspirin 81 MG EC tablet  Take 1 tablet by mouth daily  Start taking on: December 25, 2024  What changed:   medication strength  how much to take  when to take this  These instructions start on December 25, 2024. If you are unsure what to do until then, ask your doctor or other care provider.     tamsulosin 0.4 MG capsule  Commonly known as: FLOMAX  Take 1 capsule by mouth daily  What changed: when to take this            CONTINUE taking these medications      carvedilol 6.25 MG tablet  Commonly known as: COREG     finasteride 5 MG tablet  Commonly known as: PROSCAR  Take 1 tablet by mouth daily     melatonin 3 MG Tabs tablet     NIFEdipine 90 MG extended release tablet  Commonly known as: ADALAT CC     * OLANZapine 10 MG tablet  Commonly known as: ZYPREXA     * OLANZapine 5 MG tablet  Commonly known as: ZYPREXA     sodium bicarbonate 325 MG tablet  Take 4 tablets by mouth 2 times daily Take 4 tablets by mouth twice a day     vitamin D 25 MCG (1000 UT) Tabs tablet  Commonly known as: CHOLECALCIFEROL           * This list has 2 medication(s) that are the same as other medications prescribed for you. Read the directions carefully, and ask your doctor or other care provider to review them with you.                   Where to Get Your Medications        These medications were sent to [facility] - [place], [place] - [facility] - P [phone] - F [phone]  [facility] [address]      Phone: [phone]   aspirin 81 MG EC tablet         34 Minutes spent on patient evaluation, counseling and discharge planning.     Signed:  Walker Senior MD  12/14/2024, 5:03 PM **    
PENNIE Killian made aware
PENNIE Dunlap

## 2024-12-17 ENCOUNTER — APPOINTMENT (OUTPATIENT)
Dept: ORTHOPEDIC SURGERY | Facility: CLINIC | Age: 79
End: 2024-12-17

## 2025-01-20 ENCOUNTER — APPOINTMENT (OUTPATIENT)
Dept: ORTHOPEDIC SURGERY | Facility: CLINIC | Age: 80
End: 2025-01-20
Payer: OTHER MISCELLANEOUS

## 2025-01-20 VITALS — HEIGHT: 64 IN | WEIGHT: 180 LBS | BODY MASS INDEX: 30.73 KG/M2

## 2025-01-20 DIAGNOSIS — Z98.1 ARTHRODESIS STATUS: ICD-10-CM

## 2025-01-20 PROCEDURE — 99213 OFFICE O/P EST LOW 20 MIN: CPT

## 2025-04-21 ENCOUNTER — APPOINTMENT (OUTPATIENT)
Dept: ORTHOPEDIC SURGERY | Facility: CLINIC | Age: 80
End: 2025-04-21
Payer: OTHER MISCELLANEOUS

## 2025-04-21 VITALS — BODY MASS INDEX: 30.73 KG/M2 | HEIGHT: 64 IN | WEIGHT: 180 LBS

## 2025-04-21 DIAGNOSIS — M43.17 SPONDYLOLISTHESIS, LUMBOSACRAL REGION: ICD-10-CM

## 2025-04-21 DIAGNOSIS — M48.062 SPINAL STENOSIS, LUMBAR REGION WITH NEUROGENIC CLAUDICATION: ICD-10-CM

## 2025-04-21 DIAGNOSIS — Z98.1 ARTHRODESIS STATUS: ICD-10-CM

## 2025-04-21 PROCEDURE — 72100 X-RAY EXAM L-S SPINE 2/3 VWS: CPT

## 2025-04-21 PROCEDURE — 99213 OFFICE O/P EST LOW 20 MIN: CPT

## 2025-06-02 ENCOUNTER — APPOINTMENT (OUTPATIENT)
Dept: ORTHOPEDIC SURGERY | Facility: CLINIC | Age: 80
End: 2025-06-02
Payer: OTHER MISCELLANEOUS

## 2025-06-02 VITALS — WEIGHT: 180 LBS | BODY MASS INDEX: 30.73 KG/M2 | HEIGHT: 64 IN

## 2025-06-02 DIAGNOSIS — M43.17 SPONDYLOLISTHESIS, LUMBOSACRAL REGION: ICD-10-CM

## 2025-06-02 DIAGNOSIS — M48.062 SPINAL STENOSIS, LUMBAR REGION WITH NEUROGENIC CLAUDICATION: ICD-10-CM

## 2025-06-02 DIAGNOSIS — M54.17 RADICULOPATHY, LUMBOSACRAL REGION: ICD-10-CM

## 2025-06-02 DIAGNOSIS — M41.50 OTHER SECONDARY SCOLIOSIS, SITE UNSPECIFIED: ICD-10-CM

## 2025-06-02 DIAGNOSIS — Z98.1 ARTHRODESIS STATUS: ICD-10-CM

## 2025-06-02 PROCEDURE — 99213 OFFICE O/P EST LOW 20 MIN: CPT

## 2025-07-24 ENCOUNTER — INPATIENT (INPATIENT)
Facility: HOSPITAL | Age: 80
LOS: 23 days | Discharge: HOME | DRG: 674 | End: 2025-08-17
Attending: INTERNAL MEDICINE | Admitting: INTERNAL MEDICINE
Payer: COMMERCIAL

## 2025-07-24 VITALS
TEMPERATURE: 98 F | WEIGHT: 141.98 LBS | OXYGEN SATURATION: 99 % | SYSTOLIC BLOOD PRESSURE: 228 MMHG | HEART RATE: 71 BPM | DIASTOLIC BLOOD PRESSURE: 72 MMHG | RESPIRATION RATE: 17 BRPM | HEIGHT: 63 IN

## 2025-07-24 DIAGNOSIS — R09.89 OTHER SPECIFIED SYMPTOMS AND SIGNS INVOLVING THE CIRCULATORY AND RESPIRATORY SYSTEMS: ICD-10-CM

## 2025-07-24 DIAGNOSIS — I16.0 HYPERTENSIVE URGENCY: ICD-10-CM

## 2025-07-24 DIAGNOSIS — Z98.890 OTHER SPECIFIED POSTPROCEDURAL STATES: Chronic | ICD-10-CM

## 2025-07-24 DIAGNOSIS — N18.5 CHRONIC KIDNEY DISEASE, STAGE 5: ICD-10-CM

## 2025-07-24 DIAGNOSIS — Z29.9 ENCOUNTER FOR PROPHYLACTIC MEASURES, UNSPECIFIED: ICD-10-CM

## 2025-07-24 DIAGNOSIS — D64.9 ANEMIA, UNSPECIFIED: ICD-10-CM

## 2025-07-24 DIAGNOSIS — E11.9 TYPE 2 DIABETES MELLITUS WITHOUT COMPLICATIONS: ICD-10-CM

## 2025-07-24 DIAGNOSIS — E87.5 HYPERKALEMIA: ICD-10-CM

## 2025-07-24 LAB
ALBUMIN SERPL ELPH-MCNC: 4.1 G/DL — SIGNIFICANT CHANGE UP (ref 3.3–5)
ALP SERPL-CCNC: 66 U/L — SIGNIFICANT CHANGE UP (ref 40–120)
ALT FLD-CCNC: 12 U/L — SIGNIFICANT CHANGE UP (ref 10–45)
ANION GAP SERPL CALC-SCNC: 19 MMOL/L — HIGH (ref 5–17)
AST SERPL-CCNC: 23 U/L — SIGNIFICANT CHANGE UP (ref 10–40)
BASOPHILS # BLD AUTO: 0.07 K/UL — SIGNIFICANT CHANGE UP (ref 0–0.2)
BASOPHILS NFR BLD AUTO: 0.6 % — SIGNIFICANT CHANGE UP (ref 0–2)
BILIRUB SERPL-MCNC: 0.2 MG/DL — SIGNIFICANT CHANGE UP (ref 0.2–1.2)
BUN SERPL-MCNC: 91 MG/DL — HIGH (ref 7–23)
CALCIUM SERPL-MCNC: 9.8 MG/DL — SIGNIFICANT CHANGE UP (ref 8.4–10.5)
CHLORIDE SERPL-SCNC: 107 MMOL/L — SIGNIFICANT CHANGE UP (ref 96–108)
CO2 SERPL-SCNC: 14 MMOL/L — LOW (ref 22–31)
CREAT SERPL-MCNC: 6.64 MG/DL — HIGH (ref 0.5–1.3)
EGFR: 6 ML/MIN/1.73M2 — LOW
EGFR: 6 ML/MIN/1.73M2 — LOW
EOSINOPHIL # BLD AUTO: 0.12 K/UL — SIGNIFICANT CHANGE UP (ref 0–0.5)
EOSINOPHIL NFR BLD AUTO: 1.1 % — SIGNIFICANT CHANGE UP (ref 0–6)
GAS PNL BLDV: SIGNIFICANT CHANGE UP
GLUCOSE BLDC GLUCOMTR-MCNC: 89 MG/DL — SIGNIFICANT CHANGE UP (ref 70–99)
GLUCOSE SERPL-MCNC: 225 MG/DL — HIGH (ref 70–99)
HCT VFR BLD CALC: 29.4 % — LOW (ref 34.5–45)
HGB BLD-MCNC: 9.2 G/DL — LOW (ref 11.5–15.5)
IMM GRANULOCYTES # BLD AUTO: 0.05 K/UL — SIGNIFICANT CHANGE UP (ref 0–0.07)
IMM GRANULOCYTES NFR BLD AUTO: 0.4 % — SIGNIFICANT CHANGE UP (ref 0–0.9)
LYMPHOCYTES # BLD AUTO: 1.68 K/UL — SIGNIFICANT CHANGE UP (ref 1–3.3)
LYMPHOCYTES NFR BLD AUTO: 14.7 % — SIGNIFICANT CHANGE UP (ref 13–44)
MAGNESIUM SERPL-MCNC: 2.4 MG/DL — SIGNIFICANT CHANGE UP (ref 1.6–2.6)
MCHC RBC-ENTMCNC: 26.9 PG — LOW (ref 27–34)
MCHC RBC-ENTMCNC: 31.3 G/DL — LOW (ref 32–36)
MCV RBC AUTO: 86 FL — SIGNIFICANT CHANGE UP (ref 80–100)
MONOCYTES # BLD AUTO: 0.58 K/UL — SIGNIFICANT CHANGE UP (ref 0–0.9)
MONOCYTES NFR BLD AUTO: 5.1 % — SIGNIFICANT CHANGE UP (ref 2–14)
NEUTROPHILS # BLD AUTO: 8.92 K/UL — HIGH (ref 1.8–7.4)
NEUTROPHILS NFR BLD AUTO: 78.1 % — HIGH (ref 43–77)
NRBC # BLD AUTO: 0 K/UL — SIGNIFICANT CHANGE UP (ref 0–0)
NRBC # FLD: 0 K/UL — SIGNIFICANT CHANGE UP (ref 0–0)
NRBC BLD AUTO-RTO: 0 /100 WBCS — SIGNIFICANT CHANGE UP (ref 0–0)
PLATELET # BLD AUTO: 254 K/UL — SIGNIFICANT CHANGE UP (ref 150–400)
PMV BLD: 11.9 FL — SIGNIFICANT CHANGE UP (ref 7–13)
POTASSIUM SERPL-MCNC: 5.9 MMOL/L — HIGH (ref 3.5–5.3)
POTASSIUM SERPL-SCNC: 5.9 MMOL/L — HIGH (ref 3.5–5.3)
PROT SERPL-MCNC: 7.6 G/DL — SIGNIFICANT CHANGE UP (ref 6–8.3)
RBC # BLD: 3.42 M/UL — LOW (ref 3.8–5.2)
RBC # FLD: 14.1 % — SIGNIFICANT CHANGE UP (ref 10.3–14.5)
SODIUM SERPL-SCNC: 140 MMOL/L — SIGNIFICANT CHANGE UP (ref 135–145)
TROPONIN T, HIGH SENSITIVITY RESULT: 114 NG/L — HIGH (ref 0–51)
TROPONIN T, HIGH SENSITIVITY RESULT: 114 NG/L — HIGH (ref 0–51)
WBC # BLD: 11.42 K/UL — HIGH (ref 3.8–10.5)
WBC # FLD AUTO: 11.42 K/UL — HIGH (ref 3.8–10.5)

## 2025-07-24 PROCEDURE — 99285 EMERGENCY DEPT VISIT HI MDM: CPT

## 2025-07-24 PROCEDURE — 93010 ELECTROCARDIOGRAM REPORT: CPT

## 2025-07-24 PROCEDURE — 71046 X-RAY EXAM CHEST 2 VIEWS: CPT | Mod: 26

## 2025-07-24 PROCEDURE — 99223 1ST HOSP IP/OBS HIGH 75: CPT

## 2025-07-24 RX ORDER — ACETAMINOPHEN 500 MG/5ML
650 LIQUID (ML) ORAL EVERY 6 HOURS
Refills: 0 | Status: DISCONTINUED | OUTPATIENT
Start: 2025-07-24 | End: 2025-08-04

## 2025-07-24 RX ORDER — SODIUM ZIRCONIUM CYCLOSILICATE 5 G/5G
10 POWDER, FOR SUSPENSION ORAL ONCE
Refills: 0 | Status: COMPLETED | OUTPATIENT
Start: 2025-07-24 | End: 2025-07-24

## 2025-07-24 RX ORDER — MELATONIN 5 MG
3 TABLET ORAL AT BEDTIME
Refills: 0 | Status: DISCONTINUED | OUTPATIENT
Start: 2025-07-24 | End: 2025-08-04

## 2025-07-24 RX ORDER — SODIUM BICARBONATE 1 MEQ/ML
100 SYRINGE (ML) INTRAVENOUS ONCE
Refills: 0 | Status: COMPLETED | OUTPATIENT
Start: 2025-07-24 | End: 2025-07-24

## 2025-07-24 RX ORDER — NIFEDIPINE 30 MG
30 TABLET, EXTENDED RELEASE 24 HR ORAL DAILY
Refills: 0 | Status: DISCONTINUED | OUTPATIENT
Start: 2025-07-24 | End: 2025-07-25

## 2025-07-24 RX ADMIN — SODIUM ZIRCONIUM CYCLOSILICATE 10 GRAM(S): 5 POWDER, FOR SUSPENSION ORAL at 18:26

## 2025-07-24 RX ADMIN — Medication 10 MILLIGRAM(S): at 23:28

## 2025-07-24 RX ADMIN — Medication 100 MILLIEQUIVALENT(S): at 21:09

## 2025-07-24 RX ADMIN — Medication 25 MILLIGRAM(S): at 23:37

## 2025-07-25 LAB
ALBUMIN SERPL ELPH-MCNC: 3.4 G/DL — SIGNIFICANT CHANGE UP (ref 3.3–5)
ALP SERPL-CCNC: 55 U/L — SIGNIFICANT CHANGE UP (ref 40–120)
ALT FLD-CCNC: 11 U/L — SIGNIFICANT CHANGE UP (ref 10–45)
ANION GAP SERPL CALC-SCNC: 16 MMOL/L — SIGNIFICANT CHANGE UP (ref 5–17)
ANION GAP SERPL CALC-SCNC: 18 MMOL/L — HIGH (ref 5–17)
APTT BLD: 26.2 SEC — SIGNIFICANT CHANGE UP (ref 26.1–36.8)
AST SERPL-CCNC: 20 U/L — SIGNIFICANT CHANGE UP (ref 10–40)
BASOPHILS # BLD AUTO: 0.09 K/UL — SIGNIFICANT CHANGE UP (ref 0–0.2)
BASOPHILS NFR BLD AUTO: 0.9 % — SIGNIFICANT CHANGE UP (ref 0–2)
BILIRUB SERPL-MCNC: 0.2 MG/DL — SIGNIFICANT CHANGE UP (ref 0.2–1.2)
BUN SERPL-MCNC: 88 MG/DL — HIGH (ref 7–23)
BUN SERPL-MCNC: 90 MG/DL — HIGH (ref 7–23)
CALCIUM SERPL-MCNC: 9.2 MG/DL — SIGNIFICANT CHANGE UP (ref 8.4–10.5)
CALCIUM SERPL-MCNC: 9.4 MG/DL — SIGNIFICANT CHANGE UP (ref 8.4–10.5)
CALCIUM SERPL-MCNC: 9.6 MG/DL — SIGNIFICANT CHANGE UP (ref 8.4–10.5)
CHLORIDE SERPL-SCNC: 109 MMOL/L — HIGH (ref 96–108)
CHLORIDE SERPL-SCNC: 110 MMOL/L — HIGH (ref 96–108)
CO2 SERPL-SCNC: 16 MMOL/L — LOW (ref 22–31)
CO2 SERPL-SCNC: 18 MMOL/L — LOW (ref 22–31)
CREAT SERPL-MCNC: 6.52 MG/DL — HIGH (ref 0.5–1.3)
CREAT SERPL-MCNC: 6.81 MG/DL — HIGH (ref 0.5–1.3)
EGFR: 6 ML/MIN/1.73M2 — LOW
EOSINOPHIL # BLD AUTO: 0.45 K/UL — SIGNIFICANT CHANGE UP (ref 0–0.5)
EOSINOPHIL NFR BLD AUTO: 4.5 % — SIGNIFICANT CHANGE UP (ref 0–6)
FOLATE SERPL-MCNC: 7.6 NG/ML — SIGNIFICANT CHANGE UP
GAS PNL BLDV: SIGNIFICANT CHANGE UP
GLUCOSE BLDC GLUCOMTR-MCNC: 102 MG/DL — HIGH (ref 70–99)
GLUCOSE BLDC GLUCOMTR-MCNC: 148 MG/DL — HIGH (ref 70–99)
GLUCOSE BLDC GLUCOMTR-MCNC: 150 MG/DL — HIGH (ref 70–99)
GLUCOSE SERPL-MCNC: 76 MG/DL — SIGNIFICANT CHANGE UP (ref 70–99)
GLUCOSE SERPL-MCNC: 81 MG/DL — SIGNIFICANT CHANGE UP (ref 70–99)
HAPTOGLOB SERPL-MCNC: 88 MG/DL — SIGNIFICANT CHANGE UP (ref 34–200)
HCT VFR BLD CALC: 25.8 % — LOW (ref 34.5–45)
HGB BLD-MCNC: 8.1 G/DL — LOW (ref 11.5–15.5)
IMM GRANULOCYTES # BLD AUTO: 0.02 K/UL — SIGNIFICANT CHANGE UP (ref 0–0.07)
IMM GRANULOCYTES NFR BLD AUTO: 0.2 % — SIGNIFICANT CHANGE UP (ref 0–0.9)
INR BLD: 0.96 RATIO — SIGNIFICANT CHANGE UP (ref 0.85–1.16)
LDH SERPL L TO P-CCNC: 243 U/L — HIGH (ref 50–242)
LYMPHOCYTES # BLD AUTO: 2.78 K/UL — SIGNIFICANT CHANGE UP (ref 1–3.3)
LYMPHOCYTES NFR BLD AUTO: 27.9 % — SIGNIFICANT CHANGE UP (ref 13–44)
MAGNESIUM SERPL-MCNC: 2.4 MG/DL — SIGNIFICANT CHANGE UP (ref 1.6–2.6)
MCHC RBC-ENTMCNC: 27.1 PG — SIGNIFICANT CHANGE UP (ref 27–34)
MCHC RBC-ENTMCNC: 31.4 G/DL — LOW (ref 32–36)
MCV RBC AUTO: 86.3 FL — SIGNIFICANT CHANGE UP (ref 80–100)
MONOCYTES # BLD AUTO: 0.76 K/UL — SIGNIFICANT CHANGE UP (ref 0–0.9)
MONOCYTES NFR BLD AUTO: 7.6 % — SIGNIFICANT CHANGE UP (ref 2–14)
NEUTROPHILS # BLD AUTO: 5.86 K/UL — SIGNIFICANT CHANGE UP (ref 1.8–7.4)
NEUTROPHILS NFR BLD AUTO: 58.9 % — SIGNIFICANT CHANGE UP (ref 43–77)
NRBC # BLD AUTO: 0 K/UL — SIGNIFICANT CHANGE UP (ref 0–0)
NRBC # FLD: 0 K/UL — SIGNIFICANT CHANGE UP (ref 0–0)
NRBC BLD AUTO-RTO: 0 /100 WBCS — SIGNIFICANT CHANGE UP (ref 0–0)
PHOSPHATE SERPL-MCNC: 4.9 MG/DL — HIGH (ref 2.5–4.5)
PLATELET # BLD AUTO: 212 K/UL — SIGNIFICANT CHANGE UP (ref 150–400)
PMV BLD: 13 FL — SIGNIFICANT CHANGE UP (ref 7–13)
POTASSIUM SERPL-MCNC: 4.9 MMOL/L — SIGNIFICANT CHANGE UP (ref 3.5–5.3)
POTASSIUM SERPL-MCNC: 5.3 MMOL/L — SIGNIFICANT CHANGE UP (ref 3.5–5.3)
POTASSIUM SERPL-SCNC: 4.9 MMOL/L — SIGNIFICANT CHANGE UP (ref 3.5–5.3)
POTASSIUM SERPL-SCNC: 5.3 MMOL/L — SIGNIFICANT CHANGE UP (ref 3.5–5.3)
PROT SERPL-MCNC: 6.3 G/DL — SIGNIFICANT CHANGE UP (ref 6–8.3)
PROTHROM AB SERPL-ACNC: 11.1 SEC — SIGNIFICANT CHANGE UP (ref 9.9–13.4)
PTH-INTACT FLD-MCNC: 199 PG/ML — HIGH (ref 15–65)
RBC # BLD: 2.99 M/UL — LOW (ref 3.8–5.2)
RBC # FLD: 14.1 % — SIGNIFICANT CHANGE UP (ref 10.3–14.5)
SODIUM SERPL-SCNC: 142 MMOL/L — SIGNIFICANT CHANGE UP (ref 135–145)
SODIUM SERPL-SCNC: 145 MMOL/L — SIGNIFICANT CHANGE UP (ref 135–145)
VIT B12 SERPL-MCNC: 1200 PG/ML — SIGNIFICANT CHANGE UP (ref 232–1245)
WBC # BLD: 9.96 K/UL — SIGNIFICANT CHANGE UP (ref 3.8–10.5)
WBC # FLD AUTO: 9.96 K/UL — SIGNIFICANT CHANGE UP (ref 3.8–10.5)

## 2025-07-25 PROCEDURE — 93970 EXTREMITY STUDY: CPT | Mod: 26

## 2025-07-25 PROCEDURE — 99222 1ST HOSP IP/OBS MODERATE 55: CPT

## 2025-07-25 RX ORDER — NIFEDIPINE 30 MG
60 TABLET, EXTENDED RELEASE 24 HR ORAL
Refills: 0 | Status: DISCONTINUED | OUTPATIENT
Start: 2025-07-25 | End: 2025-07-28

## 2025-07-25 RX ADMIN — Medication 10 MILLIGRAM(S): at 07:27

## 2025-07-25 RX ADMIN — Medication 25 MILLIGRAM(S): at 22:55

## 2025-07-25 RX ADMIN — Medication 25 MILLIGRAM(S): at 18:15

## 2025-07-25 RX ADMIN — Medication 650 MILLIGRAM(S): at 01:00

## 2025-07-25 RX ADMIN — Medication 30 MILLIGRAM(S): at 05:05

## 2025-07-25 RX ADMIN — Medication 10 MILLIGRAM(S): at 14:40

## 2025-07-25 RX ADMIN — Medication 25 MILLIGRAM(S): at 05:05

## 2025-07-25 RX ADMIN — Medication 650 MILLIGRAM(S): at 00:30

## 2025-07-26 ENCOUNTER — RESULT REVIEW (OUTPATIENT)
Age: 80
End: 2025-07-26

## 2025-07-26 LAB
ANION GAP SERPL CALC-SCNC: 15 MMOL/L — SIGNIFICANT CHANGE UP (ref 5–17)
BUN SERPL-MCNC: 85 MG/DL — HIGH (ref 7–23)
CALCIUM SERPL-MCNC: 9.5 MG/DL — SIGNIFICANT CHANGE UP (ref 8.4–10.5)
CHLORIDE SERPL-SCNC: 110 MMOL/L — HIGH (ref 96–108)
CO2 SERPL-SCNC: 18 MMOL/L — LOW (ref 22–31)
CREAT SERPL-MCNC: 6.65 MG/DL — HIGH (ref 0.5–1.3)
EGFR: 6 ML/MIN/1.73M2 — LOW
EGFR: 6 ML/MIN/1.73M2 — LOW
GLUCOSE BLDC GLUCOMTR-MCNC: 122 MG/DL — HIGH (ref 70–99)
GLUCOSE BLDC GLUCOMTR-MCNC: 159 MG/DL — HIGH (ref 70–99)
GLUCOSE BLDC GLUCOMTR-MCNC: 168 MG/DL — HIGH (ref 70–99)
GLUCOSE BLDC GLUCOMTR-MCNC: 97 MG/DL — SIGNIFICANT CHANGE UP (ref 70–99)
GLUCOSE SERPL-MCNC: 85 MG/DL — SIGNIFICANT CHANGE UP (ref 70–99)
HCT VFR BLD CALC: 25.9 % — LOW (ref 34.5–45)
HGB BLD-MCNC: 8.1 G/DL — LOW (ref 11.5–15.5)
MCHC RBC-ENTMCNC: 27.1 PG — SIGNIFICANT CHANGE UP (ref 27–34)
MCHC RBC-ENTMCNC: 31.3 G/DL — LOW (ref 32–36)
MCV RBC AUTO: 86.6 FL — SIGNIFICANT CHANGE UP (ref 80–100)
NRBC # BLD AUTO: 0 K/UL — SIGNIFICANT CHANGE UP (ref 0–0)
NRBC # FLD: 0 K/UL — SIGNIFICANT CHANGE UP (ref 0–0)
NRBC BLD AUTO-RTO: 0 /100 WBCS — SIGNIFICANT CHANGE UP (ref 0–0)
PLATELET # BLD AUTO: 214 K/UL — SIGNIFICANT CHANGE UP (ref 150–400)
PMV BLD: 12.2 FL — SIGNIFICANT CHANGE UP (ref 7–13)
POTASSIUM SERPL-MCNC: 4.6 MMOL/L — SIGNIFICANT CHANGE UP (ref 3.5–5.3)
POTASSIUM SERPL-SCNC: 4.6 MMOL/L — SIGNIFICANT CHANGE UP (ref 3.5–5.3)
RBC # BLD: 2.99 M/UL — LOW (ref 3.8–5.2)
RBC # FLD: 13.9 % — SIGNIFICANT CHANGE UP (ref 10.3–14.5)
SODIUM SERPL-SCNC: 143 MMOL/L — SIGNIFICANT CHANGE UP (ref 135–145)
WBC # BLD: 9.55 K/UL — SIGNIFICANT CHANGE UP (ref 3.8–10.5)
WBC # FLD AUTO: 9.55 K/UL — SIGNIFICANT CHANGE UP (ref 3.8–10.5)

## 2025-07-26 PROCEDURE — 93306 TTE W/DOPPLER COMPLETE: CPT | Mod: 26

## 2025-07-26 PROCEDURE — 93356 MYOCRD STRAIN IMG SPCKL TRCK: CPT

## 2025-07-26 RX ADMIN — Medication 25 MILLIGRAM(S): at 04:38

## 2025-07-26 RX ADMIN — Medication 100 MILLIGRAM(S): at 13:41

## 2025-07-26 RX ADMIN — Medication 100 MILLIGRAM(S): at 21:14

## 2025-07-26 RX ADMIN — Medication 60 MILLIGRAM(S): at 17:55

## 2025-07-26 RX ADMIN — Medication 60 MILLIGRAM(S): at 08:08

## 2025-07-27 LAB
ANION GAP SERPL CALC-SCNC: 16 MMOL/L — SIGNIFICANT CHANGE UP (ref 5–17)
BUN SERPL-MCNC: 89 MG/DL — HIGH (ref 7–23)
CALCIUM SERPL-MCNC: 9.1 MG/DL — SIGNIFICANT CHANGE UP (ref 8.4–10.5)
CHLORIDE SERPL-SCNC: 111 MMOL/L — HIGH (ref 96–108)
CO2 SERPL-SCNC: 16 MMOL/L — LOW (ref 22–31)
CREAT SERPL-MCNC: 7.25 MG/DL — HIGH (ref 0.5–1.3)
EGFR: 5 ML/MIN/1.73M2 — LOW
EGFR: 5 ML/MIN/1.73M2 — LOW
GLUCOSE BLDC GLUCOMTR-MCNC: 113 MG/DL — HIGH (ref 70–99)
GLUCOSE BLDC GLUCOMTR-MCNC: 132 MG/DL — HIGH (ref 70–99)
GLUCOSE BLDC GLUCOMTR-MCNC: 142 MG/DL — HIGH (ref 70–99)
GLUCOSE BLDC GLUCOMTR-MCNC: 181 MG/DL — HIGH (ref 70–99)
GLUCOSE SERPL-MCNC: 96 MG/DL — SIGNIFICANT CHANGE UP (ref 70–99)
HBV CORE AB SER-ACNC: SIGNIFICANT CHANGE UP
HBV SURFACE AB SER-ACNC: 4.4 MIU/ML — LOW
HBV SURFACE AG SER-ACNC: SIGNIFICANT CHANGE UP
HCT VFR BLD CALC: 25.6 % — LOW (ref 34.5–45)
HGB BLD-MCNC: 7.9 G/DL — LOW (ref 11.5–15.5)
MCHC RBC-ENTMCNC: 27.1 PG — SIGNIFICANT CHANGE UP (ref 27–34)
MCHC RBC-ENTMCNC: 30.9 G/DL — LOW (ref 32–36)
MCV RBC AUTO: 88 FL — SIGNIFICANT CHANGE UP (ref 80–100)
NRBC # BLD AUTO: 0 K/UL — SIGNIFICANT CHANGE UP (ref 0–0)
NRBC # FLD: 0 K/UL — SIGNIFICANT CHANGE UP (ref 0–0)
NRBC BLD AUTO-RTO: 0 /100 WBCS — SIGNIFICANT CHANGE UP (ref 0–0)
PLATELET # BLD AUTO: 212 K/UL — SIGNIFICANT CHANGE UP (ref 150–400)
PMV BLD: 12.3 FL — SIGNIFICANT CHANGE UP (ref 7–13)
POTASSIUM SERPL-MCNC: 4.7 MMOL/L — SIGNIFICANT CHANGE UP (ref 3.5–5.3)
POTASSIUM SERPL-SCNC: 4.7 MMOL/L — SIGNIFICANT CHANGE UP (ref 3.5–5.3)
RBC # BLD: 2.91 M/UL — LOW (ref 3.8–5.2)
RBC # FLD: 14 % — SIGNIFICANT CHANGE UP (ref 10.3–14.5)
SODIUM SERPL-SCNC: 143 MMOL/L — SIGNIFICANT CHANGE UP (ref 135–145)
WBC # BLD: 9.35 K/UL — SIGNIFICANT CHANGE UP (ref 3.8–10.5)
WBC # FLD AUTO: 9.35 K/UL — SIGNIFICANT CHANGE UP (ref 3.8–10.5)

## 2025-07-27 RX ORDER — HEPARIN SODIUM 1000 [USP'U]/ML
5000 INJECTION INTRAVENOUS; SUBCUTANEOUS EVERY 12 HOURS
Refills: 0 | Status: DISCONTINUED | OUTPATIENT
Start: 2025-07-27 | End: 2025-08-04

## 2025-07-27 RX ADMIN — Medication 100 MILLIGRAM(S): at 20:40

## 2025-07-27 RX ADMIN — Medication 100 MILLIGRAM(S): at 13:36

## 2025-07-27 RX ADMIN — HEPARIN SODIUM 5000 UNIT(S): 1000 INJECTION INTRAVENOUS; SUBCUTANEOUS at 18:06

## 2025-07-27 RX ADMIN — Medication 100 MILLIGRAM(S): at 05:24

## 2025-07-28 LAB
ANION GAP SERPL CALC-SCNC: 18 MMOL/L — HIGH (ref 5–17)
BUN SERPL-MCNC: 89 MG/DL — HIGH (ref 7–23)
CALCIUM SERPL-MCNC: 9.3 MG/DL — SIGNIFICANT CHANGE UP (ref 8.4–10.5)
CHLORIDE SERPL-SCNC: 109 MMOL/L — HIGH (ref 96–108)
CO2 SERPL-SCNC: 16 MMOL/L — LOW (ref 22–31)
CREAT SERPL-MCNC: 7.11 MG/DL — HIGH (ref 0.5–1.3)
EGFR: 5 ML/MIN/1.73M2 — LOW
EGFR: 5 ML/MIN/1.73M2 — LOW
GLUCOSE BLDC GLUCOMTR-MCNC: 100 MG/DL — HIGH (ref 70–99)
GLUCOSE BLDC GLUCOMTR-MCNC: 132 MG/DL — HIGH (ref 70–99)
GLUCOSE SERPL-MCNC: 82 MG/DL — SIGNIFICANT CHANGE UP (ref 70–99)
HCT VFR BLD CALC: 24.9 % — LOW (ref 34.5–45)
HGB BLD-MCNC: 7.5 G/DL — LOW (ref 11.5–15.5)
MCHC RBC-ENTMCNC: 26.4 PG — LOW (ref 27–34)
MCHC RBC-ENTMCNC: 30.1 G/DL — LOW (ref 32–36)
MCV RBC AUTO: 87.7 FL — SIGNIFICANT CHANGE UP (ref 80–100)
NRBC # BLD AUTO: 0 K/UL — SIGNIFICANT CHANGE UP (ref 0–0)
NRBC # FLD: 0 K/UL — SIGNIFICANT CHANGE UP (ref 0–0)
NRBC BLD AUTO-RTO: 0 /100 WBCS — SIGNIFICANT CHANGE UP (ref 0–0)
PLATELET # BLD AUTO: 210 K/UL — SIGNIFICANT CHANGE UP (ref 150–400)
PMV BLD: 12.4 FL — SIGNIFICANT CHANGE UP (ref 7–13)
POTASSIUM SERPL-MCNC: 4.7 MMOL/L — SIGNIFICANT CHANGE UP (ref 3.5–5.3)
POTASSIUM SERPL-SCNC: 4.7 MMOL/L — SIGNIFICANT CHANGE UP (ref 3.5–5.3)
RBC # BLD: 2.84 M/UL — LOW (ref 3.8–5.2)
RBC # FLD: 13.6 % — SIGNIFICANT CHANGE UP (ref 10.3–14.5)
SODIUM SERPL-SCNC: 143 MMOL/L — SIGNIFICANT CHANGE UP (ref 135–145)
WBC # BLD: 8.38 K/UL — SIGNIFICANT CHANGE UP (ref 3.8–10.5)
WBC # FLD AUTO: 8.38 K/UL — SIGNIFICANT CHANGE UP (ref 3.8–10.5)

## 2025-07-28 RX ORDER — NICARDIPINE HCL 30 MG
20 CAPSULE ORAL THREE TIMES A DAY
Refills: 0 | Status: DISCONTINUED | OUTPATIENT
Start: 2025-07-28 | End: 2025-07-29

## 2025-07-28 RX ADMIN — Medication 100 MILLIGRAM(S): at 05:24

## 2025-07-28 RX ADMIN — HEPARIN SODIUM 5000 UNIT(S): 1000 INJECTION INTRAVENOUS; SUBCUTANEOUS at 17:48

## 2025-07-28 RX ADMIN — HEPARIN SODIUM 5000 UNIT(S): 1000 INJECTION INTRAVENOUS; SUBCUTANEOUS at 05:24

## 2025-07-28 RX ADMIN — Medication 100 MILLIGRAM(S): at 13:58

## 2025-07-28 RX ADMIN — Medication 20 MILLIGRAM(S): at 13:15

## 2025-07-28 RX ADMIN — Medication 20 MILLIGRAM(S): at 22:31

## 2025-07-28 RX ADMIN — Medication 100 MILLIGRAM(S): at 22:29

## 2025-07-28 RX ADMIN — Medication 20 MILLIGRAM(S): at 07:23

## 2025-07-28 RX ADMIN — Medication 10 MILLIGRAM(S): at 22:05

## 2025-07-29 LAB
ANION GAP SERPL CALC-SCNC: 16 MMOL/L — SIGNIFICANT CHANGE UP (ref 5–17)
BUN SERPL-MCNC: 89 MG/DL — HIGH (ref 7–23)
CALCIUM SERPL-MCNC: 9.1 MG/DL — SIGNIFICANT CHANGE UP (ref 8.4–10.5)
CHLORIDE SERPL-SCNC: 108 MMOL/L — SIGNIFICANT CHANGE UP (ref 96–108)
CO2 SERPL-SCNC: 16 MMOL/L — LOW (ref 22–31)
CREAT SERPL-MCNC: 7.1 MG/DL — HIGH (ref 0.5–1.3)
EGFR: 5 ML/MIN/1.73M2 — LOW
EGFR: 5 ML/MIN/1.73M2 — LOW
GLUCOSE SERPL-MCNC: 88 MG/DL — SIGNIFICANT CHANGE UP (ref 70–99)
HCT VFR BLD CALC: 25.1 % — LOW (ref 34.5–45)
HGB BLD-MCNC: 7.6 G/DL — LOW (ref 11.5–15.5)
MCHC RBC-ENTMCNC: 26.3 PG — LOW (ref 27–34)
MCHC RBC-ENTMCNC: 30.3 G/DL — LOW (ref 32–36)
MCV RBC AUTO: 86.9 FL — SIGNIFICANT CHANGE UP (ref 80–100)
NRBC # BLD AUTO: 0 K/UL — SIGNIFICANT CHANGE UP (ref 0–0)
NRBC # FLD: 0 K/UL — SIGNIFICANT CHANGE UP (ref 0–0)
NRBC BLD AUTO-RTO: 0 /100 WBCS — SIGNIFICANT CHANGE UP (ref 0–0)
PLATELET # BLD AUTO: 214 K/UL — SIGNIFICANT CHANGE UP (ref 150–400)
PMV BLD: 12.1 FL — SIGNIFICANT CHANGE UP (ref 7–13)
POTASSIUM SERPL-MCNC: 4.4 MMOL/L — SIGNIFICANT CHANGE UP (ref 3.5–5.3)
POTASSIUM SERPL-SCNC: 4.4 MMOL/L — SIGNIFICANT CHANGE UP (ref 3.5–5.3)
RBC # BLD: 2.89 M/UL — LOW (ref 3.8–5.2)
RBC # FLD: 13.5 % — SIGNIFICANT CHANGE UP (ref 10.3–14.5)
SODIUM SERPL-SCNC: 140 MMOL/L — SIGNIFICANT CHANGE UP (ref 135–145)
WBC # BLD: 8.58 K/UL — SIGNIFICANT CHANGE UP (ref 3.8–10.5)
WBC # FLD AUTO: 8.58 K/UL — SIGNIFICANT CHANGE UP (ref 3.8–10.5)

## 2025-07-29 RX ORDER — NIFEDIPINE 30 MG
60 TABLET, EXTENDED RELEASE 24 HR ORAL
Refills: 0 | Status: DISCONTINUED | OUTPATIENT
Start: 2025-07-29 | End: 2025-07-31

## 2025-07-29 RX ORDER — NIFEDIPINE 30 MG
30 TABLET, EXTENDED RELEASE 24 HR ORAL ONCE
Refills: 0 | Status: COMPLETED | OUTPATIENT
Start: 2025-07-29 | End: 2025-07-29

## 2025-07-29 RX ORDER — ALPRAZOLAM 0.5 MG
0.25 TABLET, EXTENDED RELEASE 24 HR ORAL ONCE
Refills: 0 | Status: DISCONTINUED | OUTPATIENT
Start: 2025-07-29 | End: 2025-07-29

## 2025-07-29 RX ORDER — SODIUM BICARBONATE 1 MEQ/ML
1300 SYRINGE (ML) INTRAVENOUS
Refills: 0 | Status: DISCONTINUED | OUTPATIENT
Start: 2025-07-29 | End: 2025-08-04

## 2025-07-29 RX ORDER — NIFEDIPINE 30 MG
30 TABLET, EXTENDED RELEASE 24 HR ORAL
Refills: 0 | Status: DISCONTINUED | OUTPATIENT
Start: 2025-07-29 | End: 2025-07-29

## 2025-07-29 RX ADMIN — Medication 0.1 MILLIGRAM(S): at 10:35

## 2025-07-29 RX ADMIN — Medication 100 MILLIGRAM(S): at 14:36

## 2025-07-29 RX ADMIN — Medication 100 MILLIGRAM(S): at 03:59

## 2025-07-29 RX ADMIN — Medication 0.1 MILLIGRAM(S): at 20:48

## 2025-07-29 RX ADMIN — Medication 20 MILLIGRAM(S): at 03:59

## 2025-07-29 RX ADMIN — HEPARIN SODIUM 5000 UNIT(S): 1000 INJECTION INTRAVENOUS; SUBCUTANEOUS at 17:07

## 2025-07-29 RX ADMIN — Medication 60 MILLIGRAM(S): at 17:07

## 2025-07-29 RX ADMIN — Medication 0.25 MILLIGRAM(S): at 08:38

## 2025-07-29 RX ADMIN — Medication 1300 MILLIGRAM(S): at 17:07

## 2025-07-29 RX ADMIN — Medication 100 MILLIGRAM(S): at 20:48

## 2025-07-29 RX ADMIN — Medication 30 MILLIGRAM(S): at 10:09

## 2025-07-30 LAB
A1C WITH ESTIMATED AVERAGE GLUCOSE RESULT: 5.4 % — SIGNIFICANT CHANGE UP (ref 4–5.6)
ANION GAP SERPL CALC-SCNC: 16 MMOL/L — SIGNIFICANT CHANGE UP (ref 5–17)
BUN SERPL-MCNC: 90 MG/DL — HIGH (ref 7–23)
CALCIUM SERPL-MCNC: 8.9 MG/DL — SIGNIFICANT CHANGE UP (ref 8.4–10.5)
CHLORIDE SERPL-SCNC: 108 MMOL/L — SIGNIFICANT CHANGE UP (ref 96–108)
CO2 SERPL-SCNC: 18 MMOL/L — LOW (ref 22–31)
CREAT SERPL-MCNC: 7.07 MG/DL — HIGH (ref 0.5–1.3)
EGFR: 5 ML/MIN/1.73M2 — LOW
EGFR: 5 ML/MIN/1.73M2 — LOW
ESTIMATED AVERAGE GLUCOSE: 108 MG/DL — SIGNIFICANT CHANGE UP (ref 68–114)
GLUCOSE SERPL-MCNC: 93 MG/DL — SIGNIFICANT CHANGE UP (ref 70–99)
HCT VFR BLD CALC: 23 % — LOW (ref 34.5–45)
HGB BLD-MCNC: 7.2 G/DL — LOW (ref 11.5–15.5)
MCHC RBC-ENTMCNC: 27.3 PG — SIGNIFICANT CHANGE UP (ref 27–34)
MCHC RBC-ENTMCNC: 31.3 G/DL — LOW (ref 32–36)
MCV RBC AUTO: 87.1 FL — SIGNIFICANT CHANGE UP (ref 80–100)
NRBC # BLD AUTO: 0 K/UL — SIGNIFICANT CHANGE UP (ref 0–0)
NRBC # FLD: 0 K/UL — SIGNIFICANT CHANGE UP (ref 0–0)
NRBC BLD AUTO-RTO: 0 /100 WBCS — SIGNIFICANT CHANGE UP (ref 0–0)
PLATELET # BLD AUTO: 206 K/UL — SIGNIFICANT CHANGE UP (ref 150–400)
PMV BLD: 12.5 FL — SIGNIFICANT CHANGE UP (ref 7–13)
POTASSIUM SERPL-MCNC: 4.6 MMOL/L — SIGNIFICANT CHANGE UP (ref 3.5–5.3)
POTASSIUM SERPL-SCNC: 4.6 MMOL/L — SIGNIFICANT CHANGE UP (ref 3.5–5.3)
RBC # BLD: 2.64 M/UL — LOW (ref 3.8–5.2)
RBC # FLD: 13.5 % — SIGNIFICANT CHANGE UP (ref 10.3–14.5)
SODIUM SERPL-SCNC: 142 MMOL/L — SIGNIFICANT CHANGE UP (ref 135–145)
WBC # BLD: 7.52 K/UL — SIGNIFICANT CHANGE UP (ref 3.8–10.5)
WBC # FLD AUTO: 7.52 K/UL — SIGNIFICANT CHANGE UP (ref 3.8–10.5)

## 2025-07-30 PROCEDURE — 36556 INSERT NON-TUNNEL CV CATH: CPT

## 2025-07-30 PROCEDURE — 77001 FLUOROGUIDE FOR VEIN DEVICE: CPT | Mod: 26

## 2025-07-30 PROCEDURE — 76937 US GUIDE VASCULAR ACCESS: CPT | Mod: 26

## 2025-07-30 RX ORDER — ALPRAZOLAM 0.5 MG
0.25 TABLET, EXTENDED RELEASE 24 HR ORAL ONCE
Refills: 0 | Status: DISCONTINUED | OUTPATIENT
Start: 2025-07-30 | End: 2025-07-30

## 2025-07-30 RX ADMIN — Medication 1 APPLICATION(S): at 20:09

## 2025-07-30 RX ADMIN — Medication 100 MILLIGRAM(S): at 13:04

## 2025-07-30 RX ADMIN — Medication 650 MILLIGRAM(S): at 21:25

## 2025-07-30 RX ADMIN — Medication 60 MILLIGRAM(S): at 20:05

## 2025-07-30 RX ADMIN — Medication 100 MILLIGRAM(S): at 20:53

## 2025-07-30 RX ADMIN — HEPARIN SODIUM 5000 UNIT(S): 1000 INJECTION INTRAVENOUS; SUBCUTANEOUS at 06:20

## 2025-07-30 RX ADMIN — Medication 1 APPLICATION(S): at 11:16

## 2025-07-30 RX ADMIN — Medication 1300 MILLIGRAM(S): at 20:04

## 2025-07-30 RX ADMIN — Medication 1300 MILLIGRAM(S): at 06:20

## 2025-07-30 RX ADMIN — Medication 650 MILLIGRAM(S): at 20:53

## 2025-07-30 RX ADMIN — Medication 0.25 MILLIGRAM(S): at 07:56

## 2025-07-30 RX ADMIN — Medication 60 MILLIGRAM(S): at 06:17

## 2025-07-30 RX ADMIN — HEPARIN SODIUM 5000 UNIT(S): 1000 INJECTION INTRAVENOUS; SUBCUTANEOUS at 20:06

## 2025-07-31 LAB
ANION GAP SERPL CALC-SCNC: 15 MMOL/L — SIGNIFICANT CHANGE UP (ref 5–17)
BUN SERPL-MCNC: 60 MG/DL — HIGH (ref 7–23)
CALCIUM SERPL-MCNC: 9.1 MG/DL — SIGNIFICANT CHANGE UP (ref 8.4–10.5)
CHLORIDE SERPL-SCNC: 105 MMOL/L — SIGNIFICANT CHANGE UP (ref 96–108)
CO2 SERPL-SCNC: 21 MMOL/L — LOW (ref 22–31)
CREAT SERPL-MCNC: 5.05 MG/DL — HIGH (ref 0.5–1.3)
EGFR: 8 ML/MIN/1.73M2 — LOW
EGFR: 8 ML/MIN/1.73M2 — LOW
GLUCOSE SERPL-MCNC: 102 MG/DL — HIGH (ref 70–99)
HCT VFR BLD CALC: 26.3 % — LOW (ref 34.5–45)
HGB BLD-MCNC: 8.1 G/DL — LOW (ref 11.5–15.5)
MCHC RBC-ENTMCNC: 26.5 PG — LOW (ref 27–34)
MCHC RBC-ENTMCNC: 30.8 G/DL — LOW (ref 32–36)
MCV RBC AUTO: 85.9 FL — SIGNIFICANT CHANGE UP (ref 80–100)
MRSA PCR RESULT.: SIGNIFICANT CHANGE UP
NRBC # BLD AUTO: 0 K/UL — SIGNIFICANT CHANGE UP (ref 0–0)
NRBC # FLD: 0 K/UL — SIGNIFICANT CHANGE UP (ref 0–0)
NRBC BLD AUTO-RTO: 0 /100 WBCS — SIGNIFICANT CHANGE UP (ref 0–0)
PLATELET # BLD AUTO: 225 K/UL — SIGNIFICANT CHANGE UP (ref 150–400)
PMV BLD: 12.6 FL — SIGNIFICANT CHANGE UP (ref 7–13)
POTASSIUM SERPL-MCNC: 4.1 MMOL/L — SIGNIFICANT CHANGE UP (ref 3.5–5.3)
POTASSIUM SERPL-SCNC: 4.1 MMOL/L — SIGNIFICANT CHANGE UP (ref 3.5–5.3)
RBC # BLD: 3.06 M/UL — LOW (ref 3.8–5.2)
RBC # FLD: 13.2 % — SIGNIFICANT CHANGE UP (ref 10.3–14.5)
S AUREUS DNA NOSE QL NAA+PROBE: SIGNIFICANT CHANGE UP
SODIUM SERPL-SCNC: 141 MMOL/L — SIGNIFICANT CHANGE UP (ref 135–145)
WBC # BLD: 9.32 K/UL — SIGNIFICANT CHANGE UP (ref 3.8–10.5)
WBC # FLD AUTO: 9.32 K/UL — SIGNIFICANT CHANGE UP (ref 3.8–10.5)

## 2025-07-31 RX ORDER — NIFEDIPINE 30 MG
30 TABLET, EXTENDED RELEASE 24 HR ORAL ONCE
Refills: 0 | Status: DISCONTINUED | OUTPATIENT
Start: 2025-07-31 | End: 2025-07-31

## 2025-07-31 RX ORDER — NIFEDIPINE 30 MG
90 TABLET, EXTENDED RELEASE 24 HR ORAL
Refills: 0 | Status: DISCONTINUED | OUTPATIENT
Start: 2025-08-01 | End: 2025-08-04

## 2025-07-31 RX ORDER — NIFEDIPINE 30 MG
90 TABLET, EXTENDED RELEASE 24 HR ORAL ONCE
Refills: 0 | Status: COMPLETED | OUTPATIENT
Start: 2025-07-31 | End: 2025-07-31

## 2025-07-31 RX ADMIN — Medication 60 MILLIGRAM(S): at 11:36

## 2025-07-31 RX ADMIN — HEPARIN SODIUM 5000 UNIT(S): 1000 INJECTION INTRAVENOUS; SUBCUTANEOUS at 06:40

## 2025-07-31 RX ADMIN — Medication 90 MILLIGRAM(S): at 17:54

## 2025-07-31 RX ADMIN — Medication 1 APPLICATION(S): at 11:47

## 2025-07-31 RX ADMIN — Medication 1 APPLICATION(S): at 06:44

## 2025-07-31 RX ADMIN — Medication 100 MILLIGRAM(S): at 23:04

## 2025-07-31 RX ADMIN — Medication 100 MILLIGRAM(S): at 17:53

## 2025-07-31 RX ADMIN — HEPARIN SODIUM 5000 UNIT(S): 1000 INJECTION INTRAVENOUS; SUBCUTANEOUS at 17:54

## 2025-07-31 RX ADMIN — Medication 1300 MILLIGRAM(S): at 06:39

## 2025-07-31 RX ADMIN — Medication 100 MILLIGRAM(S): at 11:36

## 2025-07-31 RX ADMIN — Medication 1300 MILLIGRAM(S): at 17:54

## 2025-08-01 LAB
ANION GAP SERPL CALC-SCNC: 13 MMOL/L — SIGNIFICANT CHANGE UP (ref 5–17)
BUN SERPL-MCNC: 38 MG/DL — HIGH (ref 7–23)
CALCIUM SERPL-MCNC: 8.5 MG/DL — SIGNIFICANT CHANGE UP (ref 8.4–10.5)
CALCIUM SERPL-MCNC: 8.8 MG/DL — SIGNIFICANT CHANGE UP (ref 8.4–10.5)
CHLORIDE SERPL-SCNC: 105 MMOL/L — SIGNIFICANT CHANGE UP (ref 96–108)
CO2 SERPL-SCNC: 25 MMOL/L — SIGNIFICANT CHANGE UP (ref 22–31)
CREAT SERPL-MCNC: 4.08 MG/DL — HIGH (ref 0.5–1.3)
EGFR: 11 ML/MIN/1.73M2 — LOW
EGFR: 11 ML/MIN/1.73M2 — LOW
GLUCOSE SERPL-MCNC: 90 MG/DL — SIGNIFICANT CHANGE UP (ref 70–99)
HCT VFR BLD CALC: 24.7 % — LOW (ref 34.5–45)
HGB BLD-MCNC: 7.7 G/DL — LOW (ref 11.5–15.5)
MAGNESIUM SERPL-MCNC: 2.1 MG/DL — SIGNIFICANT CHANGE UP (ref 1.6–2.6)
MCHC RBC-ENTMCNC: 27.2 PG — SIGNIFICANT CHANGE UP (ref 27–34)
MCHC RBC-ENTMCNC: 31.2 G/DL — LOW (ref 32–36)
MCV RBC AUTO: 87.3 FL — SIGNIFICANT CHANGE UP (ref 80–100)
NRBC # BLD AUTO: 0 K/UL — SIGNIFICANT CHANGE UP (ref 0–0)
NRBC # FLD: 0 K/UL — SIGNIFICANT CHANGE UP (ref 0–0)
NRBC BLD AUTO-RTO: 0 /100 WBCS — SIGNIFICANT CHANGE UP (ref 0–0)
PHOSPHATE SERPL-MCNC: 4 MG/DL — SIGNIFICANT CHANGE UP (ref 2.5–4.5)
PLATELET # BLD AUTO: 206 K/UL — SIGNIFICANT CHANGE UP (ref 150–400)
PMV BLD: 12.5 FL — SIGNIFICANT CHANGE UP (ref 7–13)
POTASSIUM SERPL-MCNC: 3.8 MMOL/L — SIGNIFICANT CHANGE UP (ref 3.5–5.3)
POTASSIUM SERPL-SCNC: 3.8 MMOL/L — SIGNIFICANT CHANGE UP (ref 3.5–5.3)
PTH-INTACT FLD-MCNC: 281 PG/ML — HIGH (ref 15–65)
RBC # BLD: 2.83 M/UL — LOW (ref 3.8–5.2)
RBC # FLD: 13.2 % — SIGNIFICANT CHANGE UP (ref 10.3–14.5)
SODIUM SERPL-SCNC: 143 MMOL/L — SIGNIFICANT CHANGE UP (ref 135–145)
WBC # BLD: 8.31 K/UL — SIGNIFICANT CHANGE UP (ref 3.8–10.5)
WBC # FLD AUTO: 8.31 K/UL — SIGNIFICANT CHANGE UP (ref 3.8–10.5)

## 2025-08-01 RX ADMIN — Medication 1 APPLICATION(S): at 11:27

## 2025-08-01 RX ADMIN — Medication 650 MILLIGRAM(S): at 20:17

## 2025-08-01 RX ADMIN — Medication 90 MILLIGRAM(S): at 05:06

## 2025-08-01 RX ADMIN — Medication 650 MILLIGRAM(S): at 20:47

## 2025-08-01 RX ADMIN — Medication 100 MILLIGRAM(S): at 05:07

## 2025-08-01 RX ADMIN — Medication 100 MILLIGRAM(S): at 23:02

## 2025-08-01 RX ADMIN — HEPARIN SODIUM 5000 UNIT(S): 1000 INJECTION INTRAVENOUS; SUBCUTANEOUS at 05:06

## 2025-08-01 RX ADMIN — Medication 90 MILLIGRAM(S): at 17:42

## 2025-08-01 RX ADMIN — Medication 1300 MILLIGRAM(S): at 05:07

## 2025-08-01 RX ADMIN — HEPARIN SODIUM 5000 UNIT(S): 1000 INJECTION INTRAVENOUS; SUBCUTANEOUS at 17:42

## 2025-08-01 RX ADMIN — Medication 1300 MILLIGRAM(S): at 17:42

## 2025-08-01 RX ADMIN — Medication 100 MILLIGRAM(S): at 17:42

## 2025-08-01 RX ADMIN — Medication 1 APPLICATION(S): at 05:11

## 2025-08-01 RX ADMIN — Medication 100 MILLIGRAM(S): at 11:14

## 2025-08-02 LAB
ANION GAP SERPL CALC-SCNC: 12 MMOL/L — SIGNIFICANT CHANGE UP (ref 5–17)
BUN SERPL-MCNC: 25 MG/DL — HIGH (ref 7–23)
CALCIUM SERPL-MCNC: 9.2 MG/DL — SIGNIFICANT CHANGE UP (ref 8.4–10.5)
CHLORIDE SERPL-SCNC: 101 MMOL/L — SIGNIFICANT CHANGE UP (ref 96–108)
CO2 SERPL-SCNC: 26 MMOL/L — SIGNIFICANT CHANGE UP (ref 22–31)
CREAT SERPL-MCNC: 3.41 MG/DL — HIGH (ref 0.5–1.3)
EGFR: 13 ML/MIN/1.73M2 — LOW
EGFR: 13 ML/MIN/1.73M2 — LOW
GLUCOSE SERPL-MCNC: 84 MG/DL — SIGNIFICANT CHANGE UP (ref 70–99)
HCT VFR BLD CALC: 27.9 % — LOW (ref 34.5–45)
HGB BLD-MCNC: 8.4 G/DL — LOW (ref 11.5–15.5)
MCHC RBC-ENTMCNC: 26.8 PG — LOW (ref 27–34)
MCHC RBC-ENTMCNC: 30.1 G/DL — LOW (ref 32–36)
MCV RBC AUTO: 89.1 FL — SIGNIFICANT CHANGE UP (ref 80–100)
NRBC # BLD AUTO: 0 K/UL — SIGNIFICANT CHANGE UP (ref 0–0)
NRBC # FLD: 0 K/UL — SIGNIFICANT CHANGE UP (ref 0–0)
NRBC BLD AUTO-RTO: 0 /100 WBCS — SIGNIFICANT CHANGE UP (ref 0–0)
PLATELET # BLD AUTO: 203 K/UL — SIGNIFICANT CHANGE UP (ref 150–400)
PMV BLD: 12.6 FL — SIGNIFICANT CHANGE UP (ref 7–13)
POTASSIUM SERPL-MCNC: 3.8 MMOL/L — SIGNIFICANT CHANGE UP (ref 3.5–5.3)
POTASSIUM SERPL-SCNC: 3.8 MMOL/L — SIGNIFICANT CHANGE UP (ref 3.5–5.3)
RBC # BLD: 3.13 M/UL — LOW (ref 3.8–5.2)
RBC # FLD: 13.2 % — SIGNIFICANT CHANGE UP (ref 10.3–14.5)
SODIUM SERPL-SCNC: 139 MMOL/L — SIGNIFICANT CHANGE UP (ref 135–145)
WBC # BLD: 9.25 K/UL — SIGNIFICANT CHANGE UP (ref 3.8–10.5)
WBC # FLD AUTO: 9.25 K/UL — SIGNIFICANT CHANGE UP (ref 3.8–10.5)

## 2025-08-02 RX ADMIN — Medication 90 MILLIGRAM(S): at 17:21

## 2025-08-02 RX ADMIN — Medication 1 APPLICATION(S): at 11:32

## 2025-08-02 RX ADMIN — Medication 1 APPLICATION(S): at 05:32

## 2025-08-02 RX ADMIN — Medication 1300 MILLIGRAM(S): at 05:29

## 2025-08-02 RX ADMIN — Medication 100 MILLIGRAM(S): at 22:06

## 2025-08-02 RX ADMIN — HEPARIN SODIUM 5000 UNIT(S): 1000 INJECTION INTRAVENOUS; SUBCUTANEOUS at 17:21

## 2025-08-02 RX ADMIN — Medication 100 MILLIGRAM(S): at 11:31

## 2025-08-02 RX ADMIN — Medication 100 MILLIGRAM(S): at 05:29

## 2025-08-02 RX ADMIN — Medication 1300 MILLIGRAM(S): at 17:21

## 2025-08-02 RX ADMIN — Medication 100 MILLIGRAM(S): at 17:21

## 2025-08-02 RX ADMIN — Medication 90 MILLIGRAM(S): at 05:29

## 2025-08-02 RX ADMIN — HEPARIN SODIUM 5000 UNIT(S): 1000 INJECTION INTRAVENOUS; SUBCUTANEOUS at 05:29

## 2025-08-03 LAB
FERRITIN SERPL-MCNC: 438 NG/ML — HIGH (ref 13–330)
IRON SATN MFR SERPL: 39 % — SIGNIFICANT CHANGE UP (ref 14–50)
IRON SATN MFR SERPL: 70 UG/DL — SIGNIFICANT CHANGE UP (ref 30–160)
TIBC SERPL-MCNC: 177 UG/DL — LOW (ref 220–430)
UIBC SERPL-MCNC: 107 UG/DL — LOW (ref 110–370)

## 2025-08-03 RX ORDER — LOSARTAN POTASSIUM 100 MG/1
50 TABLET, FILM COATED ORAL AT BEDTIME
Refills: 0 | Status: DISCONTINUED | OUTPATIENT
Start: 2025-08-03 | End: 2025-08-04

## 2025-08-03 RX ADMIN — HEPARIN SODIUM 5000 UNIT(S): 1000 INJECTION INTRAVENOUS; SUBCUTANEOUS at 17:15

## 2025-08-03 RX ADMIN — Medication 100 MILLIGRAM(S): at 05:28

## 2025-08-03 RX ADMIN — HEPARIN SODIUM 5000 UNIT(S): 1000 INJECTION INTRAVENOUS; SUBCUTANEOUS at 05:28

## 2025-08-03 RX ADMIN — Medication 1 APPLICATION(S): at 10:59

## 2025-08-03 RX ADMIN — LOSARTAN POTASSIUM 50 MILLIGRAM(S): 100 TABLET, FILM COATED ORAL at 23:03

## 2025-08-03 RX ADMIN — Medication 100 MILLIGRAM(S): at 17:15

## 2025-08-03 RX ADMIN — Medication 100 MILLIGRAM(S): at 10:59

## 2025-08-03 RX ADMIN — Medication 90 MILLIGRAM(S): at 05:28

## 2025-08-03 RX ADMIN — Medication 100 MILLIGRAM(S): at 23:03

## 2025-08-03 RX ADMIN — Medication 90 MILLIGRAM(S): at 17:15

## 2025-08-03 RX ADMIN — Medication 1 APPLICATION(S): at 05:30

## 2025-08-03 RX ADMIN — Medication 1300 MILLIGRAM(S): at 05:28

## 2025-08-03 RX ADMIN — Medication 1300 MILLIGRAM(S): at 17:15

## 2025-08-04 ENCOUNTER — APPOINTMENT (OUTPATIENT)
Dept: VASCULAR SURGERY | Facility: HOSPITAL | Age: 80
End: 2025-08-04

## 2025-08-04 ENCOUNTER — TRANSCRIPTION ENCOUNTER (OUTPATIENT)
Age: 80
End: 2025-08-04

## 2025-08-04 LAB
ALBUMIN SERPL ELPH-MCNC: 3.5 G/DL — SIGNIFICANT CHANGE UP (ref 3.3–5)
ALP SERPL-CCNC: 51 U/L — SIGNIFICANT CHANGE UP (ref 40–120)
ALT FLD-CCNC: 10 U/L — SIGNIFICANT CHANGE UP (ref 10–45)
ANION GAP SERPL CALC-SCNC: 12 MMOL/L — SIGNIFICANT CHANGE UP (ref 5–17)
APTT BLD: 24.8 SEC — LOW (ref 26.1–36.8)
AST SERPL-CCNC: 16 U/L — SIGNIFICANT CHANGE UP (ref 10–40)
BILIRUB SERPL-MCNC: 0.2 MG/DL — SIGNIFICANT CHANGE UP (ref 0.2–1.2)
BUN SERPL-MCNC: 34 MG/DL — HIGH (ref 7–23)
CALCIUM SERPL-MCNC: 9.8 MG/DL — SIGNIFICANT CHANGE UP (ref 8.4–10.5)
CHLORIDE SERPL-SCNC: 103 MMOL/L — SIGNIFICANT CHANGE UP (ref 96–108)
CO2 SERPL-SCNC: 25 MMOL/L — SIGNIFICANT CHANGE UP (ref 22–31)
CREAT SERPL-MCNC: 4.51 MG/DL — HIGH (ref 0.5–1.3)
EGFR: 9 ML/MIN/1.73M2 — LOW
EGFR: 9 ML/MIN/1.73M2 — LOW
GLUCOSE BLDC GLUCOMTR-MCNC: 112 MG/DL — HIGH (ref 70–99)
GLUCOSE SERPL-MCNC: 89 MG/DL — SIGNIFICANT CHANGE UP (ref 70–99)
HCT VFR BLD CALC: 26.1 % — LOW (ref 34.5–45)
HGB BLD-MCNC: 7.8 G/DL — LOW (ref 11.5–15.5)
INR BLD: 0.92 RATIO — SIGNIFICANT CHANGE UP (ref 0.85–1.16)
MAGNESIUM SERPL-MCNC: 2.1 MG/DL — SIGNIFICANT CHANGE UP (ref 1.6–2.6)
MCHC RBC-ENTMCNC: 26.5 PG — LOW (ref 27–34)
MCHC RBC-ENTMCNC: 29.9 G/DL — LOW (ref 32–36)
MCV RBC AUTO: 88.8 FL — SIGNIFICANT CHANGE UP (ref 80–100)
NRBC # BLD AUTO: 0 K/UL — SIGNIFICANT CHANGE UP (ref 0–0)
NRBC # FLD: 0 K/UL — SIGNIFICANT CHANGE UP (ref 0–0)
NRBC BLD AUTO-RTO: 0 /100 WBCS — SIGNIFICANT CHANGE UP (ref 0–0)
PHOSPHATE SERPL-MCNC: 3.6 MG/DL — SIGNIFICANT CHANGE UP (ref 2.5–4.5)
PLATELET # BLD AUTO: 205 K/UL — SIGNIFICANT CHANGE UP (ref 150–400)
PMV BLD: 12.9 FL — SIGNIFICANT CHANGE UP (ref 7–13)
POTASSIUM SERPL-MCNC: 4 MMOL/L — SIGNIFICANT CHANGE UP (ref 3.5–5.3)
POTASSIUM SERPL-SCNC: 4 MMOL/L — SIGNIFICANT CHANGE UP (ref 3.5–5.3)
PROT SERPL-MCNC: 6.5 G/DL — SIGNIFICANT CHANGE UP (ref 6–8.3)
PROTHROM AB SERPL-ACNC: 10.6 SEC — SIGNIFICANT CHANGE UP (ref 9.9–13.4)
RBC # BLD: 2.94 M/UL — LOW (ref 3.8–5.2)
RBC # FLD: 13 % — SIGNIFICANT CHANGE UP (ref 10.3–14.5)
SODIUM SERPL-SCNC: 140 MMOL/L — SIGNIFICANT CHANGE UP (ref 135–145)
WBC # BLD: 9.01 K/UL — SIGNIFICANT CHANGE UP (ref 3.8–10.5)
WBC # FLD AUTO: 9.01 K/UL — SIGNIFICANT CHANGE UP (ref 3.8–10.5)

## 2025-08-04 PROCEDURE — 36821 AV FUSION DIRECT ANY SITE: CPT | Mod: RT

## 2025-08-04 DEVICE — SURGIFOAM 8 X 12.5CM X 10MM (100): Type: IMPLANTABLE DEVICE | Site: RIGHT | Status: FUNCTIONAL

## 2025-08-04 DEVICE — SURGICEL FIBRILLAR 2 X 4": Type: IMPLANTABLE DEVICE | Site: RIGHT | Status: FUNCTIONAL

## 2025-08-04 DEVICE — CLIP APPLIER COVIDIEN SURGICLIP III 9" SM: Type: IMPLANTABLE DEVICE | Site: RIGHT | Status: FUNCTIONAL

## 2025-08-04 RX ORDER — ACETAMINOPHEN 500 MG/5ML
1000 LIQUID (ML) ORAL EVERY 6 HOURS
Refills: 0 | Status: DISCONTINUED | OUTPATIENT
Start: 2025-08-04 | End: 2025-08-17

## 2025-08-04 RX ORDER — LOSARTAN POTASSIUM 100 MG/1
100 TABLET, FILM COATED ORAL DAILY
Refills: 0 | Status: DISCONTINUED | OUTPATIENT
Start: 2025-08-04 | End: 2025-08-04

## 2025-08-04 RX ORDER — SODIUM BICARBONATE 1 MEQ/ML
1300 SYRINGE (ML) INTRAVENOUS
Refills: 0 | Status: DISCONTINUED | OUTPATIENT
Start: 2025-08-04 | End: 2025-08-05

## 2025-08-04 RX ORDER — NIFEDIPINE 30 MG
90 TABLET, EXTENDED RELEASE 24 HR ORAL
Refills: 0 | Status: DISCONTINUED | OUTPATIENT
Start: 2025-08-04 | End: 2025-08-17

## 2025-08-04 RX ORDER — OXYCODONE HYDROCHLORIDE 30 MG/1
2.5 TABLET ORAL EVERY 4 HOURS
Refills: 0 | Status: DISCONTINUED | OUTPATIENT
Start: 2025-08-04 | End: 2025-08-09

## 2025-08-04 RX ORDER — HEPARIN SODIUM 1000 [USP'U]/ML
5000 INJECTION INTRAVENOUS; SUBCUTANEOUS EVERY 12 HOURS
Refills: 0 | Status: DISCONTINUED | OUTPATIENT
Start: 2025-08-04 | End: 2025-08-17

## 2025-08-04 RX ORDER — FENTANYL CITRATE-0.9 % NACL/PF 100MCG/2ML
25 SYRINGE (ML) INTRAVENOUS
Refills: 0 | Status: DISCONTINUED | OUTPATIENT
Start: 2025-08-04 | End: 2025-08-04

## 2025-08-04 RX ORDER — LOSARTAN POTASSIUM 100 MG/1
100 TABLET, FILM COATED ORAL DAILY
Refills: 0 | Status: DISCONTINUED | OUTPATIENT
Start: 2025-08-04 | End: 2025-08-17

## 2025-08-04 RX ORDER — MELATONIN 5 MG
3 TABLET ORAL AT BEDTIME
Refills: 0 | Status: DISCONTINUED | OUTPATIENT
Start: 2025-08-04 | End: 2025-08-17

## 2025-08-04 RX ORDER — OXYCODONE HYDROCHLORIDE 30 MG/1
10 TABLET ORAL EVERY 4 HOURS
Refills: 0 | Status: DISCONTINUED | OUTPATIENT
Start: 2025-08-04 | End: 2025-08-04

## 2025-08-04 RX ADMIN — Medication 1 APPLICATION(S): at 11:11

## 2025-08-04 RX ADMIN — Medication 100 MILLIGRAM(S): at 06:08

## 2025-08-04 RX ADMIN — Medication 1300 MILLIGRAM(S): at 18:00

## 2025-08-04 RX ADMIN — HEPARIN SODIUM 5000 UNIT(S): 1000 INJECTION INTRAVENOUS; SUBCUTANEOUS at 06:06

## 2025-08-04 RX ADMIN — Medication 10 MILLIGRAM(S): at 09:55

## 2025-08-04 RX ADMIN — Medication 1300 MILLIGRAM(S): at 06:06

## 2025-08-04 RX ADMIN — Medication 10 MILLIGRAM(S): at 12:04

## 2025-08-04 RX ADMIN — Medication 90 MILLIGRAM(S): at 06:08

## 2025-08-04 RX ADMIN — Medication 90 MILLIGRAM(S): at 18:56

## 2025-08-04 RX ADMIN — Medication 1000 MILLIGRAM(S): at 23:54

## 2025-08-04 RX ADMIN — Medication 100 MILLIGRAM(S): at 11:50

## 2025-08-04 RX ADMIN — Medication 1 APPLICATION(S): at 06:07

## 2025-08-04 RX ADMIN — Medication 100 MILLIGRAM(S): at 18:02

## 2025-08-04 RX ADMIN — LOSARTAN POTASSIUM 100 MILLIGRAM(S): 100 TABLET, FILM COATED ORAL at 11:10

## 2025-08-04 RX ADMIN — Medication 100 MILLIGRAM(S): at 23:53

## 2025-08-05 LAB
ADD ON TEST-SPECIMEN IN LAB: SIGNIFICANT CHANGE UP
ANION GAP SERPL CALC-SCNC: 15 MMOL/L — SIGNIFICANT CHANGE UP (ref 5–17)
BUN SERPL-MCNC: 43 MG/DL — HIGH (ref 7–23)
CALCIUM SERPL-MCNC: 9.8 MG/DL — SIGNIFICANT CHANGE UP (ref 8.4–10.5)
CHLORIDE SERPL-SCNC: 100 MMOL/L — SIGNIFICANT CHANGE UP (ref 96–108)
CO2 SERPL-SCNC: 25 MMOL/L — SIGNIFICANT CHANGE UP (ref 22–31)
CREAT SERPL-MCNC: 5.45 MG/DL — HIGH (ref 0.5–1.3)
EGFR: 7 ML/MIN/1.73M2 — LOW
EGFR: 7 ML/MIN/1.73M2 — LOW
GLUCOSE SERPL-MCNC: 90 MG/DL — SIGNIFICANT CHANGE UP (ref 70–99)
HCT VFR BLD CALC: 26.5 % — LOW (ref 34.5–45)
HGB BLD-MCNC: 7.9 G/DL — LOW (ref 11.5–15.5)
MCHC RBC-ENTMCNC: 26.8 PG — LOW (ref 27–34)
MCHC RBC-ENTMCNC: 29.8 G/DL — LOW (ref 32–36)
MCV RBC AUTO: 89.8 FL — SIGNIFICANT CHANGE UP (ref 80–100)
NRBC # BLD AUTO: 0 K/UL — SIGNIFICANT CHANGE UP (ref 0–0)
NRBC # FLD: 0 K/UL — SIGNIFICANT CHANGE UP (ref 0–0)
NRBC BLD AUTO-RTO: 0 /100 WBCS — SIGNIFICANT CHANGE UP (ref 0–0)
PLATELET # BLD AUTO: 198 K/UL — SIGNIFICANT CHANGE UP (ref 150–400)
PMV BLD: 11.9 FL — SIGNIFICANT CHANGE UP (ref 7–13)
POTASSIUM SERPL-MCNC: 4.4 MMOL/L — SIGNIFICANT CHANGE UP (ref 3.5–5.3)
POTASSIUM SERPL-SCNC: 4.4 MMOL/L — SIGNIFICANT CHANGE UP (ref 3.5–5.3)
RBC # BLD: 2.95 M/UL — LOW (ref 3.8–5.2)
RBC # FLD: 12.9 % — SIGNIFICANT CHANGE UP (ref 10.3–14.5)
SODIUM SERPL-SCNC: 140 MMOL/L — SIGNIFICANT CHANGE UP (ref 135–145)
WBC # BLD: 10.02 K/UL — SIGNIFICANT CHANGE UP (ref 3.8–10.5)
WBC # FLD AUTO: 10.02 K/UL — SIGNIFICANT CHANGE UP (ref 3.8–10.5)

## 2025-08-05 RX ORDER — POLYETHYLENE GLYCOL 3350 17 G/17G
17 POWDER, FOR SOLUTION ORAL DAILY
Refills: 0 | Status: COMPLETED | OUTPATIENT
Start: 2025-08-05 | End: 2025-08-08

## 2025-08-05 RX ORDER — SENNA 187 MG
2 TABLET ORAL AT BEDTIME
Refills: 0 | Status: DISCONTINUED | OUTPATIENT
Start: 2025-08-05 | End: 2025-08-17

## 2025-08-05 RX ADMIN — Medication 90 MILLIGRAM(S): at 18:36

## 2025-08-05 RX ADMIN — Medication 1000 MILLIGRAM(S): at 00:31

## 2025-08-05 RX ADMIN — Medication 2 TABLET(S): at 23:06

## 2025-08-05 RX ADMIN — Medication 100 MILLIGRAM(S): at 23:05

## 2025-08-05 RX ADMIN — LOSARTAN POTASSIUM 100 MILLIGRAM(S): 100 TABLET, FILM COATED ORAL at 05:31

## 2025-08-05 RX ADMIN — Medication 1300 MILLIGRAM(S): at 05:30

## 2025-08-05 RX ADMIN — Medication 1000 MILLIGRAM(S): at 23:15

## 2025-08-05 RX ADMIN — Medication 1000 MILLIGRAM(S): at 12:09

## 2025-08-05 RX ADMIN — Medication 1000 MILLIGRAM(S): at 18:35

## 2025-08-05 RX ADMIN — HEPARIN SODIUM 5000 UNIT(S): 1000 INJECTION INTRAVENOUS; SUBCUTANEOUS at 00:02

## 2025-08-05 RX ADMIN — Medication 1000 MILLIGRAM(S): at 06:47

## 2025-08-05 RX ADMIN — Medication 100 MILLIGRAM(S): at 18:35

## 2025-08-05 RX ADMIN — Medication 1 APPLICATION(S): at 12:10

## 2025-08-05 RX ADMIN — Medication 1000 MILLIGRAM(S): at 05:31

## 2025-08-05 RX ADMIN — Medication 1 APPLICATION(S): at 05:40

## 2025-08-05 RX ADMIN — Medication 100 MILLIGRAM(S): at 05:36

## 2025-08-05 RX ADMIN — Medication 1000 MILLIGRAM(S): at 19:35

## 2025-08-05 RX ADMIN — Medication 1000 MILLIGRAM(S): at 23:05

## 2025-08-05 RX ADMIN — Medication 1000 MILLIGRAM(S): at 13:09

## 2025-08-05 RX ADMIN — POLYETHYLENE GLYCOL 3350 17 GRAM(S): 17 POWDER, FOR SOLUTION ORAL at 19:45

## 2025-08-05 RX ADMIN — Medication 90 MILLIGRAM(S): at 05:30

## 2025-08-06 LAB
ANION GAP SERPL CALC-SCNC: 13 MMOL/L — SIGNIFICANT CHANGE UP (ref 5–17)
APTT BLD: 24.1 SEC — LOW (ref 26.1–36.8)
BUN SERPL-MCNC: 27 MG/DL — HIGH (ref 7–23)
CALCIUM SERPL-MCNC: 9 MG/DL — SIGNIFICANT CHANGE UP (ref 8.4–10.5)
CHLORIDE SERPL-SCNC: 98 MMOL/L — SIGNIFICANT CHANGE UP (ref 96–108)
CO2 SERPL-SCNC: 25 MMOL/L — SIGNIFICANT CHANGE UP (ref 22–31)
CREAT SERPL-MCNC: 3.9 MG/DL — HIGH (ref 0.5–1.3)
EGFR: 11 ML/MIN/1.73M2 — LOW
EGFR: 11 ML/MIN/1.73M2 — LOW
GLUCOSE SERPL-MCNC: 84 MG/DL — SIGNIFICANT CHANGE UP (ref 70–99)
HCT VFR BLD CALC: 25.2 % — LOW (ref 34.5–45)
HCV AB S/CO SERPL IA: 0.05 S/CO — SIGNIFICANT CHANGE UP
HCV AB S/CO SERPL IA: 0.13 S/CO — SIGNIFICANT CHANGE UP (ref 0–0.79)
HCV AB SERPL-IMP: SIGNIFICANT CHANGE UP
HCV AB SERPL-IMP: SIGNIFICANT CHANGE UP
HGB BLD-MCNC: 7.5 G/DL — LOW (ref 11.5–15.5)
INR BLD: 0.93 RATIO — SIGNIFICANT CHANGE UP (ref 0.85–1.16)
MCHC RBC-ENTMCNC: 26.5 PG — LOW (ref 27–34)
MCHC RBC-ENTMCNC: 29.8 G/DL — LOW (ref 32–36)
MCV RBC AUTO: 89 FL — SIGNIFICANT CHANGE UP (ref 80–100)
NRBC # BLD AUTO: 0 K/UL — SIGNIFICANT CHANGE UP (ref 0–0)
NRBC # FLD: 0 K/UL — SIGNIFICANT CHANGE UP (ref 0–0)
NRBC BLD AUTO-RTO: 0 /100 WBCS — SIGNIFICANT CHANGE UP (ref 0–0)
PLATELET # BLD AUTO: 192 K/UL — SIGNIFICANT CHANGE UP (ref 150–400)
PMV BLD: 12.2 FL — SIGNIFICANT CHANGE UP (ref 7–13)
POTASSIUM SERPL-MCNC: 4.4 MMOL/L — SIGNIFICANT CHANGE UP (ref 3.5–5.3)
POTASSIUM SERPL-SCNC: 4.4 MMOL/L — SIGNIFICANT CHANGE UP (ref 3.5–5.3)
PROTHROM AB SERPL-ACNC: 10.7 SEC — SIGNIFICANT CHANGE UP (ref 9.9–13.4)
RBC # BLD: 2.83 M/UL — LOW (ref 3.8–5.2)
RBC # FLD: 12.9 % — SIGNIFICANT CHANGE UP (ref 10.3–14.5)
SODIUM SERPL-SCNC: 136 MMOL/L — SIGNIFICANT CHANGE UP (ref 135–145)
WBC # BLD: 8.28 K/UL — SIGNIFICANT CHANGE UP (ref 3.8–10.5)
WBC # FLD AUTO: 8.28 K/UL — SIGNIFICANT CHANGE UP (ref 3.8–10.5)

## 2025-08-06 PROCEDURE — 36558 INSERT TUNNELED CV CATH: CPT | Mod: RT

## 2025-08-06 PROCEDURE — 77001 FLUOROGUIDE FOR VEIN DEVICE: CPT | Mod: 26

## 2025-08-06 RX ORDER — EPOETIN ALFA 10000 [IU]/ML
10000 SOLUTION INTRAVENOUS; SUBCUTANEOUS
Refills: 0 | Status: DISCONTINUED | OUTPATIENT
Start: 2025-08-06 | End: 2025-08-17

## 2025-08-06 RX ORDER — SODIUM CHLORIDE 9 G/1000ML
1000 INJECTION, SOLUTION INTRAVENOUS
Refills: 0 | Status: DISCONTINUED | OUTPATIENT
Start: 2025-08-06 | End: 2025-08-07

## 2025-08-06 RX ADMIN — LOSARTAN POTASSIUM 100 MILLIGRAM(S): 100 TABLET, FILM COATED ORAL at 05:18

## 2025-08-06 RX ADMIN — Medication 1000 MILLIGRAM(S): at 14:30

## 2025-08-06 RX ADMIN — Medication 1000 MILLIGRAM(S): at 18:16

## 2025-08-06 RX ADMIN — Medication 100 MILLIGRAM(S): at 14:30

## 2025-08-06 RX ADMIN — SODIUM CHLORIDE 75 MILLILITER(S): 9 INJECTION, SOLUTION INTRAVENOUS at 23:38

## 2025-08-06 RX ADMIN — Medication 1 APPLICATION(S): at 10:18

## 2025-08-06 RX ADMIN — Medication 100 MILLIGRAM(S): at 05:17

## 2025-08-06 RX ADMIN — Medication 90 MILLIGRAM(S): at 18:17

## 2025-08-06 RX ADMIN — SODIUM CHLORIDE 75 MILLILITER(S): 9 INJECTION, SOLUTION INTRAVENOUS at 18:33

## 2025-08-06 RX ADMIN — Medication 100 MILLIGRAM(S): at 18:17

## 2025-08-06 RX ADMIN — Medication 1000 MILLIGRAM(S): at 23:35

## 2025-08-06 RX ADMIN — Medication 100 MILLIGRAM(S): at 23:35

## 2025-08-06 RX ADMIN — Medication 1000 MILLIGRAM(S): at 05:16

## 2025-08-06 RX ADMIN — Medication 2 TABLET(S): at 23:35

## 2025-08-06 RX ADMIN — Medication 90 MILLIGRAM(S): at 05:17

## 2025-08-06 RX ADMIN — Medication 1000 MILLIGRAM(S): at 15:50

## 2025-08-06 RX ADMIN — Medication 1000 MILLIGRAM(S): at 05:40

## 2025-08-06 RX ADMIN — Medication 1000 MILLIGRAM(S): at 19:24

## 2025-08-06 RX ADMIN — Medication 1 APPLICATION(S): at 05:19

## 2025-08-06 RX ADMIN — HEPARIN SODIUM 5000 UNIT(S): 1000 INJECTION INTRAVENOUS; SUBCUTANEOUS at 23:37

## 2025-08-07 LAB
ANION GAP SERPL CALC-SCNC: 15 MMOL/L — SIGNIFICANT CHANGE UP (ref 5–17)
BUN SERPL-MCNC: 38 MG/DL — HIGH (ref 7–23)
CALCIUM SERPL-MCNC: 9.2 MG/DL — SIGNIFICANT CHANGE UP (ref 8.4–10.5)
CHLORIDE SERPL-SCNC: 100 MMOL/L — SIGNIFICANT CHANGE UP (ref 96–108)
CO2 SERPL-SCNC: 24 MMOL/L — SIGNIFICANT CHANGE UP (ref 22–31)
CREAT SERPL-MCNC: 5.06 MG/DL — HIGH (ref 0.5–1.3)
EGFR: 8 ML/MIN/1.73M2 — LOW
EGFR: 8 ML/MIN/1.73M2 — LOW
GLUCOSE BLDC GLUCOMTR-MCNC: 87 MG/DL — SIGNIFICANT CHANGE UP (ref 70–99)
GLUCOSE SERPL-MCNC: 82 MG/DL — SIGNIFICANT CHANGE UP (ref 70–99)
HCT VFR BLD CALC: 24.8 % — LOW (ref 34.5–45)
HGB BLD-MCNC: 7.5 G/DL — LOW (ref 11.5–15.5)
MAGNESIUM SERPL-MCNC: 2.3 MG/DL — SIGNIFICANT CHANGE UP (ref 1.6–2.6)
MCHC RBC-ENTMCNC: 27.3 PG — SIGNIFICANT CHANGE UP (ref 27–34)
MCHC RBC-ENTMCNC: 30.2 G/DL — LOW (ref 32–36)
MCV RBC AUTO: 90.2 FL — SIGNIFICANT CHANGE UP (ref 80–100)
MRSA PCR RESULT.: SIGNIFICANT CHANGE UP
NRBC # BLD AUTO: 0 K/UL — SIGNIFICANT CHANGE UP (ref 0–0)
NRBC # FLD: 0 K/UL — SIGNIFICANT CHANGE UP (ref 0–0)
NRBC BLD AUTO-RTO: 0 /100 WBCS — SIGNIFICANT CHANGE UP (ref 0–0)
PLATELET # BLD AUTO: 202 K/UL — SIGNIFICANT CHANGE UP (ref 150–400)
PMV BLD: 12.2 FL — SIGNIFICANT CHANGE UP (ref 7–13)
POTASSIUM SERPL-MCNC: 4.4 MMOL/L — SIGNIFICANT CHANGE UP (ref 3.5–5.3)
POTASSIUM SERPL-SCNC: 4.4 MMOL/L — SIGNIFICANT CHANGE UP (ref 3.5–5.3)
RBC # BLD: 2.75 M/UL — LOW (ref 3.8–5.2)
RBC # FLD: 12.8 % — SIGNIFICANT CHANGE UP (ref 10.3–14.5)
S AUREUS DNA NOSE QL NAA+PROBE: SIGNIFICANT CHANGE UP
SODIUM SERPL-SCNC: 139 MMOL/L — SIGNIFICANT CHANGE UP (ref 135–145)
WBC # BLD: 9.23 K/UL — SIGNIFICANT CHANGE UP (ref 3.8–10.5)
WBC # FLD AUTO: 9.23 K/UL — SIGNIFICANT CHANGE UP (ref 3.8–10.5)

## 2025-08-07 RX ADMIN — EPOETIN ALFA 10000 UNIT(S): 10000 SOLUTION INTRAVENOUS; SUBCUTANEOUS at 10:03

## 2025-08-07 RX ADMIN — Medication 1000 MILLIGRAM(S): at 01:44

## 2025-08-07 RX ADMIN — HEPARIN SODIUM 5000 UNIT(S): 1000 INJECTION INTRAVENOUS; SUBCUTANEOUS at 23:25

## 2025-08-07 RX ADMIN — Medication 1 APPLICATION(S): at 11:16

## 2025-08-07 RX ADMIN — HEPARIN SODIUM 5000 UNIT(S): 1000 INJECTION INTRAVENOUS; SUBCUTANEOUS at 14:00

## 2025-08-07 RX ADMIN — Medication 2 TABLET(S): at 23:25

## 2025-08-07 RX ADMIN — Medication 100 MILLIGRAM(S): at 17:02

## 2025-08-07 RX ADMIN — Medication 90 MILLIGRAM(S): at 17:03

## 2025-08-07 RX ADMIN — Medication 100 MILLIGRAM(S): at 11:15

## 2025-08-07 RX ADMIN — Medication 1 APPLICATION(S): at 06:52

## 2025-08-07 RX ADMIN — SODIUM CHLORIDE 75 MILLILITER(S): 9 INJECTION, SOLUTION INTRAVENOUS at 06:52

## 2025-08-07 RX ADMIN — Medication 100 MILLIGRAM(S): at 23:24

## 2025-08-08 RX ORDER — FLUTICASONE PROPIONATE 50 UG/1
1 SPRAY, METERED NASAL
Refills: 0 | Status: DISCONTINUED | OUTPATIENT
Start: 2025-08-08 | End: 2025-08-17

## 2025-08-08 RX ADMIN — LOSARTAN POTASSIUM 100 MILLIGRAM(S): 100 TABLET, FILM COATED ORAL at 06:35

## 2025-08-08 RX ADMIN — HEPARIN SODIUM 5000 UNIT(S): 1000 INJECTION INTRAVENOUS; SUBCUTANEOUS at 23:49

## 2025-08-08 RX ADMIN — Medication 90 MILLIGRAM(S): at 06:34

## 2025-08-08 RX ADMIN — Medication 100 MILLIGRAM(S): at 11:46

## 2025-08-08 RX ADMIN — Medication 100 MILLILITER(S): at 18:24

## 2025-08-08 RX ADMIN — Medication 100 MILLIGRAM(S): at 23:38

## 2025-08-08 RX ADMIN — HEPARIN SODIUM 5000 UNIT(S): 1000 INJECTION INTRAVENOUS; SUBCUTANEOUS at 14:22

## 2025-08-08 RX ADMIN — Medication 100 MILLIGRAM(S): at 06:34

## 2025-08-08 RX ADMIN — Medication 2 TABLET(S): at 23:38

## 2025-08-08 RX ADMIN — Medication 1000 MILLIGRAM(S): at 11:46

## 2025-08-08 RX ADMIN — FLUTICASONE PROPIONATE 1 SPRAY(S): 50 SPRAY, METERED NASAL at 18:25

## 2025-08-08 RX ADMIN — Medication 1 APPLICATION(S): at 11:51

## 2025-08-08 RX ADMIN — Medication 90 MILLIGRAM(S): at 18:14

## 2025-08-08 RX ADMIN — Medication 1000 MILLIGRAM(S): at 12:46

## 2025-08-08 RX ADMIN — Medication 100 MILLIGRAM(S): at 18:14

## 2025-08-08 RX ADMIN — Medication 1 APPLICATION(S): at 06:36

## 2025-08-09 LAB
ANION GAP SERPL CALC-SCNC: 15 MMOL/L — SIGNIFICANT CHANGE UP (ref 5–17)
BUN SERPL-MCNC: 38 MG/DL — HIGH (ref 7–23)
CALCIUM SERPL-MCNC: 9.4 MG/DL — SIGNIFICANT CHANGE UP (ref 8.4–10.5)
CHLORIDE SERPL-SCNC: 100 MMOL/L — SIGNIFICANT CHANGE UP (ref 96–108)
CO2 SERPL-SCNC: 23 MMOL/L — SIGNIFICANT CHANGE UP (ref 22–31)
CREAT SERPL-MCNC: 5.29 MG/DL — HIGH (ref 0.5–1.3)
EGFR: 8 ML/MIN/1.73M2 — LOW
EGFR: 8 ML/MIN/1.73M2 — LOW
GLUCOSE BLDC GLUCOMTR-MCNC: 107 MG/DL — HIGH (ref 70–99)
GLUCOSE SERPL-MCNC: 90 MG/DL — SIGNIFICANT CHANGE UP (ref 70–99)
POTASSIUM SERPL-MCNC: 3.8 MMOL/L — SIGNIFICANT CHANGE UP (ref 3.5–5.3)
POTASSIUM SERPL-SCNC: 3.8 MMOL/L — SIGNIFICANT CHANGE UP (ref 3.5–5.3)
SODIUM SERPL-SCNC: 138 MMOL/L — SIGNIFICANT CHANGE UP (ref 135–145)

## 2025-08-09 RX ADMIN — Medication 100 MILLIGRAM(S): at 23:11

## 2025-08-09 RX ADMIN — Medication 1000 MILLIGRAM(S): at 05:20

## 2025-08-09 RX ADMIN — Medication 1000 MILLIGRAM(S): at 23:11

## 2025-08-09 RX ADMIN — Medication 100 MILLIGRAM(S): at 12:03

## 2025-08-09 RX ADMIN — Medication 1000 MILLIGRAM(S): at 06:33

## 2025-08-09 RX ADMIN — Medication 1000 MILLIGRAM(S): at 13:19

## 2025-08-09 RX ADMIN — Medication 50 MILLILITER(S): at 14:56

## 2025-08-09 RX ADMIN — EPOETIN ALFA 10000 UNIT(S): 10000 SOLUTION INTRAVENOUS; SUBCUTANEOUS at 08:32

## 2025-08-09 RX ADMIN — Medication 90 MILLIGRAM(S): at 18:23

## 2025-08-09 RX ADMIN — Medication 1 APPLICATION(S): at 12:07

## 2025-08-09 RX ADMIN — HEPARIN SODIUM 5000 UNIT(S): 1000 INJECTION INTRAVENOUS; SUBCUTANEOUS at 12:04

## 2025-08-09 RX ADMIN — Medication 1 APPLICATION(S): at 05:21

## 2025-08-09 RX ADMIN — Medication 100 MILLIGRAM(S): at 18:22

## 2025-08-09 RX ADMIN — Medication 100 MILLILITER(S): at 22:18

## 2025-08-09 RX ADMIN — OXYCODONE HYDROCHLORIDE 2.5 MILLIGRAM(S): 30 TABLET ORAL at 20:12

## 2025-08-09 RX ADMIN — OXYCODONE HYDROCHLORIDE 2.5 MILLIGRAM(S): 30 TABLET ORAL at 21:48

## 2025-08-09 RX ADMIN — FLUTICASONE PROPIONATE 1 SPRAY(S): 50 SPRAY, METERED NASAL at 18:23

## 2025-08-09 RX ADMIN — Medication 1000 MILLIGRAM(S): at 12:04

## 2025-08-09 RX ADMIN — HEPARIN SODIUM 5000 UNIT(S): 1000 INJECTION INTRAVENOUS; SUBCUTANEOUS at 23:12

## 2025-08-10 RX ORDER — FLUDROCORTISONE ACETATE 0.1 MG
0.1 TABLET ORAL DAILY
Refills: 0 | Status: DISCONTINUED | OUTPATIENT
Start: 2025-08-10 | End: 2025-08-17

## 2025-08-10 RX ADMIN — Medication 1 APPLICATION(S): at 05:41

## 2025-08-10 RX ADMIN — Medication 100 MILLIGRAM(S): at 18:26

## 2025-08-10 RX ADMIN — Medication 0.1 MILLIGRAM(S): at 14:31

## 2025-08-10 RX ADMIN — Medication 100 MILLIGRAM(S): at 05:40

## 2025-08-10 RX ADMIN — LOSARTAN POTASSIUM 100 MILLIGRAM(S): 100 TABLET, FILM COATED ORAL at 05:41

## 2025-08-10 RX ADMIN — HEPARIN SODIUM 5000 UNIT(S): 1000 INJECTION INTRAVENOUS; SUBCUTANEOUS at 12:08

## 2025-08-10 RX ADMIN — HEPARIN SODIUM 5000 UNIT(S): 1000 INJECTION INTRAVENOUS; SUBCUTANEOUS at 23:52

## 2025-08-10 RX ADMIN — Medication 90 MILLIGRAM(S): at 18:26

## 2025-08-10 RX ADMIN — Medication 1 APPLICATION(S): at 12:09

## 2025-08-10 RX ADMIN — Medication 90 MILLIGRAM(S): at 05:40

## 2025-08-10 RX ADMIN — FLUTICASONE PROPIONATE 1 SPRAY(S): 50 SPRAY, METERED NASAL at 05:41

## 2025-08-10 RX ADMIN — Medication 100 MILLIGRAM(S): at 12:05

## 2025-08-10 RX ADMIN — FLUTICASONE PROPIONATE 1 SPRAY(S): 50 SPRAY, METERED NASAL at 18:27

## 2025-08-10 RX ADMIN — Medication 1000 MILLIGRAM(S): at 01:24

## 2025-08-10 RX ADMIN — Medication 100 MILLIGRAM(S): at 23:51

## 2025-08-11 RX ORDER — B1/B2/B3/B5/B6/B12/VIT C/FOLIC 500-0.5 MG
1 TABLET ORAL DAILY
Refills: 0 | Status: DISCONTINUED | OUTPATIENT
Start: 2025-08-11 | End: 2025-08-17

## 2025-08-11 RX ADMIN — Medication 90 MILLIGRAM(S): at 05:21

## 2025-08-11 RX ADMIN — Medication 2 TABLET(S): at 21:07

## 2025-08-11 RX ADMIN — LOSARTAN POTASSIUM 100 MILLIGRAM(S): 100 TABLET, FILM COATED ORAL at 05:21

## 2025-08-11 RX ADMIN — Medication 100 MILLIGRAM(S): at 05:20

## 2025-08-11 RX ADMIN — FLUTICASONE PROPIONATE 1 SPRAY(S): 50 SPRAY, METERED NASAL at 05:20

## 2025-08-11 RX ADMIN — Medication 1 TABLET(S): at 19:26

## 2025-08-11 RX ADMIN — Medication 1 APPLICATION(S): at 11:20

## 2025-08-11 RX ADMIN — Medication 90 MILLIGRAM(S): at 18:19

## 2025-08-11 RX ADMIN — Medication 0.1 MILLIGRAM(S): at 05:20

## 2025-08-11 RX ADMIN — Medication 75 MILLIGRAM(S): at 19:27

## 2025-08-11 RX ADMIN — Medication 100 MILLIGRAM(S): at 11:23

## 2025-08-11 RX ADMIN — FLUTICASONE PROPIONATE 1 SPRAY(S): 50 SPRAY, METERED NASAL at 18:20

## 2025-08-11 RX ADMIN — Medication 1 APPLICATION(S): at 05:21

## 2025-08-11 RX ADMIN — HEPARIN SODIUM 5000 UNIT(S): 1000 INJECTION INTRAVENOUS; SUBCUTANEOUS at 12:38

## 2025-08-12 LAB
ANION GAP SERPL CALC-SCNC: 11 MMOL/L — SIGNIFICANT CHANGE UP (ref 5–17)
BLD GP AB SCN SERPL QL: NEGATIVE — SIGNIFICANT CHANGE UP
BUN SERPL-MCNC: 39 MG/DL — HIGH (ref 7–23)
CALCIUM SERPL-MCNC: 9 MG/DL — SIGNIFICANT CHANGE UP (ref 8.4–10.5)
CHLORIDE SERPL-SCNC: 100 MMOL/L — SIGNIFICANT CHANGE UP (ref 96–108)
CO2 SERPL-SCNC: 25 MMOL/L — SIGNIFICANT CHANGE UP (ref 22–31)
CREAT SERPL-MCNC: 5.27 MG/DL — HIGH (ref 0.5–1.3)
EGFR: 8 ML/MIN/1.73M2 — LOW
EGFR: 8 ML/MIN/1.73M2 — LOW
GLUCOSE SERPL-MCNC: 93 MG/DL — SIGNIFICANT CHANGE UP (ref 70–99)
HCT VFR BLD CALC: 21.1 % — LOW (ref 34.5–45)
HGB BLD-MCNC: 6.5 G/DL — CRITICAL LOW (ref 11.5–15.5)
MAGNESIUM SERPL-MCNC: 2.1 MG/DL — SIGNIFICANT CHANGE UP (ref 1.6–2.6)
MCHC RBC-ENTMCNC: 28 PG — SIGNIFICANT CHANGE UP (ref 27–34)
MCHC RBC-ENTMCNC: 30.8 G/DL — LOW (ref 32–36)
MCV RBC AUTO: 90.9 FL — SIGNIFICANT CHANGE UP (ref 80–100)
NRBC # BLD AUTO: 0 K/UL — SIGNIFICANT CHANGE UP (ref 0–0)
NRBC # FLD: 0 K/UL — SIGNIFICANT CHANGE UP (ref 0–0)
NRBC BLD AUTO-RTO: 0 /100 WBCS — SIGNIFICANT CHANGE UP (ref 0–0)
PHOSPHATE SERPL-MCNC: 3.7 MG/DL — SIGNIFICANT CHANGE UP (ref 2.5–4.5)
PLATELET # BLD AUTO: 199 K/UL — SIGNIFICANT CHANGE UP (ref 150–400)
PMV BLD: 11.6 FL — SIGNIFICANT CHANGE UP (ref 7–13)
POTASSIUM SERPL-MCNC: 3.5 MMOL/L — SIGNIFICANT CHANGE UP (ref 3.5–5.3)
POTASSIUM SERPL-SCNC: 3.5 MMOL/L — SIGNIFICANT CHANGE UP (ref 3.5–5.3)
RBC # BLD: 2.32 M/UL — LOW (ref 3.8–5.2)
RBC # FLD: 13.2 % — SIGNIFICANT CHANGE UP (ref 10.3–14.5)
RH IG SCN BLD-IMP: NEGATIVE — SIGNIFICANT CHANGE UP
SODIUM SERPL-SCNC: 136 MMOL/L — SIGNIFICANT CHANGE UP (ref 135–145)
WBC # BLD: 7.07 K/UL — SIGNIFICANT CHANGE UP (ref 3.8–10.5)
WBC # FLD AUTO: 7.07 K/UL — SIGNIFICANT CHANGE UP (ref 3.8–10.5)

## 2025-08-12 RX ADMIN — Medication 75 MILLIGRAM(S): at 17:16

## 2025-08-12 RX ADMIN — HEPARIN SODIUM 5000 UNIT(S): 1000 INJECTION INTRAVENOUS; SUBCUTANEOUS at 12:34

## 2025-08-12 RX ADMIN — Medication 75 MILLIGRAM(S): at 00:20

## 2025-08-12 RX ADMIN — Medication 0.1 MILLIGRAM(S): at 05:38

## 2025-08-12 RX ADMIN — Medication 75 MILLIGRAM(S): at 12:33

## 2025-08-12 RX ADMIN — EPOETIN ALFA 10000 UNIT(S): 10000 SOLUTION INTRAVENOUS; SUBCUTANEOUS at 07:37

## 2025-08-12 RX ADMIN — Medication 1 APPLICATION(S): at 05:38

## 2025-08-12 RX ADMIN — FLUTICASONE PROPIONATE 1 SPRAY(S): 50 SPRAY, METERED NASAL at 05:38

## 2025-08-12 RX ADMIN — Medication 3 MILLIGRAM(S): at 00:23

## 2025-08-12 RX ADMIN — Medication 1 APPLICATION(S): at 12:33

## 2025-08-12 RX ADMIN — Medication 90 MILLIGRAM(S): at 17:16

## 2025-08-12 RX ADMIN — FLUTICASONE PROPIONATE 1 SPRAY(S): 50 SPRAY, METERED NASAL at 17:16

## 2025-08-12 RX ADMIN — Medication 1 TABLET(S): at 12:33

## 2025-08-12 RX ADMIN — HEPARIN SODIUM 5000 UNIT(S): 1000 INJECTION INTRAVENOUS; SUBCUTANEOUS at 00:21

## 2025-08-13 LAB
HCT VFR BLD CALC: 31.2 % — LOW (ref 34.5–45)
HGB BLD-MCNC: 9.8 G/DL — LOW (ref 11.5–15.5)
MCHC RBC-ENTMCNC: 27.2 PG — SIGNIFICANT CHANGE UP (ref 27–34)
MCHC RBC-ENTMCNC: 31.4 G/DL — LOW (ref 32–36)
MCV RBC AUTO: 86.7 FL — SIGNIFICANT CHANGE UP (ref 80–100)
NRBC # BLD AUTO: 0 K/UL — SIGNIFICANT CHANGE UP (ref 0–0)
NRBC # FLD: 0 K/UL — SIGNIFICANT CHANGE UP (ref 0–0)
NRBC BLD AUTO-RTO: 0 /100 WBCS — SIGNIFICANT CHANGE UP (ref 0–0)
PLATELET # BLD AUTO: 191 K/UL — SIGNIFICANT CHANGE UP (ref 150–400)
PMV BLD: 11.7 FL — SIGNIFICANT CHANGE UP (ref 7–13)
RBC # BLD: 3.6 M/UL — LOW (ref 3.8–5.2)
RBC # FLD: 15.2 % — HIGH (ref 10.3–14.5)
WBC # BLD: 10.49 K/UL — SIGNIFICANT CHANGE UP (ref 3.8–10.5)
WBC # FLD AUTO: 10.49 K/UL — SIGNIFICANT CHANGE UP (ref 3.8–10.5)

## 2025-08-13 RX ADMIN — Medication 1 APPLICATION(S): at 11:44

## 2025-08-13 RX ADMIN — Medication 1 TABLET(S): at 11:43

## 2025-08-13 RX ADMIN — FLUTICASONE PROPIONATE 1 SPRAY(S): 50 SPRAY, METERED NASAL at 18:22

## 2025-08-13 RX ADMIN — Medication 90 MILLIGRAM(S): at 05:11

## 2025-08-13 RX ADMIN — LOSARTAN POTASSIUM 100 MILLIGRAM(S): 100 TABLET, FILM COATED ORAL at 05:11

## 2025-08-13 RX ADMIN — FLUTICASONE PROPIONATE 1 SPRAY(S): 50 SPRAY, METERED NASAL at 05:11

## 2025-08-13 RX ADMIN — Medication 75 MILLIGRAM(S): at 00:12

## 2025-08-13 RX ADMIN — Medication 0.1 MILLIGRAM(S): at 20:37

## 2025-08-13 RX ADMIN — HEPARIN SODIUM 5000 UNIT(S): 1000 INJECTION INTRAVENOUS; SUBCUTANEOUS at 12:46

## 2025-08-13 RX ADMIN — Medication 75 MILLIGRAM(S): at 11:43

## 2025-08-13 RX ADMIN — Medication 75 MILLILITER(S): at 20:04

## 2025-08-13 RX ADMIN — Medication 90 MILLIGRAM(S): at 18:17

## 2025-08-13 RX ADMIN — Medication 1 APPLICATION(S): at 05:12

## 2025-08-13 RX ADMIN — Medication 75 MILLIGRAM(S): at 05:11

## 2025-08-13 RX ADMIN — HEPARIN SODIUM 5000 UNIT(S): 1000 INJECTION INTRAVENOUS; SUBCUTANEOUS at 00:12

## 2025-08-13 RX ADMIN — Medication 75 MILLIGRAM(S): at 18:17

## 2025-08-13 RX ADMIN — Medication 0.1 MILLIGRAM(S): at 05:11

## 2025-08-14 LAB
ANION GAP SERPL CALC-SCNC: 12 MMOL/L — SIGNIFICANT CHANGE UP (ref 5–17)
BUN SERPL-MCNC: 37 MG/DL — HIGH (ref 7–23)
CALCIUM SERPL-MCNC: 9.3 MG/DL — SIGNIFICANT CHANGE UP (ref 8.4–10.5)
CHLORIDE SERPL-SCNC: 102 MMOL/L — SIGNIFICANT CHANGE UP (ref 96–108)
CO2 SERPL-SCNC: 25 MMOL/L — SIGNIFICANT CHANGE UP (ref 22–31)
CREAT SERPL-MCNC: 5.08 MG/DL — HIGH (ref 0.5–1.3)
EGFR: 8 ML/MIN/1.73M2 — LOW
EGFR: 8 ML/MIN/1.73M2 — LOW
GLUCOSE SERPL-MCNC: 102 MG/DL — HIGH (ref 70–99)
HCT VFR BLD CALC: 29.2 % — LOW (ref 34.5–45)
HGB BLD-MCNC: 9 G/DL — LOW (ref 11.5–15.5)
MAGNESIUM SERPL-MCNC: 2.2 MG/DL — SIGNIFICANT CHANGE UP (ref 1.6–2.6)
MCHC RBC-ENTMCNC: 27 PG — SIGNIFICANT CHANGE UP (ref 27–34)
MCHC RBC-ENTMCNC: 30.8 G/DL — LOW (ref 32–36)
MCV RBC AUTO: 87.7 FL — SIGNIFICANT CHANGE UP (ref 80–100)
NRBC # BLD AUTO: 0.02 K/UL — HIGH (ref 0–0)
NRBC # FLD: 0.02 K/UL — HIGH (ref 0–0)
NRBC BLD AUTO-RTO: 0 /100 WBCS — SIGNIFICANT CHANGE UP (ref 0–0)
PHOSPHATE SERPL-MCNC: 3.9 MG/DL — SIGNIFICANT CHANGE UP (ref 2.5–4.5)
PLATELET # BLD AUTO: 200 K/UL — SIGNIFICANT CHANGE UP (ref 150–400)
PMV BLD: 11.2 FL — SIGNIFICANT CHANGE UP (ref 7–13)
POTASSIUM SERPL-MCNC: 3.6 MMOL/L — SIGNIFICANT CHANGE UP (ref 3.5–5.3)
POTASSIUM SERPL-SCNC: 3.6 MMOL/L — SIGNIFICANT CHANGE UP (ref 3.5–5.3)
RBC # BLD: 3.33 M/UL — LOW (ref 3.8–5.2)
RBC # FLD: 15 % — HIGH (ref 10.3–14.5)
SODIUM SERPL-SCNC: 139 MMOL/L — SIGNIFICANT CHANGE UP (ref 135–145)
WBC # BLD: 8.03 K/UL — SIGNIFICANT CHANGE UP (ref 3.8–10.5)
WBC # FLD AUTO: 8.03 K/UL — SIGNIFICANT CHANGE UP (ref 3.8–10.5)

## 2025-08-14 PROCEDURE — 93010 ELECTROCARDIOGRAM REPORT: CPT

## 2025-08-14 RX ORDER — DROXIDOPA 300 MG/1
100 CAPSULE ORAL THREE TIMES A DAY
Refills: 0 | Status: DISCONTINUED | OUTPATIENT
Start: 2025-08-14 | End: 2025-08-15

## 2025-08-14 RX ADMIN — DROXIDOPA 100 MILLIGRAM(S): 300 CAPSULE ORAL at 20:50

## 2025-08-14 RX ADMIN — EPOETIN ALFA 10000 UNIT(S): 10000 SOLUTION INTRAVENOUS; SUBCUTANEOUS at 08:30

## 2025-08-14 RX ADMIN — Medication 1 TABLET(S): at 11:19

## 2025-08-14 RX ADMIN — Medication 75 MILLIGRAM(S): at 18:22

## 2025-08-14 RX ADMIN — Medication 1000 MILLIGRAM(S): at 07:06

## 2025-08-14 RX ADMIN — Medication 1 APPLICATION(S): at 06:01

## 2025-08-14 RX ADMIN — Medication 1 APPLICATION(S): at 10:44

## 2025-08-14 RX ADMIN — Medication 2 TABLET(S): at 20:50

## 2025-08-14 RX ADMIN — Medication 0.1 MILLIGRAM(S): at 06:01

## 2025-08-14 RX ADMIN — Medication 1000 MILLIGRAM(S): at 06:13

## 2025-08-14 RX ADMIN — Medication 90 MILLIGRAM(S): at 18:22

## 2025-08-14 RX ADMIN — FLUTICASONE PROPIONATE 1 SPRAY(S): 50 SPRAY, METERED NASAL at 18:38

## 2025-08-14 RX ADMIN — Medication 75 MILLIGRAM(S): at 00:19

## 2025-08-14 RX ADMIN — HEPARIN SODIUM 5000 UNIT(S): 1000 INJECTION INTRAVENOUS; SUBCUTANEOUS at 13:14

## 2025-08-14 RX ADMIN — HEPARIN SODIUM 5000 UNIT(S): 1000 INJECTION INTRAVENOUS; SUBCUTANEOUS at 00:20

## 2025-08-14 RX ADMIN — Medication 75 MILLIGRAM(S): at 23:42

## 2025-08-14 RX ADMIN — Medication 75 MILLIGRAM(S): at 11:18

## 2025-08-15 RX ORDER — DROXIDOPA 300 MG/1
1 CAPSULE ORAL
Qty: 90 | Refills: 0
Start: 2025-08-15 | End: 2025-09-13

## 2025-08-15 RX ORDER — FLUDROCORTISONE ACETATE 0.1 MG
1 TABLET ORAL
Qty: 30 | Refills: 0
Start: 2025-08-15 | End: 2025-09-13

## 2025-08-15 RX ADMIN — Medication 0.1 MILLIGRAM(S): at 06:43

## 2025-08-15 RX ADMIN — Medication 90 MILLIGRAM(S): at 06:41

## 2025-08-15 RX ADMIN — Medication 75 MILLIGRAM(S): at 12:10

## 2025-08-15 RX ADMIN — Medication 3 MILLIGRAM(S): at 00:04

## 2025-08-15 RX ADMIN — Medication 1 TABLET(S): at 12:09

## 2025-08-15 RX ADMIN — Medication 75 MILLIGRAM(S): at 06:42

## 2025-08-15 RX ADMIN — Medication 75 MILLIGRAM(S): at 17:38

## 2025-08-15 RX ADMIN — HEPARIN SODIUM 5000 UNIT(S): 1000 INJECTION INTRAVENOUS; SUBCUTANEOUS at 12:09

## 2025-08-15 RX ADMIN — Medication 90 MILLIGRAM(S): at 17:38

## 2025-08-15 RX ADMIN — HEPARIN SODIUM 5000 UNIT(S): 1000 INJECTION INTRAVENOUS; SUBCUTANEOUS at 00:05

## 2025-08-15 RX ADMIN — DROXIDOPA 100 MILLIGRAM(S): 300 CAPSULE ORAL at 12:10

## 2025-08-15 RX ADMIN — Medication 1 APPLICATION(S): at 06:44

## 2025-08-15 RX ADMIN — DROXIDOPA 100 MILLIGRAM(S): 300 CAPSULE ORAL at 06:42

## 2025-08-15 RX ADMIN — FLUTICASONE PROPIONATE 1 SPRAY(S): 50 SPRAY, METERED NASAL at 06:43

## 2025-08-15 RX ADMIN — LOSARTAN POTASSIUM 100 MILLIGRAM(S): 100 TABLET, FILM COATED ORAL at 06:42

## 2025-08-16 LAB
ANION GAP SERPL CALC-SCNC: 14 MMOL/L — SIGNIFICANT CHANGE UP (ref 5–17)
BUN SERPL-MCNC: 44 MG/DL — HIGH (ref 7–23)
CALCIUM SERPL-MCNC: 9.8 MG/DL — SIGNIFICANT CHANGE UP (ref 8.4–10.5)
CHLORIDE SERPL-SCNC: 98 MMOL/L — SIGNIFICANT CHANGE UP (ref 96–108)
CO2 SERPL-SCNC: 24 MMOL/L — SIGNIFICANT CHANGE UP (ref 22–31)
CREAT SERPL-MCNC: 5.1 MG/DL — HIGH (ref 0.5–1.3)
EGFR: 8 ML/MIN/1.73M2 — LOW
EGFR: 8 ML/MIN/1.73M2 — LOW
GLUCOSE SERPL-MCNC: 101 MG/DL — HIGH (ref 70–99)
HCT VFR BLD CALC: 30.6 % — LOW (ref 34.5–45)
HGB BLD-MCNC: 9.5 G/DL — LOW (ref 11.5–15.5)
MCHC RBC-ENTMCNC: 27.6 PG — SIGNIFICANT CHANGE UP (ref 27–34)
MCHC RBC-ENTMCNC: 31 G/DL — LOW (ref 32–36)
MCV RBC AUTO: 89 FL — SIGNIFICANT CHANGE UP (ref 80–100)
NRBC # BLD AUTO: 0 K/UL — SIGNIFICANT CHANGE UP (ref 0–0)
NRBC # FLD: 0 K/UL — SIGNIFICANT CHANGE UP (ref 0–0)
NRBC BLD AUTO-RTO: 0 /100 WBCS — SIGNIFICANT CHANGE UP (ref 0–0)
PLATELET # BLD AUTO: 234 K/UL — SIGNIFICANT CHANGE UP (ref 150–400)
PMV BLD: 11.6 FL — SIGNIFICANT CHANGE UP (ref 7–13)
POTASSIUM SERPL-MCNC: 3.4 MMOL/L — LOW (ref 3.5–5.3)
POTASSIUM SERPL-SCNC: 3.4 MMOL/L — LOW (ref 3.5–5.3)
RBC # BLD: 3.44 M/UL — LOW (ref 3.8–5.2)
RBC # FLD: 14.9 % — HIGH (ref 10.3–14.5)
SODIUM SERPL-SCNC: 136 MMOL/L — SIGNIFICANT CHANGE UP (ref 135–145)
WBC # BLD: 11.55 K/UL — HIGH (ref 3.8–10.5)
WBC # FLD AUTO: 11.55 K/UL — HIGH (ref 3.8–10.5)

## 2025-08-16 RX ORDER — B1/B2/B3/B5/B6/B12/VIT C/FOLIC 500-0.5 MG
1 TABLET ORAL
Qty: 30 | Refills: 0
Start: 2025-08-16 | End: 2025-09-14

## 2025-08-16 RX ORDER — FLUTICASONE PROPIONATE 50 UG/1
1 SPRAY, METERED NASAL
Qty: 1 | Refills: 0
Start: 2025-08-16 | End: 2025-08-22

## 2025-08-16 RX ORDER — LOSARTAN POTASSIUM 100 MG/1
1 TABLET, FILM COATED ORAL
Qty: 30 | Refills: 0
Start: 2025-08-16 | End: 2025-09-14

## 2025-08-16 RX ORDER — ACETAMINOPHEN 500 MG/5ML
975 LIQUID (ML) ORAL ONCE
Refills: 0 | Status: COMPLETED | OUTPATIENT
Start: 2025-08-16 | End: 2025-08-16

## 2025-08-16 RX ORDER — FLUDROCORTISONE ACETATE 0.1 MG
1 TABLET ORAL
Qty: 30 | Refills: 0
Start: 2025-08-16 | End: 2025-09-14

## 2025-08-16 RX ORDER — NIFEDIPINE 30 MG
1 TABLET, EXTENDED RELEASE 24 HR ORAL
Qty: 60 | Refills: 0
Start: 2025-08-16 | End: 2025-09-14

## 2025-08-16 RX ORDER — SENNA 187 MG
2 TABLET ORAL
Qty: 60 | Refills: 0
Start: 2025-08-16 | End: 2025-09-14

## 2025-08-16 RX ADMIN — Medication 1 APPLICATION(S): at 11:43

## 2025-08-16 RX ADMIN — Medication 75 MILLIGRAM(S): at 17:06

## 2025-08-16 RX ADMIN — FLUTICASONE PROPIONATE 1 SPRAY(S): 50 SPRAY, METERED NASAL at 17:06

## 2025-08-16 RX ADMIN — Medication 90 MILLIGRAM(S): at 17:06

## 2025-08-16 RX ADMIN — EPOETIN ALFA 10000 UNIT(S): 10000 SOLUTION INTRAVENOUS; SUBCUTANEOUS at 08:10

## 2025-08-16 RX ADMIN — Medication 2 TABLET(S): at 00:18

## 2025-08-16 RX ADMIN — FLUTICASONE PROPIONATE 1 SPRAY(S): 50 SPRAY, METERED NASAL at 05:19

## 2025-08-16 RX ADMIN — Medication 1 APPLICATION(S): at 05:19

## 2025-08-16 RX ADMIN — Medication 1 TABLET(S): at 13:13

## 2025-08-16 RX ADMIN — Medication 1000 MILLIGRAM(S): at 13:13

## 2025-08-16 RX ADMIN — HEPARIN SODIUM 5000 UNIT(S): 1000 INJECTION INTRAVENOUS; SUBCUTANEOUS at 00:19

## 2025-08-16 RX ADMIN — Medication 1000 MILLIGRAM(S): at 11:43

## 2025-08-16 RX ADMIN — Medication 75 MILLIGRAM(S): at 00:18

## 2025-08-16 RX ADMIN — Medication 75 MILLIGRAM(S): at 11:43

## 2025-08-16 RX ADMIN — Medication 0.1 MILLIGRAM(S): at 05:19

## 2025-08-16 RX ADMIN — Medication 1000 MILLIGRAM(S): at 23:46

## 2025-08-16 RX ADMIN — Medication 75 MILLIGRAM(S): at 23:45

## 2025-08-17 VITALS
SYSTOLIC BLOOD PRESSURE: 174 MMHG | OXYGEN SATURATION: 99 % | DIASTOLIC BLOOD PRESSURE: 63 MMHG | RESPIRATION RATE: 18 BRPM | TEMPERATURE: 98 F | HEART RATE: 54 BPM

## 2025-08-17 PROCEDURE — 83010 ASSAY OF HAPTOGLOBIN QUANT: CPT

## 2025-08-17 PROCEDURE — 83615 LACTATE (LD) (LDH) ENZYME: CPT

## 2025-08-17 PROCEDURE — 36556 INSERT NON-TUNNEL CV CATH: CPT

## 2025-08-17 PROCEDURE — 86901 BLOOD TYPING SEROLOGIC RH(D): CPT

## 2025-08-17 PROCEDURE — 80048 BASIC METABOLIC PNL TOTAL CA: CPT

## 2025-08-17 PROCEDURE — 82962 GLUCOSE BLOOD TEST: CPT

## 2025-08-17 PROCEDURE — 83970 ASSAY OF PARATHORMONE: CPT

## 2025-08-17 PROCEDURE — 93005 ELECTROCARDIOGRAM TRACING: CPT

## 2025-08-17 PROCEDURE — 82607 VITAMIN B-12: CPT

## 2025-08-17 PROCEDURE — 84132 ASSAY OF SERUM POTASSIUM: CPT

## 2025-08-17 PROCEDURE — 86704 HEP B CORE ANTIBODY TOTAL: CPT

## 2025-08-17 PROCEDURE — 84484 ASSAY OF TROPONIN QUANT: CPT

## 2025-08-17 PROCEDURE — 82435 ASSAY OF BLOOD CHLORIDE: CPT

## 2025-08-17 PROCEDURE — 93356 MYOCRD STRAIN IMG SPCKL TRCK: CPT

## 2025-08-17 PROCEDURE — 85014 HEMATOCRIT: CPT

## 2025-08-17 PROCEDURE — 82728 ASSAY OF FERRITIN: CPT

## 2025-08-17 PROCEDURE — 82310 ASSAY OF CALCIUM: CPT

## 2025-08-17 PROCEDURE — C1889: CPT

## 2025-08-17 PROCEDURE — P9016: CPT

## 2025-08-17 PROCEDURE — C1752: CPT

## 2025-08-17 PROCEDURE — 86706 HEP B SURFACE ANTIBODY: CPT

## 2025-08-17 PROCEDURE — 84100 ASSAY OF PHOSPHORUS: CPT

## 2025-08-17 PROCEDURE — 83605 ASSAY OF LACTIC ACID: CPT

## 2025-08-17 PROCEDURE — 83540 ASSAY OF IRON: CPT

## 2025-08-17 PROCEDURE — 83036 HEMOGLOBIN GLYCOSYLATED A1C: CPT

## 2025-08-17 PROCEDURE — 85610 PROTHROMBIN TIME: CPT

## 2025-08-17 PROCEDURE — 83550 IRON BINDING TEST: CPT

## 2025-08-17 PROCEDURE — 87641 MR-STAPH DNA AMP PROBE: CPT

## 2025-08-17 PROCEDURE — 80053 COMPREHEN METABOLIC PANEL: CPT

## 2025-08-17 PROCEDURE — 86803 HEPATITIS C AB TEST: CPT

## 2025-08-17 PROCEDURE — 93970 EXTREMITY STUDY: CPT

## 2025-08-17 PROCEDURE — 99261: CPT

## 2025-08-17 PROCEDURE — 97162 PT EVAL MOD COMPLEX 30 MIN: CPT

## 2025-08-17 PROCEDURE — 87640 STAPH A DNA AMP PROBE: CPT

## 2025-08-17 PROCEDURE — 82746 ASSAY OF FOLIC ACID SERUM: CPT

## 2025-08-17 PROCEDURE — 87340 HEPATITIS B SURFACE AG IA: CPT

## 2025-08-17 PROCEDURE — 36430 TRANSFUSION BLD/BLD COMPNT: CPT

## 2025-08-17 PROCEDURE — 86900 BLOOD TYPING SEROLOGIC ABO: CPT

## 2025-08-17 PROCEDURE — C1894: CPT

## 2025-08-17 PROCEDURE — 85027 COMPLETE CBC AUTOMATED: CPT

## 2025-08-17 PROCEDURE — 76937 US GUIDE VASCULAR ACCESS: CPT

## 2025-08-17 PROCEDURE — 85025 COMPLETE CBC W/AUTO DIFF WBC: CPT

## 2025-08-17 PROCEDURE — C1750: CPT

## 2025-08-17 PROCEDURE — 82803 BLOOD GASES ANY COMBINATION: CPT

## 2025-08-17 PROCEDURE — 85018 HEMOGLOBIN: CPT

## 2025-08-17 PROCEDURE — 84295 ASSAY OF SERUM SODIUM: CPT

## 2025-08-17 PROCEDURE — 85730 THROMBOPLASTIN TIME PARTIAL: CPT

## 2025-08-17 PROCEDURE — 99285 EMERGENCY DEPT VISIT HI MDM: CPT | Mod: 25

## 2025-08-17 PROCEDURE — 71046 X-RAY EXAM CHEST 2 VIEWS: CPT

## 2025-08-17 PROCEDURE — 82947 ASSAY GLUCOSE BLOOD QUANT: CPT

## 2025-08-17 PROCEDURE — 36415 COLL VENOUS BLD VENIPUNCTURE: CPT

## 2025-08-17 PROCEDURE — 77001 FLUOROGUIDE FOR VEIN DEVICE: CPT

## 2025-08-17 PROCEDURE — 36558 INSERT TUNNELED CV CATH: CPT

## 2025-08-17 PROCEDURE — 94640 AIRWAY INHALATION TREATMENT: CPT

## 2025-08-17 PROCEDURE — C1769: CPT

## 2025-08-17 PROCEDURE — 86923 COMPATIBILITY TEST ELECTRIC: CPT

## 2025-08-17 PROCEDURE — 93306 TTE W/DOPPLER COMPLETE: CPT

## 2025-08-17 PROCEDURE — 83735 ASSAY OF MAGNESIUM: CPT

## 2025-08-17 PROCEDURE — 82330 ASSAY OF CALCIUM: CPT

## 2025-08-17 PROCEDURE — 86850 RBC ANTIBODY SCREEN: CPT

## 2025-08-17 RX ADMIN — Medication 1 TABLET(S): at 11:11

## 2025-08-17 RX ADMIN — Medication 1000 MILLIGRAM(S): at 05:16

## 2025-08-17 RX ADMIN — Medication 1000 MILLIGRAM(S): at 12:38

## 2025-08-17 RX ADMIN — Medication 1000 MILLIGRAM(S): at 11:04

## 2025-08-17 RX ADMIN — HEPARIN SODIUM 5000 UNIT(S): 1000 INJECTION INTRAVENOUS; SUBCUTANEOUS at 13:08

## 2025-08-17 RX ADMIN — LOSARTAN POTASSIUM 100 MILLIGRAM(S): 100 TABLET, FILM COATED ORAL at 05:16

## 2025-08-17 RX ADMIN — Medication 90 MILLIGRAM(S): at 05:20

## 2025-08-17 RX ADMIN — Medication 0.1 MILLIGRAM(S): at 05:17

## 2025-08-17 RX ADMIN — Medication 75 MILLIGRAM(S): at 05:15

## 2025-08-17 RX ADMIN — Medication 1 APPLICATION(S): at 11:05

## 2025-08-17 RX ADMIN — Medication 75 MILLIGRAM(S): at 11:05

## 2025-08-17 RX ADMIN — Medication 1000 MILLIGRAM(S): at 00:08

## 2025-09-08 VITALS — BODY MASS INDEX: 30.73 KG/M2 | WEIGHT: 180 LBS | HEIGHT: 64 IN

## 2025-09-09 ENCOUNTER — APPOINTMENT (OUTPATIENT)
Dept: ORTHOPEDIC SURGERY | Facility: CLINIC | Age: 80
End: 2025-09-09

## 2025-09-09 VITALS — HEIGHT: 64 IN | WEIGHT: 180 LBS | BODY MASS INDEX: 30.73 KG/M2

## 2025-09-10 ENCOUNTER — EMERGENCY (EMERGENCY)
Facility: HOSPITAL | Age: 80
LOS: 1 days | End: 2025-09-10
Attending: EMERGENCY MEDICINE
Payer: COMMERCIAL

## 2025-09-10 VITALS
RESPIRATION RATE: 20 BRPM | HEIGHT: 63 IN | TEMPERATURE: 98 F | OXYGEN SATURATION: 99 % | HEART RATE: 58 BPM | WEIGHT: 145.06 LBS | SYSTOLIC BLOOD PRESSURE: 169 MMHG | DIASTOLIC BLOOD PRESSURE: 75 MMHG

## 2025-09-10 VITALS
HEART RATE: 63 BPM | RESPIRATION RATE: 20 BRPM | TEMPERATURE: 98 F | DIASTOLIC BLOOD PRESSURE: 76 MMHG | SYSTOLIC BLOOD PRESSURE: 168 MMHG | OXYGEN SATURATION: 98 %

## 2025-09-10 DIAGNOSIS — Z98.890 OTHER SPECIFIED POSTPROCEDURAL STATES: Chronic | ICD-10-CM

## 2025-09-10 PROBLEM — I10 ESSENTIAL (PRIMARY) HYPERTENSION: Chronic | Status: ACTIVE | Noted: 2025-07-24

## 2025-09-10 PROBLEM — E11.9 TYPE 2 DIABETES MELLITUS WITHOUT COMPLICATIONS: Chronic | Status: ACTIVE | Noted: 2025-07-24

## 2025-09-10 PROBLEM — N18.9 CHRONIC KIDNEY DISEASE, UNSPECIFIED: Chronic | Status: ACTIVE | Noted: 2025-07-24

## 2025-09-10 LAB
BASOPHILS # BLD AUTO: 0.09 K/UL — SIGNIFICANT CHANGE UP (ref 0–0.2)
BASOPHILS NFR BLD AUTO: 1 % — SIGNIFICANT CHANGE UP (ref 0–2)
EOSINOPHIL # BLD AUTO: 0.58 K/UL — HIGH (ref 0–0.5)
EOSINOPHIL NFR BLD AUTO: 6.5 % — HIGH (ref 0–6)
HCT VFR BLD CALC: 44 % — SIGNIFICANT CHANGE UP (ref 34.5–45)
HGB BLD-MCNC: 13.5 G/DL — SIGNIFICANT CHANGE UP (ref 11.5–15.5)
IMM GRANULOCYTES # BLD AUTO: 0.03 K/UL — SIGNIFICANT CHANGE UP (ref 0–0.07)
IMM GRANULOCYTES NFR BLD AUTO: 0.3 % — SIGNIFICANT CHANGE UP (ref 0–0.9)
LYMPHOCYTES # BLD AUTO: 2.13 K/UL — SIGNIFICANT CHANGE UP (ref 1–3.3)
LYMPHOCYTES NFR BLD AUTO: 24 % — SIGNIFICANT CHANGE UP (ref 13–44)
MCHC RBC-ENTMCNC: 27.1 PG — SIGNIFICANT CHANGE UP (ref 27–34)
MCHC RBC-ENTMCNC: 30.7 G/DL — LOW (ref 32–36)
MCV RBC AUTO: 88.4 FL — SIGNIFICANT CHANGE UP (ref 80–100)
MONOCYTES # BLD AUTO: 0.68 K/UL — SIGNIFICANT CHANGE UP (ref 0–0.9)
MONOCYTES NFR BLD AUTO: 7.7 % — SIGNIFICANT CHANGE UP (ref 2–14)
NEUTROPHILS # BLD AUTO: 5.35 K/UL — SIGNIFICANT CHANGE UP (ref 1.8–7.4)
NEUTROPHILS NFR BLD AUTO: 60.5 % — SIGNIFICANT CHANGE UP (ref 43–77)
NRBC # BLD AUTO: 0 K/UL — SIGNIFICANT CHANGE UP (ref 0–0)
NRBC # FLD: 0 K/UL — SIGNIFICANT CHANGE UP (ref 0–0)
NRBC BLD AUTO-RTO: 0 /100 WBCS — SIGNIFICANT CHANGE UP (ref 0–0)
PLATELET # BLD AUTO: 259 K/UL — SIGNIFICANT CHANGE UP (ref 150–400)
PMV BLD: 10.4 FL — SIGNIFICANT CHANGE UP (ref 7–13)
RBC # BLD: 4.98 M/UL — SIGNIFICANT CHANGE UP (ref 3.8–5.2)
RBC # FLD: 14.1 % — SIGNIFICANT CHANGE UP (ref 10.3–14.5)
WBC # BLD: 8.86 K/UL — SIGNIFICANT CHANGE UP (ref 3.8–10.5)
WBC # FLD AUTO: 8.86 K/UL — SIGNIFICANT CHANGE UP (ref 3.8–10.5)

## 2025-09-10 PROCEDURE — 99284 EMERGENCY DEPT VISIT MOD MDM: CPT

## 2025-09-10 PROCEDURE — 85025 COMPLETE CBC W/AUTO DIFF WBC: CPT

## 2025-09-10 PROCEDURE — 99285 EMERGENCY DEPT VISIT HI MDM: CPT

## 2025-09-10 PROCEDURE — 36415 COLL VENOUS BLD VENIPUNCTURE: CPT

## 2025-09-16 ENCOUNTER — APPOINTMENT (OUTPATIENT)
Dept: VASCULAR SURGERY | Facility: CLINIC | Age: 80
End: 2025-09-16
Payer: MEDICARE

## 2025-09-16 ENCOUNTER — APPOINTMENT (OUTPATIENT)
Dept: VASCULAR SURGERY | Facility: CLINIC | Age: 80
End: 2025-09-16

## 2025-09-16 VITALS
HEART RATE: 58 BPM | HEIGHT: 64 IN | SYSTOLIC BLOOD PRESSURE: 135 MMHG | WEIGHT: 180 LBS | TEMPERATURE: 98.3 F | DIASTOLIC BLOOD PRESSURE: 71 MMHG | BODY MASS INDEX: 30.73 KG/M2

## 2025-09-16 PROCEDURE — 93990 DOPPLER FLOW TESTING: CPT

## 2025-09-16 RX ORDER — HYDRALAZINE HYDROCHLORIDE 50 MG/1
TABLET ORAL
Refills: 0 | Status: ACTIVE | COMMUNITY

## 2025-09-16 RX ORDER — LOSARTAN POTASSIUM 100 MG/1
TABLET, FILM COATED ORAL
Refills: 0 | Status: ACTIVE | COMMUNITY

## (undated) DEVICE — GLV 7.5 PROTEXIS (WHITE)

## (undated) DEVICE — MISONIX BONESCALPEL BLUNT BLADE & TUBESET 20MM

## (undated) DEVICE — DRAPE TOWEL BLUE 17" X 24"

## (undated) DEVICE — SUT SILK 3-0 18" TIES

## (undated) DEVICE — VENODYNE/SCD SLEEVE CALF MEDIUM

## (undated) DEVICE — ELCTR STRYKER NEPTUNE SMOKE EVACUATION PENCIL (GREEN)

## (undated) DEVICE — SUT VICRYL 0 18" CT-1 UNDYED (POP-OFF)

## (undated) DEVICE — DRAPE TOWEL 1010

## (undated) DEVICE — POSITIONER PATIENT SAFETY STRAP 3X60"

## (undated) DEVICE — SOL IRR POUR H2O 250ML

## (undated) DEVICE — FRAZIER SUCTION TIP 12FR

## (undated) DEVICE — SUT STRATAFIX SPIRAL MONOCRYL PLUS 4-0 45CM PS-2 UNDYED

## (undated) DEVICE — Device

## (undated) DEVICE — KIT ARRAY PULSE PATIENT REFERENCE

## (undated) DEVICE — DRILL BIT NUVASIVE PULSE AO 3MM

## (undated) DEVICE — WARMING BLANKET UPPER ADULT

## (undated) DEVICE — ELCTR AQUAMANTYS BIPOLAR SEALER 6.0

## (undated) DEVICE — SUT PROLENE 6-0 4-30" C-1

## (undated) DEVICE — MEDICATION LABELS W MARKER

## (undated) DEVICE — MIDAS REX LEGEND MATCH HEAD FLUTED LG BORE 3.0MM X 14CM

## (undated) DEVICE — BLADE SCALPEL SAFETYLOCK #10

## (undated) DEVICE — ELCTR GROUNDING PAD ADULT COVIDIEN

## (undated) DEVICE — DRAPE C ARM 41X140"

## (undated) DEVICE — WARMING BLANKET LOWER ADULT

## (undated) DEVICE — GLV 7 PROTEXIS (WHITE)

## (undated) DEVICE — SUT POLYSORB 3-0 30" V-20 UNDYED

## (undated) DEVICE — PREP DURAPREP 26CC

## (undated) DEVICE — BLADE SCALPEL SAFETYLOCK #15

## (undated) DEVICE — GLV 7 PROTEXIS (BLUE)

## (undated) DEVICE — BLADE SCALPEL SAFETYLOCK #11

## (undated) DEVICE — PROBE STIM MONOPOLAR

## (undated) DEVICE — DRAPE INSTRUMENT POUCH 6.75" X 11"

## (undated) DEVICE — GOWN TRIMAX LG

## (undated) DEVICE — GLV 8 PROTEXIS (BLUE)

## (undated) DEVICE — GLV 6.5 PROTEXIS (BLUE)

## (undated) DEVICE — DRSG CURITY GAUZE SPONGE 4 X 4" 12-PLY

## (undated) DEVICE — FOLEY TRAY 14FR 5CC LF UMETER CLOSED

## (undated) DEVICE — POSITIONER FOAM HEAD DONUT 9" (PINK)

## (undated) DEVICE — DRSG STERISTRIPS 0.5 X 4"

## (undated) DEVICE — STAPLER SKIN VISI-STAT 35 WIDE

## (undated) DEVICE — DRAPE SHOWER CURTAIN ISOLATION

## (undated) DEVICE — SUT SOFSILK 2-0 18" TIES

## (undated) DEVICE — SUCTION YANKAUER NO CONTROL VENT

## (undated) DEVICE — SUT PROLENE 7-0 24" BV-1

## (undated) DEVICE — DRAPE 3/4 SHEET W REINFORCEMENT 56X77"

## (undated) DEVICE — SUT PROLENE 6-0 4-30" BV-1

## (undated) DEVICE — SPECIMEN CONTAINER 100ML

## (undated) DEVICE — ELCTR BOVIE TIP BLADE INSULATED 2.75" EDGE WITH SAFETY

## (undated) DEVICE — MIDAS REX MR7 LUBRICANT DIFFUSER CARTRIDGE

## (undated) DEVICE — SUT VICRYL 1 18" CT-1 UNDYED (POP-OFF)

## (undated) DEVICE — CLAMP BULLDOG MIDI 45 DEGREE (GREEN) DISP

## (undated) DEVICE — SOL INJ NS 0.9% 500ML 1-PORT

## (undated) DEVICE — NDL SPINAL 18G X 3.5" (PINK)

## (undated) DEVICE — ELCTR BOVIE TIP BLADE INSULATED 4" EDGE

## (undated) DEVICE — PROBE STIM CLIP-ACTIVATOR SNGL

## (undated) DEVICE — PACK AV FISTULA

## (undated) DEVICE — DRSG TEGADERM 4X4.75"

## (undated) DEVICE — POSITIONER FOAM LAMINECTOMY ARM CRADLE (PINK)

## (undated) DEVICE — CLAMP BULLDOG MIDI STRAIGHT (YELLOW) DISP

## (undated) DEVICE — VESSEL LOOP MINI-BLUE 0.075" X 16"

## (undated) DEVICE — GOWN IMPERV BREATHABLE XL

## (undated) DEVICE — DRSG DERMABOND PRINEO 22CM

## (undated) DEVICE — BIPOLAR FORCEP HENSLER BAYONET 10" X 1MM (YELLOW)

## (undated) DEVICE — FRAZIER SUCTION TIP 10FR

## (undated) DEVICE — PREP CHLORAPREP HI-LITE ORANGE 26ML

## (undated) DEVICE — VESSEL LOOP MAXI-BLUE 0.120" X 16"

## (undated) DEVICE — POSITIONER FOAM EGG CRATE ULNAR 2PCS (PINK)

## (undated) DEVICE — DRSG DERMABOND 0.7ML

## (undated) DEVICE — KIT NDL PULSE EMG ELECTRODE

## (undated) DEVICE — DRAPE LIGHT HANDLE COVER (BLUE)

## (undated) DEVICE — DRSG XEROFORM 5 X 9"

## (undated) DEVICE — DRSG OPSITE 13.75 X 4"

## (undated) DEVICE — DRSG TEGADERM 6 X 8"

## (undated) DEVICE — POSITIONER CUSHION INSERT PRONE VIEW LG

## (undated) DEVICE — VISITEC 4X4

## (undated) DEVICE — SUT SOFSILK 4-0 18" TIES

## (undated) DEVICE — SUT VICRYL 2-0 18" CP-2 UNDYED (POP-OFF)

## (undated) DEVICE — SUT BIOSYN 4-0 18" P-12

## (undated) DEVICE — KIT DILATOR KWIRE NVM5 XLIF

## (undated) DEVICE — GLV 6 PROTEXIS (WHITE)

## (undated) DEVICE — SOL IRR POUR NS 0.9% 1000ML

## (undated) DEVICE — STRYKER BONE MILL BLADE FINE 3.2MM

## (undated) DEVICE — GLV 6.5 PROTEXIS (WHITE)

## (undated) DEVICE — TUBING BIPOLAR IRRIGATOR AND CORD SET

## (undated) DEVICE — DURABLE MEDICAL EQUIPMENT: Type: DURABLE MEDICAL EQUIPMENT